# Patient Record
Sex: MALE | Race: WHITE | Employment: OTHER | URBAN - METROPOLITAN AREA
[De-identification: names, ages, dates, MRNs, and addresses within clinical notes are randomized per-mention and may not be internally consistent; named-entity substitution may affect disease eponyms.]

---

## 2017-01-24 ENCOUNTER — HOSPITAL ENCOUNTER (OUTPATIENT)
Dept: LAB | Age: 82
Discharge: HOME | End: 2017-01-24
Payer: MEDICARE

## 2017-01-24 ENCOUNTER — OFFICE VISIT (OUTPATIENT)
Dept: CARDIOLOGY CLINIC | Age: 82
End: 2017-01-24

## 2017-01-24 VITALS
WEIGHT: 246 LBS | HEIGHT: 69 IN | HEART RATE: 62 BPM | DIASTOLIC BLOOD PRESSURE: 90 MMHG | OXYGEN SATURATION: 97 % | SYSTOLIC BLOOD PRESSURE: 152 MMHG | BODY MASS INDEX: 36.43 KG/M2

## 2017-01-24 DIAGNOSIS — I48.92 PAROXYSMAL ATRIAL FLUTTER (HCC): Primary | ICD-10-CM

## 2017-01-24 DIAGNOSIS — R53.83 FATIGUE, UNSPECIFIED TYPE: ICD-10-CM

## 2017-01-24 DIAGNOSIS — Z98.890 S/P ABLATION OF ATRIAL FLUTTER: ICD-10-CM

## 2017-01-24 DIAGNOSIS — Z86.79 S/P ABLATION OF ATRIAL FLUTTER: ICD-10-CM

## 2017-01-24 DIAGNOSIS — I10 ESSENTIAL HYPERTENSION: ICD-10-CM

## 2017-01-24 LAB
ANION GAP BLD CALC-SCNC: 6 MMOL/L (ref 3–18)
BUN SERPL-MCNC: 12 MG/DL (ref 7–18)
BUN/CREAT SERPL: 16 (ref 12–20)
CALCIUM SERPL-MCNC: 8.9 MG/DL (ref 8.5–10.1)
CHLORIDE SERPL-SCNC: 103 MMOL/L (ref 100–108)
CO2 SERPL-SCNC: 29 MMOL/L (ref 21–32)
CREAT SERPL-MCNC: 0.73 MG/DL (ref 0.6–1.3)
GLUCOSE SERPL-MCNC: 93 MG/DL (ref 74–99)
POTASSIUM SERPL-SCNC: 4.3 MMOL/L (ref 3.5–5.5)
SODIUM SERPL-SCNC: 138 MMOL/L (ref 136–145)

## 2017-01-24 PROCEDURE — 80048 BASIC METABOLIC PNL TOTAL CA: CPT | Performed by: NURSE PRACTITIONER

## 2017-01-24 PROCEDURE — 36415 COLL VENOUS BLD VENIPUNCTURE: CPT | Performed by: NURSE PRACTITIONER

## 2017-01-24 RX ORDER — HYDROCHLOROTHIAZIDE 25 MG/1
25 TABLET ORAL DAILY
Qty: 30 TAB | Refills: 6 | Status: SHIPPED | OUTPATIENT
Start: 2017-01-24 | End: 2017-02-07 | Stop reason: SDUPTHER

## 2017-01-24 NOTE — PROGRESS NOTES
1. Have you been to the ER, urgent care clinic since your last visit? Hospitalized since your last visit? No    2. Have you seen or consulted any other health care providers outside of the [address] [place] [address] since your last visit? Include any pap smears or colon screening.  No

## 2017-01-24 NOTE — MR AVS SNAPSHOT
Visit Information Date & Time Provider Department Dept. Phone Encounter #  
 1/24/2017 10:30 AM Raffi Diez NP [facility] [address] [id number] Your Appointments 2/7/2017  9:30 AM  
Follow Up with Raffi Diez NP [facility] ([facility]) Appt Note: 2 week  f/u HTN  
 [address] [place] [address] [address] [address] [id number] [phone] 2300 [facility] [address] [facility]  
  
    
 3/6/2017  8:15 AM  
Office Visit with Ruy Shepadr MD  
Internist of [address] [place] [address] Appt Note: ov 6mos. rm; ov 6mos. rm  
 [address] [facility], [address] [address] [address] [place]  
  
   
 54[address] [facility] [place], [address]  
  
    
 12/14/2017  8:00 AM  
Follow Up with Priyanka Velazquez MD  
[facility] ([facility]) Appt Note: 1 yr f/up [place] [address] [address] [address] [phone] 2300 [facility] [address] Upcoming Health Maintenance Date Due DTaP/Tdap/Td series (1 - Tdap) 8/13/1953 MEDICARE YEARLY EXAM 8/13/1997 GLAUCOMA SCREENING Q2Y 6/25/2017 Pneumococcal 65+ Low/Medium Risk (2 of 2 - PPSV23) 10/28/2017 Allergies as of 1/24/2017  Review Complete On: 1/24/2017 By: Raffi Diez NP No Known Allergies Current Immunizations  Reviewed on 12/1/2016 Name Date Influenza High Dose Vaccine PF 10/16/2016 Influenza Vaccine 10/14/2014 Influenza Vaccine Whole 10/28/2012 Pneumococcal Polysaccharide (PPSV-23) 10/28/2012 Pneumococcal Vaccine (Unspecified Type) 10/28/2012 Not reviewed this visit You Were Diagnosed With   
  
 Codes Comments Paroxysmal atrial flutter (HCC)    -  Primary ICD-10-CM: I48.92 
ICD-9-CM: 427.32 Essential hypertension     ICD-10-CM: I10 
ICD-9-CM: 401.9 S/P ablation of atrial flutter     ICD-10-CM: Z98.890, Z86.79 
ICD-9-CM: V45.89 Fatigue, unspecified type     ICD-10-CM: R53.83 ICD-9-CM: 780.79 Vitals BP Pulse Height(growth percentile) Weight(growth percentile) SpO2 BMI  
 152/90 62 5' 9\" (1.753 m) 246 lb (111.6 kg) 97% 36.33 kg/m2 Smoking Status Former Smoker Vitals History BMI and BSA Data Body Mass Index Body Surface Area  
 36.33 kg/m 2 2.33 m 2 Preferred Pharmacy Pharmacy Name Phone [email or web address] [address] [place], [address] Your Updated Medication List  
  
   
This list is accurate as of: 1/24/17 11:44 AM.  Always use your most recent med list.  
  
  
  
  
 FISH OIL 1,000 mg Cap Generic drug:  omega-3 fatty acids-vitamin e Take 1 Cap by mouth two (2) times a day. metoprolol succinate 25 mg XL tablet Commonly known as:  TOPROL-XL Take 1 Tab by mouth daily. multivitamin tablet Commonly known as:  ONE A DAY Take 1 Tab by mouth daily. OSTEO BI-FLEX (5-LOXIN) 1,500-400-100 mg-unit-mg Tab Generic drug:  glucosamine-D3-Boswellia serr Take 1 Tab by mouth two (2) times a day. To-Do List   
 01/24/2017 Lab:  METABOLIC PANEL, BASIC Around 01/31/2017 ECHO:  2D ECHO COMPLETE ADULT (TTE) W OR WO CONTR Patient Instructions BMP Begin HCTZ 25mg - take one tablet daily All other medications to remain the same Echocardiogram; Dx;  Lower extremity edema, fatigue, evaluate LV function Follow-up with Gerry Gomes in 2 weeks Introducing [facility]! [facility] introduces Haloband patient portal. Now you can access parts of your medical record, email your doctor's office, and request medication refills online. 1. In your internet browser, go to [email or web address] 2. Click on the First Time User? Click Here link in the Sign In box.  You will see the New Member Sign Up page. 3. Enter your buildabrand Access Code exactly as it appears below. You will not need to use this code after youve completed the sign-up process. If you do not sign up before the expiration date, you must request a new code. · buildabrand Access Code: [email or web address] Expires: 3/15/2017  7:46 AM 
 
4. Enter the last four digits of your Social Security Number (xxxx) and Date of Birth (mm/dd/yyyy) as indicated and click Submit. You will be taken to the next sign-up page. 5. Create a buildabrand ID. This will be your buildabrand login ID and cannot be changed, so think of one that is secure and easy to remember. 6. Create a buildabrand password. You can change your password at any time. 7. Enter your Password Reset Question and Answer. This can be used at a later time if you forget your password. 8. Enter your e-mail address. You will receive e-mail notification when new information is available in [address] 9. Click Sign Up. You can now view and download portions of your medical record. 10. Click the Download Summary menu link to download a portable copy of your medical information. If you have questions, please visit the Frequently Asked Questions section of the buildabrand website. Remember, buildabrand is NOT to be used for urgent needs. For medical emergencies, dial 911. Now available from your iPhone and Android! Please provide this summary of care documentation to your next provider. Your primary care clinician is listed as Swapna Alcala. Cordell Chambers. If you have any questions after today's visit, please call [phone].

## 2017-01-24 NOTE — PATIENT INSTRUCTIONS
BMP  Begin HCTZ 25mg - take one tablet daily  All other medications to remain the same  Echocardiogram; Dx;  Lower extremity edema, fatigue, evaluate LV function  Follow-up with Duran Jones in 2 weeks

## 2017-01-24 NOTE — PROGRESS NOTES
Herminia Lai presents today for evaluation of complaints of left ankle swelling. He states that this has been occurring for some time as he had a previous fracture in this foot and the swelling intensifies the longer he stays on it. Mr. Kimmie Salinas is an 61-year-old  male with history of recurrent atrial flutter with ablation in January 2013. He is on a low-dose beta-blocker as he is unable to tolerate higher doses due to mild bradycardia. His last echocardiogram was done in 2011 which showed biatrial enlargement and transient cardiomyopathy. He also has history of hypertension and degenerative joint disease. He was last seen by Dr. Nick Zhang in mid December 2016 and during that visit an echocardiogram was recommended but he he and his wife did not want to proceed at that time. Aside from the swelling around his left ankle, he states that he has no other complaints. He denies chest pain, tightness, heaviness, and palpitations. He denies shortness of breath at rest, dyspnea on exertion, orthopnea and PND. He denies abdominal bloating. He denies lightheadedness, dizziness, and syncope. He admits to swelling around his left ankle. He denies claudication. Denies nausea, vomiting, diarrhea, fever, chills. PMH:  Past Medical History   Diagnosis Date    Arthritis      knees    Cardiac echocardiogram 06/16/2011     Normal LV systolic function. Mild conc LVH. Gr 1 DDfx. RVSP 35-40 mmHg. Marked LAE. Marked ILSA. Mild-mod MR.  Mild-mod AI. Mild AoRE. Mild aortic arch dil.  Cardiac Holter monitor study 06/22/2011     Baseline sinus rhythm to sinus bradycardia, avg HR 60 bpm (range  bpm). Questionable chronotropic intolerance. No sustained arrhythmias.  GERD (gastroesophageal reflux disease)     History of atrial fibrillation 2009    Hypertension     Unspecified adverse effect of anesthesia      H/o A-fib immediately post colonoscopy.        PSH:  Past Surgical History Procedure Laterality Date    Pr egd deliver thermal energy sphnctr/cardia gerd      Hx colonoscopy      Hx svt ablation  1/2/2013     by Dr. Harry Quinteros Hx knee replacement  2/19/2013     right    Hx orthopaedic Right 2/13     TKR, [place]    Hx tonsillectomy      Pr cardiac surg procedure unlist       hx atrial flutter ablation 2013    Hx gi         MEDS:  Current Outpatient Prescriptions   Medication Sig    metoprolol succinate (TOPROL-XL) 25 mg XL tablet Take 1 Tab by mouth daily.  multivitamin (ONE A DAY) tablet Take 1 Tab by mouth daily.  glucosamine-D3-boswellia serr (OSTEO BI-FLEX) 1,500-400-100 mg-unit-mg Tab Take 1 Tab by mouth two (2) times a day.  omega-3 fatty acids-vitamin e (FISH OIL) 1,000 mg cap Take 1 Cap by mouth two (2) times a day. No current facility-administered medications for this visit. Allergies and Sensitivities:  No Known Allergies    Family History:  Family History   Problem Relation Age of Onset    Pacemaker Father     Pacemaker Sister     Heart Failure Brother        Social History:  He  reports that he quit smoking about 52 years ago. He smoked 1.00 pack per day. He has never used smokeless tobacco.  He  reports that he drinks alcohol. Physical:  Visit Vitals    /90    Pulse 62    Ht 5' 9\" (1.753 m)    Wt 111.6 kg (246 lb)    SpO2 97%    BMI 36.33 kg/m2       His weight is down 4 pounds since his last visit. Exam:  Neck:  Supple, no JVD, no carotid bruits  CV:  Normal S1 and  S2, no murmurs, rubs, or gallops noted  Lungs:  Clear to ausculation throughout, no wheezes or rales  Abd:  Soft, non-tender, non-distended with good bowel sounds. No hepatosplenomegaly  Extremities:  Trace edema around the right ankle and 1+ edema around the left ankle.       Data:  EKG:        LABS:  Lab Results   Component Value Date/Time    Sodium 142 01/12/2016 08:02 AM    Potassium 4.2 01/12/2016 08:02 AM    Chloride 102 01/12/2016 08:02 AM    CO2 27 01/12/2016 08:02 AM    Glucose 89 01/12/2016 08:02 AM    BUN 17 01/12/2016 08:02 AM    Creatinine 0.78 01/12/2016 08:02 AM     Lab Results   Component Value Date/Time    Cholesterol, total 171 01/12/2016 08:02 AM    HDL Cholesterol 43 01/12/2016 08:02 AM    LDL, calculated 80.2 01/12/2016 08:02 AM    Triglyceride 239 01/12/2016 08:02 AM    CHOL/HDL Ratio 4.0 01/12/2016 08:02 AM     Lab Results   Component Value Date/Time    ALT 33 01/12/2016 08:02 AM       Impression / Plan:  1. Paroxysmal atrial flutter, status post atrial flutter ablation in January 2013, with no recurrence  2. Hypertension, blood pressure elevated  3. Lower extremity edema  4. Degenerative joint disease  5. History of transient cardiomyopathy    Mr. Salvatore Pimentel presents to the office today for evaluation of complaints of left ankle edema. He states that he recently noticed that it is a little more than usual and that it typically is worse when he is on his feet for long period of time. He also complains of some fatigue. His wife states that he went to [facility] and they did a x-ray. He was last seen by Dr. Mendez Smith in mid December 2016 and during that visit an echocardiogram was recommended. However, Mr. Salvatore Pimentel declined at that time. His last echocardiogram was done in June 2011 and at that time he had normal LV systolic function, grade 1 diastolic dysfunction, RVSP 35-40 mmHg. Will request that he have an echocardiogram done to reevaluate his LV function. He does have a history of transient cardiomyopathy. His blood pressure is elevated today. He is currently only taking a low-dose beta-blocker at 25 mg per day. I will add HCTZ 25 mg once a day to assist with blood pressure control and his complaints of lower extremity edema. He was given a lab requisition to have a BMP done today as his last BMP was done in January 2016. I asked that he return for follow-up in 2 weeks for reevaluation of his blood pressure and edema.     He will follow-up with Dr. Rosalee Medina as scheduled and as needed.       Tyree Hutchison MSN, FNP-BC

## 2017-01-25 ENCOUNTER — HOSPITAL ENCOUNTER (OUTPATIENT)
Dept: NON INVASIVE DIAGNOSTICS | Age: 82
Discharge: HOME | End: 2017-01-25
Attending: NURSE PRACTITIONER
Payer: MEDICARE

## 2017-01-25 DIAGNOSIS — I10 ESSENTIAL HYPERTENSION: ICD-10-CM

## 2017-01-25 PROCEDURE — 93306 TTE W/DOPPLER COMPLETE: CPT

## 2017-01-25 NOTE — PROGRESS NOTES
Echocardiogram was completed, report to follow. Patient armband removed and given to patient to take home. Patient was informed of the privacy risks if armband lost or stolen.

## 2017-02-07 ENCOUNTER — HOSPITAL ENCOUNTER (OUTPATIENT)
Dept: LAB | Age: 82
Discharge: HOME | End: 2017-02-07
Payer: MEDICARE

## 2017-02-07 ENCOUNTER — OFFICE VISIT (OUTPATIENT)
Dept: CARDIOLOGY CLINIC | Age: 82
End: 2017-02-07

## 2017-02-07 VITALS
WEIGHT: 243 LBS | OXYGEN SATURATION: 98 % | HEART RATE: 71 BPM | SYSTOLIC BLOOD PRESSURE: 130 MMHG | BODY MASS INDEX: 35.99 KG/M2 | HEIGHT: 69 IN | DIASTOLIC BLOOD PRESSURE: 72 MMHG

## 2017-02-07 DIAGNOSIS — I42.9 CARDIOMYOPATHY (HCC): ICD-10-CM

## 2017-02-07 DIAGNOSIS — Z98.890 S/P ABLATION OF ATRIAL FLUTTER: ICD-10-CM

## 2017-02-07 DIAGNOSIS — I10 ESSENTIAL HYPERTENSION: Primary | ICD-10-CM

## 2017-02-07 DIAGNOSIS — I48.3 TYPICAL ATRIAL FLUTTER (HCC): ICD-10-CM

## 2017-02-07 DIAGNOSIS — Z86.79 S/P ABLATION OF ATRIAL FLUTTER: ICD-10-CM

## 2017-02-07 LAB
ANION GAP BLD CALC-SCNC: 8 MMOL/L (ref 3–18)
BUN SERPL-MCNC: 14 MG/DL (ref 7–18)
BUN/CREAT SERPL: 16 (ref 12–20)
CALCIUM SERPL-MCNC: 9.3 MG/DL (ref 8.5–10.1)
CHLORIDE SERPL-SCNC: 99 MMOL/L (ref 100–108)
CO2 SERPL-SCNC: 30 MMOL/L (ref 21–32)
CREAT SERPL-MCNC: 0.86 MG/DL (ref 0.6–1.3)
GLUCOSE SERPL-MCNC: 91 MG/DL (ref 74–99)
POTASSIUM SERPL-SCNC: 4.3 MMOL/L (ref 3.5–5.5)
SODIUM SERPL-SCNC: 137 MMOL/L (ref 136–145)

## 2017-02-07 PROCEDURE — 80048 BASIC METABOLIC PNL TOTAL CA: CPT | Performed by: NURSE PRACTITIONER

## 2017-02-07 PROCEDURE — 36415 COLL VENOUS BLD VENIPUNCTURE: CPT | Performed by: NURSE PRACTITIONER

## 2017-02-07 RX ORDER — HYDROCHLOROTHIAZIDE 25 MG/1
25 TABLET ORAL DAILY
Qty: 90 TAB | Refills: 2 | Status: ON HOLD | OUTPATIENT
Start: 2017-02-07 | End: 2017-10-05

## 2017-02-07 NOTE — PROGRESS NOTES
1. Have you been to the ER, urgent care clinic since your last visit? Hospitalized since your last visit? No    2. Have you seen or consulted any other health care providers outside of the [address] [place] [address] since your last visit? Include any pap smears or colon screening.  No

## 2017-02-07 NOTE — MR AVS SNAPSHOT
Visit Information Date & Time Provider Department Dept. Phone Encounter #  
 2/7/2017  9:30 AM Priya Sarkar NP [facility] [address] [id number] Your Appointments 3/6/2017  8:15 AM  
Office Visit with Kings Jarrett MD  
Internist of [address] [place] [address] Appt Note: ov 6mos. rm; ov 6mos. rm  
 [address] [facility] [address] [address] [facility]  
  
   
 [address] [place] [address]  
  
    
 12/14/2017  8:00 AM  
Follow Up with Maynor Pickard MD  
[facility] [address] ([facility]) Appt Note: 1 yr f/up [place] [address] [address] [address] [phone] 2303 [facility] [address] Upcoming Health Maintenance Date Due DTaP/Tdap/Td series (1 - Tdap) 8/13/1953 MEDICARE YEARLY EXAM 8/13/1997 GLAUCOMA SCREENING Q2Y 6/25/2017 Pneumococcal 65+ Low/Medium Risk (2 of 2 - PPSV23) 10/28/2017 Allergies as of 2/7/2017  Review Complete On: 2/7/2017 By: Priya Sarkar NP No Known Allergies Current Immunizations  Reviewed on 12/1/2016 Name Date Influenza High Dose Vaccine PF 10/16/2016 Influenza Vaccine 10/14/2014 Influenza Vaccine Whole 10/28/2012 Pneumococcal Polysaccharide (PPSV-23) 10/28/2012 Pneumococcal Vaccine (Unspecified Type) 10/28/2012 Not reviewed this visit You Were Diagnosed With   
  
 Codes Comments Essential hypertension    -  Primary ICD-10-CM: I10 
ICD-9-CM: 401.9 Typical atrial flutter (HCC)     ICD-10-CM: I48.3 ICD-9-CM: 427.32 S/P ablation of atrial flutter     ICD-10-CM: Z98.890, Z86.79 
ICD-9-CM: V45.89 Cardiomyopathy ([address]     ICD-10-CM: I42.9 ICD-9-CM: 425.4 Vitals BP Pulse Height(growth percentile) Weight(growth percentile) SpO2 BMI  
 130/72 71 5' 9\" (1.753 m) 243 lb (110.2 kg) 98% 35.88 kg/m2 Smoking Status Former Smoker BMI and BSA Data Body Mass Index Body Surface Area  
 35.88 kg/m 2 2.32 m 2 Preferred Pharmacy Pharmacy Name Phone [facility] [address] [place], [address] Your Updated Medication List  
  
   
This list is accurate as of: 2/7/17 10:01 AM.  Always use your most recent med list.  
  
  
  
  
 FISH OIL 1,000 mg Cap Generic drug:  omega-3 fatty acids-vitamin e Take 1 Cap by mouth two (2) times a day. hydroCHLOROthiazide 25 mg tablet Commonly known as:  HYDRODIURIL Take 1 Tab by mouth daily. metoprolol succinate 25 mg XL tablet Commonly known as:  TOPROL-XL Take 1 Tab by mouth daily. multivitamin tablet Commonly known as:  ONE A DAY Take 1 Tab by mouth daily. OSTEO BI-FLEX (5-LOXIN) 1,500-400-100 mg-unit-mg Tab Generic drug:  glucosamine-D3-Boswellia serr Take 1 Tab by mouth two (2) times a day. To-Do List   
 Around 02/07/2017 Lab:  METABOLIC PANEL, BASIC Patient Instructions Continue present medication regimen BMP today Follow-up with Dr. Cheri Head as scheduled and as needed Primary care options:   
 Dr. Evelyne Laureano [facility] [address] [place] [address] # [phone] Dr. Arsen Lara 
 [address] [address] Ph:# [phone] Introducing [facility]! T[facility] introduces Flayr patient portal. Now you can access parts of your medical record, email your doctor's office, and request medication refills online. 1. In your internet browser, go to [email or web address] 2. Click on the First Time User? Click Here link in the Sign In box. You will see the New Member Sign Up page. 3. Enter your Flayr Access Code exactly as it appears below. You will not need to use this code after youve completed the sign-up process.  If you do not sign up before the expiration date, you must request a new code. · Manta Access Code: [email or web address] Expires: 3/15/2017  7:46 AM 
 
4. Enter the last four digits of your Social Security Number (xxxx) and Date of Birth (mm/dd/yyyy) as indicated and click Submit. You will be taken to the next sign-up page. 5. Create a Manta ID. This will be your Manta login ID and cannot be changed, so think of one that is secure and easy to remember. 6. Create a Manta password. You can change your password at any time. 7. Enter your Password Reset Question and Answer. This can be used at a later time if you forget your password. 8. Enter your e-mail address. You will receive e-mail notification when new information is available in [address] 9. Click Sign Up. You can now view and download portions of your medical record. 10. Click the Download Summary menu link to download a portable copy of your medical information. If you have questions, please visit the Frequently Asked Questions section of the Manta website. Remember, Manta is NOT to be used for urgent needs. For medical emergencies, dial 911. Now available from your iPhone and Android! Please provide this summary of care documentation to your next provider. Your primary care clinician is listed as [phone]. If you have any questions after today's visit, please call [phone].

## 2017-02-07 NOTE — PATIENT INSTRUCTIONS
Continue present medication regimen  BMP today  Follow-up with Dr. Mccann Samples as scheduled and as needed    Primary care options:     Dr. Jeff Huff., [address] [facility] [address]   [address] # [phone]     Dr. Rachelle LEMOS [address], [address] [facility] [address]   [place], [address] [place], [place] [address] [address]   Ph:# [phone]

## 2017-02-07 NOTE — PROGRESS NOTES
Fernando Soriano presents today for follow-up of his blood pressure and left ankle swelling. When seen 2 weeks ago, his blood pressure was elevated and he had left ankle edema. He was started on HCTZ 25mg daily and an echo was requested to follow-up on his LV function. Mr. Jeri Addison is an 80-year-old  male with history of recurrent atrial flutter with ablation in January 2013. He is on a low-dose beta-blocker as he is unable to tolerate higher doses due to mild bradycardia. His last echocardiogram was done in 2011 which showed biatrial enlargement and transient cardiomyopathy. He also has history of hypertension and degenerative joint disease. He states that he has been experiencing left ankle swelling for some time as he had a previous fracture in this foot and the swelling intensifies the longer he stays on it. He offers no cardiac complaints. He has noticed some improvement in the edema since starting the HCTZ. He denies chest pain, tightness, heaviness, and palpitations. He denies shortness of breath at rest, dyspnea on exertion, orthopnea and PND. He denies abdominal bloating. He denies lightheadedness, dizziness, and syncope. He admits to swelling around his left ankle but improved. He denies claudication. Denies nausea, vomiting, diarrhea, fever, chills. PMH:  Past Medical History   Diagnosis Date    Arthritis      knees    Cardiac echocardiogram 06/16/2011     Normal LV systolic function. Mild conc LVH. Gr 1 DDfx. RVSP 35-40 mmHg. Marked LAE. Marked LISA. Mild-mod MR.  Mild-mod AI. Mild AoRE. Mild aortic arch dil.  Cardiac Holter monitor study 06/22/2011     Baseline sinus rhythm to sinus bradycardia, avg HR 60 bpm (range  bpm). Questionable chronotropic intolerance. No sustained arrhythmias.     GERD (gastroesophageal reflux disease)     History of atrial fibrillation 2009    Hypertension     Unspecified adverse effect of anesthesia      H/o A-fib immediately post colonoscopy. PSH:  Past Surgical History   Procedure Laterality Date    Pr egd deliver thermal energy sphnctr/cardia gerd      Hx colonoscopy      Hx svt ablation  1/2/2013     by Dr. Milagros Antonio Hx knee replacement  2/19/2013     right    Hx orthopaedic Right 2/13     TKR, [place]    Hx tonsillectomy      Pr cardiac surg procedure unlist       hx atrial flutter ablation 2013    Hx gi         MEDS:  Current Outpatient Prescriptions   Medication Sig    hydroCHLOROthiazide (HYDRODIURIL) 25 mg tablet Take 1 Tab by mouth daily.  metoprolol succinate (TOPROL-XL) 25 mg XL tablet Take 1 Tab by mouth daily.  multivitamin (ONE A DAY) tablet Take 1 Tab by mouth daily.  glucosamine-D3-boswellia serr (OSTEO BI-FLEX) 1,500-400-100 mg-unit-mg Tab Take 1 Tab by mouth two (2) times a day.  omega-3 fatty acids-vitamin e (FISH OIL) 1,000 mg cap Take 1 Cap by mouth two (2) times a day. No current facility-administered medications for this visit. Allergies and Sensitivities:  No Known Allergies    Family History:  Family History   Problem Relation Age of Onset    Pacemaker Father     Pacemaker Sister     Heart Failure Brother        Social History:  He  reports that he quit smoking about 52 years ago. He smoked 1.00 pack per day. He has never used smokeless tobacco.  He  reports that he drinks alcohol. Physical:  Visit Vitals    /72    Pulse 71    Ht 5' 9\" (1.753 m)    Wt 110.2 kg (243 lb)    SpO2 98%    BMI 35.88 kg/m2       His weight is down 3 pounds since his last visit. Exam:  Neck:  Supple, no JVD, no carotid bruits  CV:  Normal S1 and  S2, no murmurs, rubs, or gallops noted  Lungs:  Clear to ausculation throughout, no wheezes or rales  Abd:  Soft, non-tender, non-distended with good bowel sounds.   No hepatosplenomegaly  Extremities:  Trace edema around both ankles      Data:  EKG:  Not done today      LABS:  Lab Results   Component Value Date/Time    Sodium 138 01/24/2017 12:40 PM    Potassium 4.3 01/24/2017 12:40 PM    Chloride 103 01/24/2017 12:40 PM    CO2 29 01/24/2017 12:40 PM    Glucose 93 01/24/2017 12:40 PM    BUN 12 01/24/2017 12:40 PM    Creatinine 0.73 01/24/2017 12:40 PM     Lab Results   Component Value Date/Time    Cholesterol, total 171 01/12/2016 08:02 AM    HDL Cholesterol 43 01/12/2016 08:02 AM    LDL, calculated 80.2 01/12/2016 08:02 AM    Triglyceride 239 01/12/2016 08:02 AM    CHOL/HDL Ratio 4.0 01/12/2016 08:02 AM     Lab Results   Component Value Date/Time    ALT (SGPT) 33 01/12/2016 08:02 AM       Impression / Plan:  1. Paroxysmal atrial flutter, status post atrial flutter ablation in January 2013, with no recurrence  2. Hypertension, blood pressure now well-controlled  3. Lower extremity edema, improved since starting HCTZ  4. Degenerative joint disease  5. History of transient cardiomyopathy    Mr. Isabel Vega presents to the office today for for follow-up after being started on HCTZ 25mg daily for suboptimally controlled blood pressure and left ankle edema. His pre-HCTZ labs are noted above and are stable. Results were discussed with him and his wife. He states that he is feeling well. He has noticed some improvement in his lower extremity edema and his weight is down 3 pounds. His blood pressure is now well-controlled and he denies side effects from the HCTZ. Will ask that he have another BMP done to follow-up on his renal function after being on the HCTZ. He had an echo done on Jan. 25, 2017 and it showed an EF of 55 to 60%, no regional wall motion abnormalities, improved aortic and mitral regurgitation, pulmonary artery pressure has decreased and left and right atrium size has decreased (when compared to previous echo done in June 2011. His last echocardiogram was done in June 2011 and at that time it showed normal LV systolic function, grade 1 diastolic dysfunction, RVSP 35-40 mmHg.   Results of his echocardiogram were also discussed with them. He will follow-up with Dr. Abdulaziz Crawford as scheduled and as needed. They requested recommendations for primary care physicians.       Crissy Taylor MSN, FNP-BC

## 2017-03-13 ENCOUNTER — OFFICE VISIT (OUTPATIENT)
Dept: FAMILY MEDICINE CLINIC | Age: 82
End: 2017-03-13

## 2017-03-13 VITALS
WEIGHT: 244 LBS | SYSTOLIC BLOOD PRESSURE: 128 MMHG | HEIGHT: 69 IN | BODY MASS INDEX: 36.14 KG/M2 | OXYGEN SATURATION: 95 % | TEMPERATURE: 98.8 F | HEART RATE: 69 BPM | DIASTOLIC BLOOD PRESSURE: 84 MMHG | RESPIRATION RATE: 16 BRPM

## 2017-03-13 DIAGNOSIS — I10 ESSENTIAL HYPERTENSION: ICD-10-CM

## 2017-03-13 DIAGNOSIS — M25.472 LEFT ANKLE SWELLING: Primary | ICD-10-CM

## 2017-03-13 DIAGNOSIS — M21.42 PES PLANUS OF LEFT FOOT: ICD-10-CM

## 2017-03-13 DIAGNOSIS — Z98.890 S/P ABLATION OF ATRIAL FLUTTER: ICD-10-CM

## 2017-03-13 DIAGNOSIS — Z86.79 S/P ABLATION OF ATRIAL FLUTTER: ICD-10-CM

## 2017-03-13 DIAGNOSIS — Z87.81 HISTORY OF ANKLE FRACTURE: ICD-10-CM

## 2017-03-13 DIAGNOSIS — R79.89 ELEVATED TSH: ICD-10-CM

## 2017-03-13 RX ORDER — IBUPROFEN 800 MG/1
800 TABLET ORAL
COMMUNITY
Start: 2017-01-17 | End: 2017-10-06

## 2017-03-13 NOTE — PATIENT INSTRUCTIONS
Ankle: Exercises  Your Care Instructions  Here are some examples of exercises for your ankle. Start each exercise slowly. Ease off the exercise if you start to have pain. Your doctor or physical therapist will tell you when you can start these exercises and which ones will work best for you. How to do the exercises  \"Alphabet\" exercise    1. Trace the alphabet with your toe. This helps your ankle move in all directions. Side-to-side knee swing exercise    1. Sit in a chair with your foot flat on the floor. 2. Slowly move your knee from side to side while keeping your foot pressed flat. 3. Continue this exercise for 2 to 3 minutes. Towel curl    1. While sitting, place your foot on a towel on the floor and scrunch the towel toward you with your toes. 2. Then use your toes to push the towel away from you. 3. Make this exercise more challenging by placing a weighted object, such as a soup can, on the other end of the towel. Towel stretch    1. Sit with your legs extended and knees straight. 2. Place a towel around your foot just under the toes. 3. Hold each end of the towel in each hand, with your hands above your knees. 4. Pull back with the towel so that your foot stretches toward you. 5. Hold the position for at least 15 to 30 seconds. 6. Repeat 2 to 4 times a session, up to 5 sessions a day. Ankle eversion exercise    1. Start by sitting with your foot flat on the floor and pushing it outward against an immovable object such as the wall or heavy furniture. Hold for about 6 seconds, then relax. Repeat 8 to 12 times. 2. After you feel comfortable with this, try using rubber tubing looped around the outside of your feet for resistance. Push your foot out to the side against the tubing, and then count to 10 as you slowly bring your foot back to the middle. Repeat 8 to 12 times. Isometric opposition exercises    1. While sitting, put your feet together flat on the floor.   2. Press your injured foot inward against your other foot. Hold for about 6 seconds, and relax. Repeat 8 to 12 times. 3. Then place the heel of your other foot on top of the injured one. Push down with the top heel while trying to push up with your injured foot. Hold for about 6 seconds, and relax. Repeat 8 to 12 times. Follow-up care is a key part of your treatment and safety. Be sure to make and go to all appointments, and call your doctor if you are having problems. It's also a good idea to know your test results and keep a list of the medicines you take. Where can you learn more? Go to http://fouzia-bere.info/. Enter I500 in the search box to learn more about \"Ankle: Exercises. \"  Current as of: May 23, 2016  Content Version: 11.1  © 1271-1432 [employer]. Care instructions adapted under license by [employer] (which disclaims liability or warranty for this information). If you have questions about a medical condition or this instruction, always ask your healthcare professional. [address] any warranty or liability for your use of this information.

## 2017-03-13 NOTE — PROGRESS NOTES
1. Have you been to the ER, urgent care clinic since your last visit? Hospitalized since your last visit? N/A    2. Have you seen or consulted any other health care providers outside of the [address] [place] [address] since your last visit? Include any pap smears or colon screening.  Jose A Angel is a new patient to [facility] (Dr. Hurley Lundborg)     Last Tdap: axp 1997  Last Colonoscopy: Dr. Yareli Hidalgo 04-

## 2017-03-13 NOTE — MR AVS SNAPSHOT
Visit Information Date & Time Provider Department Dept. Phone Encounter #  
 3/13/2017  9:00 AM Jerrica Cuenca, [address] [id number] Follow-up Instructions Return in about 1 month (around 4/13/2017) for routine care and Medicare Wellness exam.  Fasting labs due 3-7 days prior to appointment. Your Appointments 12/14/2017  8:00 AM  
Follow Up with Maynor Pickard MD  
[facility] ([facility]) Appt Note: 1 yr f/up [place] [address] [address] [address] [phone] [address] Upcoming Health Maintenance Date Due COLONOSCOPY 8/13/1950 MEDICARE YEARLY EXAM 8/13/1997 Pneumococcal 65+ Low/Medium Risk (2 of 2 - PPSV23) 10/28/2017 GLAUCOMA SCREENING Q2Y 1/27/2019 DTaP/Tdap/Td series (2 - Td) 3/13/2027 Allergies as of 3/13/2017  Review Complete On: 2/7/2017 By: Cheyenne Manzanares NP No Known Allergies Current Immunizations  Reviewed on 3/13/2017 Name Date Influenza High Dose Vaccine PF 10/16/2016 Influenza Vaccine 10/14/2014 Influenza Vaccine Whole 10/28/2012 Pneumococcal Polysaccharide (PPSV-23) 10/28/2012 Pneumococcal Vaccine (Unspecified Type) 10/28/2012 Reviewed by Jerrica Cuenca MD on 3/13/2017 at  9:35 AM  
You Were Diagnosed With   
  
 Codes Comments Left ankle swelling    -  Primary ICD-10-CM: M25.472 ICD-9-CM: 719.07 Essential hypertension     ICD-10-CM: I10 
ICD-9-CM: 401.9 Elevated TSH     ICD-10-CM: R94.6 ICD-9-CM: 794.5 S/P ablation of atrial flutter     ICD-10-CM: Z98.890, Z86.79 
ICD-9-CM: V45.89 History of ankle fracture     ICD-10-CM: Z87.81 ICD-9-CM: V15.51 Pes planovalgus, acquired, left     ICD-10-CM: M21.42 
ICD-9-CM: 736.79 Vitals BP Pulse Temp Resp Height(growth percentile) Weight(growth percentile) 128/84 69 98.8 °F (37.1 °C) (Oral) 16 5' 9\" (1.753 m) 244 lb (110.7 kg) SpO2 BMI Smoking Status 95% 36.03 kg/m2 Former Smoker Vitals History BMI and BSA Data Body Mass Index Body Surface Area 36.03 kg/m 2 2.32 m 2 Preferred Pharmacy Pharmacy Name Phone [facility] [address] [place], [address] Your Updated Medication List  
  
   
This list is accurate as of: 3/13/17  9:45 AM.  Always use your most recent med list.  
  
  
  
  
 FISH OIL 1,000 mg Cap Generic drug:  omega-3 fatty acids-vitamin e Take 1 Cap by mouth two (2) times a day. hydroCHLOROthiazide 25 mg tablet Commonly known as:  HYDRODIURIL Take 1 Tab by mouth daily. ibuprofen 800 mg tablet Commonly known as:  MOTRIN Take 800 mg by mouth every eight (8) hours as needed. metoprolol succinate 25 mg XL tablet Commonly known as:  TOPROL-XL Take 1 Tab by mouth daily. multivitamin tablet Commonly known as:  ONE A DAY Take 1 Tab by mouth daily. OSTEO BI-FLEX (5-LOXIN) 1,500-400-100 mg-unit-mg Tab Generic drug:  glucosamine-D3-Boswellia serr Take 1 Tab by mouth two (2) times a day. Follow-up Instructions Return in about 1 month (around 4/13/2017) for routine care and Medicare Wellness exam.  Fasting labs due 3-7 days prior to appointment. To-Do List   
 03/13/2017 Lab:  CBC W/O DIFF   
  
 03/13/2017 Lab:  LIPID PANEL   
  
 03/13/2017 Lab:  METABOLIC PANEL, COMPREHENSIVE   
  
 03/13/2017 Lab:  TSH 3RD GENERATION Patient Instructions Ankle: Exercises Your Care Instructions Here are some examples of exercises for your ankle. Start each exercise slowly. Ease off the exercise if you start to have pain. Your doctor or physical therapist will tell you when you can start these exercises and which ones will work best for you. How to do the exercises \"Alphabet\" exercise 1. Trace the alphabet with your toe. This helps your ankle move in all directions. Side-to-side knee swing exercise 1. Sit in a chair with your foot flat on the floor. 2. Slowly move your knee from side to side while keeping your foot pressed flat. 3. Continue this exercise for 2 to 3 minutes. Towel curl 1. While sitting, place your foot on a towel on the floor and scrunch the towel toward you with your toes. 2. Then use your toes to push the towel away from you. 3. Make this exercise more challenging by placing a weighted object, such as a soup can, on the other end of the towel. Towel stretch 1. Sit with your legs extended and knees straight. 2. Place a towel around your foot just under the toes. 3. Hold each end of the towel in each hand, with your hands above your knees. 4. Pull back with the towel so that your foot stretches toward you. 5. Hold the position for at least 15 to 30 seconds. 6. Repeat 2 to 4 times a session, up to 5 sessions a day. Ankle eversion exercise 1. Start by sitting with your foot flat on the floor and pushing it outward against an immovable object such as the wall or heavy furniture. Hold for about 6 seconds, then relax. Repeat 8 to 12 times. 2. After you feel comfortable with this, try using rubber tubing looped around the outside of your feet for resistance. Push your foot out to the side against the tubing, and then count to 10 as you slowly bring your foot back to the middle. Repeat 8 to 12 times. Isometric opposition exercises 1. While sitting, put your feet together flat on the floor. 2. Press your injured foot inward against your other foot. Hold for about 6 seconds, and relax. Repeat 8 to 12 times. 3. Then place the heel of your other foot on top of the injured one. Push down with the top heel while trying to push up with your injured foot. Hold for about 6 seconds, and relax. Repeat 8 to 12 times. Follow-up care is a key part of your treatment and safety. Be sure to make and go to all appointments, and call your doctor if you are having problems. It's also a good idea to know your test results and keep a list of the medicines you take. Where can you learn more? Go to http://fouzia-bere.info/. Enter Q477 in the search box to learn more about \"Ankle: Exercises. \" Current as of: May 23, 2016 Content Version: 11.1 © 7087-1973 [employer], [employer]orporated. Care instructions adapted under license by [employer] (which disclaims liability or warranty for this information). If you have questions about a medical condition or this instruction, always ask your healthcare professional. [address] any warranty or liability for your use of this information. Introducing [facility]! [employer] introduces [employer] patient portal. Now you can access parts of your medical record, email your doctor's office, and request medication refills online. 1. In your internet browser, go to [email or web address] 2. Click on the First Time User? Click Here link in the Sign In box. You will see the New Member Sign Up page. 3. Enter your [employer] Access Code exactly as it appears below. You will not need to use this code after youve completed the sign-up process. If you do not sign up before the expiration date, you must request a new code. · [employer] Access Code: [email or web address] Expires: 3/15/2017  8:46 AM 
 
4. Enter the last four digits of your Social Security Number (xxxx) and Date of Birth (mm/dd/yyyy) as indicated and click Submit. You will be taken to the next sign-up page. 5. Create a GroupSwimt ID. This will be your [employer] login ID and cannot be changed, so think of one that is secure and easy to remember. 6. Create a GroupSwimt password. You can change your password at any time. 7. Enter your Password Reset Question and Answer. This can be used at a later time if you forget your password. 8. Enter your e-mail address. You will receive e-mail notification when new information is available in [address] 9. Click Sign Up. You can now view and download portions of your medical record. 10. Click the Download Summary menu link to download a portable copy of your medical information. If you have questions, please visit the Frequently Asked Questions section of the [employer] website. Remember, [employer] is NOT to be used for urgent needs. For medical emergencies, dial 911. Now available from your iPhone and Android! Please provide this summary of care documentation to your next provider. Your primary care clinician is listed as Reji Valdes. Vandana Car. If you have any questions after today's visit, please call [phone].

## 2017-03-14 NOTE — PROGRESS NOTES
SUBJECTIVE  Chief Complaint   Patient presents with    New Patient    Ankle swelling     c/o left ankle swelling    Numbness     c/o plantar numbness     Patient presents to [facility] care. Has had ongoing complaints of left ankle swelling over the past 6 months. [place] He says that he had a medial fracture. He went to [facility] to get this checked out and has films with him showing two medial loose fracture fragments. He has had some reduction of swelling with recently started HCTZ by his cardiologist.  He has had a 2D ECHO as well recently. He denies pain but his wife says that he has complained of ankle pain if he walks far distances. We reviewed their cardiac risk factors. The patient has been taking medications as prescribed regularly and denies any side effects with the medication. The patient denies any chest pain or chest tightness. Denies any dyspnea upon exertion. ROS:  Complete 10 system ROS is obtained from the patient intake forms which are reviewed with the patient. The intake H&P is scanned in for the chart. OBJECTIVE    Blood pressure 128/84, pulse 69, temperature 98.8 °F (37.1 °C), temperature source Oral, resp. rate 16, height 5' 9\" (1.753 m), weight 244 lb (110.7 kg), SpO2 95 %. General:  Alert, cooperative, well appearing, in no apparent distress. Neck:  There are no carotid bruits. No JVD. CV:  The heart sounds are regular in rate and rhythm. There is a normal S1 and S2. There or no murmurs. Lungs: Inspiratory and expiratory efforts are full and unlabored. Lung sounds are clear and equal to auscultation throughout all lung fields without wheezing, rales, or rhonchi. Ext: mild swelling, trace pitting in the left ankle area. Left Ankle:  Reduced ROM. Nontender. Swelling worse medially. Pes planus of both feet. Psych: normal affect. Mood good. Oriented x 3. Judgement and insight intact. ASSESSMENT / PLAN    ICD-10-CM ICD-9-CM    1. Left ankle swelling M25.472 719.07    2. Essential hypertension I10 401.9 CBC W/O DIFF      METABOLIC PANEL, COMPREHENSIVE      LIPID PANEL   3. Elevated TSH R94.6 794.5 TSH 3RD GENERATION   4. S/P ablation of atrial flutter Z98.890 V45.89     Z86.79     5. History of ankle fracture Z87.81 V15.51    6. Pes planus of left foot M21.42 734      Left ankle swelling - suggested etiologies are multifactorial including his history of an ankle fracture with arthritis and venous insufficieny. Advised on orthotics to support his pes planus. Advised on possible compression stockings. Continue HCTZ as this has helped so far. HTN - well-controlled. Continue current care. Labs ordered. Elevated TSH - in 2016. Repeat TSH. S/P ablation for arrhythmia - cont per Dr. Gladis Carnes. H/O ankle fracture - films and [facility] record reviewed. HEP handout. Pes planus - referred to PT for orthotic fabrication. 30 minutes of face to face time spent with the patient with at least 50% on counseling on above medical issues, especially on the management of ankle swelling. All chart history elements were reviewed by me at the time of the visit even though marked at time of note closure. Patient understands our medical plan. Patient has provided input and agrees with goals. Alternatives have been explained and offered. All questions answered. The patient is to call if condition worsens or fails to improve. Follow-up Disposition:  Return in about 1 month (around 4/13/2017) for routine care and Medicare Wellness exam.  Fasting labs due 3-7 days prior to appointment.

## 2017-03-28 ENCOUNTER — HOSPITAL ENCOUNTER (OUTPATIENT)
Dept: LAB | Age: 82
Discharge: HOME | End: 2017-03-28
Payer: MEDICARE

## 2017-03-28 DIAGNOSIS — I10 ESSENTIAL HYPERTENSION: ICD-10-CM

## 2017-03-28 DIAGNOSIS — R79.89 ELEVATED TSH: ICD-10-CM

## 2017-03-28 LAB
ALBUMIN SERPL BCP-MCNC: 3.3 G/DL (ref 3.4–5)
ALBUMIN/GLOB SERPL: 0.8 {RATIO} (ref 0.8–1.7)
ALP SERPL-CCNC: 78 U/L (ref 45–117)
ALT SERPL-CCNC: 30 U/L (ref 16–61)
ANION GAP BLD CALC-SCNC: 10 MMOL/L (ref 3–18)
AST SERPL W P-5'-P-CCNC: 28 U/L (ref 15–37)
BILIRUB SERPL-MCNC: 0.4 MG/DL (ref 0.2–1)
BUN SERPL-MCNC: 14 MG/DL (ref 7–18)
BUN/CREAT SERPL: 18 (ref 12–20)
CALCIUM SERPL-MCNC: 8.7 MG/DL (ref 8.5–10.1)
CHLORIDE SERPL-SCNC: 103 MMOL/L (ref 100–108)
CHOLEST SERPL-MCNC: 155 MG/DL
CO2 SERPL-SCNC: 29 MMOL/L (ref 21–32)
CREAT SERPL-MCNC: 0.77 MG/DL (ref 0.6–1.3)
ERYTHROCYTE [DISTWIDTH] IN BLOOD BY AUTOMATED COUNT: 14.2 % (ref 11.6–14.5)
GLOBULIN SER CALC-MCNC: 4.2 G/DL (ref 2–4)
GLUCOSE SERPL-MCNC: 102 MG/DL (ref 74–99)
HCT VFR BLD AUTO: 43 % (ref 36–48)
HDLC SERPL-MCNC: 45 MG/DL (ref 40–60)
HDLC SERPL: 3.4 {RATIO} (ref 0–5)
HGB BLD-MCNC: 14.5 G/DL (ref 13–16)
LDLC SERPL CALC-MCNC: 74.2 MG/DL (ref 0–100)
LIPID PROFILE,FLP: ABNORMAL
MCH RBC QN AUTO: 31.9 PG (ref 24–34)
MCHC RBC AUTO-ENTMCNC: 33.7 G/DL (ref 31–37)
MCV RBC AUTO: 94.5 FL (ref 74–97)
PLATELET # BLD AUTO: 180 K/UL (ref 135–420)
PMV BLD AUTO: 11.7 FL (ref 9.2–11.8)
POTASSIUM SERPL-SCNC: 4 MMOL/L (ref 3.5–5.5)
PROT SERPL-MCNC: 7.5 G/DL (ref 6.4–8.2)
RBC # BLD AUTO: 4.55 M/UL (ref 4.7–5.5)
SODIUM SERPL-SCNC: 142 MMOL/L (ref 136–145)
TRIGL SERPL-MCNC: 179 MG/DL (ref ?–150)
TSH SERPL DL<=0.05 MIU/L-ACNC: 2.41 UIU/ML (ref 0.36–3.74)
VLDLC SERPL CALC-MCNC: 35.8 MG/DL
WBC # BLD AUTO: 8.8 K/UL (ref 4.6–13.2)

## 2017-03-28 PROCEDURE — 36415 COLL VENOUS BLD VENIPUNCTURE: CPT | Performed by: FAMILY MEDICINE

## 2017-03-28 PROCEDURE — 80061 LIPID PANEL: CPT | Performed by: FAMILY MEDICINE

## 2017-03-28 PROCEDURE — 80053 COMPREHEN METABOLIC PANEL: CPT | Performed by: FAMILY MEDICINE

## 2017-03-28 PROCEDURE — 84443 ASSAY THYROID STIM HORMONE: CPT | Performed by: FAMILY MEDICINE

## 2017-03-28 PROCEDURE — 85027 COMPLETE CBC AUTOMATED: CPT | Performed by: FAMILY MEDICINE

## 2017-04-03 RX ORDER — METOPROLOL SUCCINATE 25 MG/1
TABLET, EXTENDED RELEASE ORAL
Qty: 90 TAB | Refills: 0 | Status: SHIPPED | OUTPATIENT
Start: 2017-04-03 | End: 2017-04-17 | Stop reason: SDUPTHER

## 2017-04-05 NOTE — PROGRESS NOTES
Nurse to advise just minor abnormalities like elevated globulin levels. This is rarely anything of significance but we should discuss this at his follow-up. May need more labs after we discuss at his follow-up. All other labs look good.

## 2017-04-06 NOTE — PROGRESS NOTES
Patient identifiers verified. He was advised that per Dr. Facundo Manning, there were minor abnormalities like elevated globulin levels. This is rarely anything of significance but he will discuss this at his follow-up. There may be a need more labs after it is discussed at his follow-up. All other labs look good. Patient voices understanding and confirms he will be attending his 4/17/17 appointment.

## 2017-04-17 ENCOUNTER — OFFICE VISIT (OUTPATIENT)
Dept: FAMILY MEDICINE CLINIC | Age: 82
End: 2017-04-17

## 2017-04-17 ENCOUNTER — HOSPITAL ENCOUNTER (OUTPATIENT)
Dept: LAB | Age: 82
Discharge: HOME | End: 2017-04-17
Payer: MEDICARE

## 2017-04-17 VITALS
WEIGHT: 241 LBS | HEART RATE: 68 BPM | TEMPERATURE: 98 F | HEIGHT: 69 IN | RESPIRATION RATE: 16 BRPM | SYSTOLIC BLOOD PRESSURE: 130 MMHG | DIASTOLIC BLOOD PRESSURE: 74 MMHG | OXYGEN SATURATION: 96 % | BODY MASS INDEX: 35.7 KG/M2

## 2017-04-17 VITALS
WEIGHT: 241 LBS | RESPIRATION RATE: 16 BRPM | OXYGEN SATURATION: 96 % | SYSTOLIC BLOOD PRESSURE: 130 MMHG | DIASTOLIC BLOOD PRESSURE: 74 MMHG | TEMPERATURE: 98 F | BODY MASS INDEX: 35.7 KG/M2 | HEIGHT: 69 IN | HEART RATE: 68 BPM

## 2017-04-17 DIAGNOSIS — I10 ESSENTIAL HYPERTENSION: ICD-10-CM

## 2017-04-17 DIAGNOSIS — R79.89 ABNORMAL TSH: ICD-10-CM

## 2017-04-17 DIAGNOSIS — R77.1 ELEVATED SERUM GLOBULIN LEVEL: ICD-10-CM

## 2017-04-17 DIAGNOSIS — M25.472 LEFT ANKLE SWELLING: Primary | ICD-10-CM

## 2017-04-17 DIAGNOSIS — Z86.79 S/P ABLATION OF ATRIAL FLUTTER: ICD-10-CM

## 2017-04-17 DIAGNOSIS — Z13.31 SCREENING FOR DEPRESSION: ICD-10-CM

## 2017-04-17 DIAGNOSIS — M15.9 PRIMARY OSTEOARTHRITIS INVOLVING MULTIPLE JOINTS: ICD-10-CM

## 2017-04-17 DIAGNOSIS — Z00.00 ROUTINE GENERAL MEDICAL EXAMINATION AT A HEALTH CARE FACILITY: ICD-10-CM

## 2017-04-17 DIAGNOSIS — Z71.89 ADVANCED DIRECTIVES, COUNSELING/DISCUSSION: ICD-10-CM

## 2017-04-17 DIAGNOSIS — Z98.890 S/P ABLATION OF ATRIAL FLUTTER: ICD-10-CM

## 2017-04-17 PROCEDURE — 36415 COLL VENOUS BLD VENIPUNCTURE: CPT | Performed by: FAMILY MEDICINE

## 2017-04-17 PROCEDURE — 84156 ASSAY OF PROTEIN URINE: CPT | Performed by: FAMILY MEDICINE

## 2017-04-17 PROCEDURE — 84155 ASSAY OF PROTEIN SERUM: CPT | Performed by: FAMILY MEDICINE

## 2017-04-17 RX ORDER — METOPROLOL SUCCINATE 25 MG/1
TABLET, EXTENDED RELEASE ORAL
Qty: 90 TAB | Refills: 1 | Status: SHIPPED | OUTPATIENT
Start: 2017-04-17 | End: 2018-01-29 | Stop reason: SDUPTHER

## 2017-04-17 NOTE — PROGRESS NOTES
1. Have you been to the ER, urgent care clinic since your last visit? Hospitalized since your last visit? No    2. Have you seen or consulted any other health care providers outside of the [address] [place] [address] since your last visit? Include any pap smears or colon screening.  No

## 2017-04-17 NOTE — MR AVS SNAPSHOT
Visit Information Date & Time Provider Department Dept. Phone Encounter #  
 4/17/2017  8:45 AM Taj Henriquez, [address] [id number] Follow-up Instructions Return in about 6 months (around 10/17/2017) for HTN/Influenza Vaccine and have labs done today (04/17/2017). Your Appointments 4/17/2017  8:45 AM  
ROUTINE CARE with Taj Henriquez MD  
[address] [facility] ([address]) Appt Note: Followup [address] [facility] [address]  
[address] [facility] [address] [address] [address] [address]  
  
    
 12/14/2017  8:00 AM  
Follow Up with Teodora Benz MD  
[facility] ([address]) Appt Note: 1 yr f/up [place] [address] [address] [address]  
[phone] [address] Upcoming Health Maintenance Date Due  
 MEDICARE YEARLY EXAM 8/13/1997 Pneumococcal 65+ Low/Medium Risk (2 of 2 - PPSV23) 10/28/2017 GLAUCOMA SCREENING Q2Y 1/27/2019 COLONOSCOPY 4/13/2021 DTaP/Tdap/Td series (2 - Td) 3/13/2027 Allergies as of 4/17/2017  Review Complete On: 4/17/2017 By: Taj Henriquez MD  
 No Known Allergies Current Immunizations  Reviewed on 4/17/2017 Name Date Influenza High Dose Vaccine PF 10/16/2016 Influenza Vaccine 10/14/2014 Influenza Vaccine Whole 10/28/2012 Pneumococcal Polysaccharide (PPSV-23) 10/28/2012 Pneumococcal Vaccine (Unspecified Type) 10/28/2012 Reviewed by Mattie Enriquez on 4/17/2017 at  8:14 AM  
You Were Diagnosed With   
  
 Codes Comments Left ankle swelling    -  Primary ICD-10-CM: M25.472 ICD-9-CM: 719.07   
 Primary osteoarthritis involving multiple joints     ICD-10-CM: M15.0 ICD-9-CM: 715.09 Essential hypertension     ICD-10-CM: I10 
ICD-9-CM: 401.9 S/P ablation of atrial flutter     ICD-10-CM: Z98.890, Z86.79 
ICD-9-CM: V45.89 Abnormal TSH     ICD-10-CM: R79.89 ICD-9-CM: 790.6 Elevated serum globulin level     ICD-10-CM: R77.1 ICD-9-CM: 790.99 Vitals BP Pulse Temp Resp Height(growth percentile) Weight(growth percentile) 130/74 (BP 1 Location: Left arm, BP Patient Position: Sitting) 68 98 °F (36.7 °C) (Oral) 16 5' 9\" (1.753 m) 241 lb (109.3 kg) SpO2 BMI Smoking Status 96% 35.59 kg/m2 Former Smoker BMI and BSA Data Body Mass Index Body Surface Area 35.59 kg/m 2 2.31 m 2 Preferred Pharmacy Pharmacy Name Phone [facility] Your Updated Medication List  
  
   
This list is accurate as of: 4/17/17  8:42 AM.  Always use your most recent med list.  
  
  
  
  
 FISH OIL 1,000 mg Cap Generic drug:  omega-3 fatty acids-vitamin e Take 1 Cap by mouth two (2) times a day. hydroCHLOROthiazide 25 mg tablet Commonly known as:  HYDRODIURIL Take 1 Tab by mouth daily. ibuprofen 800 mg tablet Commonly known as:  MOTRIN Take 800 mg by mouth every eight (8) hours as needed. metoprolol succinate 25 mg XL tablet Commonly known as:  TOPROL-XL  
TAKE ONE TABLET BY MOUTH ONCE DAILY  
  
 multivitamin tablet Commonly known as:  ONE A DAY Take 1 Tab by mouth daily. OSTEO BI-FLEX (5-LOXIN) 1,500-400-100 mg-unit-mg Tab Generic drug:  glucosamine-D3-Boswellia serr Take 1 Tab by mouth two (2) times a day. Prescriptions Printed Refills  
 metoprolol succinate (TOPROL-XL) 25 mg XL tablet 1 Sig: TAKE ONE TABLET BY MOUTH ONCE DAILY Class: Print Follow-up Instructions Return in about 6 months (around 10/17/2017) for HTN/Influenza Vaccine and have labs done today (04/17/2017). To-Do List   
 04/17/2017 Lab:  PROTEIN ELECT & JAMEL, UR, RANDOM   
  
 04/17/2017 Lab: PROTEIN ELECTROPHORESIS Patient Instructions A Healthy Lifestyle: Care Instructions Your Care Instructions A healthy lifestyle can help you feel good, stay at a healthy weight, and have plenty of energy for both work and play. A healthy lifestyle is something you can share with your whole family. A healthy lifestyle also can lower your risk for serious health problems, such as high blood pressure, heart disease, and diabetes. You can follow a few steps listed below to improve your health and the health of your family. Follow-up care is a key part of your treatment and safety. Be sure to make and go to all appointments, and call your doctor if you are having problems. Its also a good idea to know your test results and keep a list of the medicines you take. How can you care for yourself at home? · Do not eat too much sugar, fat, or fast foods. You can still have dessert and treats now and then. The goal is moderation. · Start small to improve your eating habits. Pay attention to portion sizes, drink less juice and soda pop, and eat more fruits and vegetables. ¨ Eat a healthy amount of food. A 3-ounce serving of meat, for example, is about the size of a deck of cards. Fill the rest of your plate with vegetables and whole grains. ¨ Limit the amount of soda and sports drinks you have every day. Drink more water when you are thirsty. ¨ Eat at least 5 servings of fruits and vegetables every day. It may seem like a lot, but it is not hard to reach this goal. A serving or helping is 1 piece of fruit, 1 cup of vegetables, or 2 cups of leafy, raw vegetables. Have an apple or some carrot sticks as an afternoon snack instead of a candy bar. Try to have fruits and/or vegetables at every meal. 
· Make exercise part of your daily routine. You may want to start with simple activities, such as walking, bicycling, or slow swimming.  Try to be active 30 to 60 minutes every day. You do not need to do all 30 to 60 minutes all at once. For example, you can exercise 3 times a day for 10 or 20 minutes. Moderate exercise is safe for most people, but it is always a good idea to talk to your doctor before starting an exercise program. 
· Keep moving. Yaritzae Gold the lawn, work in the garden, or [employer]. Take the stairs instead of the elevator at work. · If you smoke, quit. People who smoke have an increased risk for heart attack, stroke, cancer, and other lung illnesses. Quitting is hard, but there are ways to boost your chance of quitting tobacco for good. ¨ Use nicotine gum, patches, or lozenges. ¨ Ask your doctor about stop-smoking programs and medicines. ¨ Keep trying. In addition to reducing your risk of diseases in the future, you will notice some benefits soon after you stop using tobacco. If you have shortness of breath or asthma symptoms, they will likely get better within a few weeks after you quit. · Limit how much alcohol you drink. Moderate amounts of alcohol (up to 2 drinks a day for men, 1 drink a day for women) are okay. But drinking too much can lead to liver problems, high blood pressure, and other health problems. Family health If you have a family, there are many things you can do together to improve your health. · Eat meals together as a family as often as possible. · Eat healthy foods. This includes fruits, vegetables, lean meats and dairy, and whole grains. · Include your family in your fitness plan. Most people think of activities such as jogging or tennis as the way to fitness, but there are many ways you and your family can be more active. Anything that makes you breathe hard and gets your heart pumping is exercise. Here are some tips: 
¨ Walk to do errands or to take your child to school or the bus. ¨ Go for a family bike ride after dinner instead of watching TV. Where can you learn more? Go to http://fouzia-bere.info/. Enter D916 in the search box to learn more about \"A Healthy Lifestyle: Care Instructions. \" Current as of: July 26, 2016 Content Version: 11.2 © 4357-2593 [employer]. Care instructions adapted under license by [employer] (which disclaims liability or warranty for this information). If you have questions about a medical condition or this instruction, always ask your healthcare professional. [facility] any warranty or liability for your use of this information. Follow up in 6 months for HTN/Influenza Vaccine and have labs done today (04/17/2017) Introducing [facility]! [place] [employer] introduces Zentyal patient portal. Now you can access parts of your medical record, email your doctor's office, and request medication refills online. 1. In your internet browser, go to [email or web address] 2. Click on the First Time User? Click Here link in the Sign In box. You will see the New Member Sign Up page. 3. Enter your Zentyal Access Code exactly as it appears below. You will not need to use this code after youve completed the sign-up process. If you do not sign up before the expiration date, you must request a new code. · Zentyal Access Code: [email or web address] Expires: 6/19/2017  1:49 PM 
 
4. Enter the last four digits of your Social Security Number (xxxx) and Date of Birth (mm/dd/yyyy) as indicated and click Submit. You will be taken to the next sign-up page. 5. Create a Yo que Vost ID. This will be your Zentyal login ID and cannot be changed, so think of one that is secure and easy to remember. 6. Create a Zentyal password. You can change your password at any time. 7. Enter your Password Reset Question and Answer. This can be used at a later time if you forget your password. 8. Enter your e-mail address.  You will receive e-mail notification when new information is available in [employer]. 9. Click Sign Up. You can now view and download portions of your medical record. 10. Click the Download Summary menu link to download a portable copy of your medical information. If you have questions, please visit the Frequently Asked Questions section of the [employer] website. Remember, [employer] is NOT to be used for urgent needs. For medical emergencies, dial 911. Now available from your iPhone and Android! Please provide this summary of care documentation to your next provider. Your primary care clinician is listed as Adriana Moffett. If you have any questions after today's visit, please call [phone].

## 2017-04-17 NOTE — PATIENT INSTRUCTIONS
Medicare Wellness Visit, Male    The best way to live healthy is to have a healthy lifestyle by eating a well-balanced diet, exercising regularly, limiting alcohol and stopping smoking. Regular physical exams and screening tests are another way to keep healthy. Preventive exams provided by your health care provider can find health problems before they become diseases or illnesses. Preventive services including immunizations, screening tests, monitoring and exams can help you take care of your own health. All people over age 72 should have a pneumovax  and and a prevnar shot to prevent pneumonia. These are once in a lifetime unless you and your provider decide differently. All people over 65 should have a yearly flu shot and a tetanus vaccine every 10 years. Screening for diabetes mellitus with a blood sugar test should be done every year. Glaucoma is a disease of the eye due to increased ocular pressure that can lead to blindness and it should be done every year by an eye professional.    Cardiovascular screening tests that check for elevated lipids (fatty part of blood) which can lead to heart disease and strokes should be done every 5 years. Colorectal screening that evaluates for blood or polyps in your colon should be done yearly as a stool test or every five years as a flexible sigmoidoscope or every 10 years as a colonoscopy up to age 76. Men up to age 76 may need a screening blood test for prostate cancer at certain intervals, depending on their personal and family history. This decision is between the patient and his provider. If you have been a smoker or had family history of abdominal aortic aneurysms, you and your provider may decide to schedule an ultrasound test of your aorta. Hepatitis C screening is also recommended for anyone born between [birth year] through [address]. A shingles vaccine is also recommended once in a lifetime after age 61.     Your Medicare Wellness Exam is recommended annually. Here is a list of your current Health Maintenance items with a due date:  Health Maintenance Due   Topic Date Due    Annual Well Visit  08/13/1997            Advance Directives: Care Instructions  Your Care Instructions  An advance directive is a legal way to state your wishes at the end of your life. It tells your family and your doctor what to do if you can no longer say what you want. There are two main types of advance directives. You can change them any time that your wishes change. · A living will tells your family and your doctor your wishes about life support and other treatment. · A durable power of  for health care lets you name a person to make treatment decisions for you when you can't speak for yourself. This person is called a health care agent. If you do not have an advance directive, decisions about your medical care may be made by a doctor or a  who doesn't know you. It may help to think of an advance directive as a gift to the people who care for you. If you have one, they won't have to make tough decisions by themselves. Follow-up care is a key part of your treatment and safety. Be sure to make and go to all appointments, and call your doctor if you are having problems. It's also a good idea to know your test results and keep a list of the medicines you take. How can you care for yourself at home? · Discuss your wishes with your loved ones and your doctor. This way, there are no surprises. · Many states have a unique form. Or you might use a universal form that has been approved by many states. This kind of form can sometimes be completed and stored online. Your electronic copy will then be available wherever you have a connection to the Internet. In most cases, doctors will respect your wishes even if you have a form from a different state. · You don't need a  to do an advance directive. But you may want to get legal advice.   · Think about these questions when you prepare an advance directive:  ¨ Who do you want to make decisions about your medical care if you are not able to? Many people choose a family member or close friend. ¨ Do you know enough about life support methods that might be used? If not, talk to your doctor so you understand. ¨ What are you most afraid of that might happen? You might be afraid of having pain, losing your independence, or being kept alive by machines. ¨ Where would you prefer to die? Choices include your home, a hospital, or a nursing home. ¨ Would you like to have information about hospice care to support you and your family? ¨ Do you want to donate organs when you die? ¨ Do you want certain Hoahaoism practices performed before you die? If so, put your wishes in the advance directive. · Read your advance directive every year, and make changes as needed. When should you call for help? Be sure to contact your doctor if you have any questions. Where can you learn more? Go to http://fouzia-bere.info/. Enter R264 in the search box to learn more about \"Advance Directives: Care Instructions. \"  Current as of: November 17, 2016  Content Version: 11.2  © 5292-0497 [employer]. Care instructions adapted under license by [employer] (which disclaims liability or warranty for this information). If you have questions about a medical condition or this instruction, always ask your healthcare professional. [address] any warranty or liability for your use of this information.     Follow up in 1 year for annual wellness

## 2017-04-17 NOTE — PROGRESS NOTES
SUBJECTIVE  Chief Complaint   Patient presents with    Hypertension    Ankle swelling     2 week follow up    Results     review of lab results      Patient presents for follow-up. Has had ongoing complaints of left ankle swelling. He is satisfied with the explanation of OA being a potential cause. He has had some relief with orthotics. He wishes not to wear compression stockings. He does request a handicap placard due to his inability to walk long distances due to arthritic pains. We reviewed their cardiac risk factors. The patient has been taking medications as prescribed regularly and denies any side effects with the medication. The patient denies any chest pain or chest tightness. Denies any dyspnea upon exertion. OBJECTIVE    Blood pressure 130/74, pulse 68, temperature 98 °F (36.7 °C), temperature source Oral, resp. rate 16, height 5' 9\" (1.753 m), weight 241 lb (109.3 kg), SpO2 96 %. General:  Alert, cooperative, well appearing, in no apparent distress. CV:  The heart sounds are regular in rate and rhythm. There is a normal S1 and S2. There or no murmurs. Lungs: Inspiratory and expiratory efforts are full and unlabored. Lung sounds are clear and equal to auscultation throughout all lung fields without wheezing, rales, or rhonchi. Ext: mild swelling, trace pitting in the left ankle area. Psych: normal affect. Mood good. Oriented x 3. Judgement and insight intact.      Results for orders placed or performed during the hospital encounter of 03/28/17   TSH 3RD GENERATION   Result Value Ref Range    TSH 2.41 0.36 - 3.74 uIU/mL   CBC W/O DIFF   Result Value Ref Range    WBC 8.8 4.6 - 13.2 K/uL    RBC 4.55 (L) 4.70 - 5.50 M/uL    HGB 14.5 13.0 - 16.0 g/dL    HCT 43.0 36.0 - 48.0 %    MCV 94.5 74.0 - 97.0 FL    MCH 31.9 24.0 - 34.0 PG    MCHC 33.7 31.0 - 37.0 g/dL    RDW 14.2 11.6 - 14.5 %    PLATELET 247 429 - 551 K/uL    MPV 11.7 9.2 - 62.7 FL   METABOLIC PANEL, COMPREHENSIVE Result Value Ref Range    Sodium 142 136 - 145 mmol/L    Potassium 4.0 3.5 - 5.5 mmol/L    Chloride 103 100 - 108 mmol/L    CO2 29 21 - 32 mmol/L    Anion gap 10 3.0 - 18 mmol/L    Glucose 102 (H) 74 - 99 mg/dL    BUN 14 7.0 - 18 MG/DL    Creatinine 0.77 0.6 - 1.3 MG/DL    BUN/Creatinine ratio 18 12 - 20      GFR est AA >60 >60 ml/min/1.73m2    GFR est non-AA >60 >60 ml/min/1.73m2    Calcium 8.7 8.5 - 10.1 MG/DL    Bilirubin, total 0.4 0.2 - 1.0 MG/DL    ALT (SGPT) 30 16 - 61 U/L    AST (SGOT) 28 15 - 37 U/L    Alk. phosphatase 78 45 - 117 U/L    Protein, total 7.5 6.4 - 8.2 g/dL    Albumin 3.3 (L) 3.4 - 5.0 g/dL    Globulin 4.2 (H) 2.0 - 4.0 g/dL    A-G Ratio 0.8 0.8 - 1.7     LIPID PANEL   Result Value Ref Range    LIPID PROFILE          Cholesterol, total 155 <200 MG/DL    Triglyceride 179 (H) <150 MG/DL    HDL Cholesterol 45 40 - 60 MG/DL    LDL, calculated 74.2 0 - 100 MG/DL    VLDL, calculated 35.8 MG/DL    CHOL/HDL Ratio 3.4 0 - 5.0       ASSESSMENT / PLAN    ICD-10-CM ICD-9-CM    1. Left ankle swelling M25.472 719.07    2. Primary osteoarthritis involving multiple joints M15.0 715.09    3. Essential hypertension I10 401.9    4. S/P ablation of atrial flutter Z98.890 V45.89     Z86.79     5. Abnormal TSH R79.89 790.6    6. Elevated serum globulin level R77.1 790.99 PROTEIN ELECT & JAMEL, UR, RANDOM      PROTEIN ELECTROPHORESIS     Reviewed labs. Left ankle swelling - suggested etiologies are multifactorial including his history of an ankle fracture with arthritis and venous insufficieny. Advised on orthotics to support his pes planus. Advised on possible compression stockings. Continue HCTZ as this has helped so far. OA - handicap placard application filled out. HTN - well-controlled. Continue current care. Labs ordered. S/P ablation for arrhythmia - cont per Dr. Jose Looney. Abn TSH -  Repeat TSH was normal.     Elevated serum globulin - SPEP, UPEP.     15 minutes of face to face time spent with the patient with at least 50% on counseling on above medical issues. All chart history elements were reviewed by me at the time of the visit even though marked at time of note closure. Patient understands our medical plan. Patient has provided input and agrees with goals. Alternatives have been explained and offered. All questions answered. The patient is to call if condition worsens or fails to improve. Follow-up Disposition:  Return in about 6 months (around 10/17/2017) for HTN/Influenza Vaccine and have labs done today (04/17/2017).

## 2017-04-17 NOTE — PROGRESS NOTES
This is an Initial Medicare Annual Wellness Exam (AWV) (Performed 12 months after IPPE or effective date of Medicare Part B enrollment, Once in a lifetime)    I have reviewed the patient's medical history in detail and updated the computerized patient record. History     Past Medical History:   Diagnosis Date    Ankle fracture, left approx 2011    medial, tibial    Arthritis     knees    Cardiac echocardiogram 06/16/2011    Normal LV systolic function. Mild conc LVH. Gr 1 DDfx. RVSP 35-40 mmHg. Marked LAE. Marked LISA. Mild-mod MR.  Mild-mod AI. Mild AoRE. Mild aortic arch dil.  Cardiac Holter monitor study 06/22/2011    Baseline sinus rhythm to sinus bradycardia, avg HR 60 bpm (range  bpm). Questionable chronotropic intolerance. No sustained arrhythmias.  GERD (gastroesophageal reflux disease)     H/O colonoscopy 04/13/2011    1 polyp thermally treated    History of atrial fibrillation 2009    Hypertension     Unspecified adverse effect of anesthesia     H/o A-fib immediately post colonoscopy. Past Surgical History:   Procedure Laterality Date    CARDIAC SURG PROCEDURE UNLIST      hx atrial flutter ablation 2013    HX COLONOSCOPY      HX GI      HX KNEE REPLACEMENT Right 02/19/2013    Dr. Khan Ped HX ORTHOPAEDIC Right 2/13    [facility], [place]    HX SVT ABLATION  1/2/2013    by Dr. Tramaine Gimenez EGD [facility] SPHNCTR/CARDIA GERD       Current Outpatient Prescriptions   Medication Sig Dispense Refill    metoprolol succinate (TOPROL-XL) 25 mg XL tablet TAKE ONE TABLET BY MOUTH ONCE DAILY 90 Tab 0    hydroCHLOROthiazide (HYDRODIURIL) 25 mg tablet Take 1 Tab by mouth daily. 90 Tab 2    multivitamin (ONE A DAY) tablet Take 1 Tab by mouth daily.  glucosamine-D3-boswellia serr (OSTEO BI-FLEX) 1,500-400-100 mg-unit-mg Tab Take 1 Tab by mouth two (2) times a day.         omega-3 fatty acids-vitamin e (FISH OIL) 1,000 mg cap Take 1 Cap by mouth two (2) times a day.  ibuprofen (MOTRIN) 800 mg tablet Take 800 mg by mouth every eight (8) hours as needed. No Known Allergies  Family History   Problem Relation Age of Onset    Pacemaker Father     Pacemaker Sister     Heart Failure Brother     Diabetes Brother     Cancer Brother      Lung Cancer     Social History   Substance Use Topics    Smoking status: Former Smoker     Packs/day: 1.00     Quit date: 1/1/1965    Smokeless tobacco: Never Used    Alcohol use Yes      Comment: occasionally. Patient Active Problem List   Diagnosis Code    Atrial flutter ([facility]) I48.92    Cardiomyopathy ([address] I42.9    DJD (degenerative joint disease) M19.90    Atrial flutter with rapid ventricular response ([facility]) I48.92    S/P ablation of atrial flutter Z98.890, Z86.79    Essential hypertension I10         Depression Risk Factor Screening:     PHQ 2 / 9, over the last two weeks 4/17/2017   Little interest or pleasure in doing things Not at all   Feeling down, depressed or hopeless Not at all   Total Score PHQ 2 0     Alcohol Risk Factor Screening: On any occasion during the past 3 months, have you had more than 4 drinks containing alcohol? Yes    Do you average more than 14 drinks per week? No    Functional Ability and Level of Safety:     Hearing Loss   Mild, worked as [employer]    Activities of Daily Living   Self-care. Requires assistance with: no ADLs    Fall Risk     Fall Risk Assessment, last 12 mths 4/17/2017   Able to walk? Yes   Fall in past 12 months? No     Abuse Screen   Patient is not abused    Review of Systems   A comprehensive review of systems was negative except for that written in the HPI. Physical Examination   Blood pressure 130/74, pulse 68, temperature 98 °F (36.7 °C), temperature source Oral, resp. rate 16, height 5' 9\" (1.753 m), weight 241 lb (109.3 kg), SpO2 96 %.      Evaluation of Cognitive Function:  Mood/affect:  happy  Appearance: age appropriate and casually dressed  Family member/caregiver input: wife present    Patient Care Team:  Claudean Nipper, MD as PCP - General (Family Practice)  Santa Rivera MD (Cardiology)  Zack Hua NP (Nurse Practitioner)    Advice/Referrals/Counseling   Education and counseling provided:  Are appropriate based on today's review and evaluation      Assessment/Plan       ICD-10-CM ICD-9-CM    1. Routine general medical examination at a health care facility Z00.00 V70.0    2. Screening for depression Z13.89 V79.0 DEPRESSION SCREEN ANNUAL   3. Advanced directives, counseling/discussion Z71.89 V65.49 ADVANCE CARE PLANNING FIRST 27 MINS     Age and sex specific counseling. Screening for depression and alcoholism. Advanced directives / end of life planning discussion - they have a packet at home. Care team updated. Vaccines are up to date. Medicare recommended screening and prevention test table is reviewed and provided to patient. Screening and prevention is up to date based on his age. Patient understands our medical plan. Patient has provided input and agrees with goals. All questions answered.

## 2017-04-17 NOTE — PROGRESS NOTES
1. Have you been to the ER, urgent care clinic since your last visit? Hospitalized since your last visit? No    2. Have you seen or consulted any other health care providers outside of the [address] [place] [address] since your last visit? Include any pap smears or colon screening. No     Abuse Screening Questionnaire 4/17/2017   Do you ever feel afraid of your partner? N   Are you in a relationship with someone who physically or mentally threatens you? N   Is it safe for you to go home? Y     Fall Risk Assessment, last 12 mths 4/17/2017   Able to walk? Yes   Fall in past 12 months?  No             PHQ 2 / 9, over the last two weeks 4/17/2017   Little interest or pleasure in doing things Not at all   Feeling down, depressed or hopeless Not at all   Total Score PHQ 2 0

## 2017-04-17 NOTE — ACP (ADVANCE CARE PLANNING)
Advance Care Planning    Advance Care Planning (ACP) Provider Note - Comprehensive     Date of ACP Conversation: 04/17/17  Persons included in Conversation:  and wife  Length of ACP Conversation in minutes:  16 minutes    Authorized Decision Maker (if patient is incapable of making informed decisions): This person is:  not determined yet          General ACP for ALL Patients with Decision Making Capacity:   Importance of advance care planning, including choosing a healthcare agent to communicate patient's healthcare decisions if patient lost the ability to make decisions, such as after a sudden illness or accident  Understanding of the healthcare agent role was assessed and information provided    Review of Existing Advance Directive:  none exists at this time    For Serious or Chronic Illness:  No known illness    Interventions Provided:  Referral made for ACP follow-up assistance to:  nurse navigator. Advised on importance of annual review of Advanced Care Planning and Living Will.

## 2017-04-17 NOTE — PATIENT INSTRUCTIONS

## 2017-04-18 LAB
ALBUMIN 24H MFR UR ELPH: 32.9 %
ALPHA1 GLOB 24H MFR UR ELPH: 2.5 %
ALPHA2 GLOB 24H MFR UR ELPH: 12.9 %
B-GLOBULIN MFR UR ELPH: 27.3 %
GAMMA GLOB 24H MFR UR ELPH: 24.5 %
INTERPRETATION UR IFE-IMP: NORMAL
M PROTEIN 24H MFR UR ELPH: NORMAL %
NOTE, 149533: NORMAL
PROT UR-MCNC: 14 MG/DL

## 2017-04-19 LAB
ALBUMIN SERPL ELPH-MCNC: 3.3 G/DL (ref 2.9–4.4)
ALBUMIN/GLOB SERPL: 0.8 {RATIO} (ref 0.7–1.7)
ALPHA1 GLOB SERPL ELPH-MCNC: 0.3 G/DL (ref 0–0.4)
ALPHA2 GLOB SERPL ELPH-MCNC: 0.9 G/DL (ref 0.4–1)
B-GLOBULIN SERPL ELPH-MCNC: 1.3 G/DL (ref 0.7–1.3)
GAMMA GLOB SERPL ELPH-MCNC: 1.5 G/DL (ref 0.4–1.8)
GLOBULIN SER CALC-MCNC: 4.1 G/DL (ref 2.2–3.9)
M PROTEIN SERPL ELPH-MCNC: ABNORMAL G/DL
PROT SERPL-MCNC: 7.4 G/DL (ref 6–8.5)

## 2017-04-20 ENCOUNTER — TELEPHONE (OUTPATIENT)
Dept: FAMILY MEDICINE CLINIC | Age: 82
End: 2017-04-20

## 2017-04-20 NOTE — TELEPHONE ENCOUNTER
Patient called this afternoon returning nurse Aura's phone call regarding results. Please call her back at your earliest convenience.

## 2017-10-02 ENCOUNTER — HOSPITAL ENCOUNTER (INPATIENT)
Age: 82
LOS: 4 days | Discharge: HOME | DRG: 378 | End: 2017-10-06
Attending: EMERGENCY MEDICINE | Admitting: HOSPITALIST
Payer: MEDICARE

## 2017-10-02 ENCOUNTER — APPOINTMENT (OUTPATIENT)
Dept: GENERAL RADIOLOGY | Age: 82
DRG: 378 | End: 2017-10-02
Attending: EMERGENCY MEDICINE
Payer: MEDICARE

## 2017-10-02 ENCOUNTER — APPOINTMENT (OUTPATIENT)
Dept: CT IMAGING | Age: 82
DRG: 378 | End: 2017-10-02
Attending: EMERGENCY MEDICINE
Payer: MEDICARE

## 2017-10-02 DIAGNOSIS — R55 SYNCOPE, UNSPECIFIED SYNCOPE TYPE: ICD-10-CM

## 2017-10-02 DIAGNOSIS — D64.9 SYMPTOMATIC ANEMIA: ICD-10-CM

## 2017-10-02 DIAGNOSIS — R06.09 DOE (DYSPNEA ON EXERTION): ICD-10-CM

## 2017-10-02 DIAGNOSIS — K92.2 ACUTE LOWER GI BLEEDING: Primary | ICD-10-CM

## 2017-10-02 LAB
ALBUMIN SERPL-MCNC: 2.8 G/DL (ref 3.4–5)
ALBUMIN/GLOB SERPL: 0.7 {RATIO} (ref 0.8–1.7)
ALP SERPL-CCNC: 65 U/L (ref 45–117)
ALT SERPL-CCNC: 21 U/L (ref 16–61)
ANION GAP SERPL CALC-SCNC: 7 MMOL/L (ref 3–18)
APPEARANCE UR: CLEAR
APTT PPP: 29.6 SEC (ref 23–36.4)
AST SERPL-CCNC: 20 U/L (ref 15–37)
ATRIAL RATE: 102 BPM
BACTERIA URNS QL MICRO: ABNORMAL /HPF
BASOPHILS # BLD: 0 K/UL (ref 0–0.06)
BASOPHILS NFR BLD: 0 % (ref 0–2)
BILIRUB SERPL-MCNC: 0.3 MG/DL (ref 0.2–1)
BILIRUB UR QL: NEGATIVE
BNP SERPL-MCNC: 195 PG/ML (ref 0–1800)
BUN SERPL-MCNC: 23 MG/DL (ref 7–18)
BUN/CREAT SERPL: 29 (ref 12–20)
CALCIUM SERPL-MCNC: 8.4 MG/DL (ref 8.5–10.1)
CALCULATED P AXIS, ECG09: 45 DEGREES
CALCULATED R AXIS, ECG10: -9 DEGREES
CALCULATED T AXIS, ECG11: 58 DEGREES
CHLORIDE SERPL-SCNC: 106 MMOL/L (ref 100–108)
CK MB CFR SERPL CALC: NORMAL % (ref 0–4)
CK MB SERPL-MCNC: <1 NG/ML (ref 5–25)
CK SERPL-CCNC: 45 U/L (ref 39–308)
CO2 SERPL-SCNC: 25 MMOL/L (ref 21–32)
COLOR UR: ABNORMAL
CREAT SERPL-MCNC: 0.8 MG/DL (ref 0.6–1.3)
D DIMER PPP FEU-MCNC: 1.28 UG/ML(FEU)
DIAGNOSIS, 93000: NORMAL
DIFFERENTIAL METHOD BLD: ABNORMAL
EOSINOPHIL # BLD: 0.1 K/UL (ref 0–0.4)
EOSINOPHIL NFR BLD: 1 % (ref 0–5)
EPITH CASTS URNS QL MICRO: ABNORMAL /LPF (ref 0–5)
ERYTHROCYTE [DISTWIDTH] IN BLOOD BY AUTOMATED COUNT: 13.7 % (ref 11.6–14.5)
GLOBULIN SER CALC-MCNC: 3.8 G/DL (ref 2–4)
GLUCOSE BLD STRIP.AUTO-MCNC: 196 MG/DL (ref 70–110)
GLUCOSE SERPL-MCNC: 125 MG/DL (ref 74–99)
GLUCOSE UR STRIP.AUTO-MCNC: NEGATIVE MG/DL
HCT VFR BLD AUTO: 27.6 % (ref 36–48)
HCT VFR BLD AUTO: 33.7 % (ref 36–48)
HGB BLD-MCNC: 11.3 G/DL (ref 13–16)
HGB BLD-MCNC: 9.3 G/DL (ref 13–16)
HGB UR QL STRIP: NEGATIVE
INR PPP: 1.1 (ref 0.8–1.2)
KETONES UR QL STRIP.AUTO: ABNORMAL MG/DL
LEUKOCYTE ESTERASE UR QL STRIP.AUTO: ABNORMAL
LYMPHOCYTES # BLD: 2.9 K/UL (ref 0.9–3.6)
LYMPHOCYTES NFR BLD: 27 % (ref 21–52)
MAGNESIUM SERPL-MCNC: 1.9 MG/DL (ref 1.6–2.6)
MCH RBC QN AUTO: 31.7 PG (ref 24–34)
MCHC RBC AUTO-ENTMCNC: 33.5 G/DL (ref 31–37)
MCV RBC AUTO: 94.4 FL (ref 74–97)
MONOCYTES # BLD: 0.7 K/UL (ref 0.05–1.2)
MONOCYTES NFR BLD: 7 % (ref 3–10)
MUCOUS THREADS URNS QL MICRO: ABNORMAL /LPF
NEUTS SEG # BLD: 7.1 K/UL (ref 1.8–8)
NEUTS SEG NFR BLD: 65 % (ref 40–73)
NITRITE UR QL STRIP.AUTO: NEGATIVE
P-R INTERVAL, ECG05: 196 MS
PH UR STRIP: 5 [PH] (ref 5–8)
PLATELET # BLD AUTO: 198 K/UL (ref 135–420)
PMV BLD AUTO: 11.3 FL (ref 9.2–11.8)
POTASSIUM SERPL-SCNC: 4.8 MMOL/L (ref 3.5–5.5)
PROT SERPL-MCNC: 6.6 G/DL (ref 6.4–8.2)
PROT UR STRIP-MCNC: NEGATIVE MG/DL
PROTHROMBIN TIME: 13.5 SEC (ref 11.5–15.2)
Q-T INTERVAL, ECG07: 334 MS
QRS DURATION, ECG06: 68 MS
QTC CALCULATION (BEZET), ECG08: 435 MS
RBC # BLD AUTO: 3.57 M/UL (ref 4.7–5.5)
RBC #/AREA URNS HPF: ABNORMAL /HPF (ref 0–5)
SODIUM SERPL-SCNC: 138 MMOL/L (ref 136–145)
SP GR UR REFRACTOMETRY: 1.03 (ref 1–1.03)
TROPONIN I SERPL-MCNC: 0.02 NG/ML (ref 0–0.04)
TROPONIN I SERPL-MCNC: 0.03 NG/ML (ref 0–0.04)
UROBILINOGEN UR QL STRIP.AUTO: 1 EU/DL (ref 0.2–1)
VENTRICULAR RATE, ECG03: 102 BPM
WBC # BLD AUTO: 10.8 K/UL (ref 4.6–13.2)
WBC URNS QL MICRO: ABNORMAL /HPF (ref 0–4)

## 2017-10-02 PROCEDURE — 74011250637 HC RX REV CODE- 250/637: Performed by: HOSPITALIST

## 2017-10-02 PROCEDURE — 85379 FIBRIN DEGRADATION QUANT: CPT | Performed by: EMERGENCY MEDICINE

## 2017-10-02 PROCEDURE — 86920 COMPATIBILITY TEST SPIN: CPT | Performed by: EMERGENCY MEDICINE

## 2017-10-02 PROCEDURE — 86900 BLOOD TYPING SEROLOGIC ABO: CPT | Performed by: EMERGENCY MEDICINE

## 2017-10-02 PROCEDURE — 96360 HYDRATION IV INFUSION INIT: CPT

## 2017-10-02 PROCEDURE — 94762 N-INVAS EAR/PLS OXIMTRY CONT: CPT

## 2017-10-02 PROCEDURE — 36430 TRANSFUSION BLD/BLD COMPNT: CPT

## 2017-10-02 PROCEDURE — 96361 HYDRATE IV INFUSION ADD-ON: CPT

## 2017-10-02 PROCEDURE — 93005 ELECTROCARDIOGRAM TRACING: CPT

## 2017-10-02 PROCEDURE — 82550 ASSAY OF CK (CPK): CPT | Performed by: EMERGENCY MEDICINE

## 2017-10-02 PROCEDURE — 85025 COMPLETE CBC W/AUTO DIFF WBC: CPT | Performed by: EMERGENCY MEDICINE

## 2017-10-02 PROCEDURE — 80053 COMPREHEN METABOLIC PANEL: CPT | Performed by: EMERGENCY MEDICINE

## 2017-10-02 PROCEDURE — 71010 XR CHEST PORT: CPT

## 2017-10-02 PROCEDURE — 30233N1 TRANSFUSION OF NONAUTOLOGOUS RED BLOOD CELLS INTO PERIPHERAL VEIN, PERCUTANEOUS APPROACH: ICD-10-PCS | Performed by: EMERGENCY MEDICINE

## 2017-10-02 PROCEDURE — 83880 ASSAY OF NATRIURETIC PEPTIDE: CPT | Performed by: EMERGENCY MEDICINE

## 2017-10-02 PROCEDURE — 65660000004 HC RM CVT STEPDOWN

## 2017-10-02 PROCEDURE — P9016 RBC LEUKOCYTES REDUCED: HCPCS | Performed by: EMERGENCY MEDICINE

## 2017-10-02 PROCEDURE — 74011250636 HC RX REV CODE- 250/636: Performed by: EMERGENCY MEDICINE

## 2017-10-02 PROCEDURE — 77030013169 SET IV BLD ICUM -A

## 2017-10-02 PROCEDURE — 81001 URINALYSIS AUTO W/SCOPE: CPT | Performed by: EMERGENCY MEDICINE

## 2017-10-02 PROCEDURE — 83735 ASSAY OF MAGNESIUM: CPT | Performed by: EMERGENCY MEDICINE

## 2017-10-02 PROCEDURE — 71275 CT ANGIOGRAPHY CHEST: CPT

## 2017-10-02 PROCEDURE — 85730 THROMBOPLASTIN TIME PARTIAL: CPT | Performed by: EMERGENCY MEDICINE

## 2017-10-02 PROCEDURE — 85610 PROTHROMBIN TIME: CPT | Performed by: EMERGENCY MEDICINE

## 2017-10-02 PROCEDURE — 74011636320 HC RX REV CODE- 636/320: Performed by: EMERGENCY MEDICINE

## 2017-10-02 PROCEDURE — 85018 HEMOGLOBIN: CPT | Performed by: EMERGENCY MEDICINE

## 2017-10-02 PROCEDURE — 82962 GLUCOSE BLOOD TEST: CPT

## 2017-10-02 PROCEDURE — 99285 EMERGENCY DEPT VISIT HI MDM: CPT

## 2017-10-02 RX ORDER — SODIUM CHLORIDE 9 MG/ML
250 INJECTION, SOLUTION INTRAVENOUS AS NEEDED
Status: DISCONTINUED | OUTPATIENT
Start: 2017-10-02 | End: 2017-10-06 | Stop reason: HOSPADM

## 2017-10-02 RX ORDER — FAMOTIDINE 20 MG/1
20 TABLET, FILM COATED ORAL 2 TIMES DAILY
Status: DISCONTINUED | OUTPATIENT
Start: 2017-10-02 | End: 2017-10-03

## 2017-10-02 RX ORDER — ONDANSETRON 2 MG/ML
INJECTION INTRAMUSCULAR; INTRAVENOUS
Status: DISPENSED
Start: 2017-10-02 | End: 2017-10-03

## 2017-10-02 RX ORDER — HYDROCODONE BITARTRATE AND ACETAMINOPHEN 10; 325 MG/1; MG/1
1 TABLET ORAL
Status: DISCONTINUED | OUTPATIENT
Start: 2017-10-02 | End: 2017-10-03

## 2017-10-02 RX ORDER — ACETAMINOPHEN 325 MG/1
650 TABLET ORAL ONCE
Status: ACTIVE | OUTPATIENT
Start: 2017-10-02 | End: 2017-10-03

## 2017-10-02 RX ORDER — DIPHENHYDRAMINE HYDROCHLORIDE 50 MG/ML
12.5 INJECTION, SOLUTION INTRAMUSCULAR; INTRAVENOUS
Status: DISCONTINUED | OUTPATIENT
Start: 2017-10-02 | End: 2017-10-03

## 2017-10-02 RX ORDER — ONDANSETRON 2 MG/ML
4 INJECTION INTRAMUSCULAR; INTRAVENOUS
Status: DISCONTINUED | OUTPATIENT
Start: 2017-10-02 | End: 2017-10-05

## 2017-10-02 RX ORDER — MORPHINE SULFATE 2 MG/ML
1 INJECTION, SOLUTION INTRAMUSCULAR; INTRAVENOUS
Status: DISCONTINUED | OUTPATIENT
Start: 2017-10-02 | End: 2017-10-03

## 2017-10-02 RX ORDER — HYDROCHLOROTHIAZIDE 25 MG/1
25 TABLET ORAL DAILY
Status: DISCONTINUED | OUTPATIENT
Start: 2017-10-03 | End: 2017-10-03

## 2017-10-02 RX ORDER — DOCUSATE SODIUM 100 MG/1
100 CAPSULE, LIQUID FILLED ORAL 2 TIMES DAILY
Status: DISCONTINUED | OUTPATIENT
Start: 2017-10-02 | End: 2017-10-06 | Stop reason: HOSPADM

## 2017-10-02 RX ORDER — DIPHENHYDRAMINE HYDROCHLORIDE 50 MG/ML
25 INJECTION, SOLUTION INTRAMUSCULAR; INTRAVENOUS ONCE
Status: ACTIVE | OUTPATIENT
Start: 2017-10-02 | End: 2017-10-03

## 2017-10-02 RX ORDER — METOPROLOL SUCCINATE 25 MG/1
25 TABLET, EXTENDED RELEASE ORAL DAILY
Status: DISCONTINUED | OUTPATIENT
Start: 2017-10-03 | End: 2017-10-06 | Stop reason: HOSPADM

## 2017-10-02 RX ORDER — NALOXONE HYDROCHLORIDE 0.4 MG/ML
0.4 INJECTION, SOLUTION INTRAMUSCULAR; INTRAVENOUS; SUBCUTANEOUS AS NEEDED
Status: DISCONTINUED | OUTPATIENT
Start: 2017-10-02 | End: 2017-10-06 | Stop reason: HOSPADM

## 2017-10-02 RX ORDER — ZOLPIDEM TARTRATE 5 MG/1
5 TABLET ORAL
Status: DISCONTINUED | OUTPATIENT
Start: 2017-10-02 | End: 2017-10-03

## 2017-10-02 RX ADMIN — SODIUM CHLORIDE 500 ML: 900 INJECTION, SOLUTION INTRAVENOUS at 16:15

## 2017-10-02 RX ADMIN — IOPAMIDOL 100 ML: 755 INJECTION, SOLUTION INTRAVENOUS at 14:52

## 2017-10-02 RX ADMIN — FAMOTIDINE 20 MG: 20 TABLET ORAL at 20:10

## 2017-10-02 RX ADMIN — SODIUM CHLORIDE 500 ML: 900 INJECTION, SOLUTION INTRAVENOUS at 13:36

## 2017-10-02 NOTE — IP AVS SNAPSHOT
[address] Patient: Paula Severe MRN: [id number] You are allergic to the following No active allergies Immunizations Administered for This Admission Name Date Influenza Vaccine (Quad) PF  Deferred () Recent Documentation Height Weight BMI Smoking Status 1.765 m 104.5 kg 33.53 kg/m2 Former Smoker Emergency Contacts Name Discharge Info Relation Home Work Mobile Lilliam Espinal DISCHARGE CAREGIVER [3] Spouse [3] [phone] About your hospitalization You were admitted on:  2017 You last received care in the:  [address] [place] [address] You were discharged on:  2017 Unit phone number:  [phone] Why you were hospitalized Your primary diagnosis was:  Not on File Your diagnoses also included:  Lower Gi Bleed, Symptomatic Anemia, Syncope And Collapse, Acute Gi Bleeding Providers Seen During Your Hospitalizations Provider Role Specialty Primary office phone Tracie Rodas MD Attending Provider Emergency Medicine [phone] Kashmir Espinoza MD Attending Provider Internal Medicine [phone] Your Primary Care Physician (PCP) Primary Care Physician Office Phone Office Fax Elder Vides [phone] Follow-up Information Follow up With Details Comments Contact Info Shelly Alan MD On 10/11/2017 @ 5:00 pm [id number] [facility] 
 [address] [facility] [address] 
[phone] Jay Pennington MD On 10/20/2017 @ 1:45 pm with NP [address] [facility] [facility] [address] 
[phone] Your Appointments 2017  5:00 PM EDT TRANSITIONAL CARE MANAGEMENT with Shelly Alan MD  
 [facility] ([facility]) [address] [facility] [address]  
[phone] Monday October 16, 2017  8:45 AM EDT ROUTINE CARE with Hilaria Crowell MD  
[facility] ([facility]) [address] [place] [address] [address] [place] [address]  
[phone] Current Discharge Medication List  
  
START taking these medications Dose & Instructions Dispensing Information Comments Morning Noon Evening Bedtime  
 ferrous sulfate 325 mg (65 mg iron) tablet Your last dose was: Your next dose is:    
   
   
 Dose:  325 mg Take 1 Tab by mouth daily (with breakfast). Quantity:  15 Tab Refills:  0  
     
   
   
   
  
 pantoprazole 40 mg tablet Commonly known as:  PROTONIX Your last dose was: Your next dose is:    
   
   
 Dose:  40 mg Take 1 Tab by mouth daily. Quantity:  10 Tab Refills:  0  
     
   
   
   
  
 polyethylene glycol 17 gram packet Commonly known as:  Jt Armor Your last dose was: Your next dose is:    
   
   
 Dose:  17 g Take 1 Packet by mouth daily. Quantity:  30 Packet Refills:  0  
     
   
   
   
  
 senna-docusate 8.6-50 mg per tablet Commonly known as:  Miachel Huge Your last dose was: Your next dose is:    
   
   
 Dose:  1 Tab Take 1 Tab by mouth daily. Quantity:  10 Tab Refills:  0 CONTINUE these medications which have CHANGED Dose & Instructions Dispensing Information Comments Morning Noon Evening Bedtime  
 hydroCHLOROthiazide 25 mg tablet Commonly known as:  HYDRODIURIL Start taking on:  10/9/2017 What changed:  how much to take Your last dose was: Your next dose is:    
   
   
 Dose:  12.5 mg Take 0.5 Tabs by mouth daily. Quantity:  90 Tab Refills:  2 CONTINUE these medications which have NOT CHANGED Dose & Instructions Dispensing Information Comments Morning Noon Evening Bedtime FISH OIL 1,000 mg Cap Generic drug:  omega-3 fatty acids-vitamin e Your last dose was: Your next dose is:    
   
   
 Dose:  1 Cap Take 1 Cap by mouth two (2) times a day. Refills:  0  
     
   
   
   
  
 metoprolol succinate 25 mg XL tablet Commonly known as:  TOPROL-XL Your last dose was: Your next dose is: TAKE ONE TABLET BY MOUTH ONCE DAILY Quantity:  90 Tab Refills:  1  
     
   
   
   
  
 multivitamin tablet Commonly known as:  ONE A DAY Your last dose was: Your next dose is:    
   
   
 Dose:  1 Tab Take 1 Tab by mouth daily. Refills:  0  
     
   
   
   
  
 OSTEO BI-FLEX (5-LOXIN) 1,500-400-100 mg-unit-mg Tab Generic drug:  glucosamine-D3-Boswellia serr Your last dose was: Your next dose is:    
   
   
 Dose:  1 Tab Take 1 Tab by mouth two (2) times a day. Refills:  0 STOP taking these medications   
 ibuprofen 800 mg tablet Commonly known as:  MOTRIN Where to Get Your Medications These medications were sent to [facility], [address] [place] [address], [address] [place] [id number] Phone:  [phone]  
  hydroCHLOROthiazide 25 mg tablet Information on where to get these meds will be given to you by the nurse or doctor. ! Ask your nurse or doctor about these medications  
  ferrous sulfate 325 mg (65 mg iron) tablet  
 pantoprazole 40 mg tablet  
 polyethylene glycol 17 gram packet  
 senna-docusate 8.6-50 mg per tablet Discharge Instructions Anemia: Care Instructions Your Care Instructions Anemia is a low level of red blood cells, which carry oxygen throughout your body. Many things can cause anemia. Lack of iron is one of the most common causes. Your body needs iron to make hemoglobin, a substance in red blood cells that carries oxygen from the lungs to your body's cells. Without enough iron, the body produces fewer and smaller red blood cells. As a result, your body's cells do not get enough oxygen, and you feel tired and weak. And you may have trouble concentrating. Bleeding is the most common cause of a lack of iron. You may have heavy menstrual bleeding or bleeding caused by conditions such as ulcers, hemorrhoids, or cancer. Regular use of aspirin or other anti-inflammatory medicines (such as ibuprofen) also can cause bleeding in some people. A lack of iron in your diet also can cause anemia, especially at times when the body needs more iron, such as during pregnancy, infancy, and the teen years. Your doctor may have prescribed iron pills. It may take several months of treatment for your iron levels to return to normal. Your doctor also may suggest that you eat foods that are rich in iron, such as meat and beans. There are many other causes of anemia. It is not always due to a lack of iron. Finding the specific cause of your anemia will help your doctor find the right treatment for you. Follow-up care is a key part of your treatment and safety. Be sure to make and go to all appointments, and call your doctor if you are having problems. It's also a good idea to know your test results and keep a list of the medicines you take. How can you care for yourself at home? · Take your medicines exactly as prescribed. Call your doctor if you think you are having a problem with your medicine. · If your doctor recommends iron pills, take them as directed: ¨ Try to take the pills on an empty stomach about 1 hour before or 2 hours after meals. But you may need to take iron with food to avoid an upset stomach. ¨ Do not take antacids or drink milk or caffeine drinks (such as coffee, tea, or cola) at the same time or within 2 hours of the time that you take your iron. They can make it hard for your body to absorb the iron. ¨ Vitamin C (from food or supplements) helps your body absorb iron. Try taking iron pills with a glass of orange juice or some other food that is high in vitamin C, such as citrus fruits. ¨ Iron pills may cause stomach problems, such as heartburn, nausea, diarrhea, constipation, and cramps. Be sure to drink plenty of fluids, and include fruits, vegetables, and fiber in your diet each day. Iron pills often make your bowel movements dark or green. ¨ If you forget to take an iron pill, do not take a double dose of iron the next time you take a pill. ¨ Keep iron pills out of the reach of small children. An overdose of iron can be very dangerous. · Follow your doctor's advice about eating iron-rich foods. These include red meat, shellfish, poultry, eggs, beans, raisins, whole-grain bread, and leafy green vegetables. · Steam vegetables to help them keep their iron content. When should you call for help? Call 911 anytime you think you may need emergency care. For example, call if: 
· You have symptoms of a heart attack. These may include: ¨ Chest pain or pressure, or a strange feeling in the chest. 
¨ Sweating. ¨ Shortness of breath. ¨ Nausea or vomiting. ¨ Pain, pressure, or a strange feeling in the back, neck, jaw, or upper belly or in one or both shoulders or arms. ¨ Lightheadedness or sudden weakness. ¨ A fast or irregular heartbeat. After you call 911, the  may tell you to chew 1 adult-strength or 2 to 4 low-dose aspirin. Wait for an ambulance. Do not try to drive yourself. · You passed out (lost consciousness). Call your doctor now or seek immediate medical care if: 
· You have new or increased shortness of breath. · You are dizzy or lightheaded, or you feel like you may faint. · Your fatigue and weakness continue or get worse. · You have any abnormal bleeding, such as: 
¨ Nosebleeds.  
¨ Vaginal bleeding that is different (heavier, more frequent, at a different time of the month) than what you are used to. ¨ Bloody or black stools, or rectal bleeding. ¨ Bloody or pink urine. Watch closely for changes in your health, and be sure to contact your doctor if: 
· You do not get better as expected. Where can you learn more? Go to http://fouzia-bere.info/. Enter R301 in the search box to learn more about \"Anemia: Care Instructions. \" Current as of: October 13, 2016 Content Version: 11.3 © 0268-9176 [employer]. Care instructions adapted under license by [employer] (which disclaims liability or warranty for this information). If you have questions about a medical condition or this instruction, always ask your healthcare professional. [address] any warranty or liability for your use of this information. Learning About Blood Transfusions What is a blood transfusion? Blood transfusion is a medical treatment to replace the blood or parts of blood that your body has lost. The blood goes through a tube from a bag to an intravenous (IV) catheter and into your vein. You may need a blood transfusion after losing blood from an injury, a major surgery, an illness that causes bleeding, or an illness that destroys blood cells. Transfusions are also used to give you the parts of bloodsuch as platelets, plasma, or substances that cause clottingthat your body needs to fight an illness or stop bleeding. How is a blood transfusion done? Before you receive a blood transfusion, your blood is tested to find out what your blood type is. Blood or blood parts that are a match with your blood type are ordered by your doctor. Blood is typed as A, B, AB, or O. It is also typed as Rh-positive or Rh-negative. The blood you are getting is checked and rechecked to make sure that it's the right type for you.  
A sample of your blood is mixed with a sample of the blood you will receive to check for problems. Before actually giving you the transfusion, a doctor and nurses will look at the label on the package of blood and compare it to your hospital ID bracelet and medical records. The transfusion begins only when all agree that this is the correct blood and that you are the correct person to receive it. To receive the transfusion, you will have an intravenous (IV) catheter inserted into a vein. A tube connects the catheter to the bag containing the blood, which is placed higher than your body. The blood then flows slowly into your vein. A doctor or nurse will check you several times during the transfusion to watch for a reaction or other problems. What are the possible risks? Blood transfusions have many benefits and are often life-saving. But they also have a few risks. Possible risks include: 
· Your body's reaction to receiving new blood. This may include: ¨ Fever. ¨ Allergic reactions. ¨ Breathing problems. · An infection from the blood. This risk is small because of the strict rules placed on handling and storing blood. Getting a viral infection, such as HIV or hepatitis B or C, through blood transfusions has become very rare. The U.S. Food and Drug Administration (FDA) enforces strict guidelines on the collection, testing, storage, and use of blood. · Getting the wrong blood type by accident. Severe reactions, which can be life-threatening, are very rare. What can you expect after a blood transfusion? Here are some things you can do at home to help prevent infection at the transfusion site: 
· Wash the area daily with warm, soapy water, and pat it dry. Don't use hydrogen peroxide or alcohol, which can slow healing. You may cover the area with a gauze bandage if it weeps or rubs against clothing. Change the bandage every day. · Keep the area clean and dry. Follow-up care is a key part of your treatment and safety.  Be sure to make and go to all appointments, and call your doctor if you are having problems. It's also a good idea to know your test results and keep a list of the medicines you take. When should you call for help? Call 911 anytime you think you may need emergency care. For example, call if: 
· You have severe trouble breathing. Call your doctor now or seek immediate medical care if: 
· You have a fever. · You feel weaker or more tired than usual. 
· You have a yellow tint to your skin or the whites of your eyes. Watch closely for changes in your health, and be sure to contact your doctor if you have any problems. Where can you learn more? Go to http://fouziaSonicPollenbere.info/. Enter V588 in the search box to learn more about \"Learning About Blood Transfusions. \" Current as of: October 13, 2016 Content Version: 11.3 © 7230-2080 [employer]. Care instructions adapted under license by "[employer] (which disclaims liability or warranty for this information). If you have questions about a medical condition or this instruction, always ask your healthcare professional. [place] [address] any warranty or liability for your use of this information. Discharge Orders Procedure Order Date Status Priority Quantity Spec Type Associated Dx DIET CARDIAC No options chosen 10/05/17 0933 Normal Routine 1 Questions: Additional options:  No options chosen CBC WITH AUTOMATED DIFF 10/05/17 0933 Future Routine 1 Blood Comments:  On 10/10/17. Call report to PCP Diagnosis: anemia ACO Transitions of Care Introducing [employer] [address] Bunn offers a voluntary care coordination program to provide high quality service and care to [facility] fee-for-service beneficiaries. [employer] was designed to help you enhance your health and well-being through the following services: ? Transitions of Care  support for individuals who are transitioning from one care setting to another (example: Hospital to home). ? Chronic and Complex Care Coordination  support for individuals and caregivers of those with serious or chronic illnesses or with more than one chronic (ongoing) condition and those who take a number of different medications. If you meet specific medical criteria, a [facility] [address] may call you directly to coordinate your care with your primary care physician and your other care providers. For questions about the [facility] programs, please, contact your physicians office. For general questions or additional information about Accountable Care Organizations: 
Please visit www.medicare.gov/acos. html or call 1-800-MEDICARE (9-650.939.5857) TTY users should call 3-742.577.8754. Introducing [facility]! [facility] introduces AppLift patient portal. Now you can access parts of your medical record, email your doctor's office, and request medication refills online. 1. In your internet browser, go to [email or web address] 2. Click on the First Time User? Click Here link in the Sign In box. You will see the New Member Sign Up page. 3. Enter your AppLift Access Code exactly as it appears below. You will not need to use this code after youve completed the sign-up process. If you do not sign up before the expiration date, you must request a new code. · AppLift Access Code: [email or web address] Expires: 1/3/2018 11:00 AM 
 
4. Enter the last four digits of your Social Security Number (xxxx) and Date of Birth (mm/dd/yyyy) as indicated and click Submit. You will be taken to the next sign-up page. 5. Create a AppLift ID. This will be your AppLift login ID and cannot be changed, so think of one that is secure and easy to remember. 6. Create a CallRestot password. You can change your password at any time. 7. Enter your Password Reset Question and Answer. This can be used at a later time if you forget your password. 8. Enter your e-mail address. You will receive e-mail notification when new information is available in [address] 9. Click Sign Up. You can now view and download portions of your medical record. 10. Click the Download Summary menu link to download a portable copy of your medical information. If you have questions, please visit the Frequently Asked Questions section of the [employer] website. Remember, [employer] is NOT to be used for urgent needs. For medical emergencies, dial 911. Now available from your iPhone and Android! General Information Please provide this summary of care documentation to your next provider. Patient Signature:  ____________________________________________________________ Date:  ____________________________________________________________  
  
Renzo Griffin Provider Signature:  ____________________________________________________________ Date:  ____________________________________________________________

## 2017-10-02 NOTE — ED NOTES
Patient requested to get out of bed to have bowel movement, educated patient that he would have to use a bedpan due to his shortness of breath upon exertion and lower GI bleed, patient then stated he would wait for his wife, patient informed to not get out of bed and to call staff when ready to use bedpan, safety intact, will continue to monitor

## 2017-10-02 NOTE — H&P
Hospitalist Admission History and Physical    NAME:  Sonia Muller   :   1932   MRN:   [id number]     PCP:  Mich Velazquez MD  Date/Time:  10/2/2017 5:01 PM  Subjective:   CHIEF COMPLAINT:  Shortness of breath    HISTORY OF PRESENT ILLNESS:     Tia Ayala is a 80 y.o. male who presents with dyspnea, tachycardia, and dizziness which occurs on exertion which may be possibly attributed to painless rectal bleeding that first started yesterday morning. Overnight he woke up and upon getting out of bed and immediately felt dizzy, dyspneic and fell on floor w/o LOC. He continued to have WOODY and was trasnferred to ER where he was given a fluid bolus and admitted for further workup. He was found to have a hemoglobin of 11.3 which was not as that low but he does appear to be symptomatic given that his previous Hgb was > 14. He denies chest pain. Past Medical History:   Diagnosis Date    Ankle fracture, left approx     medial, tibial    Arthritis     knees    Cardiac echocardiogram 2011    Normal LV systolic function. Mild conc LVH. Gr 1 DDfx. RVSP 35-40 mmHg. Marked LAE. Marked LISA. Mild-mod MR.  Mild-mod AI. Mild AoRE. Mild aortic arch dil.  Cardiac Holter monitor study 2011    Baseline sinus rhythm to sinus bradycardia, avg HR 60 bpm (range  bpm). Questionable chronotropic intolerance. No sustained arrhythmias.  GERD (gastroesophageal reflux disease)     Glaucoma suspect     posterior vitreous detachment b/l, pterygium left eye, aseroid hyalosis / synchisis, b/l pseudophakia    H/O colonoscopy 2011    1 polyp thermally treated    History of atrial fibrillation     Hypertension     Unspecified adverse effect of anesthesia     H/o A-fib immediately post colonoscopy.         Past Surgical History:   Procedure Laterality Date    CARDIAC SURG PROCEDURE UNLIST      hx atrial flutter ablation     HX COLONOSCOPY      HX GI      HX KNEE REPLACEMENT Right 02/19/2013    Dr. Madan Restrepo Right 2/13    TKR, Fabio    HX SVT ABLATION  1/2/2013    by Dr. Javier Starr EGD [address] [facility] SPHNCTR/CARDIA GERD         Social History   Substance Use Topics    Smoking status: Former Smoker     Packs/day: 1.00     Quit date: 1/1/1965    Smokeless tobacco: Never Used    Alcohol use Yes      Comment: occasionally. Family History   Problem Relation Age of Onset   [facility] Pacemaker Father    [facility] Pac[facility] Sister     Heart Failure Brother     Diabetes Brother     Cancer Brother      Lung Cancer        No Known Allergies     Prior to Admission Medications   Prescriptions Last Dose Informant Patient Reported? Taking?   glucosamine-D3-boswellia serr (OSTEO BI-FLEX) 1,500-400-100 mg-unit-mg Tab   Yes No   Sig: Take 1 Tab by mouth two (2) times a day. hydroCHLOROthiazide (HYDRODIURIL) 25 mg tablet   No No   Sig: Take 1 Tab by mouth daily. ibuprofen (MOTRIN) 800 mg tablet   Yes No   Sig: Take 800 mg by mouth every eight (8) hours as needed. metoprolol succinate (TOPROL-XL) 25 mg XL tablet   No No   Sig: TAKE ONE TABLET BY MOUTH ONCE DAILY   multivitamin (ONE A DAY) tablet   Yes No   Sig: Take 1 Tab by mouth daily. omega-3 fatty acids-vitamin e (FISH OIL) 1,000 mg cap   Yes No   Sig: Take 1 Cap by mouth two (2) times a day.       Facility-Administered Medications: None       REVIEW OF SYSTEMS:     [] Unable to obtain  ROS due to  []mental status change  []sedated   []intubated   [x]Total of 12 systems reviewed as follows:  Constitutional: Positive for weakness, but negative fever, negative chills, negative weight loss  Eyes:   negative visual changes  ENT:   negative sore throat, tongue or lip swelling  Respiratory:  Positive for dyspnea  Cards:  negative for chest pain, palpitations, lower extremity edema  GI:   negative for nausea, vomiting, diarrhea, and abdominal pain  Genitourinary: negative for frequency, dysuria  Integument:  negative for rash and pruritus  Hematologic:  negative for easy bruising and gum/nose bleeding  Musculoskel: negative for myalgias,  back pain and muscle weakness  Neurological:  dizziness, but no vertigo  Behavl/Psych: negative for feelings of anxiety, depression       Objective:   VITALS:    Visit Vitals    /75    Pulse 99    Temp 97.4 °F (36.3 °C)    Resp 22    SpO2 100%     Temp (24hrs), Av.4 °F (36.3 °C), Min:97.4 °F (36.3 °C), Max:97.4 °F (36.3 °C)      PHYSICAL EXAM:   General:    Alert, cooperative, no distress, appears stated age. Head:   Normocephalic, without obvious abnormality, atraumatic. Eyes:   Conjunctivae clear, anicteric sclerae. Pupils are equal  Nose:  Nares normal. No drainage or sinus tenderness. Throat:    Lips, mucosa, and tongue normal.  No Thrush  Neck:  Supple, symmetrical,  no adenopathy, thyroid: non tender    no carotid bruit and no JVD. Back:    Symmetric,  No CVA tenderness. Lungs:   Clear to auscultation bilaterally. No Wheezing or Rhonchi. No rales. Chest wall:  No tenderness or deformity. No Accessory muscle use. Heart:   Regular rate and rhythm,  no murmur, rub or gallop. Abdomen:   Soft, non-tender. Not distended. Bowel sounds normal. No masses  Extremities: Extremities normal, atraumatic, No cyanosis. No edema. No clubbing  Skin:     Texture, turgor normal. No rashes or lesions. Not Jaundiced  Lymph nodes: Cervical, supraclavicular normal.  Psych:  Good insight. Not depressed. Not anxious or agitated. Neurologic: EOMs intact. No facial asymmetry. No aphasia or slurred speech. Normal  strength, Alert and oriented X 3.        LAB DATA REVIEWED:    No components found for: [place]  Recent Labs      10/02/17   1349  10/02/17   1211   NA  138   --    K  4.8   --    CL  106   --    CO2  25   --    BUN  23*   --    CREA  0.80   --    GLU  125*   --    CA  8.4*   --    ALB  2.8*   --    WBC   --   10.8   HGB   --   11.3*   HCT   -- 33.7*   PLT   --   198         IMAGING RESULTS:   []  I have personally reviewed the actual   []CXR  []CT scan    CXR:  Impression: Subtle retrocardiac opacity likely represents atelectasis, when the patient's clinical condition allows a PA and lateral chest radiograph could be obtained. CTA Chest:  IMPRESSION:  1. No pulmonary embolus. 2. Emphysema with minimal fibrosis in the lung bases. 3. Left upper renal pole simple cystic lesion. Limited evaluation. Further follow-up with retroperitoneal ultrasound is recommended. 4. Extensive coronary vascular calcifications. Coronary calcium scoring evaluation may be evaluated.             Assessment/Plan:      1. Rectal bleeding likely due to lower gastrointestinal bleed  2. Symptomatic anemia due to acute blood loss anemia from rectal bleed with dyspnea, tachycardia, and dizziness. 3. Dyspnea likely due to anemia but need to assess for other causes such as cardiac. 4. History of HTN: stable  5. Remote history of Atrial Fib but has been in NSR for years and is on aspirin. 6. Left upper renal cystic lesion  ___________________________________________________  PLAN:    Risk of deterioration:  []Low    [x]Moderate  []High  -  Admit for further workup and treatment. -  Will monitor labs as outlined below. -  IV Fluid hydration.  -  Clears  -  Supportive care and await consultant input. Meds:   -  Dina Case if prepping for procedure if GI wants to proce  -  GI & Mechanical DVT prophylaxis  Labs:  Monitor H&H, Chemistry, troponin, BNP, Renal ultrasound recommended to assess cystic. Extras: Consider echocard. If symptoms do not improve. Drug levels: n/a  Cultures:  n/a  Consult(s):  -  Gastroenterology pending  -  Consider surgical consult    **Chart reviewed including medications, vitals, notes, labs and pertinent studies. **Assessment, condition, and plan reviewed and all questions and concerns addressed.     Prophylaxis:  []Lovenox  []Coumadin  []Hep SQ  [x]SCDs [x]H2B/PPI    Disposition:  [x]Home w/ Family   []HH PT,OT,RN   []SNF/LTC   []SAH/Rehab    Discussed Code Status:    []Full Code      [x]DNR     ___________________________________________________    Care Plan discussed with:    [x]Patient   []Family    []ED Care Manager  []ED Doc   []Specialist :    Total Time Coordinating Admission:      minutes    []Total Critical Care Time:     ___________________________________________________  Admitting Physician: Devang Hassan MD     Addendum:    After admitting patient to observation but still in emergency room the patient's wife helped him get up to have a bowel movement. He quickly became diaphoretic and had a syncopal episode. He then had a large bloody BM. His hemoglobin is now 9.3 from 11.3. A/P  Worsening symptomatic anemia and in the face of a drop in hemoglobin of 3 gms since admission. At this point the patient will be transfused, made inpatient instead of obs and changed from tele to stepdown. H&H will be monitored and will transfuse additional units of PRBC if needed. Plan d/w nurse in ER, Dr Agustin Khna, and wife.

## 2017-10-02 NOTE — Clinical Note
Patient Class[de-identified] Observation [770] Type of Bed: Telemetry [19] Reason for Observation: Symptomatic anemia Admitting Diagnosis: Lower GI bleed [997029] Admitting Diagnosis: Symptomatic anemia [[id number]] Admitting Physician: Lily Lopez [[id number]] Attending Physician: Lily Lopez [[id number]]

## 2017-10-02 NOTE — IP AVS SNAPSHOT
[facility] 
 
 
 [address] Patient: Trae Jeter MRN: [id number] ARK:8/83/1912 Current Discharge Medication List  
  
START taking these medications Dose & Instructions Dispensing Information Comments Morning Noon Evening Bedtime  
 ferrous sulfate 325 mg (65 mg iron) tablet Your last dose was: Your next dose is:    
   
   
 Dose:  325 mg Take 1 Tab by mouth daily (with breakfast). Quantity:  15 Tab Refills:  0  
     
   
   
   
  
 pantoprazole 40 mg tablet Commonly known as:  PROTONIX Your last dose was: Your next dose is:    
   
   
 Dose:  40 mg Take 1 Tab by mouth daily. Quantity:  10 Tab Refills:  0  
     
   
   
   
  
 polyethylene glycol 17 gram packet Commonly known as:  Kim Lamprey Your last dose was: Your next dose is:    
   
   
 Dose:  17 g Take 1 Packet by mouth daily. Quantity:  30 Packet Refills:  0  
     
   
   
   
  
 senna-docusate 8.6-50 mg per tablet Commonly known as:  Marrie Portia Your last dose was: Your next dose is:    
   
   
 Dose:  1 Tab Take 1 Tab by mouth daily. Quantity:  10 Tab Refills:  0 CONTINUE these medications which have CHANGED Dose & Instructions Dispensing Information Comments Morning Noon Evening Bedtime  
 hydroCHLOROthiazide 25 mg tablet Commonly known as:  HYDRODIURIL Start taking on:  10/9/2017 What changed:  how much to take Your last dose was: Your next dose is:    
   
   
 Dose:  12.5 mg Take 0.5 Tabs by mouth daily. Quantity:  90 Tab Refills:  2 CONTINUE these medications which have NOT CHANGED Dose & Instructions Dispensing Information Comments Morning Noon Evening Bedtime FISH OIL 1,000 mg Cap Generic drug:  omega-3 fatty acids-vitamin e Your last dose was: Your next dose is:    
   
   
 Dose:  1 Cap Take 1 Cap by mouth two (2) times a day. Refills:  0  
     
   
   
   
  
 metoprolol succinate 25 mg XL tablet Commonly known as:  TOPROL-XL Your last dose was: Your next dose is: TAKE ONE TABLET BY MOUTH ONCE DAILY Quantity:  90 Tab Refills:  1  
     
   
   
   
  
 multivitamin tablet Commonly known as:  ONE A DAY Your last dose was: Your next dose is:    
   
   
 Dose:  1 Tab Take 1 Tab by mouth daily. Refills:  0  
     
   
   
   
  
 OSTEO BI-FLEX (5-LOXIN) 1,500-400-100 mg-unit-mg Tab Generic drug:  glucosamine-D3-Boswellia serr Your last dose was: Your next dose is:    
   
   
 Dose:  1 Tab Take 1 Tab by mouth two (2) times a day. Refills:  0 STOP taking these medications   
 ibuprofen 800 mg tablet Commonly known as:  MOTRIN Where to Get Your Medications These medications were sent to [facility], [address] [place] [address]  [address] [address] Phone:  [phone]  
  hydroCHLOROthiazide 25 mg tablet Information on where to get these meds will be given to you by the nurse or doctor. ! Ask your nurse or doctor about these medications  
  ferrous sulfate 325 mg (65 mg iron) tablet  
 pantoprazole 40 mg tablet  
 polyethylene glycol 17 gram packet  
 senna-docusate 8.6-50 mg per tablet

## 2017-10-02 NOTE — ED PROVIDER NOTES
HPI Comments: Lázaro Mathis is a 80 y.o. Male with PMHx of HTN and A-Fib who presents to the ED with c/o SOB and WOODY since this morning. Associated symptoms include lightheadedness. Admits he had a fall 2/2 dizziness \"spell\" this morning, denies hitting head or pain elsewhere. Patient also notes 1x episode of diarrhea this morning with dark blood progressing to light red blood in stool. Patient reports he was at baseline yesterday and had no SOB or other sx. Denies cough or cold sx. Notes hx of A-Fib in past for which he had an ablation years ago by Dr. Augustine Razo, cardiologist. Denies CP, N/V, palpitations or abdominal pain. Admits he had LLE swelling after a broken bone 5 years ago, denies leg swelling currently. Claims he was on Warfarin after his surgery, not currently. Takes ASA daily. The history is provided by the patient. Past Medical History:   Diagnosis Date    Ankle fracture, left approx 2011    medial, tibial    Arthritis     knees    Cardiac echocardiogram 06/16/2011    Normal LV systolic function. Mild conc LVH. Gr 1 DDfx. RVSP 35-40 mmHg. Marked LAE. Marked LISA. Mild-mod MR.  Mild-mod AI. Mild AoRE. Mild aortic arch dil.  Cardiac Holter monitor study 06/22/2011    Baseline sinus rhythm to sinus bradycardia, avg HR 60 bpm (range  bpm). Questionable chronotropic intolerance. No sustained arrhythmias.  GERD (gastroesophageal reflux disease)     Glaucoma suspect     posterior vitreous detachment b/l, pterygium left eye, aseroid hyalosis / synchisis, b/l pseudophakia    H/O colonoscopy 04/13/2011    1 polyp thermally treated    History of atrial fibrillation 2009    Hypertension     Unspecified adverse effect of anesthesia     H/o A-fib immediately post colonoscopy.        Past Surgical History:   Procedure Laterality Date    CARDIAC SURG PROCEDURE UNLIST      hx atrial flutter ablation 2013    HX COLONOSCOPY      HX GI      HX KNEE REPLACEMENT Right 02/19/2013 Dr. Una Salcido    HX ORTHOPAEDIC Right 2/13    TKR, Fabio    HX SVT ABLATION  1/2/2013    by Dr. Cherise Ha EGD [address] [facility] SPHNCTR/CARDIA GERD           Family History:   Problem Relation Age of Onset   Harshad Pérez Pacemaker Father     Pacemaker Sister     Heart Failure Brother     Diabetes Brother     Cancer Brother      Lung Cancer       Social History     Social History    Marital status:      Spouse name: N/A    Number of children: N/A    Years of education: N/A     Occupational History    Not on file. Social History Main Topics    Smoking status: Former Smoker     Packs/day: 1.00     Quit date: 1/1/1965    Smokeless tobacco: Never Used    Alcohol use Yes      Comment: occasionally.  Drug use: No    Sexual activity: Not Currently     Partners: Female     Other Topics Concern    Not on file     Social History Narrative         ALLERGIES: Review of patient's allergies indicates no known allergies. Review of Systems   Constitutional: Negative. Negative for chills and fever. HENT: Negative. Negative for congestion. Eyes: Negative. Negative for visual disturbance. Respiratory: Positive for shortness of breath. Negative for cough. Cardiovascular: Negative. Negative for chest pain, palpitations and leg swelling. Gastrointestinal: Positive for blood in stool and diarrhea. Negative for abdominal pain, nausea and vomiting. Genitourinary: Negative. Negative for dysuria. Musculoskeletal: Negative. Negative for back pain and myalgias. Skin: Negative. Negative for rash and wound. Neurological: Positive for dizziness and light-headedness. Negative for weakness and numbness. Psychiatric/Behavioral: Negative. Negative for self-injury. All other systems reviewed and are negative.       Vitals:    10/02/17 1645 10/02/17 1700 10/02/17 1810 10/02/17 1815   BP: 122/77 115/79 94/42 90/54   Pulse: (!) 102 (!) 105 (!) 105 (!) 102   Resp: 21 22 19 17   Temp:       SpO2: 96% 100% 98% 99%            Physical Exam   Constitutional: He is oriented to person, place, and time. He appears well-developed and well-nourished. No distress. HENT:   Head: Normocephalic and atraumatic. Mouth/Throat: Mucous membranes are pale. Eyes: Conjunctivae and EOM are normal. Pupils are equal, round, and reactive to light. No scleral icterus. Neck: Normal range of motion. Neck supple. No JVD present. No thyromegaly present. Cardiovascular: Regular rhythm, S1 normal and S2 normal.  Tachycardia present. Exam reveals no gallop and no friction rub. No murmur heard. Pulmonary/Chest: Effort normal and breath sounds normal. No accessory muscle usage. No respiratory distress. Abdominal: Soft. Normal appearance. He exhibits no distension. There is no tenderness. There is no rigidity, no rebound and no guarding. Genitourinary: Rectal exam shows guaiac positive stool. Genitourinary Comments: Rectal exam:  Dark red blood, grossly heme positive stool    Musculoskeletal: Normal range of motion. He exhibits edema (+1 pitting edema in BLE). He exhibits no tenderness. Neurological: He is alert and oriented to person, place, and time. He has normal strength. No cranial nerve deficit or sensory deficit. Coordination normal.   Skin: Skin is warm and intact. No rash noted. Psychiatric: He has a normal mood and affect. His speech is normal and behavior is normal.   Vitals reviewed. MDM  Number of Diagnoses or Management Options  Acute lower GI bleeding:   WOODY (dyspnea on exertion): Symptomatic anemia:   Syncope, unspecified syncope type:   Diagnosis management comments: Mara Koroma is a 80 y.o. Male coming in with WOODY and lightheadedness. Evidence of lower GI bleed on exam and history. Hbg and hemodynamically stable. No evidence of PE or CHF. Will type and screen and plan for admission.     ED Course       Procedures    Vitals:  Patient Vitals for the past 12 hrs: Temp Pulse Resp BP SpO2   10/02/17 1815 - (!) 102 17 90/54 99 %   10/02/17 1810 - (!) 105 19 94/42 98 %   10/02/17 1700 - (!) 105 22 115/79 100 %   10/02/17 1645 - (!) 102 21 122/77 96 %   10/02/17 1630 - (!) 102 19 123/81 96 %   10/02/17 1615 - 99 22 113/75 100 %   10/02/17 1600 - 99 22 110/72 98 %   10/02/17 1545 - 99 23 131/80 94 %   10/02/17 1530 - (!) 105 20 120/72 -   10/02/17 1515 - (!) 103 23 110/71 99 %   10/02/17 1430 - (!) 106 23 110/68 -   10/02/17 1415 - (!) 105 24 110/65 99 %   10/02/17 1400 - (!) 104 19 123/61 98 %   10/02/17 1345 - (!) 108 27 108/53 95 %   10/02/17 1315 - (!) 101 20 106/76 95 %   10/02/17 1300 - (!) 102 16 110/68 -   10/02/17 1245 - (!) 103 21 104/57 97 %   10/02/17 1230 - (!) 104 21 103/64 99 %   10/02/17 1215 - (!) 106 20 95/75 97 %   10/02/17 1201 97.4 °F (36.3 °C) 85 20 122/80 98 %   10/02/17 1200 - (!) 103 25 115/74 -       Medications ordered:   Medications   hydroCHLOROthiazide (HYDRODIURIL) tablet 25 mg (not administered)   metoprolol succinate (TOPROL-XL) XL tablet 25 mg (not administered)   acetaminophen (TYLENOL) solution 650 mg (not administered)   HYDROcodone-acetaminophen (NORCO)  mg tablet 1 Tab (not administered)   morphine injection 1 mg (not administered)   naloxone (NARCAN) injection 0.4 mg (not administered)   diphenhydrAMINE (BENADRYL) injection 12.5 mg (not administered)   ondansetron (ZOFRAN) injection 4 mg (not administered)   docusate sodium (COLACE) capsule 100 mg (not administered)   zolpidem (AMBIEN) tablet 5 mg (not administered)   ondansetron (ZOFRAN) 4 mg/2 mL injection (not administered)   famotidine (PEPCID) tablet 20 mg (not administered)   . PLEASE ENTER THE PATIENT'S HEIGHT AND WEIGHT IN CONNECT CARE (not administered)   sodium chloride 0.9 % bolus infusion 500 mL (0 mL IntraVENous IV Completed 10/2/17 6242)   iopamidol (ISOVUE-370) 76 % injection 100 mL (100 mL IntraVENous Given 10/2/17 5502)   sodium chloride 0.9 % bolus infusion 500 mL (0 mL IntraVENous IV Completed 10/2/17 8914)         Lab findings:  Recent Results (from the past 12 hour(s))   CBC WITH AUTOMATED DIFF    Collection Time: 10/02/17 12:11 PM   Result Value Ref Range    WBC 10.8 4.6 - 13.2 K/uL    RBC 3.57 (L) 4.70 - 5.50 M/uL    HGB 11.3 (L) 13.0 - 16.0 g/dL    HCT 33.7 (L) 36.0 - 48.0 %    MCV 94.4 74.0 - 97.0 FL    MCH 31.7 24.0 - 34.0 PG    MCHC 33.5 31.0 - 37.0 g/dL    RDW 13.7 11.6 - 14.5 %    PLATELET 653 004 - 818 K/uL    MPV 11.3 9.2 - 11.8 FL    NEUTROPHILS 65 40 - 73 %    LYMPHOCYTES 27 21 - 52 %    MONOCYTES 7 3 - 10 %    EOSINOPHILS 1 0 - 5 %    BASOPHILS 0 0 - 2 %    ABS. NEUTROPHILS 7.1 1.8 - 8.0 K/UL    ABS. LYMPHOCYTES 2.9 0.9 - 3.6 K/UL    ABS. MONOCYTES 0.7 0.05 - 1.2 K/UL    ABS. EOSINOPHILS 0.1 0.0 - 0.4 K/UL    ABS.  BASOPHILS 0.0 0.0 - 0.06 K/UL    DF AUTOMATED     PROTHROMBIN TIME + INR    Collection Time: 10/02/17 12:11 PM   Result Value Ref Range    Prothrombin time 13.5 11.5 - 15.2 sec    INR 1.1 0.8 - 1.2     PTT    Collection Time: 10/02/17 12:11 PM   Result Value Ref Range    aPTT 29.6 23.0 - 36.4 SEC   D DIMER    Collection Time: 10/02/17 12:11 PM   Result Value Ref Range    D DIMER 1.28 (H) <0.46 ug/ml(FEU)   EKG, 12 LEAD, INITIAL    Collection Time: 10/02/17 12:32 PM   Result Value Ref Range    Ventricular Rate 102 BPM    Atrial Rate 102 BPM    P-R Interval 196 ms    QRS Duration 68 ms    Q-T Interval 334 ms    QTC Calculation (Bezet) 435 ms    Calculated P Axis 45 degrees    Calculated R Axis -9 degrees    Calculated T Axis 58 degrees    Diagnosis       Sinus tachycardia  Otherwise normal ECG  When compared with ECG of 10-DEC-2012 09:35,  QRS duration has decreased  Confirmed by Janice Miller MD, --- ([id number]) on 10/2/2017 5:24:56 PM     CARDIAC PANEL,(CK, CKMB & TROPONIN)    Collection Time: 10/02/17  1:49 PM   Result Value Ref Range    CK 45 39 - 308 U/L    CK - MB <1.0 <3.6 ng/ml    CK-MB Index  0.0 - 4.0 %     CALCULATION NOT PERFORMED WHEN RESULT IS BELOW LINEAR LIMIT    Troponin-I, Qt. 0.02 0.0 - 0.045 NG/ML   MAGNESIUM    Collection Time: 10/02/17  1:49 PM   Result Value Ref Range    Magnesium 1.9 1.6 - 2.6 mg/dL   METABOLIC PANEL, COMPREHENSIVE    Collection Time: 10/02/17  1:49 PM   Result Value Ref Range    Sodium 138 136 - 145 mmol/L    Potassium 4.8 3.5 - 5.5 mmol/L    Chloride 106 100 - 108 mmol/L    CO2 25 21 - 32 mmol/L    Anion gap 7 3.0 - 18 mmol/L    Glucose 125 (H) 74 - 99 mg/dL    BUN 23 (H) 7.0 - 18 MG/DL    Creatinine 0.80 0.6 - 1.3 MG/DL    BUN/Creatinine ratio 29 (H) 12 - 20      GFR est AA >60 >60 ml/min/1.73m2    GFR est non-AA >60 >60 ml/min/1.73m2    Calcium 8.4 (L) 8.5 - 10.1 MG/DL    Bilirubin, total 0.3 0.2 - 1.0 MG/DL    ALT (SGPT) 21 16 - 61 U/L    AST (SGOT) 20 15 - 37 U/L    Alk.  phosphatase 65 45 - 117 U/L    Protein, total 6.6 6.4 - 8.2 g/dL    Albumin 2.8 (L) 3.4 - 5.0 g/dL    Globulin 3.8 2.0 - 4.0 g/dL    A-G Ratio 0.7 (L) 0.8 - 1.7     NT-PRO BNP    Collection Time: 10/02/17  1:49 PM   Result Value Ref Range    NT pro- 0 - 1800 PG/ML   URINALYSIS W/ RFLX MICROSCOPIC    Collection Time: 10/02/17  2:23 PM   Result Value Ref Range    Color DARK YELLOW      Appearance CLEAR      Specific gravity 1.029 1.005 - 1.030      pH (UA) 5.0 5.0 - 8.0      Protein NEGATIVE  NEG mg/dL    Glucose NEGATIVE  NEG mg/dL    Ketone TRACE (A) NEG mg/dL    Bilirubin NEGATIVE  NEG      Blood NEGATIVE  NEG      Urobilinogen 1.0 0.2 - 1.0 EU/dL    Nitrites NEGATIVE  NEG      Leukocyte Esterase TRACE (A) NEG     URINE MICROSCOPIC ONLY    Collection Time: 10/02/17  2:23 PM   Result Value Ref Range    WBC 1 to 4 0 - 4 /hpf    RBC 0 to 2 0 - 5 /hpf    Epithelial cells 1+ 0 - 5 /lpf    Bacteria 1+ (A) NEG /hpf    Mucus 1+ (A) NEG /lpf   TYPE & SCREEN    Collection Time: 10/02/17  4:51 PM   Result Value Ref Range    Crossmatch Expiration 10/05/2017     ABO/Rh(D) Boby Mylar POSITIVE     Antibody screen NEG    TROPONIN I    Collection Time: 10/02/17  6:03 PM   Result Value Ref Range    Troponin-I, Qt. 0.03 0.0 - 0.045 NG/ML   HGB & HCT    Collection Time: 10/02/17  6:03 PM   Result Value Ref Range    HGB 9.3 (L) 13.0 - 16.0 g/dL    HCT 27.6 (L) 36.0 - 48.0 %   GLUCOSE, POC    Collection Time: 10/02/17  6:20 PM   Result Value Ref Range    Glucose (POC) 196 (H) 70 - 110 mg/dL       EKG interpretation by ED Physician:  Sinus tachycardia @ 102 bpm. No acute ischemic changes. X-Ray, CT or other radiology findings or impressions:  CTA CHEST W OR W WO CONT   Final Result      XR CHEST PORT   Final Result      US KIDNEY LT    (Results Pending)   CXR:  Subtle retrocardiac opacity likely represents atelectasis, when the  patient's clinical condition allows a PA and lateral chest radiograph could be  obtained. CTA Chest:  1. No pulmonary embolus. 2. Emphysema with minimal fibrosis in the lung bases. 3. Left upper renal pole simple cystic lesion. Limited evaluation. Further  follow-up with retroperitoneal ultrasound is recommended. 4. Extensive coronary vascular calcifications. Coronary calcium scoring  evaluation may be evaluated. Progress notes, Consult notes or additional Procedure notes:   Consult:  Discussed care with Dr. Carolyn Richardson, hospitalist. Standard discussion; including history of patients chief complaint, available diagnostic results, and treatment course. Agrees to admit for Telemetry. 4:06 PM, 10/2/2017     Consult:  Discussed care with Dr. Diogenes Harmon, Gastroenterologist. Standard discussion; including history of patients chief complaint, available diagnostic results, and treatment course. Agrees to consult.  5:26 PM, 10/2/2017     6:30 PM: Called back into the room by nurse. Patient was trying to walk to the bedside commode and synopized and had a large dark red bloody bowel movement. Was able to get patient back in bed and he quickly came around. BP low initial, but improved with IVF. Repeat H+H showed hbg of 9.  Called and spoke to Dr. Ochoa Aponte to make him aware.    Disposition:  Diagnosis:   1. Acute lower GI bleeding    2. Symptomatic anemia    3. WOODY (dyspnea on exertion)    4. Syncope, unspecified syncope type        Disposition: Admit. Follow-up Information     None           Patient's Medications   Start Taking    No medications on file   Continue Taking    GLUCOSAMINE-D3-BOSWELLIA SERR (OSTEO BI-FLEX) 1,500-400-100 MG-UNIT-MG TAB    Take 1 Tab by mouth two (2) times a day. HYDROCHLOROTHIAZIDE (HYDRODIURIL) 25 MG TABLET    Take 1 Tab by mouth daily. IBUPROFEN (MOTRIN) 800 MG TABLET    Take 800 mg by mouth every eight (8) hours as needed. METOPROLOL SUCCINATE (TOPROL-XL) 25 MG XL TABLET    TAKE ONE TABLET BY MOUTH ONCE DAILY    MULTIVITAMIN (ONE A DAY) TABLET    Take 1 Tab by mouth daily. OMEGA-3 FATTY ACIDS-VITAMIN E (FISH OIL) 1,000 MG CAP    Take 1 Cap by mouth two (2) times a day. These Medications have changed    No medications on file   Stop Taking    No medications on file         Scribe Attestation      Ramírez Mccall acting as a scribe for and in the presence of Balaji Cerrato MD      October 02, 2017 at 1:02 PM       Provider Attestation:      I personally performed the services described in the documentation, reviewed the documentation, as recorded by the scribe in my presence, and it accurately and completely records my words and actions.  October 02, 2017 at 1:02 PM - Balaji Cerrato MD

## 2017-10-02 NOTE — ED NOTES
Walked into pts room to find pt standing out of bed with wifes help. Wife unhooked pt. Wife was assisting pt to the bedside commode. Pt was diaphoretic. Pt sat on the bedside commode and appeared to have a syncopal episode. I called for help and other nurses and emergency room doctors came to bedside. Returned pt to bed. Pts doctor notified. Pt currently resting in bed.

## 2017-10-03 ENCOUNTER — APPOINTMENT (OUTPATIENT)
Dept: ULTRASOUND IMAGING | Age: 82
DRG: 378 | End: 2017-10-03
Attending: HOSPITALIST
Payer: MEDICARE

## 2017-10-03 ENCOUNTER — APPOINTMENT (OUTPATIENT)
Dept: NUCLEAR MEDICINE | Age: 82
DRG: 378 | End: 2017-10-03
Attending: INTERNAL MEDICINE
Payer: MEDICARE

## 2017-10-03 LAB
ALBUMIN SERPL-MCNC: 2.5 G/DL (ref 3.4–5)
ALBUMIN/GLOB SERPL: 0.8 {RATIO} (ref 0.8–1.7)
ALP SERPL-CCNC: 49 U/L (ref 45–117)
ALT SERPL-CCNC: 19 U/L (ref 16–61)
ANION GAP SERPL CALC-SCNC: 10 MMOL/L (ref 3–18)
APTT PPP: 29.3 SEC (ref 23–36.4)
AST SERPL-CCNC: 24 U/L (ref 15–37)
BASOPHILS # BLD: 0 K/UL (ref 0–0.1)
BASOPHILS NFR BLD: 0 % (ref 0–2)
BILIRUB SERPL-MCNC: 0.4 MG/DL (ref 0.2–1)
BUN SERPL-MCNC: 23 MG/DL (ref 7–18)
BUN/CREAT SERPL: 33 (ref 12–20)
CALCIUM SERPL-MCNC: 7.7 MG/DL (ref 8.5–10.1)
CHLORIDE SERPL-SCNC: 108 MMOL/L (ref 100–108)
CO2 SERPL-SCNC: 21 MMOL/L (ref 21–32)
CREAT SERPL-MCNC: 0.69 MG/DL (ref 0.6–1.3)
EOSINOPHIL # BLD: 0 K/UL (ref 0–0.4)
EOSINOPHIL NFR BLD: 1 % (ref 0–5)
ERYTHROCYTE [DISTWIDTH] IN BLOOD BY AUTOMATED COUNT: 14.2 % (ref 11.6–14.5)
GLOBULIN SER CALC-MCNC: 3.2 G/DL (ref 2–4)
GLUCOSE SERPL-MCNC: 129 MG/DL (ref 74–99)
HCT VFR BLD AUTO: 24 % (ref 36–48)
HCT VFR BLD AUTO: 24.2 % (ref 36–48)
HCT VFR BLD AUTO: 27.3 % (ref 36–48)
HGB BLD-MCNC: 8.3 G/DL (ref 13–16)
HGB BLD-MCNC: 8.3 G/DL (ref 13–16)
HGB BLD-MCNC: 9.2 G/DL (ref 13–16)
INR PPP: 1.2 (ref 0.8–1.2)
LYMPHOCYTES # BLD: 3.6 K/UL (ref 0.9–3.6)
LYMPHOCYTES NFR BLD: 23 % (ref 21–52)
MCH RBC QN AUTO: 31.8 PG (ref 24–34)
MCHC RBC AUTO-ENTMCNC: 33.7 G/DL (ref 31–37)
MCV RBC AUTO: 94.5 FL (ref 74–97)
MONOCYTES # BLD: 1.4 K/UL (ref 0.05–1.2)
MONOCYTES NFR BLD: 9 % (ref 3–10)
NEUTS SEG # BLD: 10.2 K/UL (ref 1.8–8)
NEUTS SEG NFR BLD: 67 % (ref 40–73)
PLATELET # BLD AUTO: 154 K/UL (ref 135–420)
PMV BLD AUTO: 11.6 FL (ref 9.2–11.8)
POTASSIUM SERPL-SCNC: 4.6 MMOL/L (ref 3.5–5.5)
PROT SERPL-MCNC: 5.7 G/DL (ref 6.4–8.2)
PROTHROMBIN TIME: 14.5 SEC (ref 11.5–15.2)
RBC # BLD AUTO: 2.89 M/UL (ref 4.7–5.5)
SODIUM SERPL-SCNC: 139 MMOL/L (ref 136–145)
TROPONIN I SERPL-MCNC: 0.06 NG/ML (ref 0–0.04)
WBC # BLD AUTO: 15.2 K/UL (ref 4.6–13.2)

## 2017-10-03 PROCEDURE — 76770 US EXAM ABDO BACK WALL COMP: CPT

## 2017-10-03 PROCEDURE — 36415 COLL VENOUS BLD VENIPUNCTURE: CPT | Performed by: HOSPITALIST

## 2017-10-03 PROCEDURE — 74011250636 HC RX REV CODE- 250/636: Performed by: NURSE PRACTITIONER

## 2017-10-03 PROCEDURE — 85025 COMPLETE CBC W/AUTO DIFF WBC: CPT | Performed by: HOSPITALIST

## 2017-10-03 PROCEDURE — 74011000250 HC RX REV CODE- 250: Performed by: NURSE PRACTITIONER

## 2017-10-03 PROCEDURE — 80053 COMPREHEN METABOLIC PANEL: CPT | Performed by: HOSPITALIST

## 2017-10-03 PROCEDURE — 74011250637 HC RX REV CODE- 250/637: Performed by: HOSPITALIST

## 2017-10-03 PROCEDURE — 85018 HEMOGLOBIN: CPT | Performed by: HOSPITALIST

## 2017-10-03 PROCEDURE — 84484 ASSAY OF TROPONIN QUANT: CPT | Performed by: HOSPITALIST

## 2017-10-03 PROCEDURE — 85610 PROTHROMBIN TIME: CPT | Performed by: HOSPITALIST

## 2017-10-03 PROCEDURE — P9016 RBC LEUKOCYTES REDUCED: HCPCS | Performed by: EMERGENCY MEDICINE

## 2017-10-03 PROCEDURE — A9560 TC99M LABELED RBC: HCPCS

## 2017-10-03 PROCEDURE — 65660000000 HC RM CCU STEPDOWN

## 2017-10-03 PROCEDURE — 74011250636 HC RX REV CODE- 250/636: Performed by: HOSPITALIST

## 2017-10-03 PROCEDURE — 36430 TRANSFUSION BLD/BLD COMPNT: CPT

## 2017-10-03 PROCEDURE — 85730 THROMBOPLASTIN TIME PARTIAL: CPT | Performed by: HOSPITALIST

## 2017-10-03 PROCEDURE — C9113 INJ PANTOPRAZOLE SODIUM, VIA: HCPCS | Performed by: NURSE PRACTITIONER

## 2017-10-03 RX ORDER — SODIUM CHLORIDE 9 MG/ML
250 INJECTION, SOLUTION INTRAVENOUS AS NEEDED
Status: DISCONTINUED | OUTPATIENT
Start: 2017-10-03 | End: 2017-10-06 | Stop reason: HOSPADM

## 2017-10-03 RX ORDER — HEPARIN SODIUM 1000 [USP'U]/ML
1000 INJECTION, SOLUTION INTRAVENOUS; SUBCUTANEOUS
Status: COMPLETED | OUTPATIENT
Start: 2017-10-03 | End: 2017-10-03

## 2017-10-03 RX ORDER — HYDROCODONE BITARTRATE AND ACETAMINOPHEN 5; 325 MG/1; MG/1
1-2 TABLET ORAL
Status: DISCONTINUED | OUTPATIENT
Start: 2017-10-03 | End: 2017-10-06 | Stop reason: HOSPADM

## 2017-10-03 RX ORDER — SODIUM CHLORIDE 0.9 % (FLUSH) 0.9 %
5-10 SYRINGE (ML) INJECTION AS NEEDED
Status: DISCONTINUED | OUTPATIENT
Start: 2017-10-03 | End: 2017-10-06 | Stop reason: HOSPADM

## 2017-10-03 RX ORDER — SODIUM CHLORIDE 0.9 % (FLUSH) 0.9 %
5-10 SYRINGE (ML) INJECTION EVERY 8 HOURS
Status: DISCONTINUED | OUTPATIENT
Start: 2017-10-03 | End: 2017-10-06 | Stop reason: HOSPADM

## 2017-10-03 RX ADMIN — DOCUSATE SODIUM 100 MG: 100 CAPSULE, LIQUID FILLED ORAL at 09:08

## 2017-10-03 RX ADMIN — DOCUSATE SODIUM 100 MG: 100 CAPSULE, LIQUID FILLED ORAL at 17:29

## 2017-10-03 RX ADMIN — METOPROLOL SUCCINATE 25 MG: 25 TABLET, EXTENDED RELEASE ORAL at 09:08

## 2017-10-03 RX ADMIN — FAMOTIDINE 20 MG: 20 TABLET ORAL at 09:08

## 2017-10-03 RX ADMIN — Medication 10 ML: at 23:00

## 2017-10-03 RX ADMIN — HYDROCHLOROTHIAZIDE 25 MG: 25 TABLET ORAL at 09:08

## 2017-10-03 RX ADMIN — SODIUM CHLORIDE 40 MG: 9 INJECTION INTRAMUSCULAR; INTRAVENOUS; SUBCUTANEOUS at 12:40

## 2017-10-03 RX ADMIN — Medication 10 ML: at 21:55

## 2017-10-03 RX ADMIN — HEPARIN SODIUM 1000 UNITS: 1000 INJECTION, SOLUTION INTRAVENOUS; SUBCUTANEOUS at 14:41

## 2017-10-03 RX ADMIN — SODIUM CHLORIDE 40 MG: 9 INJECTION INTRAMUSCULAR; INTRAVENOUS; SUBCUTANEOUS at 21:52

## 2017-10-03 NOTE — PROGRESS NOTES
Care Management Interventions  PCP Verified by CM: Yes  Current Support Network: Lives with Spouse  Confirm Follow Up Transport: Family  Plan discussed with Pt/Family/Caregiver: Yes     Pt is a 80year old male admitted for lower GI bleed, symptomatic anemia. Pt is alert and oriented in room with spouse Sebas at his bedside. Pt indicates being independent with ADLs and ambulation prior to admission. Pt states that he resides with spouse and his plan is to go home at discharge. Pt had concerns and questions and CM addressed these to the best of her ability. Physician informed of pt's concerns.

## 2017-10-03 NOTE — CONSULTS
[email or web address]  [phone]    Impression:   1. GI bleed- heme positive stool. Last colonoscopy 4/13/11 showed moderate diverticulosis of colon. 1 polyp distal transverse colon  2. Symptomatic anemia due to acute blood loss  3. Dyspnea- resolved  4. HTN  5. Remote h/o afib- s/p ablation  6. H/o GERD-           Plan:     1. Cont monitor H/H transfuse as indicated   2. Continue monitor for overt GI hemorrhage  3 Start PPI  4 Cont clears  Await further recommendation from attending        Chief Complaint: painless bright red blood per rectum      HPI:  Brian Parikh is a 80 y.o. male who is being seen on consult for GI bleed. Pt presented to ER for dyspnea, dizzyness and tacycardia. Pt had 1 episode at home of large amount  Of painless bright red blood per rectum after BM yesterday. Pt had another episode upon arriving to hospital room. No further episodes since then. On rectal exam small amount of bright red blood noted. No clots noted. Pt had colonoscopy about 5 years ago and had polyps removed. Pt denies NSAIDs use. Pt denies alcohol intake    PMH:   Past Medical History:   Diagnosis Date    Ankle fracture, left approx 2011    medial, tibial    Arthritis     knees    Cardiac echocardiogram 06/16/2011    Normal LV systolic function. Mild conc LVH. Gr 1 DDfx. RVSP 35-40 mmHg. Marked LAE. Marked LISA. Mild-mod MR.  Mild-mod AI. Mild AoRE. Mild aortic arch dil.  Cardiac Holter monitor study 06/22/2011    Baseline sinus rhythm to sinus bradycardia, avg HR 60 bpm (range  bpm). Questionable chronotropic intolerance. No sustained arrhythmias.     GERD (gastroesophageal reflux disease)     Glaucoma suspect     posterior vitreous detachment b/l, pterygium left eye, aseroid hyalosis / synchisis, b/l pseudophakia    H/O colonoscopy 04/13/2011    1 polyp thermally treated    History of atrial fibrillation 2009    Hypertension     Unspecified adverse effect of anesthesia     H/o A-fib immediately post colonoscopy. PSH:   Past Surgical History:   Procedure Laterality Date    CARDIAC SURG PROCEDURE UNLIST      hx atrial flutter ablation 2013    HX COLONOSCOPY      HX GI      HX KNEE REPLACEMENT Right 02/19/2013    Dr. Sanjana Irby HX ORTHOPAEDIC Right 2/13    TKR, Fabio    HX SVT ABLATION  1/2/2013    by Dr. Julian Escobar EGD [facility] [employer] SPHNCTR/CARDIA GERD         Social HX:   Social History     Social History    Marital status:      Spouse name: N/A    Number of children: N/A    Years of education: N/A     Occupational History    Not on file. Social History Main Topics    Smoking status: Former Smoker     Packs/day: 1.00     Quit date: 1/1/1965    Smokeless tobacco: Never Used    Alcohol use Yes      Comment: occasionally.  Drug use: No    Sexual activity: Not Currently     Partners: Female     Other Topics Concern    Not on file     Social History Narrative       FHX:   Family History   Problem Relation Age of Onset    Pacemaker Father     Pacemaker Sister     Heart Failure Brother     Diabetes Brother     Cancer Brother      Lung Cancer       Allergy:   No Known Allergies    Home Medications:     Prescriptions Prior to Admission   Medication Sig    metoprolol succinate (TOPROL-XL) 25 mg XL tablet TAKE ONE TABLET BY MOUTH ONCE DAILY    ibuprofen (MOTRIN) 800 mg tablet Take 800 mg by mouth every eight (8) hours as needed.  hydroCHLOROthiazide (HYDRODIURIL) 25 mg tablet Take 1 Tab by mouth daily.  multivitamin (ONE A DAY) tablet Take 1 Tab by mouth daily.  glucosamine-D3-boswellia serr (OSTEO BI-FLEX) 1,500-400-100 mg-unit-mg Tab Take 1 Tab by mouth two (2) times a day.  omega-3 fatty acids-vitamin e (FISH OIL) 1,000 mg cap Take 1 Cap by mouth two (2) times a day. Review of Systems:     A fourteen point review of systems was obtained, and was negative except per HPI.     Visit Vitals    /76 (BP 1 Location: Left arm, BP Patient Position: At rest)    Pulse 68    Temp 97.8 °F (36.6 °C)    Resp 18    Ht 5' 9.5\" (1.765 m)    Wt 109 kg (240 lb 4.8 oz)    SpO2 95%    BMI 34.98 kg/m2       Physical Assessment:     constitutional: appearance: well developed, well nourished, in no acute distress. skin: no rashes, jaundice  eyes: inspection: normal conjunctivae and lids; no scleral icterus pupils: equal, round, reactive to light; extraocular movements intact  ENMT: mouth: mucous membranes moist, no thrush  neck:  no masses or tenderness; normal range of motion  respiratory: breath sounds clear, no wheeze/rales/rhonchi  cardiovascular: normal rate, regular rhythm without murmur  abdominal: soft, bowel sounds present, non-tender to palpation without rebound or guarding; no appreciable mass; no appreciable hepatosplenomegaly; no appreciable fluid wave  extremities: no clubbing, cyanosis, edema  neurologic:  Cranial nerves II-XII grossly intact; no asterixis   psychiatric:  oriented to time, space and person. Basic Metabolic Profile   Recent Labs      10/03/17   0200  10/02/17   1349   NA  139  138   K  4.6  4.8   CL  108  106   CO2  21  25   BUN  23*  23*   GLU  129*  125*   CA  7.7*  8.4*   MG   --   1.9         CBC w/Diff    Recent Labs      10/03/17   0200   WBC  15.2*   RBC  2.89*   HGB  9.2*   HCT  27.3*   MCV  94.5   MCH  31.8   MCHC  33.7   RDW  14.2   PLT  154    Recent Labs      10/03/17   0200   GRANS  67   LYMPH  23   EOS  1        Hepatic Function   Recent Labs      10/03/17   0200   ALB  2.5*   TP  5.7*   TBILI  0.4   SGOT  24   AP  [address], NP  [facility]  [email or web address]

## 2017-10-03 NOTE — ED NOTES
TRANSFER - OUT REPORT:    Verbal report given to Mary Marrero RN (name) on Rajni Evans  being transferred to [address] [place] [address],[address] (unit) for routine progression of care       Report consisted of patients Situation, Background, Assessment and   Recommendations(SBAR). Information from the following report(s) SBAR, ED Summary and Intake/Output was reviewed with the receiving nurse. Lines:   Peripheral IV 10/02/17 Left Hand (Active)   Site Assessment Clean, dry, & intact 10/2/2017  2:25 PM   Phlebitis Assessment 0 10/2/2017  2:25 PM   Infiltration Assessment 0 10/2/2017  2:25 PM   Dressing Status Clean, dry, & intact 10/2/2017  2:25 PM   Dressing Type Transparent 10/2/2017  2:25 PM       Peripheral IV 10/02/17 Right Antecubital (Active)   Site Assessment Clean, dry, & intact 10/2/2017  2:36 PM   Phlebitis Assessment 0 10/2/2017  2:36 PM   Infiltration Assessment 0 10/2/2017  2:36 PM   Dressing Status Clean, dry, & intact 10/2/2017  2:36 PM   Dressing Type Transparent 10/2/2017  2:36 PM        Opportunity for questions and clarification was provided.       Patient transported with:   Monitor  Registered Nurse

## 2017-10-03 NOTE — PROGRESS NOTES
SUBJECTIVE:    Feeling better. Saw him in nuclear medicine department. No chest pain or SOB or cough. No N/V/D. Had a bloody BM earlier but slowing down per patient. Can not sat if he is dizzy as he did not stand up yet. No tingling or numbness. Lives with wife. OBJECTIVE:    /76 (BP 1 Location: Left arm, BP Patient Position: At rest)  Pulse 66  Temp 97.4 °F (36.3 °C)  Resp 18  Ht 5' 9.5\" (1.765 m)  Wt 109 kg (240 lb 4.8 oz)  SpO2 96%  BMI 34.98 kg/m2    HEENT: oral mucosa is moist, no pallor  Neck: trachea is midline  CVS: RRR  RS: CTA bilaterally  GI: ND, Soft, ND, BS +  Extremities: no pedal edema  CNS: non focal.  Motor strength 5/5 in all 4 extremities  General: NAD, Awake    ASSESSMENT:    1. Rectal bleeding likely due to lower gastrointestinal bleed  2. Acute GI blood loss anemia S/P ONE UNIT  3. Dyspnea and dizziness likely due to anemia, IMPROVING   4. History of HTN. 5. Remote history of Atrial Fib but has been in NSR for years. 6. Left upper renal cyst.  7. Emphysematous disease  8.  Stress induced leukocytosis     PLAN:    CTA report reviewed - will get ECHO done as well as bedside PFT  Monitor H/H and transfuse as needed  Renal US report reviewed  GI input noted - will follow bleeding scan report  PT/OT from tomorrow    Total time greater than 35 minutes    CMP:   Lab Results   Component Value Date/Time     10/03/2017 02:00 AM    K 4.6 10/03/2017 02:00 AM     10/03/2017 02:00 AM    CO2 21 10/03/2017 02:00 AM    AGAP 10 10/03/2017 02:00 AM     (H) 10/03/2017 02:00 AM    BUN 23 (H) 10/03/2017 02:00 AM    CREA 0.69 10/03/2017 02:00 AM    GFRAA >60 10/03/2017 02:00 AM    GFRNA >60 10/03/2017 02:00 AM    CA 7.7 (L) 10/03/2017 02:00 AM    ALB 2.5 (L) 10/03/2017 02:00 AM    TP 5.7 (L) 10/03/2017 02:00 AM    GLOB 3.2 10/03/2017 02:00 AM    AGRAT 0.8 10/03/2017 02:00 AM    SGOT 24 10/03/2017 02:00 AM    ALT 19 10/03/2017 02:00 AM     CBC:   Lab Results   Component Value Date/Time    WBC 15.2 (H) 10/03/2017 02:00 AM    HGB 8.3 (L) 10/03/2017 12:21 PM    HCT 24.0 (L) 10/03/2017 12:21 PM     10/03/2017 02:00 AM

## 2017-10-03 NOTE — PROGRESS NOTES
PT alert, able to make needs/wants known. VSS. SR on the monitor. Received 1 unit of PRBC's this shift. Pt tolerated it well. Voices no concerns,  Needs met by staff. Call light within reach, will monitor.

## 2017-10-03 NOTE — PROGRESS NOTES
Report received from [facility] in ED. Pt arrived to unit via stretcher accompanied by staff with unit of blood already transfusing. VSS. Pt tolerating it well. Oriented to room and plan for the night. Call light within reach, bed in low position. Will monitor.

## 2017-10-03 NOTE — ED NOTES
Received bedside report from Richardson Closs, [facility]. Pt alert and oriented getting ready to receive blood at this time. Pt awaiting bed assignment in stepdown. Pt sitting in blood on bed from rectum at this time.

## 2017-10-04 LAB
ABO + RH BLD: NORMAL
ANION GAP SERPL CALC-SCNC: 8 MMOL/L (ref 3–18)
BASOPHILS # BLD: 0 K/UL (ref 0–0.1)
BASOPHILS NFR BLD: 0 % (ref 0–2)
BLD PROD TYP BPU: NORMAL
BLD PROD TYP BPU: NORMAL
BLOOD GROUP ANTIBODIES SERPL: NORMAL
BPU ID: NORMAL
BPU ID: NORMAL
BUN SERPL-MCNC: 13 MG/DL (ref 7–18)
BUN/CREAT SERPL: 21 (ref 12–20)
CALCIUM SERPL-MCNC: 7.6 MG/DL (ref 8.5–10.1)
CALLED TO:,BCALL1: NORMAL
CALLED TO:,BCALL2: NORMAL
CHLORIDE SERPL-SCNC: 105 MMOL/L (ref 100–108)
CO2 SERPL-SCNC: 27 MMOL/L (ref 21–32)
CREAT SERPL-MCNC: 0.63 MG/DL (ref 0.6–1.3)
CROSSMATCH RESULT,%XM: NORMAL
CROSSMATCH RESULT,%XM: NORMAL
DIFFERENTIAL METHOD BLD: ABNORMAL
EOSINOPHIL # BLD: 0.2 K/UL (ref 0–0.4)
EOSINOPHIL NFR BLD: 3 % (ref 0–5)
ERYTHROCYTE [DISTWIDTH] IN BLOOD BY AUTOMATED COUNT: 14.7 % (ref 11.6–14.5)
GLUCOSE SERPL-MCNC: 103 MG/DL (ref 74–99)
HCT VFR BLD AUTO: 25.6 % (ref 36–48)
HCT VFR BLD AUTO: 28.1 % (ref 36–48)
HGB BLD-MCNC: 8.7 G/DL (ref 13–16)
HGB BLD-MCNC: 9.4 G/DL (ref 13–16)
LYMPHOCYTES # BLD: 2.4 K/UL (ref 0.9–3.6)
LYMPHOCYTES NFR BLD: 30 % (ref 21–52)
MCH RBC QN AUTO: 31.2 PG (ref 24–34)
MCHC RBC AUTO-ENTMCNC: 34 G/DL (ref 31–37)
MCV RBC AUTO: 91.8 FL (ref 74–97)
MONOCYTES # BLD: 1 K/UL (ref 0.05–1.2)
MONOCYTES NFR BLD: 12 % (ref 3–10)
NEUTS SEG # BLD: 4.3 K/UL (ref 1.8–8)
NEUTS SEG NFR BLD: 55 % (ref 40–73)
PLATELET # BLD AUTO: 138 K/UL (ref 135–420)
PMV BLD AUTO: 11.2 FL (ref 9.2–11.8)
POTASSIUM SERPL-SCNC: 3.7 MMOL/L (ref 3.5–5.5)
RBC # BLD AUTO: 2.79 M/UL (ref 4.7–5.5)
SODIUM SERPL-SCNC: 140 MMOL/L (ref 136–145)
SPECIMEN EXP DATE BLD: NORMAL
STATUS OF UNIT,%ST: NORMAL
STATUS OF UNIT,%ST: NORMAL
UNIT DIVISION, %UDIV: 0
UNIT DIVISION, %UDIV: 0
WBC # BLD AUTO: 7.9 K/UL (ref 4.6–13.2)

## 2017-10-04 PROCEDURE — 77010033678 HC OXYGEN DAILY

## 2017-10-04 PROCEDURE — 97161 PT EVAL LOW COMPLEX 20 MIN: CPT

## 2017-10-04 PROCEDURE — C8929 TTE W OR WO FOL WCON,DOPPLER: HCPCS

## 2017-10-04 PROCEDURE — 97116 GAIT TRAINING THERAPY: CPT

## 2017-10-04 PROCEDURE — 36415 COLL VENOUS BLD VENIPUNCTURE: CPT | Performed by: INTERNAL MEDICINE

## 2017-10-04 PROCEDURE — 80048 BASIC METABOLIC PNL TOTAL CA: CPT | Performed by: INTERNAL MEDICINE

## 2017-10-04 PROCEDURE — C9113 INJ PANTOPRAZOLE SODIUM, VIA: HCPCS | Performed by: NURSE PRACTITIONER

## 2017-10-04 PROCEDURE — 65660000000 HC RM CCU STEPDOWN

## 2017-10-04 PROCEDURE — 85025 COMPLETE CBC W/AUTO DIFF WBC: CPT | Performed by: INTERNAL MEDICINE

## 2017-10-04 PROCEDURE — 74011250637 HC RX REV CODE- 250/637: Performed by: HOSPITALIST

## 2017-10-04 PROCEDURE — 74011250637 HC RX REV CODE- 250/637: Performed by: INTERNAL MEDICINE

## 2017-10-04 PROCEDURE — 74011250636 HC RX REV CODE- 250/636: Performed by: NURSE PRACTITIONER

## 2017-10-04 PROCEDURE — 74011000250 HC RX REV CODE- 250: Performed by: NURSE PRACTITIONER

## 2017-10-04 PROCEDURE — 85018 HEMOGLOBIN: CPT | Performed by: INTERNAL MEDICINE

## 2017-10-04 PROCEDURE — 74011250636 HC RX REV CODE- 250/636: Performed by: HOSPITALIST

## 2017-10-04 RX ORDER — POLYETHYLENE GLYCOL 3350 17 G/17G
17 POWDER, FOR SOLUTION ORAL DAILY
Status: DISCONTINUED | OUTPATIENT
Start: 2017-10-04 | End: 2017-10-06 | Stop reason: HOSPADM

## 2017-10-04 RX ORDER — PANTOPRAZOLE SODIUM 40 MG/1
40 TABLET, DELAYED RELEASE ORAL
Status: DISCONTINUED | OUTPATIENT
Start: 2017-10-04 | End: 2017-10-06 | Stop reason: HOSPADM

## 2017-10-04 RX ADMIN — SODIUM CHLORIDE 40 MG: 9 INJECTION INTRAMUSCULAR; INTRAVENOUS; SUBCUTANEOUS at 08:48

## 2017-10-04 RX ADMIN — PANTOPRAZOLE SODIUM 40 MG: 40 TABLET, DELAYED RELEASE ORAL at 16:14

## 2017-10-04 RX ADMIN — POLYETHYLENE GLYCOL 3350 17 G: 17 POWDER, FOR SOLUTION ORAL at 16:14

## 2017-10-04 RX ADMIN — Medication 10 ML: at 16:18

## 2017-10-04 RX ADMIN — METOPROLOL SUCCINATE 25 MG: 25 TABLET, EXTENDED RELEASE ORAL at 08:48

## 2017-10-04 RX ADMIN — Medication 10 ML: at 21:22

## 2017-10-04 RX ADMIN — DOCUSATE SODIUM 100 MG: 100 CAPSULE, LIQUID FILLED ORAL at 08:48

## 2017-10-04 RX ADMIN — PERFLUTREN 2 ML: 6.52 INJECTION, SUSPENSION INTRAVENOUS at 11:10

## 2017-10-04 RX ADMIN — Medication 10 ML: at 08:56

## 2017-10-04 RX ADMIN — DOCUSATE SODIUM 100 MG: 100 CAPSULE, LIQUID FILLED ORAL at 18:00

## 2017-10-04 NOTE — PROGRESS NOTES
NUTRITION    Nutrition Consult: General Nutrition Management & Supplements     RECOMMENDATIONS / PLAN:     - Advance diet as tolerated. Monitor tolerance and meal intake  - Add Ensure Enlive, once daily  - Continue RD inpatient monitoring and evaluation. NUTRITION INTERVENTIONS & DIAGNOSIS:     [x] Meals/Snacks: modified diet  [x] Medical food supplementation: Ensure Enlive, once daily  [x] Coordination of care: Spoke with Dr. Gerry Torres about adding Ensure Enlive. MD agreed. Nutrition Diagnosis: Inadequate energy intake related to intake of low caloric foods related to liquid diet and GI bleed. ASSESSMENT:     Pt reports a good, normal appetite with good consumption and tolerance of clear liquids. Spoke with MD, orders are in place to advance diet to full liquids starting 10/5. Discussed nutritional supplements with patient, pt agrees to try Ensure Enlive once daily, once full liquid diet is initiated. No recent episodes of N/V.     Average po intake adequate to meet patients estimated nutritional needs:   [] Yes     [x] No   [] Unable to determine at this time    Diet: DIET FULL LIQUID      Food Allergies: NKFA  Current Appetite:   [] Good     [x] Fair     [] Poor     [] Other:  Appetite/meal intake prior to admission:   [x] Good     [] Fair     [] Poor     [] Other:  Feeding Limitations:  [] Swallowing difficulty    [] Chewing difficulty    [] Other:  Current Meal Intake: Patient Vitals for the past 100 hrs:   % Diet Eaten   10/03/17 1844 0 %   10/03/17 1337 0 %       BM: 10/2 - bloody  Skin Integrity: No pressure ulcers or wounds noted  Edema: 1+ LLE, RLE  Pertinent Medications: Reviewed, Toprol, Colace    Recent Labs      10/04/17   0335  10/03/17   0200  10/02/17   1349   NA  140  139  138   K  3.7  4.6  4.8   CL  105  108  106   CO2  27  21  25   GLU  103*  129*  125*   BUN  13  23*  23*   CREA  0.63  0.69  0.80   CA  7.6*  7.7*  8.4*   MG   --    --   1.9   ALB   --   2.5*  2.8*   SGOT   --   24  20 ALT   --   19  21       Intake/Output Summary (Last 24 hours) at 10/04/17 1301  Last data filed at 10/04/17 0627   Gross per 24 hour   Intake              410 ml   Output             1400 ml   Net             -990 ml       Anthropometrics:  Ht Readings from Last 1 Encounters:   10/02/17 5' 9.5\" (1.765 m)     Last 3 Recorded Weights in this Encounter    10/02/17 1859 10/02/17 2200 10/04/17 0433   Weight: 109 kg (240 lb 4.8 oz) 109 kg (240 lb 4.8 oz) 105.2 kg (231 lb 14.4 oz)     Body mass index is 33.75 kg/(m^2). Weight History: Pt reports a weight of 247 lbs. X 3 months PTA. Pt states that he has been trying to lose weight and has lost around 10 lbs over the past couple of months. Weight Metrics 10/4/2017 4/17/2017 4/17/2017 3/13/2017 2/7/2017 1/24/2017 12/15/2016   Weight 231 lb 14.4 oz 241 lb 241 lb 244 lb 243 lb 246 lb 250 lb   BMI 33.75 kg/m2 35.59 kg/m2 35.59 kg/m2 36.03 kg/m2 35.88 kg/m2 36.33 kg/m2 36.92 kg/m2        Admitting Diagnosis: Lower GI bleed  Symptomatic anemia  Acute GI bleeding  Symptomatic anemia  Syncope and collapse  Pertinent PMHx: HTN    Education Needs:        [x] None identified  [] Identified - Not appropriate at this time  []  Identified and addressed - refer to education log  Learning Limitations:   [x] None identified  [] Identified    Cultural, Episcopal & ethnic food preferences:  [x] None identified    [] Identified and addressed     ESTIMATED NUTRITION NEEDS:     Calories: 7663-0371 kcal (MSJx1.2-1.3) based on  [] Actual BW      [] IBW   Protein:  gm (0.8-1 gm/kg) based on  [] Actual BW      [] IBW   Fluid: 1 mL/kcal     MONITORING & EVALUATION:     Nutrition Goal(s):   1. Po intake of meals will meet >75% of patient estimated nutritional needs within the next 7 days.   Outcome:  [] Met/Ongoing    []  Not Met    [x] New/Initial Goal     Monitoring:   [x] Diet tolerance   [x] Meal intake   [] Supplement intake   [x] GI symptoms/ability to tolerate po diet   [] Respiratory status   [] Plan of care      Previous Recommendations (for follow-up assessments only):     []   Implemented       []   Not Implemented (RD to address)     [] No Recommendation Made     Discharge Planning: Low-Sodium diet pending patients tolerance   [x] Participated in care planning, discharge planning, & interdisciplinary rounds as appropriate      Jessica Henderson RD   Pager: [phone]

## 2017-10-04 NOTE — PROGRESS NOTES
SUBJECTIVE:    Feeling better. Walking with a walker in room in presence of his wife. Wants to have regular food. Denies for CP/SOB/N/V/D/Abdominal pain. OBJECTIVE:    /72 (BP 1 Location: Left arm, BP Patient Position: At rest)  Pulse (!) 57  Temp 97.5 °F (36.4 °C)  Resp 20  Ht 5' 9.5\" (1.765 m)  Wt 105.2 kg (231 lb 14.4 oz)  SpO2 98%  BMI 33.75 kg/m2    CVS: RRR  RS: CTA bilaterally  GI: ND, Soft, ND, BS +  Extremities: no pedal edema  CNS: non focal.  Motor strength 5/5 in all 4 extremities  General: NAD, Awake    ASSESSMENT:    1. Rectal bleeding likely due to lower gastrointestinal bleed  2. Acute GI blood loss anemia S/P 2 units  3. Dyspnea and dizziness likely due to anemia, IMPROVING   4. History of HTN. 5. Remote history of Atrial Fib but has been in NSR for years. 6. Left upper renal cyst.  7. Emphysematous disease  8.  Stress induced leukocytosis, resolved     PLAN:    State H/H now  Advance diet  GI input noted  Negative bleed scan  Possible d/c home tomorrow if remains stable    Total time greater than 30 minutes    CMP:   Lab Results   Component Value Date/Time     10/04/2017 03:35 AM    K 3.7 10/04/2017 03:35 AM     10/04/2017 03:35 AM    CO2 27 10/04/2017 03:35 AM    AGAP 8 10/04/2017 03:35 AM     (H) 10/04/2017 03:35 AM    BUN 13 10/04/2017 03:35 AM    CREA 0.63 10/04/2017 03:35 AM    GFRAA >60 10/04/2017 03:35 AM    GFRNA >60 10/04/2017 03:35 AM    CA 7.6 (L) 10/04/2017 03:35 AM     CBC:   Lab Results   Component Value Date/Time    WBC 7.9 10/04/2017 03:35 AM    HGB 8.7 (L) 10/04/2017 03:35 AM    HCT 25.6 (L) 10/04/2017 03:35 AM     10/04/2017 03:35 AM

## 2017-10-04 NOTE — ROUTINE PROCESS
TRANSFER - OUT REPORT:    Verbal report given to Dejuan Cormier on Catalina Olivo  being transferred to [address] [place] [address](unit) for routine progression of care       Report consisted of patients Situation, Background, Assessment and   Recommendations(SBAR). Information from the following report(s) SBAR, Kardex and Cardiac Rhythm normal sinus rythm was reviewed with the receiving nurse. Lines:   Peripheral IV 10/02/17 Right Antecubital (Active)   Site Assessment Clean, dry, & intact 10/3/2017  8:00 PM   Phlebitis Assessment 0 10/3/2017  8:00 PM   Infiltration Assessment 0 10/3/2017  8:00 PM   Dressing Status Clean, dry, & intact 10/3/2017  8:00 PM   Dressing Type Transparent 10/3/2017  8:00 PM   Hub Color/Line Status Pink;Flushed 10/3/2017  8:00 PM       Peripheral IV 10/02/17 Left Antecubital (Active)   Site Assessment Clean, dry, & intact 10/3/2017  8:00 PM   Phlebitis Assessment 0 10/3/2017  8:00 PM   Infiltration Assessment 0 10/3/2017  8:00 PM   Dressing Status Clean, dry, & intact 10/3/2017  8:00 PM   Dressing Type Transparent 10/3/2017  8:00 PM   Hub Color/Line Status Pink;Flushed 10/3/2017  8:00 PM        Opportunity for questions and clarification was provided.       Patient transported with:   O2 @ 2 liters  Tech

## 2017-10-04 NOTE — PROGRESS NOTES
Problem: Mobility Impaired (Adult and Pediatric)  Goal: *Acute Goals and Plan of Care (Insert Text)  STGs to be addressed within 3 days:  1. Bed mobility: Supine to sit to supine modified independent with HR for meals. 2. Activity tolerance: Tolerate up in chair 1-2 hrs for ADLs. 3. Transfers: Sit to stand to chair S with LRAD for ADLs. LTGs to be addressed within 7 days:  1. Standing/Ambulation Balance: Increase to Good with LRAD for safe transfers and gait. 2. Ambulation: Ambulate > 200 ft. S with LRAD for home mobility. 3. Patient Education: Independent with HEP for home safety. Outcome: Progressing Towards Goal  PHYSICAL THERAPY EVALUATION     Patient: Mara Koroma (70 y.o. male)  Date: 10/4/2017  Primary Diagnosis: Lower GI bleed  Symptomatic anemia  Acute GI bleeding  Symptomatic anemia  Syncope and collapse   Precautions:   Fall      ASSESSMENT :  Based on the objective data described below, the patient presents with decreased functional mobility with regard to bed mobility, transfers, gait and overall tolerance for activity. Patient admitted with dyspnea, tachycardia and rectal bleeding. Patient reports that PTA, he was independent with ADL's and ambulated without assistive device, no recent h/o falls noted. Today, patient demonstrated supervision with bed mobility, transitioned into standing CGA/SBA with RW, vc's for safety as patient demonstrates mild impulsivity with activity. Patient able to ambulate ~ 150 ft CGA/SBA with RW, vc's for maintaining upright posture as patient demonstrates crouched gt with ambulation, manual cues for obstacle negotiations. Patient with no c/o dizziness or lightheadedness. O2 saturations 98% on RA with activity. Relayed O2 saturations to Logan Sands RN, who cleared patient to remain off supplemental O2 while seated in chair. Patient able to return back to room, set up in chair at bedside. Family present at bedside.  Patient and family educated with PT's recommended use of RW for home/community mobility due to ongoing weakness during ambulation. Patient instructed to call for assistance when ready to return back to bed, patient verbalized comprehension. Patient would benefit from PT to address above impairments and assist with discharge planning. Patient will benefit from skilled intervention to address the above impairments. Patients rehabilitation potential is considered to be Good  Factors which may influence rehabilitation potential include:   [X]         None noted  [ ]         Mental ability/status  [ ]         Medical condition  [ ]         Home/family situation and support systems  [ ]         Safety awareness  [ ]         Pain tolerance/management  [ ]         Other:        PLAN :  Recommendations and Planned Interventions:  [X]           Bed Mobility Training             [X]    Neuromuscular Re-Education  [X]           Transfer Training                   [ ]    Orthotic/Prosthetic Training  [X]           Gait Training                          [ ]    Modalities  [X]           Therapeutic Exercises          [ ]    Edema Management/Control  [X]           Therapeutic Activities            [X]    Patient and Family Training/Education  [ ]           Other (comment):     Frequency/Duration: Patient will be followed by physical therapy daily x 4-7 x week to address goals. Discharge Recommendations: Home Health  Further Equipment Recommendations for Discharge: rolling walker       SUBJECTIVE:   Patient stated I am ready to get moving.       OBJECTIVE DATA SUMMARY:       Past Medical History:   Diagnosis Date    Ankle fracture, left approx 2011     medial, tibial    Arthritis       knees    Cardiac echocardiogram 06/16/2011     Normal LV systolic function. Mild conc LVH. Gr 1 DDfx. RVSP 35-40 mmHg. Marked LAE. Marked LISA. Mild-mod MR.  Mild-mod AI. Mild AoRE. Mild aortic arch dil.       Cardiac Holter monitor study 06/22/2011     Baseline sinus rhythm to sinus bradycardia, avg HR 60 bpm (range  bpm). Questionable chronotropic intolerance. No sustained arrhythmias.  GERD (gastroesophageal reflux disease)      Glaucoma suspect       posterior vitreous detachment b/l, pterygium left eye, aseroid hyalosis / synchisis, b/l pseudophakia    H/O colonoscopy 04/13/2011     1 polyp thermally treated    History of atrial fibrillation 2009    Hypertension      Unspecified adverse effect of anesthesia       H/o A-fib immediately post colonoscopy. Past Surgical History:   Procedure Laterality Date    CARDIAC SURG PROCEDURE UNLIST         hx atrial flutter ablation 2013    HX COLONOSCOPY        HX GI        HX KNEE REPLACEMENT Right 02/19/2013     Dr. Cary Mott HX ORTHOPAEDIC Right 2/13     TKR, Fabio    HX SVT ABLATION   1/2/2013     by Dr. Mckayla Medel EGD [address]  SPHNCTR/CARDIA GERD         Barriers to Learning/Limitations: None  Compensate with: N/A  Prior Level of Function/Home Situation:   Home Situation  Home Environment: Private residence  # Steps to Enter: 3  Rails to Enter: Yes  Hand Rails : Bilateral  One/Two Story Residence: One story  Living Alone: No  Support Systems: Spouse/Significant Other/Partner, Family member(s)  Patient Expects to be Discharged to[de-identified] Private residence  Current DME Used/Available at Home: Walker, rolling  Tub or Shower Type: Tub/Shower combination  Critical Behavior:  Neurologic State: Alert; Appropriate for age  Psychosocial  Patient Behaviors: Calm; Cooperative  Family  Behaviors: Appropriate for situation;Calm; Cooperative;Supportive  Needs Expressed: Educational  Purposeful Interaction: Yes  Pt Identified Daily Priority: Clinical issues (comment)  Caritas Process: Nurture loving kindness  Caring Interventions: Therapeutic modalities  Reassure: Informing; Therapeutic listening  Therapeutic Modalities: Intentional therapeutic touch  Strength:    Strength:  Within functional limits (B LE)  Tone & Sensation:   Tone: Normal (B LE)  Sensation: Intact (B LE)   Range Of Motion:  AROM: Within functional limits (B LE)  Functional Mobility:  Bed Mobility:  Rolling: Modified independent  Supine to Sit: Supervision  Scooting: Supervision  Transfers:  Sit to Stand: Contact guard assistance;Stand-by asssistance (with RW)  Stand to Sit: Contact guard assistance;Stand-by asssistance (with RW)  Balance:   Sitting: Intact  Standing: Impaired;Pull to stand; With support (with RW)  Standing - Static: Good;Constant support (with RW)  Standing - Dynamic : Fair (with RW)  Ambulation/Gait Training:  Distance (ft): 150 Feet (ft)  Assistive Device: Walker, rolling  Ambulation - Level of Assistance: Contact guard assistance  Base of Support: Widened  Speed/Adali: Pace decreased (<100 feet/min); Slow  Interventions: Verbal cues; Safety awareness training  Pain:  Pain Scale 1: Numeric (0 - 10)  Pain Intensity 1: 0  Activity Tolerance:   Good  Please refer to the flowsheet for vital signs taken during this treatment. After treatment:   [X] Patient left in no apparent distress sitting up in chair  [ ] Patient left sitting on EOB  [ ] Patient left in no apparent distress in bed  [ ] Patient declined to be OOB at this time due to   [X] Call bell left within reach  [X] Nursing notified(LOIDA Mark)  [X] Caregiver present  [ ] Bed alarm activated      COMMUNICATION/EDUCATION:   [X]         Fall prevention education was provided and the patient/caregiver indicated understanding. [X]         Patient/family have participated as able in goal setting and plan of care. [X]         Patient/family agree to work toward stated goals and plan of care. [ ]         Patient understands intent and goals of therapy, but is neutral about his/her participation. [ ]         Patient is unable to participate in goal setting and plan of care.      Thank you for this referral.  Christopher Chavarria, PT   Time Calculation: 25 mins G-codes:  Mobility [id number] Current  CI= 1-19%   Goal  CI= 1-19%. The severity rating is based on the Level of Assistance required for Functional Mobility and ADLs.      Eval Complexity: History: LOW Complexity : Zero comorbidities / personal factors that will impact the outcome / POCExam:LOW Complexity : 1-2 Standardized tests and measures addressing body structure, function, activity limitation and / or participation in recreation  Presentation: LOW Complexity : Stable, uncomplicated   Overall Complexity:LOW

## 2017-10-04 NOTE — PROGRESS NOTES
conducted an initial consultation and Spiritual Assessment for Apurva Collier, who is a 80 y. o.,male. Patients Primary Language is: [place]. According to the patients EMR Quaker Affiliation is: No Christian. The reason the Patient came to the hospital is:   Patient Active Problem List    Diagnosis Date Noted    Lower GI bleed 10/02/2017    Symptomatic anemia 10/02/2017    Syncope and collapse 10/02/2017    Acute GI bleeding 10/02/2017    Essential hypertension 01/24/2017    S/P ablation of atrial flutter 01/02/2013    Atrial flutter with rapid ventricular response ([facility] 12/10/2012    DJD (degenerative joint disease) 12/09/2012    Cardiomyopathy ([facility] 08/09/2012    [facility]2011        The  provided the following Interventions:  Initiated a relationship of care and support. Listened empathically. Provided information about Spiritual Care Services. Offered prayer and assurance of continued prayers on patient's behalf. Chart reviewed. The following outcomes where achieved:  Patient expressed gratitude for 's visit. Assessment:  Patient does not have any Evangelical/cultural needs that will affect patients preferences in health care. There are no spiritual or Evangelical issues which require intervention at this time. Plan:  Chaplains will continue to follow and will provide pastoral care on an as needed/requested basis.  recommends bedside caregivers page  on duty if patient shows signs of acute spiritual or emotional distress.     [address]  [phone])

## 2017-10-04 NOTE — ROUTINE PROCESS
Received patient in bed, awake, alert and oriented. No complaints made, will continue to monitor. Report  Given by LEAH Villeda RN.

## 2017-10-04 NOTE — ROUTINE PROCESS
Bedside and Verbal shift change report given to Donnell Anglin (oncoming nurse) by Paulo Rouse   (offgoing nurse). Report included the following information SBAR, Intake/Output, MAR and Cardiac Rhythm Sinus Rhythm.

## 2017-10-04 NOTE — PROGRESS NOTES
[email or web address]  [phone]    GI Attending note: The pros and cons of repeat colo discussed with pt and he indicates he prefers a conservative approach unless he has more bleeidng . Wife and brother present . All questions answered. Ok to advance diet and DC tomorrow gi wise if no issues.      Impression  1. GI bleed- heme positive stool.  - Last colonoscopy 4/13/11 showed moderate diverticulosis of colon. 1 polyp distal transverse colon.   -Bleeding scan showed no evidence of active gastrointestinal bleed. - No overt GI hemorrhage. 2. Symptomatic anemia due to acute blood loss  3. Dyspnea- resolved  4. HTN  5. Remote h/o afib- s/p ablation  6. H/o GERD-  On PPI      Plan:  1. Advance diet as tolerated  2. Cont monitor H/H transfuse hgb<7  3. Cont to monitor for overt GI hemorrhage  No GI intervention at this time in the absence of overt GI hemorrhage, advanced age and stable H/H      Chief Complaint: GI bleed    Subjective:  Pt uneventful overnight.  Pt denies melena, hematochezia, hematemesis        Eyes: conjunctiva normal, EOM normal   ENT: Mucous membranes moist, no lesion or thrush   Cardiovascular:  normal rate and regular rhythm, no murmur   Pulmonary/Chest Wall: breath sounds normal and effort normal   Abdominal:  soft, non-acute, non-tender, no appreciable mass or hepatosplenomegaly, no appreciable ascites       Visit Vitals    /72 (BP 1 Location: Left arm, BP Patient Position: At rest)    Pulse (!) 59    Temp 97.6 °F (36.4 °C)    Resp 20    Ht 5' 9.5\" (1.765 m)    Wt 105.2 kg (231 lb 14.4 oz)    SpO2 98%    BMI 33.75 kg/m2           Intake/Output Summary (Last 24 hours) at 10/04/17 1129  Last data filed at 10/04/17 0148   Gross per 24 hour   Intake              410 ml   Output             1600 ml   Net            -1190 ml       CBC w/Diff    Lab Results   Component Value Date/Time    WBC 7.9 10/04/2017 03:35 AM    RBC 2.79 (L) 10/04/2017 03:35 AM    HGB 8.7 (L) 10/04/2017 03:35 AM HCT 25.6 (L) 10/04/2017 03:35 AM    MCV 91.8 10/04/2017 03:35 AM    MCH 31.2 10/04/2017 03:35 AM    MCHC 34.0 10/04/2017 03:35 AM    RDW 14.7 (H) 10/04/2017 03:35 AM     10/04/2017 03:35 AM    Lab Results   Component Value Date/Time    GRANS 55 10/04/2017 03:35 AM    LYMPH 30 10/04/2017 03:35 AM    EOS 3 10/04/2017 03:35 AM    BASOS 0 10/04/2017 03:35 AM      Basic Metabolic Profile   Recent Labs      10/04/17   0335   10/02/17   1349   NA  140   < >  138   K  3.7   < >  4.8   CL  105   < >  106   CO2  27   < >  25   BUN  13   < >  23*   CA  7.6*   < >  8.4*   MG   --    --   1.9    < > = values in this interval not displayed. Hepatic Function    Lab Results   Component Value Date/Time    ALB 2.5 (L) 10/03/2017 02:00 AM    TP 5.7 (L) 10/03/2017 02:00 AM    AP 49 10/03/2017 02:00 AM    Lab Results   Component Value Date/Time    SGOT 24 10/03/2017 02:00 AM          Ivanna Navarro NP  [facility], [facility]  [email or web address]

## 2017-10-05 ENCOUNTER — ANESTHESIA EVENT (OUTPATIENT)
Dept: ENDOSCOPY | Age: 82
DRG: 378 | End: 2017-10-05
Payer: MEDICARE

## 2017-10-05 LAB
HCT VFR BLD AUTO: 23.9 % (ref 36–48)
HCT VFR BLD AUTO: 28.6 % (ref 36–48)
HGB BLD-MCNC: 8.1 G/DL (ref 13–16)
HGB BLD-MCNC: 9.6 G/DL (ref 13–16)

## 2017-10-05 PROCEDURE — 36415 COLL VENOUS BLD VENIPUNCTURE: CPT | Performed by: INTERNAL MEDICINE

## 2017-10-05 PROCEDURE — 74011250637 HC RX REV CODE- 250/637: Performed by: HOSPITALIST

## 2017-10-05 PROCEDURE — 65660000000 HC RM CCU STEPDOWN

## 2017-10-05 PROCEDURE — 97116 GAIT TRAINING THERAPY: CPT

## 2017-10-05 PROCEDURE — 74011250637 HC RX REV CODE- 250/637: Performed by: INTERNAL MEDICINE

## 2017-10-05 PROCEDURE — 74011000250 HC RX REV CODE- 250: Performed by: NURSE PRACTITIONER

## 2017-10-05 PROCEDURE — 97165 OT EVAL LOW COMPLEX 30 MIN: CPT

## 2017-10-05 PROCEDURE — 85018 HEMOGLOBIN: CPT | Performed by: INTERNAL MEDICINE

## 2017-10-05 RX ORDER — AMOXICILLIN 250 MG
1 CAPSULE ORAL DAILY
Qty: 10 TAB | Refills: 0 | Status: SHIPPED | OUTPATIENT
Start: 2017-10-05 | End: 2018-04-23

## 2017-10-05 RX ORDER — HYDROCHLOROTHIAZIDE 25 MG/1
12.5 TABLET ORAL DAILY
Qty: 90 TAB | Refills: 2 | Status: ON HOLD | OUTPATIENT
Start: 2017-10-09 | End: 2018-02-15

## 2017-10-05 RX ORDER — LIDOCAINE HYDROCHLORIDE 10 MG/ML
0.1 INJECTION, SOLUTION EPIDURAL; INFILTRATION; INTRACAUDAL; PERINEURAL AS NEEDED
Status: CANCELLED | OUTPATIENT
Start: 2017-10-05

## 2017-10-05 RX ORDER — LANOLIN ALCOHOL/MO/W.PET/CERES
325 CREAM (GRAM) TOPICAL
Qty: 15 TAB | Refills: 0 | Status: SHIPPED | OUTPATIENT
Start: 2017-10-05 | End: 2017-10-16 | Stop reason: SDUPTHER

## 2017-10-05 RX ORDER — POLYETHYLENE GLYCOL 3350 17 G/17G
17 POWDER, FOR SOLUTION ORAL DAILY
Qty: 30 PACKET | Refills: 0 | Status: SHIPPED | OUTPATIENT
Start: 2017-10-05 | End: 2017-10-16 | Stop reason: CLARIF

## 2017-10-05 RX ORDER — PANTOPRAZOLE SODIUM 40 MG/1
40 TABLET, DELAYED RELEASE ORAL DAILY
Qty: 10 TAB | Refills: 0 | Status: SHIPPED | OUTPATIENT
Start: 2017-10-05 | End: 2017-10-26

## 2017-10-05 RX ORDER — ONDANSETRON 4 MG/1
4 TABLET, ORALLY DISINTEGRATING ORAL
Status: DISCONTINUED | OUTPATIENT
Start: 2017-10-05 | End: 2017-10-06 | Stop reason: HOSPADM

## 2017-10-05 RX ORDER — LANOLIN ALCOHOL/MO/W.PET/CERES
1 CREAM (GRAM) TOPICAL
Status: DISCONTINUED | OUTPATIENT
Start: 2017-10-05 | End: 2017-10-06 | Stop reason: HOSPADM

## 2017-10-05 RX ADMIN — POLYETHYLENE GLYCOL 3350, SODIUM SULFATE ANHYDROUS, SODIUM BICARBONATE, SODIUM CHLORIDE, POTASSIUM CHLORIDE 4000 ML: 236; 22.74; 6.74; 5.86; 2.97 POWDER, FOR SOLUTION ORAL at 14:00

## 2017-10-05 RX ADMIN — METOPROLOL SUCCINATE 25 MG: 25 TABLET, EXTENDED RELEASE ORAL at 09:23

## 2017-10-05 RX ADMIN — Medication 10 ML: at 22:16

## 2017-10-05 RX ADMIN — Medication 10 ML: at 05:10

## 2017-10-05 RX ADMIN — DOCUSATE SODIUM 100 MG: 100 CAPSULE, LIQUID FILLED ORAL at 18:45

## 2017-10-05 RX ADMIN — POLYETHYLENE GLYCOL 3350 17 G: 17 POWDER, FOR SOLUTION ORAL at 09:23

## 2017-10-05 RX ADMIN — Medication 10 ML: at 14:00

## 2017-10-05 RX ADMIN — DOCUSATE SODIUM 100 MG: 100 CAPSULE, LIQUID FILLED ORAL at 09:23

## 2017-10-05 RX ADMIN — PANTOPRAZOLE SODIUM 40 MG: 40 TABLET, DELAYED RELEASE ORAL at 09:23

## 2017-10-05 RX ADMIN — PANTOPRAZOLE SODIUM 40 MG: 40 TABLET, DELAYED RELEASE ORAL at 18:45

## 2017-10-05 NOTE — PROGRESS NOTES
Pt's nurse called about pt having1 episode of BRBPR after BM. Pt seen and examined. Pt denies SOB, CP, dizzyness. Pt and wife spoken to a length regarding plan. Pt agreed to colonoscopy tomorrow. Clears today NPO MN.  Prep ordered

## 2017-10-05 NOTE — PROGRESS NOTES
SUBJECTIVE:    Feeling better. Wants to go home if possible. No BM yet. Denies for chest and abdominal pain. No N/V. WIFE IS AT BEDSIDE. OBJECTIVE:    /74 (BP 1 Location: Left arm, BP Patient Position: At rest)  Pulse 65  Temp 97.8 °F (36.6 °C)  Resp 18  Ht 5' 9.5\" (1.765 m)  Wt 105.3 kg (232 lb 1.6 oz)  SpO2 96%  BMI 33.78 kg/m2    CVS: RRR  RS: CTA bilaterally  GI: ND, Soft, ND, BS +  Extremities: no pedal edema  CNS: non focal.  Motor strength 5/5 in all 4 extremities  General: NAD, Awake    ASSESSMENT:    1. Rectal bleeding likely due to lower gastrointestinal bleed  2. Acute GI blood loss anemia S/P 2 units  3. Dyspnea and dizziness likely due to anemia, IMPROVING   4. History of HTN. 5. Remote history of Atrial Fib but has been in NSR for years. 6. Left upper renal cyst.  7. Emphysematous disease  8. Stress induced leukocytosis, resolved     PLAN:    Repeat H/H now - asked nursing to call lab  Treat constipation  If he has a BM without red blood, he can be discharged home later today otherwise whenever he is stable  Add Ferrous sulfate  Cont current management    Total time greater than 30 minutes    CMP:   No results found for: NA, K, CL, CO2, AGAP, GLU, BUN, CREA, GFRAA, GFRNA, CA, MG, PHOS, ALB, TBIL, TP, ALB, GLOB, AGRAT, SGOT, ALT, GPT  CBC:   Lab Results   Component Value Date/Time    HGB 8.1 (L) 10/05/2017 02:34 AM    HCT 23.9 (L) 10/05/2017 02:34 AM       Current Discharge Medication List      START taking these medications    Details   pantoprazole (PROTONIX) 40 mg tablet Take 1 Tab by mouth daily. Qty: 10 Tab, Refills: 0      polyethylene glycol (MIRALAX) 17 gram packet Take 1 Packet by mouth daily. Qty: 30 Packet, Refills: 0      senna-docusate (PERICOLACE) 8.6-50 mg per tablet Take 1 Tab by mouth daily. Qty: 10 Tab, Refills: 0      ferrous sulfate 325 mg (65 mg iron) tablet Take 1 Tab by mouth daily (with breakfast).   Qty: 15 Tab, Refills: 0         CONTINUE these medications which have CHANGED    Details   hydroCHLOROthiazide (HYDRODIURIL) 25 mg tablet Take 0.5 Tabs by mouth daily. Qty: 90 Tab, Refills: 2         CONTINUE these medications which have NOT CHANGED    Details   metoprolol succinate (TOPROL-XL) 25 mg XL tablet TAKE ONE TABLET BY MOUTH ONCE DAILY  Qty: 90 Tab, Refills: 1      multivitamin (ONE A DAY) tablet Take 1 Tab by mouth daily. glucosamine-D3-boswellia serr (OSTEO BI-FLEX) 1,500-400-100 mg-unit-mg Tab Take 1 Tab by mouth two (2) times a day. omega-3 fatty acids-vitamin e (FISH OIL) 1,000 mg cap Take 1 Cap by mouth two (2) times a day.          STOP taking these medications       ibuprofen (MOTRIN) 800 mg tablet Comments:   Reason for Stopping:

## 2017-10-05 NOTE — ROUTINE PROCESS
Received patient in bed, awake, alert and oriented. No complaints made, will continue to monitor. Report  Given by Sina Kitchen RN.

## 2017-10-05 NOTE — PROGRESS NOTES
[email or web address]  [phone]  GI Attending note:  -Pt seen, examined and evaluated independently. Pt is having recurrent bleeding . Will set up for colo tomorrow to evalauate . Monitor blood count and transfuse as needed .      Impression  1.. GI bleed- heme positive stool.  - Last colonoscopy 4/13/11 showed moderate diverticulosis of colon. 1 polyp distal transverse colon.   -Bleeding scan showed no evidence of active gastrointestinal bleed. - No overt GI hemorrhage. 2. Symptomatic anemia due to acute blood loss  3. Dyspnea- resolved  4. HTN  5. Remote h/o afib- s/p ablation  6. H/o GERD-  On PPI        Plan:  1. Ok d/c GI wise if H/H stable. Pt to f/u with PCP  Start metamucil at home 1 teaspoon at night. Pt and wife verbalized understanding    Chief Complaint: GI bleed    Subjective:  Pt uneventful overnight. Pt denies episodes of melena, hematochezia, hematemesis. Pt tolerated reg diet.  Pt wishes to go home        Eyes: conjunctiva normal, EOM normal   ENT: Mucous membranes moist, no lesion or thrush   Cardiovascular:  normal rate and regular rhythm, no murmur   Pulmonary/Chest Wall: breath sounds normal and effort normal   Abdominal:  soft, non-acute, non-tender, no appreciable mass or hepatosplenomegaly, no appreciable ascites       Visit Vitals    /68 (BP 1 Location: Left arm, BP Patient Position: At rest)    Pulse 77    Temp 97.5 °F (36.4 °C)    Resp 20    Ht 5' 9.5\" (1.765 m)    Wt 105.3 kg (232 lb 1.6 oz)    SpO2 96%    BMI 33.78 kg/m2           Intake/Output Summary (Last 24 hours) at 10/05/17 1120  Last data filed at 10/05/17 0913   Gross per 24 hour   Intake             1120 ml   Output              580 ml   Net              540 ml       CBC w/Diff    Lab Results   Component Value Date/Time    WBC 7.9 10/04/2017 03:35 AM    RBC 2.79 (L) 10/04/2017 03:35 AM    HGB 8.1 (L) 10/05/2017 02:34 AM    HCT 23.9 (L) 10/05/2017 02:34 AM    MCV 91.8 10/04/2017 03:35 AM    MCH 31.2 10/04/2017 03:35 AM    MCHC 34.0 10/04/2017 03:35 AM    RDW 14.7 (H) 10/04/2017 03:35 AM     10/04/2017 03:35 AM    Lab Results   Component Value Date/Time    GRANS 55 10/04/2017 03:35 AM    LYMPH 30 10/04/2017 03:35 AM    EOS 3 10/04/2017 03:35 AM    BASOS 0 10/04/2017 03:35 AM      Basic Metabolic Profile   Recent Labs      10/04/17   0335   10/02/17   1349   NA  140   < >  138   K  3.7   < >  4.8   CL  105   < >  106   CO2  27   < >  25   BUN  13   < >  23*   CA  7.6*   < >  8.4*   MG   --    --   1.9    < > = values in this interval not displayed. Hepatic Function    Lab Results   Component Value Date/Time    ALB 2.5 (L) 10/03/2017 02:00 AM    TP 5.7 (L) 10/03/2017 02:00 AM    AP 49 10/03/2017 02:00 AM    Lab Results   Component Value Date/Time    SGOT 24 10/03/2017 02:00 AM          Serjio Seaman NP  [facility], [facility]  [email or web address]

## 2017-10-05 NOTE — PROGRESS NOTES
Problem: Self Care Deficits Care Plan (Adult)  Goal: *Acute Goals and Plan of Care (Insert Text)  Outcome: Resolved/Met Date Met:  10/05/17  OCCUPATIONAL THERAPY EVALUATION/DISCHARGE     Patient: Zaid Monae (10 y.o. male)  Date: 10/5/2017  Primary Diagnosis: Lower GI bleed  Symptomatic anemia  Acute GI bleeding  Symptomatic anemia  Syncope and collapse  DX  Procedure(s) (LRB):  COLONOSCOPY (N/A)     Precautions:   Fall      ASSESSMENT AND RECOMMENDATIONS:  Based on the objective data described below, the patient is able to perform basic self care tasks and functional transfers without assistance. He has a supportive wife at home to assist him prn and all needed DME for bathroom safety. Skilled occupational therapy is not indicated at this time. Discharge Recommendations: None  Further Equipment Recommendations for Discharge: N/A       Barriers to Learning/Limitations: None  Compensate with: visual, verbal, tactile, kinesthetic cues/model       COMPLEXITY      Eval Complexity: History: LOW Complexity : Brief history review ; Examination: LOW Complexity : 1-3 performance deficits relating to physical, cognitive , or psychosocial skils that result in activity limitations and / or participation restrictions ; Decision Making:LOW Complexity : No comorbidities that affect functional and no verbal or physical assistance needed to complete eval tasks  Assessment: Low Complexity          G-CODES:      Self Care  Current  CI= 1-19%   Goal  CI= 1-19%   D/C  CI= 1-19%. The severity rating is based on the Level of Assistance required for Functional Mobility and ADLs. SUBJECTIVE:   Patient stated I have to get all these tests now.       OBJECTIVE DATA SUMMARY:       Past Medical History:   Diagnosis Date    Ankle fracture, left approx 2011     medial, tibial    Arthritis       knees    Cardiac echocardiogram 06/16/2011     Normal LV systolic function. Mild conc LVH. Gr 1 DDfx.   RVSP 35-40 mmHg.  Marked LAE. Marked LISA. Mild-mod MR.  Mild-mod AI. Mild AoRE. Mild aortic arch dil.  Cardiac Holter monitor study 06/22/2011     Baseline sinus rhythm to sinus bradycardia, avg HR 60 bpm (range  bpm). Questionable chronotropic intolerance. No sustained arrhythmias.  GERD (gastroesophageal reflux disease)      Glaucoma suspect       posterior vitreous detachment b/l, pterygium left eye, aseroid hyalosis / synchisis, b/l pseudophakia    H/O colonoscopy 04/13/2011     1 polyp thermally treated    History of atrial fibrillation 2009    Hypertension      Unspecified adverse effect of anesthesia       H/o A-fib immediately post colonoscopy. Past Surgical History:   Procedure Laterality Date    CARDIAC SURG PROCEDURE UNLIST         hx atrial flutter ablation 2013    HX COLONOSCOPY        HX GI        HX KNEE REPLACEMENT Right 02/19/2013     Dr. Luis Angel Gomes HX ORTHOPAEDIC Right 2/13     [facility]    HX SVT ABLATION   1/2/2013     by Dr. Veronica Duckworth EGD [address] SPHNCTR/CARDIA GERD         Prior Level of Function/Home Situation: Pt was independent with basic self care tasks and used a RW for functional mobility PTA. Home Situation  Home Environment: Private residence  # Steps to Enter: 3  Rails to Enter: Yes  Hand Rails : Bilateral  One/Two Story Residence: One story  Living Alone: No  Support Systems: Spouse/Significant Other/Partner, Family member(s)  Patient Expects to be Discharged to[de-identified] Private residence  Current DME Used/Available at Home: Walker, rolling  Tub or Shower Type: Tub/Shower combination (with seat)  [X]     Right hand dominant       [ ]     Left hand dominant  Cognitive/Behavioral Status:  Neurologic State: Alert  Orientation Level: Oriented X4  Cognition: Appropriate decision making; Follows commands  Safety/Judgement: Awareness of environment; Fall prevention     Skin: Intact on UEs     Edema: None noted in UEs Vision/Perceptual:    Acuity: Within Defined Limits    Corrective Lenses: Glasses     Coordination:  Fine Motor Skills-Upper: Left Intact; Right Intact    Gross Motor Skills-Upper: Left Intact; Right Intact     Balance:  Sitting: Intact  Standing: Intact  Standing - Static: Good  Standing - Dynamic : Good     Strength:  Strength: Within functional limits (UEs)     Tone & Sensation:  Tone: Normal (UEs)  Sensation: Intact (UEs)     Range of Motion:  AROM: Within functional limits (UEs)  PROM: Within functional limits (UEs)     Functional Mobility and Transfers for ADLs:  Bed Mobility:  Supine to Sit: Modified independent  Sit to Supine: Modified independent  Transfers:  Sit to Stand: Modified independent              Toilet Transfer : Modified independent     ADL Assessment:  Feeding: Independent     Oral Facial Hygiene/Grooming: Independent     Bathing: Modified independent     Upper Body Dressing: Independent     Lower Body Dressing: Modified independent     Toileting: Independent  Pain:  Pt reports 0/10 pain or discomfort prior to treatment. Pt reports 0/10 pain or discomfort post treatment. Activity Tolerance:   Good     Please refer to the flowsheet for vital signs taken during this treatment. After treatment:   [ ]  Patient left in no apparent distress sitting up in chair  [X]  Patient left in no apparent distress in bed  [X]  Call bell left within reach  [ ]  Nursing notified  [ ]  Caregiver present  [ ]  Bed alarm activated      COMMUNICATION/EDUCATION:   Communication/Collaboration:  [X]      Home safety education was provided and the patient/caregiver indicated understanding. [X]      Patient/family have participated as able and agree with findings and recommendations. [ ]      Patient is unable to participate in plan of care at this time.      Salvador Lemus MS OTR/L  Time Calculation: 15 mins

## 2017-10-05 NOTE — ROUTINE PROCESS
Bedside and Verbal shift change report given to [facility] (oncoming nurse) by Amira Loco (offgoing nurse). Report included the following information SBAR, Kardex, MAR and Recent Results. SITUATION:    Code Status: DNR   Reason for Admission: Lower GI bleed   Symptomatic anemia   Acute GI bleeding   Symptomatic anemia   Syncope and collapse    [facility] day: 3   Problem List:       Hospital Problems  Date Reviewed: 4/17/2017          Codes Class Noted POA    Lower GI bleed ICD-10-CM: K92.2  ICD-9-CM: 578.9  10/2/2017 Unknown        Symptomatic anemia ICD-10-CM: D64.9  ICD-9-CM: 285.9  10/2/2017 Unknown        Syncope and collapse ICD-10-CM: R55  ICD-9-CM: 780.2  10/2/2017 Unknown        Acute GI bleeding ICD-10-CM: K92.2  ICD-9-CM: 578.9  10/2/2017 Unknown              BACKGROUND:    Past Medical History:   Past Medical History:   Diagnosis Date    Ankle fracture, left approx 2011    medial, tibial    Arthritis     knees    Cardiac echocardiogram 06/16/2011    Normal LV systolic function. Mild conc LVH. Gr 1 DDfx. RVSP 35-40 mmHg. Marked LAE. Marked LISA. Mild-mod MR.  Mild-mod AI. Mild AoRE. Mild aortic arch dil.  Cardiac Holter monitor study 06/22/2011    Baseline sinus rhythm to sinus bradycardia, avg HR 60 bpm (range  bpm). Questionable chronotropic intolerance. No sustained arrhythmias.  GERD (gastroesophageal reflux disease)     Glaucoma suspect     posterior vitreous detachment b/l, pterygium left eye, aseroid hyalosis / synchisis, b/l pseudophakia    H/O colonoscopy 04/13/2011    1 polyp thermally treated    History of atrial fibrillation 2009    Hypertension     Unspecified adverse effect of anesthesia     H/o A-fib immediately post colonoscopy.          Patient taking anticoagulants no     ASSESSMENT:    Changes in Assessment Throughout Shift: none     Patient has Central Line: no Reasons if yes: na   Patient has Arreola Cath: no Reasons if yes: na  Last Vitals:     Vitals:    10/04/17 1158 10/04/17 1545 10/04/17 2023 10/05/17 0005   BP: 116/72 110/68 120/68 121/71   Pulse: (!) 57 72 76 68   Resp: 20 20 20 20   Temp: 97.5 °F (36.4 °C) 97.6 °F (36.4 °C) 97.2 °F (36.2 °C) 97.8 °F (36.6 °C)   SpO2: 98% 95% 97% 92%   Weight:       Height:            IV and DRAINS (will only show if present)   [REMOVED] Peripheral IV 10/02/17 Left Hand-Site Assessment: Clean, dry, & intact  [REMOVED] Peripheral IV 10/02/17 Right Antecubital-Site Assessment: Clean, dry, & intact  Peripheral IV 10/02/17 Right Antecubital-Site Assessment: Clean, dry, & intact  Peripheral IV 10/02/17 Left Antecubital-Site Assessment: Clean, dry, & intact     WOUND (if present)   Wound Type:  none   Dressing present Dressing Present : No   Wound Concerns/Notes:  none     PAIN    Pain Assessment    Pain Intensity 1: 0 (10/05/17 0005)              Patient Stated Pain Goal: 0  o Interventions for Pain:  none  o Intervention effective: na  o Time of last intervention: na   o Reassessment Completed: yes      Last 3 Weights:  Last 3 Recorded Weights in this Encounter    10/02/17 1859 10/02/17 2200 10/04/17 0433   Weight: 109 kg (240 lb 4.8 oz) 109 kg (240 lb 4.8 oz) 105.2 kg (231 lb 14.4 oz)     Weight change:      INTAKE/OUPUT    Current Shift: 10/04 1901 - 10/05 0700  In: -   Out: 380 [Urine:380]    Last three shifts: 10/03 0701 - 10/04 1900  In: 830 [P.O.:520]  Out: 1600 [Urine:1600]     LAB RESULTS     Recent Labs      10/04/17   1443  10/04/17   0335  10/03/17   1815   10/03/17   0200   10/02/17   1211   WBC   --   7.9   --    --   15.2*   --   10.8   HGB  9.4*  8.7*  8.3*   < >  9.2*   < >  11.3*   HCT  28.1*  25.6*  24.2*   < >  27.3*   < >  33.7*   PLT   --   138   --    --   154   --   198    < > = values in this interval not displayed.         Recent Labs      10/04/17   0335  10/03/17   0200  10/02/17   1349  10/02/17   1211   NA  140  139  138   --    K  3.7  4.6  4.8   --    GLU  103* 129*  125*   --    BUN  13  23*  23*   --    CREA  0.63  0.69  0.80   --    CA  7.6*  7.7*  8.4*   --    MG   --    --   1.9   --    INR   --   1.2   --   1.1       RECOMMENDATIONS AND DISCHARGE PLANNING     1. Pending tests/procedures/ Plan of Care or Other Needs: for possible discharge     2. Discharge plan for patient and Needs/Barriers: home    3. Estimated Discharge Date: 10/5/17 Posted on Whiteboard in [address] [place] [address] Room: yes      4. The patient's care plan was reviewed with the oncoming nurse. \"HEALS\" SAFETY CHECK      Fall Risk    Total Score: 2    Safety Measures: Safety Measures: Bed/Chair-Wheels locked, Bed in low position, Call light within reach, Fall prevention (comment), Gripper socks, Side rails X 3    A safety check occurred in the patient's room between off going nurse and oncoming nurse listed above. The safety check included the below items  Area Items   H  High Alert Medications - Verify all high alert medication drips (heparin, PCA, etc.)   E  Equipment - Suction is set up for ALL patients (with carissa)  - Red plugs utilized for all equipment (IV pumps, etc.)  - WOWs wiped down at end of shift.  - Room stocked with oxygen, suction, and other unit-specific supplies   A  Alarms - Bed alarm is set for fall risk patients  - Ensure chair alarm is in place and activated if patient is up in a chair   L  Lines - Check IV for any infiltration  - Arreola bag is empty if patient has a Arreola   - Tubing and IV bags are labeled   S  Safety   - Room is clean, patient is clean, and equipment is clean. - Hallways are clear from equipment besides carts. - Fall bracelet on for fall risk patients  - Ensure room is clear and free of clutter  - Suction is set up for ALL patients (with carissa)  - Hallways are clear from equipment besides carts.    - Isolation precautions followed, supplies available outside room, sign posted     Pablo Garrido

## 2017-10-05 NOTE — PROGRESS NOTES
Problem: Mobility Impaired (Adult and Pediatric)  Goal: *Acute Goals and Plan of Care (Insert Text)  STGs to be addressed within 3 days:  1. Bed mobility: Supine to sit to supine modified independent with HR for meals. 2. Activity tolerance: Tolerate up in chair 1-2 hrs for ADLs. 3. Transfers: Sit to stand to chair S with LRAD for ADLs. LTGs to be addressed within 7 days:  1. Standing/Ambulation Balance: Increase to Good with LRAD for safe transfers and gait. 2. Ambulation: Ambulate > 200 ft. S with LRAD for home mobility. 3. Patient Education: Independent with HEP for home safety. PHYSICAL THERAPY TREATMENT     Patient: Tarun Ahn (68 y.o. male)  Date: 10/5/2017  Diagnosis: Lower GI bleed  Symptomatic anemia  Acute GI bleeding  Symptomatic anemia  Syncope and collapse <principal problem not specified>       Precautions: Fall   Chart, physical therapy assessment, plan of care and goals were reviewed. ASSESSMENT:  Pt found sitting upright in chair with wife present. Pt very pleasant and eager to return home. Pt happily performed sit to stand to Standard Walker and preceded to demonstrate their ability to amb while holding walker off of floor with B UE. Pt amb with therapist in [place] ~300 ft with mod-I with singular VC to increase step clearance to discourage shuffling gait, denying any dyspnea or lightheadedness. Pt reported having 4 stairs to enter household and agreed to demo stairs. Pt able to perform 8 stairs utilizing reciprocal gair pattern when ascending and single UE support and step to gait with descending, all with SBA for safety. Pt reports wanting to operate their riding lawnmower but, therapist requested pt hold that activity for a few days to determine pt was at 100% and ready for that activity. Pt reported the lack of a bowel movement in sometime and requested to know if pt should consume more medication. Nursing alerted of question and confirmed they would follow up with pt.  Pt appears mod-I will general mobility and pt reports the use of a RW and cane at home. Progression toward goals:  [X]      Improving appropriately and progressing toward goals  [ ]      Improving slowly and progressing toward goals  [ ]      Not making progress toward goals and plan of care will be adjusted       PLAN:  Patient continues to benefit from skilled intervention to address the above impairments. Continue treatment per established plan of care. Discharge Recommendations:  Home Health  Further Equipment Recommendations for Discharge:  N/A       SUBJECTIVE:   Patient stated Theresa Brad, you bringing the paper work so I can get out of here?       OBJECTIVE DATA SUMMARY:   Critical Behavior:  Neurologic State: Alert, Eyes open spontaneously  Orientation Level: Oriented X4, Appropriate for age  Cognition: Appropriate decision making, Appropriate for age attention/concentration, Appropriate safety awareness, Follows commands  Safety/Judgement: Awareness of environment, Fall prevention  Functional Mobility Training:  Transfers:  Sit to Stand: Modified independent  Stand to Sit: Modified independent  Balance:  Sitting: Intact  Standing: Intact  Standing - Static: Good  Standing - Dynamic : Good  Ambulation/Gait Training:  Distance (ft): 300 Feet (ft) (approximately)  Assistive Device: Walker, rolling  Ambulation - Level of Assistance: Modified independent  Gait Description (WDL): Exceptions to WDL  Gait Abnormalities: Shuffling gait  Base of Support: Narrowed  Speed/Adali: Accelerated; Shuffled  Interventions: Safety awareness training  Stairs:  Number of Stairs Trained: 8  Stairs - Level of Assistance: Stand-by asssistance  Pain:  Pain Scale 1: Numeric (0 - 10)  Pain Intensity 1: 0   Activity Tolerance:   good  Please refer to the flowsheet for vital signs taken during this treatment.   After treatment:   [X] Patient left in no apparent distress sitting up in chair  [ ] Patient left in no apparent distress in bed  [X] Call bell left within reach  [ ] Nursing notified  [X] Caregiver present  [ ] Bed alarm activated      Dori Glassing   Time Calculation: 24 mins

## 2017-10-05 NOTE — DISCHARGE INSTRUCTIONS
Anemia: Care Instructions  Your Care Instructions    Anemia is a low level of red blood cells, which carry oxygen throughout your body. Many things can cause anemia. Lack of iron is one of the most common causes. Your body needs iron to make hemoglobin, a substance in red blood cells that carries oxygen from the lungs to your body's cells. Without enough iron, the body produces fewer and smaller red blood cells. As a result, your body's cells do not get enough oxygen, and you feel tired and weak. And you may have trouble concentrating. Bleeding is the most common cause of a lack of iron. You may have heavy menstrual bleeding or bleeding caused by conditions such as ulcers, hemorrhoids, or cancer. Regular use of aspirin or other anti-inflammatory medicines (such as ibuprofen) also can cause bleeding in some people. A lack of iron in your diet also can cause anemia, especially at times when the body needs more iron, such as during pregnancy, infancy, and the teen years. Your doctor may have prescribed iron pills. It may take several months of treatment for your iron levels to return to normal. Your doctor also may suggest that you eat foods that are rich in iron, such as meat and beans. There are many other causes of anemia. It is not always due to a lack of iron. Finding the specific cause of your anemia will help your doctor find the right treatment for you. Follow-up care is a key part of your treatment and safety. Be sure to make and go to all appointments, and call your doctor if you are having problems. It's also a good idea to know your test results and keep a list of the medicines you take. How can you care for yourself at home? · Take your medicines exactly as prescribed. Call your doctor if you think you are having a problem with your medicine. · If your doctor recommends iron pills, take them as directed:  ¨ Try to take the pills on an empty stomach about 1 hour before or 2 hours after meals.  But you may need to take iron with food to avoid an upset stomach. ¨ Do not take antacids or drink milk or caffeine drinks (such as coffee, tea, or cola) at the same time or within 2 hours of the time that you take your iron. They can make it hard for your body to absorb the iron. ¨ Vitamin C (from food or supplements) helps your body absorb iron. Try taking iron pills with a glass of orange juice or some other food that is high in vitamin C, such as citrus fruits. ¨ Iron pills may cause stomach problems, such as heartburn, nausea, diarrhea, constipation, and cramps. Be sure to drink plenty of fluids, and include fruits, vegetables, and fiber in your diet each day. Iron pills often make your bowel movements dark or green. ¨ If you forget to take an iron pill, do not take a double dose of iron the next time you take a pill. ¨ Keep iron pills out of the reach of small children. An overdose of iron can be very dangerous. · Follow your doctor's advice about eating iron-rich foods. These include red meat, shellfish, poultry, eggs, beans, raisins, whole-grain bread, and leafy green vegetables. · Steam vegetables to help them keep their iron content. When should you call for help? Call 911 anytime you think you may need emergency care. For example, call if:  · You have symptoms of a heart attack. These may include:  ¨ Chest pain or pressure, or a strange feeling in the chest.  ¨ Sweating. ¨ Shortness of breath. ¨ Nausea or vomiting. ¨ Pain, pressure, or a strange feeling in the back, neck, jaw, or upper belly or in one or both shoulders or arms. ¨ Lightheadedness or sudden weakness. ¨ A fast or irregular heartbeat. After you call 911, the  may tell you to chew 1 adult-strength or 2 to 4 low-dose aspirin. Wait for an ambulance. Do not try to drive yourself. · You passed out (lost consciousness). Call your doctor now or seek immediate medical care if:  · You have new or increased shortness of breath.   · You are dizzy or lightheaded, or you feel like you may faint. · Your fatigue and weakness continue or get worse. · You have any abnormal bleeding, such as:  ¨ Nosebleeds. ¨ Vaginal bleeding that is different (heavier, more frequent, at a different time of the month) than what you are used to. ¨ Bloody or black stools, or rectal bleeding. ¨ Bloody or pink urine. Watch closely for changes in your health, and be sure to contact your doctor if:  · You do not get better as expected. Where can you learn more? Go to http://fouziaLimeadebere.info/. Enter R301 in the search box to learn more about \"Anemia: Care Instructions. \"  Current as of: October 13, 2016  Content Version: 11.3  © 2818-4935 [employer]. Care instructions adapted under license by [employer] (which disclaims liability or warranty for this information). If you have questions about a medical condition or this instruction, always ask your healthcare professional. [place] [address] any warranty or liability for your use of this information. Learning About Blood Transfusions  What is a blood transfusion? Blood transfusion is a medical treatment to replace the blood or parts of blood that your body has lost. The blood goes through a tube from a bag to an intravenous (IV) catheter and into your vein. You may need a blood transfusion after losing blood from an injury, a major surgery, an illness that causes bleeding, or an illness that destroys blood cells. Transfusions are also used to give you the parts of blood--such as platelets, plasma, or substances that cause clotting--that your body needs to fight an illness or stop bleeding. How is a blood transfusion done? Before you receive a blood transfusion, your blood is tested to find out what your blood type is. Blood or blood parts that are a match with your blood type are ordered by your doctor. Blood is typed as A, B, AB, or O.  It is also typed as Rh-positive or Rh-negative. The blood you are getting is checked and rechecked to make sure that it's the right type for you. A sample of your blood is mixed with a sample of the blood you will receive to check for problems. Before actually giving you the transfusion, a doctor and nurses will look at the label on the package of blood and compare it to your hospital ID bracelet and medical records. The transfusion begins only when all agree that this is the correct blood and that you are the correct person to receive it. To receive the transfusion, you will have an intravenous (IV) catheter inserted into a vein. A tube connects the catheter to the bag containing the blood, which is placed higher than your body. The blood then flows slowly into your vein. A doctor or nurse will check you several times during the transfusion to watch for a reaction or other problems. What are the possible risks? Blood transfusions have many benefits and are often life-saving. But they also have a few risks. Possible risks include:  · Your body's reaction to receiving new blood. This may include:  ¨ Fever. ¨ Allergic reactions. ¨ Breathing problems. · An infection from the blood. This risk is small because of the strict rules placed on handling and storing blood. Getting a viral infection, such as HIV or hepatitis B or C, through blood transfusions has become very rare. The U.S. Food and Drug Administration (FDA) enforces strict guidelines on the collection, testing, storage, and use of blood. · Getting the wrong blood type by accident. Severe reactions, which can be life-threatening, are very rare. What can you expect after a blood transfusion? Here are some things you can do at home to help prevent infection at the transfusion site:  · Wash the area daily with warm, soapy water, and pat it dry. Don't use hydrogen peroxide or alcohol, which can slow healing.  You may cover the area with a gauze bandage if it weeps or rubs against clothing. Change the bandage every day. · Keep the area clean and dry. Follow-up care is a key part of your treatment and safety. Be sure to make and go to all appointments, and call your doctor if you are having problems. It's also a good idea to know your test results and keep a list of the medicines you take. When should you call for help? Call 911 anytime you think you may need emergency care. For example, call if:  · You have severe trouble breathing. Call your doctor now or seek immediate medical care if:  · You have a fever. · You feel weaker or more tired than usual.  · You have a yellow tint to your skin or the whites of your eyes. Watch closely for changes in your health, and be sure to contact your doctor if you have any problems. Where can you learn more? Go to http://fouzia-bere.info/. Enter V588 in the search box to learn more about \"Learning About Blood Transfusions. \"  Current as of: October 13, 2016  Content Version: 11.3  © 3408-2032 [employer]. Care instructions adapted under license by [employer] (which disclaims liability or warranty for this information). If you have questions about a medical condition or this instruction, always ask your healthcare professional. [address] any warranty or liability for your use of this information.

## 2017-10-05 NOTE — INTERDISCIPLINARY ROUNDS
IDR/SLIDR Summary          Patient: Coty Petersen MRN: [id number]    Age: 80 y.o. YOB: 1932 Room/Bed: 211/01   Admit Diagnosis: Lower GI bleed  Symptomatic anemia  Acute GI bleeding  Symptomatic anemia  Syncope and collapse  Principal Diagnosis: <principal problem not specified>   Goals: safety, no bleeding  Readmission: NO  Quality Measure: Not applicable  VTE Prophylaxis: Not needed  Influenza Vaccine screening completed? YES  Pneumococcal Vaccine screening completed? NO  Mobility needs: Yes   Nutrition plan:No  Consults:P.T, O.T. and Case Management    Financial concerns:No  Escalated to CM? NO  RRAT Score: 15   Interventions:H2H  Testing due for pt today?  NO  LOS: 2 days Expected length of stay 5 days  Discharge plan: home   PCP: Nic Pond MD  Transportation needs: No    Days before discharge:ready for discharge  Discharge disposition: Home    Signed:     Philip Guthrie  10/4/2017  11:50 PM

## 2017-10-05 NOTE — PROGRESS NOTES
Bedside and Verbal shift change report given to Gretta Neff RN (oncoming nurse) by Arline Haider RN (offgoing nurse). Report included the following information SBAR, Kardex, ED Summary and MAR.

## 2017-10-05 NOTE — PROGRESS NOTES
Patient called due to having a bowel movement with bleeding. Blood is bright red in color, Dr. Khoi Cartwright and Dr. Mikey Baker  notified. Hemoglobin/Hematocrit 9.6/28.6,Will continue to monitor.

## 2017-10-06 ENCOUNTER — ANESTHESIA (OUTPATIENT)
Dept: ENDOSCOPY | Age: 82
DRG: 378 | End: 2017-10-06
Payer: MEDICARE

## 2017-10-06 VITALS
RESPIRATION RATE: 18 BRPM | WEIGHT: 230.38 LBS | HEIGHT: 70 IN | BODY MASS INDEX: 32.98 KG/M2 | TEMPERATURE: 99.4 F | SYSTOLIC BLOOD PRESSURE: 114 MMHG | DIASTOLIC BLOOD PRESSURE: 69 MMHG | HEART RATE: 71 BPM | OXYGEN SATURATION: 97 %

## 2017-10-06 LAB
ANION GAP SERPL CALC-SCNC: 7 MMOL/L (ref 3–18)
BASOPHILS # BLD: 0 K/UL (ref 0–0.1)
BASOPHILS NFR BLD: 0 % (ref 0–2)
BUN SERPL-MCNC: 9 MG/DL (ref 7–18)
BUN/CREAT SERPL: 16 (ref 12–20)
CALCIUM SERPL-MCNC: 8.4 MG/DL (ref 8.5–10.1)
CHLORIDE SERPL-SCNC: 104 MMOL/L (ref 100–108)
CO2 SERPL-SCNC: 30 MMOL/L (ref 21–32)
CREAT SERPL-MCNC: 0.55 MG/DL (ref 0.6–1.3)
DIFFERENTIAL METHOD BLD: ABNORMAL
EOSINOPHIL # BLD: 0.2 K/UL (ref 0–0.4)
EOSINOPHIL NFR BLD: 3 % (ref 0–5)
ERYTHROCYTE [DISTWIDTH] IN BLOOD BY AUTOMATED COUNT: 15 % (ref 11.6–14.5)
GLUCOSE SERPL-MCNC: 108 MG/DL (ref 74–99)
HCT VFR BLD AUTO: 25.8 % (ref 36–48)
HGB BLD-MCNC: 8.7 G/DL (ref 13–16)
LYMPHOCYTES # BLD: 2 K/UL (ref 0.9–3.6)
LYMPHOCYTES NFR BLD: 26 % (ref 21–52)
MCH RBC QN AUTO: 31.6 PG (ref 24–34)
MCHC RBC AUTO-ENTMCNC: 33.7 G/DL (ref 31–37)
MCV RBC AUTO: 93.8 FL (ref 74–97)
MONOCYTES # BLD: 1 K/UL (ref 0.05–1.2)
MONOCYTES NFR BLD: 12 % (ref 3–10)
NEUTS SEG # BLD: 4.5 K/UL (ref 1.8–8)
NEUTS SEG NFR BLD: 59 % (ref 40–73)
PLATELET # BLD AUTO: 153 K/UL (ref 135–420)
PMV BLD AUTO: 11.2 FL (ref 9.2–11.8)
POTASSIUM SERPL-SCNC: 3.7 MMOL/L (ref 3.5–5.5)
RBC # BLD AUTO: 2.75 M/UL (ref 4.7–5.5)
SODIUM SERPL-SCNC: 141 MMOL/L (ref 136–145)
WBC # BLD AUTO: 7.7 K/UL (ref 4.6–13.2)

## 2017-10-06 PROCEDURE — 74011000250 HC RX REV CODE- 250: Performed by: NURSE ANESTHETIST, CERTIFIED REGISTERED

## 2017-10-06 PROCEDURE — 80048 BASIC METABOLIC PNL TOTAL CA: CPT | Performed by: INTERNAL MEDICINE

## 2017-10-06 PROCEDURE — 77030008565 HC TBNG SUC IRR ERBE -B: Performed by: INTERNAL MEDICINE

## 2017-10-06 PROCEDURE — 74011250636 HC RX REV CODE- 250/636

## 2017-10-06 PROCEDURE — 74011250637 HC RX REV CODE- 250/637: Performed by: INTERNAL MEDICINE

## 2017-10-06 PROCEDURE — 0DJD8ZZ INSPECTION OF LOWER INTESTINAL TRACT, VIA NATURAL OR ARTIFICIAL OPENING ENDOSCOPIC: ICD-10-PCS | Performed by: INTERNAL MEDICINE

## 2017-10-06 PROCEDURE — 74011000250 HC RX REV CODE- 250

## 2017-10-06 PROCEDURE — 77030018846 HC SOL IRR STRL H20 ICUM -A: Performed by: INTERNAL MEDICINE

## 2017-10-06 PROCEDURE — 76060000032 HC ANESTHESIA 0.5 TO 1 HR: Performed by: INTERNAL MEDICINE

## 2017-10-06 PROCEDURE — 74011250637 HC RX REV CODE- 250/637: Performed by: HOSPITALIST

## 2017-10-06 PROCEDURE — 36415 COLL VENOUS BLD VENIPUNCTURE: CPT | Performed by: INTERNAL MEDICINE

## 2017-10-06 PROCEDURE — 85025 COMPLETE CBC W/AUTO DIFF WBC: CPT | Performed by: INTERNAL MEDICINE

## 2017-10-06 PROCEDURE — 76040000007: Performed by: INTERNAL MEDICINE

## 2017-10-06 PROCEDURE — 74011250636 HC RX REV CODE- 250/636: Performed by: NURSE ANESTHETIST, CERTIFIED REGISTERED

## 2017-10-06 RX ORDER — PROPOFOL 10 MG/ML
INJECTION, EMULSION INTRAVENOUS AS NEEDED
Status: DISCONTINUED | OUTPATIENT
Start: 2017-10-06 | End: 2017-10-06 | Stop reason: HOSPADM

## 2017-10-06 RX ORDER — LIDOCAINE HYDROCHLORIDE 20 MG/ML
INJECTION, SOLUTION EPIDURAL; INFILTRATION; INTRACAUDAL; PERINEURAL AS NEEDED
Status: DISCONTINUED | OUTPATIENT
Start: 2017-10-06 | End: 2017-10-06 | Stop reason: HOSPADM

## 2017-10-06 RX ORDER — NALOXONE HYDROCHLORIDE 0.4 MG/ML
0.1 INJECTION, SOLUTION INTRAMUSCULAR; INTRAVENOUS; SUBCUTANEOUS ONCE
Status: DISCONTINUED | OUTPATIENT
Start: 2017-10-06 | End: 2017-10-06 | Stop reason: HOSPADM

## 2017-10-06 RX ORDER — SODIUM CHLORIDE, SODIUM LACTATE, POTASSIUM CHLORIDE, CALCIUM CHLORIDE 600; 310; 30; 20 MG/100ML; MG/100ML; MG/100ML; MG/100ML
75 INJECTION, SOLUTION INTRAVENOUS CONTINUOUS
Status: DISCONTINUED | OUTPATIENT
Start: 2017-10-06 | End: 2017-10-06 | Stop reason: HOSPADM

## 2017-10-06 RX ORDER — HYDROMORPHONE HYDROCHLORIDE 2 MG/ML
0.5 INJECTION, SOLUTION INTRAMUSCULAR; INTRAVENOUS; SUBCUTANEOUS AS NEEDED
Status: DISCONTINUED | OUTPATIENT
Start: 2017-10-06 | End: 2017-10-06 | Stop reason: HOSPADM

## 2017-10-06 RX ORDER — SODIUM CHLORIDE, SODIUM LACTATE, POTASSIUM CHLORIDE, CALCIUM CHLORIDE 600; 310; 30; 20 MG/100ML; MG/100ML; MG/100ML; MG/100ML
50 INJECTION, SOLUTION INTRAVENOUS CONTINUOUS
Status: DISCONTINUED | OUTPATIENT
Start: 2017-10-06 | End: 2017-10-06 | Stop reason: HOSPADM

## 2017-10-06 RX ADMIN — Medication 10 ML: at 07:22

## 2017-10-06 RX ADMIN — METOPROLOL SUCCINATE 25 MG: 25 TABLET, EXTENDED RELEASE ORAL at 11:33

## 2017-10-06 RX ADMIN — PROPOFOL 50 MG: 10 INJECTION, EMULSION INTRAVENOUS at 09:36

## 2017-10-06 RX ADMIN — SODIUM CHLORIDE, SODIUM LACTATE, POTASSIUM CHLORIDE, AND CALCIUM CHLORIDE 50 ML/HR: 600; 310; 30; 20 INJECTION, SOLUTION INTRAVENOUS at 07:20

## 2017-10-06 RX ADMIN — LIDOCAINE HYDROCHLORIDE 40 MG: 20 INJECTION, SOLUTION EPIDURAL; INFILTRATION; INTRACAUDAL; PERINEURAL at 09:36

## 2017-10-06 RX ADMIN — FAMOTIDINE 20 MG: 10 INJECTION, SOLUTION INTRAVENOUS at 08:25

## 2017-10-06 RX ADMIN — PROPOFOL 50 MG: 10 INJECTION, EMULSION INTRAVENOUS at 09:38

## 2017-10-06 RX ADMIN — PANTOPRAZOLE SODIUM 40 MG: 40 TABLET, DELAYED RELEASE ORAL at 11:33

## 2017-10-06 RX ADMIN — FERROUS SULFATE TAB 325 MG (65 MG ELEMENTAL FE) 325 MG: 325 (65 FE) TAB at 11:33

## 2017-10-06 NOTE — ROUTINE PROCESS
Bedside and Verbal shift change report given to Mleissa Fisher RN (oncoming nurse) by Dixie Leblanc (offgoing nurse). Report included the following information SBAR, Kardex, MAR and Recent Results. SITUATION:    Code Status: DNR   Reason for Admission: Lower GI bleed   Symptomatic anemia   Acute GI bleeding   Symptomatic anemia   Syncope and collapse   [address] day: 4   Problem List:       Hospital Problems  Date Reviewed: 4/17/2017          Codes Class Noted POA    Lower GI bleed ICD-10-CM: K92.2  ICD-9-CM: 578.9  10/2/2017 Unknown        Symptomatic anemia ICD-10-CM: D64.9  ICD-9-CM: 285.9  10/2/2017 Unknown        Syncope and collapse ICD-10-CM: R55  ICD-9-CM: 780.2  10/2/2017 Unknown        Acute GI bleeding ICD-10-CM: K92.2  ICD-9-CM: 578.9  10/2/2017 Unknown              BACKGROUND:    Past Medical History:   Past Medical History:   Diagnosis Date    Ankle fracture, left approx 2011    medial, tibial    Arthritis     knees    Cardiac echocardiogram 06/16/2011    Normal LV systolic function. Mild conc LVH. Gr 1 DDfx. RVSP 35-40 mmHg. Marked LAE. Marked LISA. Mild-mod MR.  Mild-mod AI. Mild AoRE. Mild aortic arch dil.  Cardiac Holter monitor study 06/22/2011    Baseline sinus rhythm to sinus bradycardia, avg HR 60 bpm (range  bpm). Questionable chronotropic intolerance. No sustained arrhythmias.  GERD (gastroesophageal reflux disease)     Glaucoma suspect     posterior vitreous detachment b/l, pterygium left eye, aseroid hyalosis / synchisis, b/l pseudophakia    H/O colonoscopy 04/13/2011    1 polyp thermally treated    History of atrial fibrillation 2009    Hypertension     Unspecified adverse effect of anesthesia     H/o A-fib immediately post colonoscopy.          Patient taking anticoagulants no     ASSESSMENT:    Changes in Assessment Throughout Shift: none     Patient has Central Line: no Reasons if yes: na   Patient has Arreola Cath: no Reasons if yes: na      Last Vitals:     Vitals:    10/05/17 1513 10/05/17 2000 10/06/17 0000 10/06/17 0400   BP: 119/75 116/70 135/72 127/77   Pulse: 67 94 67 66   Resp: 20 20 20 20   Temp: 98 °F (36.7 °C) 97.7 °F (36.5 °C) 98 °F (36.7 °C) 98.3 °F (36.8 °C)   SpO2: 95% 99% 98% 97%   Weight:    104.5 kg (230 lb 6.1 oz)   Height:            IV and DRAINS (will only show if present)   [REMOVED] Peripheral IV 10/02/17 Left Hand-Site Assessment: Clean, dry, & intact  [REMOVED] Peripheral IV 10/02/17 Right Antecubital-Site Assessment: Clean, dry, & intact  Peripheral IV 10/02/17 Right Antecubital-Site Assessment: Clean, dry, & intact  Peripheral IV 10/02/17 Left Antecubital-Site Assessment: Clean, dry, & intact     WOUND (if present)   Wound Type:  none   Dressing present Dressing Present : No   Wound Concerns/Notes:  none     PAIN    Pain Assessment    Pain Intensity 1: 0 (10/06/17 0437)              Patient Stated Pain Goal: 0  o Interventions for Pain:  none  o Intervention effective: na  o Time of last intervention: na   o Reassessment Completed: yes      Last 3 Weights:  Last 3 Recorded Weights in this Encounter    10/04/17 0433 10/05/17 0503 10/06/17 0400   Weight: 105.2 kg (231 lb 14.4 oz) 105.3 kg (232 lb 1.6 oz) 104.5 kg (230 lb 6.1 oz)     Weight change: -0.78 kg (-1 lb 11.5 oz)     INTAKE/OUPUT    Current Shift:      Last three shifts: 10/04 1901 - 10/06 0700  In: 1920 [P.O.:1920]  Out: 580 [Urine:580]     LAB RESULTS     Recent Labs      10/06/17   0158  10/05/17   1120  10/05/17   0234   10/04/17   0335   WBC  7.7   --    --    --   7.9   HGB  8.7*  9.6*  8.1*   < >  8.7*   HCT  25.8*  28.6*  23.9*   < >  25.6*   PLT  153   --    --    --   138    < > = values in this interval not displayed. Recent Labs      10/06/17   0158  10/04/17   0335   NA  141  140   K  3.7  3.7   GLU  108*  103*   BUN  9  13   CREA  0.55*  0.63   CA  8.4*  7.6*       RECOMMENDATIONS AND DISCHARGE PLANNING     1.  Pending tests/procedures/ Plan of Care or Other Needs: Colonoscopy, continue to monitor     2. Discharge plan for patient and Needs/Barriers: home    3. Estimated Discharge Date: pending Posted on Whiteboard in Patients Room: yes      4. The patient's care plan was reviewed with the oncoming nurse. \"HEALS\" SAFETY CHECK      Fall Risk    Total Score: 2    Safety Measures: Safety Measures: Bed/Chair-Wheels locked, Bed in low position, Call light within reach, Fall prevention (comment), Family at bedside, Gripper socks, Side rails X2    A safety check occurred in the patient's room between off going nurse and oncoming nurse listed above. The safety check included the below items  Area Items   H  High Alert Medications - Verify all high alert medication drips (heparin, PCA, etc.)   E  Equipment - Suction is set up for ALL patients (with carissa)  - Red plugs utilized for all equipment (IV pumps, etc.)  - WOWs wiped down at end of shift.  - Room stocked with oxygen, suction, and other unit-specific supplies   A  Alarms - Bed alarm is set for fall risk patients  - Ensure chair alarm is in place and activated if patient is up in a chair   L  Lines - Check IV for any infiltration  - Arreola bag is empty if patient has a Arreola   - Tubing and IV bags are labeled   S  Safety   - Room is clean, patient is clean, and equipment is clean. - Hallways are clear from equipment besides carts. - Fall bracelet on for fall risk patients  - Ensure room is clear and free of clutter  - Suction is set up for ALL patients (with carissa)  - Hallways are clear from equipment besides carts.    - Isolation precautions followed, supplies available outside room, sign posted     Renato Roberto

## 2017-10-06 NOTE — ANESTHESIA POSTPROCEDURE EVALUATION
Post-Anesthesia Evaluation and Assessment    Patient: Anh Godoy MRN: [id number]  SSN: [id number]    YOB: 1932  Age: 80 y.o. Sex: male       Cardiovascular Function/Vital Signs  Visit Vitals    /62    Pulse 60    Temp 36.9 °C (98.5 °F)    Resp 17    Ht 5' 9.5\" (1.765 m)    Wt 104.5 kg (230 lb 6.1 oz)    SpO2 100%    BMI 33.53 kg/m2       Patient is status post MAC anesthesia for Procedure(s):  COLONOSCOPY. Nausea/Vomiting: None    Postoperative hydration reviewed and adequate. Pain:  Pain Scale 1: Numeric (0 - 10) (10/06/17 0953)  Pain Intensity 1: 0 (10/06/17 0953)   Managed    Neurological Status:   Neuro  Neurologic State: Alert;Eyes open spontaneously (10/05/17 1914)  Orientation Level: Appropriate for age;Oriented X4 (10/05/17 1914)  Cognition: Appropriate decision making; Appropriate for age attention/concentration; Appropriate safety awareness; Follows commands;Recognition of people/places (10/05/17 1914)   At baseline    Mental Status and Level of Consciousness: Alert and oriented     Pulmonary Status:   O2 Device: Room air (10/06/17 0953)   Adequate oxygenation and airway patent    Complications related to anesthesia: None    Post-anesthesia assessment completed.  No concerns    Signed By: James Mayo MD     October 6, 2017

## 2017-10-06 NOTE — ANESTHESIA PREPROCEDURE EVALUATION
Anesthetic History   No history of anesthetic complications            Review of Systems / Medical History  Patient summary reviewed and pertinent labs reviewed    Pulmonary  Within defined limits                 Neuro/Psych   Within defined limits           Cardiovascular    Hypertension: well controlled                   GI/Hepatic/Renal     GERD: well controlled           Endo/Other        Arthritis and anemia     Other Findings   Comments:   Risk Factors for Postoperative nausea/vomiting:       History of postoperative nausea/vomiting? NO       Female? NO       Motion sickness? NO       Intended opioid administration for postoperative analgesia? NO      Smoking Abstinence  Current Smoker? NO  Elective Surgery? YES  Seen preoperatively by anesthesiologist or proxy prior to day of surgery? YES  Pt abstained from smoking 24 hours prior to anesthesia?  N/A           Physical Exam    Airway  Mallampati: II  TM Distance: 4 - 6 cm  Neck ROM: normal range of motion   Mouth opening: Normal     Cardiovascular  Regular rate and rhythm,  S1 and S2 normal,  no murmur, click, rub, or gallop             Dental    Dentition: Full lower dentures and Full upper dentures     Pulmonary  Breath sounds clear to auscultation               Abdominal  GI exam deferred       Other Findings            Anesthetic Plan    ASA: 2  Anesthesia type: MAC          Induction: Intravenous  Anesthetic plan and risks discussed with: Patient

## 2017-10-06 NOTE — PROCEDURES
[facility]  [facility], [address]    Procedure Note    Patient: Lalitha Wen MRN: [id number]  SSN: [id number]    YOB: 1932  Age: 80 y.o. Sex: male        Date/Time:  10/6/2017 9:50 AM    Colonoscopy Operative Report    Procedure Type:   Colonoscopy --diagnostic     Indications:     Lower rectal bleeding  Pre-operative Diagnosis: see indication above  Post-operative Diagnosis:  See findings below  :    Referring Provider: Laura Barney MD    Exam:  Airway: clear, no airway problems anticipated  Heart: RRR, without gallops or rubs  Lungs: clear bilaterally without wheezes, crackles, or rhonchi  Abdomen: soft, nontender, nondistended, bowel sounds present  Mental Status: awake, alert and oriented to person, place and time    Sedation:  MAC anesthesia Propofol  Procedure Details:  After informed consent was obtained with all risks and benefits of procedure explained and preoperative exam completed, the patient was taken to the endoscopy suite and placed in the left lateral decubitus position. Upon sequential sedation as per above, a digital rectal exam was performed demonstrating internal hemorrhoids. The Olympus videocolonoscope  was inserted in the rectum and carefully advanced to the cecum, which was identified by the ileocecal valve and appendiceal orifice. The quality of preparation was good. The colonoscope was slowly withdrawn with careful evaluation between folds. Retroflexion in the rectum was completed demonstrating internal hemorrhoids. Findings: diverticula of colon , no bleeding, 2+ hemorrhoids     Specimen Removed:  none    Complications: None. EBL:  None.     Impression:  diverticulosis,  Moderate in degree, involving the entire colon  hemorrhoids internal, Moderate in size, no bleeding anywhere     Recommendations: --high fiber diet , metamucil with dinner         Discharge Disposition: Return to [place] , ok to [place] home Later today , f/u with us for further problems       Stacy Alves, MD

## 2017-10-06 NOTE — PERIOP NOTES
TRANSFER - OUT REPORT:    Verbal report given to [facility] on Ree Muhammad  being transferred to [address] [place] [address]) for routine progression of care       Report consisted of patients Situation, Background, Assessment and   Recommendations(SBAR). Information from the following report(s) SBAR, Kardex, OR Summary, Procedure Summary, Intake/Output, MAR, Recent Results and Med Rec Status was reviewed with the receiving nurse. Lines:   Peripheral IV 10/06/17 Right Hand (Active)   Site Assessment Clean, dry, & intact 10/6/2017 10:00 AM   Phlebitis Assessment 0 10/6/2017 10:00 AM   Infiltration Assessment 0 10/6/2017 10:00 AM   Dressing Status Clean, dry, & intact 10/6/2017 10:00 AM   Dressing Type Tape;Transparent 10/6/2017 10:00 AM   Hub Color/Line Status Pink; Infusing 10/6/2017 10:00 AM        Opportunity for questions and clarification was provided.       Patient transported with:   Registered Nurse

## 2017-10-06 NOTE — PROGRESS NOTES
Problem: Mobility Impaired (Adult and Pediatric)  Goal: *Acute Goals and Plan of Care (Insert Text)  STGs to be addressed within 3 days:  1. Bed mobility: Supine to sit to supine modified independent with HR for meals. 2. Activity tolerance: Tolerate up in chair 1-2 hrs for ADLs. 3. Transfers: Sit to stand to chair S with LRAD for ADLs. LTGs to be addressed within 7 days:  1. Standing/Ambulation Balance: Increase to Good with LRAD for safe transfers and gait. 2. Ambulation: Ambulate > 200 ft. S with LRAD for home mobility. 3. Patient Education: Independent with HEP for home safety. 1     Pt found seated up right in chair dressed in regular clothes awaiting D/C. Pt reports D/C transport on the way. PTA discussed and reviewed safety education with amb and transfer at home before pt left.      Jaime TINSLEY

## 2017-10-07 NOTE — DISCHARGE SUMMARY
[address] Jordan Swanson SUMMARY    Name:  Maricarmen Mak  MR#:  [id number]  :  1932  Account #:  [de-identified]  Date of Adm:  10/02/2017  Date of Discharge:  10/06/2017      CONSULTATIONS: Dr. Samia Georges from Gastroenterology. PROCEDURES: Colonoscopy diagnostic on 10/06/2017. This showed  diverticulosis, moderate in degree, involving the entire colon,  hemorrhoids internal, moderate in size, no bleeding anywhere. Recommendations are for high-fiber diet and Metamucil with dinner. SIGNIFICANT STUDIES: He had CTA of the chest with and without  contrast that was negative for pulmonary embolism. He was noted to  have emphysema with minimal fibrosis in the lung bases, left upper  renal pole simple cystic lesion, limited evaluation with further followup  with retroperitoneal ultrasound recommended. Extensive coronary  vascular calcifications, coronary calcium score evaluation may be  evaluated. Chest x-ray 10/02/2017, showed subtle retrocardiac opacity, likely  represents atelectasis with recommendation made for a PA and lateral  chest radiograph when the patient's condition allows. REASON FOR HOSPITALIZATION: Please see the history and  physical done by Dr. Marques Osuna. Briefly, the patient is an 80-year-old  male who presented with complaints of shortness of breath and  painless rectal bleeding. Closer to the time he was admitted, he woke  up one night and felt dizzy and short of breath and fell on the floor  without loss of consciousness. Initial evaluation showed hemoglobin of  11.3, with a previous hemoglobin of 14. The patient denied chest pain. He was admitted with the initial impression of rectal bleeding thought to  be secondary to lower gastrointestinal bleeding, symptomatic anemia,  dyspnea likely secondary to anemia.     HOSPITALIZATION COURSE: The patient did have CTA as part of the  workup for his shortness of breath, which showed that he did not have  evidence of pulmonary embolism. He was noted to have  emphysematous changes. He was seen by the Gastroenterology team  with recommendations for continue to monitor his hemoglobin and  hematocrit and transfuse as needed, and to start PPIs, continue clears. Initially, it appears that the plan was for conservative management to  do invasive studies should he have continued bleeding. The patient did  have bowel movement with blood the day prior to his discharge. He did  have a colonoscopy done on date of discharge that did not show any  evidence of significant bleeding. Recommendations made for  Metamucil and high-fiber diet. The patient continued to have stable  vital signs and no further complaints of rectal bleeding on date of  discharge. PHYSICAL EXAMINATION  VITAL SIGNS: On date of discharge, his vitals are stable. GENERAL: He is alert, awake, oriented. HEART: Regular rate and rhythm. LUNGS: Clear. ABDOMEN: Soft. EXTREMITIES: No peripheral edema on extremity exam.    LABORATORY DATA: His last hemoglobin is 8.7 today, was 9.6  yesterday, and he has remained within this range since admission. DISCHARGE INSTRUCTIONS: Follow up with his primary care  physician within 1 week. He should have his hemoglobin checked in  the near future. I have instructed that he hold his aspirin for the next  few days until he is seen by his primary care physician. He has an  appointment to see his primary care physician in less than a week and  the risks and benefits of aspirin therapy should then be reconsidered at  that time. MEDICATIONS ON DISCHARGE: The patient has been instructed to  continue his outpatient regimen with no new medications prescribed. Hold aspirin as documented above. Followup with primary care physician in less than  1 week. Time taken for discharge greater than 30 minutes.         Shelly Martines MD    KB / [facility]  D:  10/06/2017   17:45  T:  10/06/2017   22:02  Job #:  [id number]

## 2017-10-09 ENCOUNTER — HOSPITAL ENCOUNTER (INPATIENT)
Age: 82
LOS: 3 days | Discharge: HOME | DRG: 378 | End: 2017-10-12
Attending: EMERGENCY MEDICINE | Admitting: INTERNAL MEDICINE
Payer: MEDICARE

## 2017-10-09 DIAGNOSIS — K92.2 ACUTE LOWER GI BLEEDING: Primary | ICD-10-CM

## 2017-10-09 DIAGNOSIS — D64.9 SYMPTOMATIC ANEMIA: ICD-10-CM

## 2017-10-09 DIAGNOSIS — R55 NEAR SYNCOPE: ICD-10-CM

## 2017-10-09 LAB
ALBUMIN SERPL-MCNC: 2.7 G/DL (ref 3.4–5)
ALBUMIN/GLOB SERPL: 0.8 {RATIO} (ref 0.8–1.7)
ALP SERPL-CCNC: 79 U/L (ref 45–117)
ALT SERPL-CCNC: 21 U/L (ref 16–61)
ANION GAP SERPL CALC-SCNC: 13 MMOL/L (ref 3–18)
APTT PPP: 34.3 SEC (ref 23–36.4)
AST SERPL-CCNC: 20 U/L (ref 15–37)
ATRIAL RATE: 94 BPM
BASOPHILS # BLD: 0 K/UL (ref 0–0.1)
BASOPHILS NFR BLD: 0 % (ref 0–2)
BILIRUB SERPL-MCNC: 0.4 MG/DL (ref 0.2–1)
BUN SERPL-MCNC: 15 MG/DL (ref 7–18)
BUN/CREAT SERPL: 17 (ref 12–20)
CALCIUM SERPL-MCNC: 7.7 MG/DL (ref 8.5–10.1)
CALCULATED P AXIS, ECG09: 44 DEGREES
CALCULATED R AXIS, ECG10: -20 DEGREES
CALCULATED T AXIS, ECG11: 58 DEGREES
CHLORIDE SERPL-SCNC: 104 MMOL/L (ref 100–108)
CK MB CFR SERPL CALC: NORMAL % (ref 0–4)
CK MB SERPL-MCNC: <1 NG/ML (ref 5–25)
CK SERPL-CCNC: 49 U/L (ref 39–308)
CO2 SERPL-SCNC: 21 MMOL/L (ref 21–32)
CREAT SERPL-MCNC: 0.89 MG/DL (ref 0.6–1.3)
DIAGNOSIS, 93000: NORMAL
DIFFERENTIAL METHOD BLD: ABNORMAL
EOSINOPHIL # BLD: 0.2 K/UL (ref 0–0.4)
EOSINOPHIL NFR BLD: 2 % (ref 0–5)
ERYTHROCYTE [DISTWIDTH] IN BLOOD BY AUTOMATED COUNT: 14.8 % (ref 11.6–14.5)
GLOBULIN SER CALC-MCNC: 3.6 G/DL (ref 2–4)
GLUCOSE SERPL-MCNC: 184 MG/DL (ref 74–99)
HCT VFR BLD AUTO: 19.4 % (ref 36–48)
HCT VFR BLD AUTO: 22.5 % (ref 36–48)
HCT VFR BLD AUTO: 22.9 % (ref 36–48)
HGB BLD-MCNC: 6.7 G/DL (ref 13–16)
HGB BLD-MCNC: 7.8 G/DL (ref 13–16)
HGB BLD-MCNC: 7.9 G/DL (ref 13–16)
INR PPP: 1.1 (ref 0.8–1.2)
LYMPHOCYTES # BLD: 1.9 K/UL (ref 0.9–3.6)
LYMPHOCYTES NFR BLD: 21 % (ref 21–52)
MAGNESIUM SERPL-MCNC: 1.8 MG/DL (ref 1.6–2.6)
MCH RBC QN AUTO: 31.7 PG (ref 24–34)
MCHC RBC AUTO-ENTMCNC: 34.7 G/DL (ref 31–37)
MCV RBC AUTO: 91.5 FL (ref 74–97)
MONOCYTES # BLD: 1 K/UL (ref 0.05–1.2)
MONOCYTES NFR BLD: 11 % (ref 3–10)
NEUTS SEG # BLD: 5.8 K/UL (ref 1.8–8)
NEUTS SEG NFR BLD: 66 % (ref 40–73)
P-R INTERVAL, ECG05: 186 MS
PLATELET # BLD AUTO: 206 K/UL (ref 135–420)
PMV BLD AUTO: 10.6 FL (ref 9.2–11.8)
POTASSIUM SERPL-SCNC: 3.3 MMOL/L (ref 3.5–5.5)
PROT SERPL-MCNC: 6.3 G/DL (ref 6.4–8.2)
PROTHROMBIN TIME: 14.1 SEC (ref 11.5–15.2)
Q-T INTERVAL, ECG07: 370 MS
QRS DURATION, ECG06: 80 MS
QTC CALCULATION (BEZET), ECG08: 462 MS
RBC # BLD AUTO: 2.46 M/UL (ref 4.7–5.5)
SODIUM SERPL-SCNC: 138 MMOL/L (ref 136–145)
TROPONIN I SERPL-MCNC: <0.02 NG/ML (ref 0–0.04)
VENTRICULAR RATE, ECG03: 94 BPM
WBC # BLD AUTO: 8.8 K/UL (ref 4.6–13.2)

## 2017-10-09 PROCEDURE — 30233N1 TRANSFUSION OF NONAUTOLOGOUS RED BLOOD CELLS INTO PERIPHERAL VEIN, PERCUTANEOUS APPROACH: ICD-10-PCS | Performed by: INTERNAL MEDICINE

## 2017-10-09 PROCEDURE — 85610 PROTHROMBIN TIME: CPT | Performed by: EMERGENCY MEDICINE

## 2017-10-09 PROCEDURE — 36415 COLL VENOUS BLD VENIPUNCTURE: CPT | Performed by: INTERNAL MEDICINE

## 2017-10-09 PROCEDURE — 99218 HC RM OBSERVATION: CPT

## 2017-10-09 PROCEDURE — 85025 COMPLETE CBC W/AUTO DIFF WBC: CPT | Performed by: EMERGENCY MEDICINE

## 2017-10-09 PROCEDURE — 83735 ASSAY OF MAGNESIUM: CPT | Performed by: EMERGENCY MEDICINE

## 2017-10-09 PROCEDURE — 93005 ELECTROCARDIOGRAM TRACING: CPT

## 2017-10-09 PROCEDURE — 74011250636 HC RX REV CODE- 250/636: Performed by: INTERNAL MEDICINE

## 2017-10-09 PROCEDURE — 82550 ASSAY OF CK (CPK): CPT | Performed by: EMERGENCY MEDICINE

## 2017-10-09 PROCEDURE — 99284 EMERGENCY DEPT VISIT MOD MDM: CPT

## 2017-10-09 PROCEDURE — 85018 HEMOGLOBIN: CPT | Performed by: EMERGENCY MEDICINE

## 2017-10-09 PROCEDURE — 86920 COMPATIBILITY TEST SPIN: CPT | Performed by: EMERGENCY MEDICINE

## 2017-10-09 PROCEDURE — 85730 THROMBOPLASTIN TIME PARTIAL: CPT | Performed by: EMERGENCY MEDICINE

## 2017-10-09 PROCEDURE — 77030013169 SET IV BLD ICUM -A

## 2017-10-09 PROCEDURE — 80053 COMPREHEN METABOLIC PANEL: CPT | Performed by: EMERGENCY MEDICINE

## 2017-10-09 PROCEDURE — P9016 RBC LEUKOCYTES REDUCED: HCPCS | Performed by: EMERGENCY MEDICINE

## 2017-10-09 PROCEDURE — 86850 RBC ANTIBODY SCREEN: CPT | Performed by: EMERGENCY MEDICINE

## 2017-10-09 PROCEDURE — 74011250636 HC RX REV CODE- 250/636: Performed by: EMERGENCY MEDICINE

## 2017-10-09 PROCEDURE — 36430 TRANSFUSION BLD/BLD COMPNT: CPT

## 2017-10-09 PROCEDURE — 65660000000 HC RM CCU STEPDOWN

## 2017-10-09 PROCEDURE — 96365 THER/PROPH/DIAG IV INF INIT: CPT

## 2017-10-09 PROCEDURE — 74011250637 HC RX REV CODE- 250/637: Performed by: INTERNAL MEDICINE

## 2017-10-09 RX ORDER — ONDANSETRON 2 MG/ML
4 INJECTION INTRAMUSCULAR; INTRAVENOUS
Status: DISCONTINUED | OUTPATIENT
Start: 2017-10-09 | End: 2017-10-12 | Stop reason: HOSPADM

## 2017-10-09 RX ORDER — HYDROCHLOROTHIAZIDE 25 MG/1
12.5 TABLET ORAL DAILY
Status: DISCONTINUED | OUTPATIENT
Start: 2017-10-09 | End: 2017-10-12 | Stop reason: HOSPADM

## 2017-10-09 RX ORDER — SODIUM CHLORIDE 9 MG/ML
250 INJECTION, SOLUTION INTRAVENOUS AS NEEDED
Status: DISCONTINUED | OUTPATIENT
Start: 2017-10-09 | End: 2017-10-12 | Stop reason: HOSPADM

## 2017-10-09 RX ORDER — SODIUM CHLORIDE 9 MG/ML
125 INJECTION, SOLUTION INTRAVENOUS CONTINUOUS
Status: DISCONTINUED | OUTPATIENT
Start: 2017-10-09 | End: 2017-10-12

## 2017-10-09 RX ORDER — HYDROCODONE BITARTRATE AND ACETAMINOPHEN 5; 325 MG/1; MG/1
1 TABLET ORAL
Status: DISCONTINUED | OUTPATIENT
Start: 2017-10-09 | End: 2017-10-12 | Stop reason: HOSPADM

## 2017-10-09 RX ORDER — METOPROLOL SUCCINATE 25 MG/1
25 TABLET, EXTENDED RELEASE ORAL DAILY
Status: DISCONTINUED | OUTPATIENT
Start: 2017-10-09 | End: 2017-10-12 | Stop reason: HOSPADM

## 2017-10-09 RX ORDER — PANTOPRAZOLE SODIUM 40 MG/1
40 TABLET, DELAYED RELEASE ORAL DAILY
Status: DISCONTINUED | OUTPATIENT
Start: 2017-10-09 | End: 2017-10-12 | Stop reason: HOSPADM

## 2017-10-09 RX ORDER — ACETAMINOPHEN 325 MG/1
650 TABLET ORAL
Status: DISCONTINUED | OUTPATIENT
Start: 2017-10-09 | End: 2017-10-12 | Stop reason: HOSPADM

## 2017-10-09 RX ORDER — POTASSIUM CHLORIDE 7.45 MG/ML
10 INJECTION INTRAVENOUS
Status: COMPLETED | OUTPATIENT
Start: 2017-10-09 | End: 2017-10-09

## 2017-10-09 RX ADMIN — PANTOPRAZOLE SODIUM 40 MG: 40 TABLET, DELAYED RELEASE ORAL at 09:40

## 2017-10-09 RX ADMIN — POTASSIUM BICARBONATE 25 MEQ: 25 TABLET, EFFERVESCENT ORAL at 09:39

## 2017-10-09 RX ADMIN — SODIUM CHLORIDE 1000 ML: 900 INJECTION, SOLUTION INTRAVENOUS at 04:43

## 2017-10-09 RX ADMIN — POTASSIUM CHLORIDE 10 MEQ: 10 INJECTION, SOLUTION INTRAVENOUS at 04:34

## 2017-10-09 RX ADMIN — POTASSIUM BICARBONATE 25 MEQ: 25 TABLET, EFFERVESCENT ORAL at 17:38

## 2017-10-09 RX ADMIN — SODIUM CHLORIDE 125 ML/HR: 900 INJECTION, SOLUTION INTRAVENOUS at 21:46

## 2017-10-09 NOTE — ROUTINE PROCESS
TRANSFER - IN REPORT:    Verbal report received from [facility], RN(name) on Zaid Monae  being received from ED(unit) for routine progression of care      Report consisted of patients Situation, Background, Assessment and   Recommendations(SBAR). Information from the following report(s) SBAR and Kardex was reviewed with the receiving nurse. Opportunity for questions and clarification was provided. Assessment completed upon patients arrival to unit and care assumed.

## 2017-10-09 NOTE — IP AVS SNAPSHOT
Charley Reynoso 
 
 
 [address] [address] David Jaimeke Patient: Herminia Lai MRN: [id number] BBS:0/36/8175 You are allergic to the following No active allergies Immunizations Administered for This Admission Name Date Influenza Vaccine (Quad) PF 10/11/2017 Recent Documentation Height Weight BMI Smoking Status 1.778 m 104.6 kg 33.09 kg/m2 Former Smoker Emergency Contacts Name Discharge Info Relation Home Work Mobile Teddy,[place] DISCHARGE CAREGIVER [3] Spouse [3] [phone] About your hospitalization You were admitted on:  October 9, 2017 You last received care in the:  [address] [place] [address] You were discharged on:  October 12, 2017 Unit phone number:  [phone] Why you were hospitalized Your primary diagnosis was:  Not on File Your diagnoses also included:  Symptomatic Anemia, Acute Lower Gi Bleeding, Near Syncope, Gi Bleeding Providers Seen During Your Hospitalizations Provider Role Specialty Primary office phone Ghazala Patrick MD Attending Provider Emergency Medicine [phone] Iveth Christopher MD Attending Provider Internal Medicine [phone] Alberto Live MD Attending Provider Internal Medicine [phone] Your Primary Care Physician (PCP) Primary Care Physician Office Phone Office Fax Arleen Jones [phone] Follow-up Information Follow up With Details Comments Contact Info Lucia Duong MD In 5 days  [address] 
 [address] [facility] [address] 
[phone] Your Appointments Monday October 16, 2017  8:45 AM EDT ROUTINE CARE with Lucia Duong MD  
[address] ([facility]) [address] [place] [address] [address] [facility] [address]  
[phone] Current Discharge Medication List  
  
START taking these medications Dose & Instructions Dispensing Information Comments Morning Noon Evening Bedtime  
 potassium bicarbonate 25 mEq tablet Commonly known as:  Levora Beat Your last dose was: Your next dose is:    
   
   
 Dose:  25 mEq Take 1 Tab by mouth two (2) times a day. Quantity:  60 Tab Refills:  0 CONTINUE these medications which have NOT CHANGED Dose & Instructions Dispensing Information Comments Morning Noon Evening Bedtime  
 ferrous sulfate 325 mg (65 mg iron) tablet Your last dose was: Your next dose is:    
   
   
 Dose:  325 mg Take 1 Tab by mouth daily (with breakfast). Quantity:  15 Tab Refills:  0  
     
   
   
   
  
 FISH OIL 1,000 mg Cap Generic drug:  omega-3 fatty acids-vitamin e Your last dose was: Your next dose is:    
   
   
 Dose:  1 Cap Take 1 Cap by mouth two (2) times a day. Refills:  0  
     
   
   
   
  
 hydroCHLOROthiazide 25 mg tablet Commonly known as:  HYDRODIURIL Your last dose was: Your next dose is:    
   
   
 Dose:  12.5 mg Take 0.5 Tabs by mouth daily. Quantity:  90 Tab Refills:  2  
     
   
   
   
  
 metoprolol succinate 25 mg XL tablet Commonly known as:  TOPROL-XL Your last dose was: Your next dose is: TAKE ONE TABLET BY MOUTH ONCE DAILY Quantity:  90 Tab Refills:  1  
     
   
   
   
  
 multivitamin tablet Commonly known as:  ONE A DAY Your last dose was: Your next dose is:    
   
   
 Dose:  1 Tab Take 1 Tab by mouth daily. Refills:  0  
     
   
   
   
  
 OSTEO BI-FLEX (5-LOXIN) 1,500-400-100 mg-unit-mg Tab Generic drug:  glucosamine-D3-Boswellia serr Your last dose was: Your next dose is:    
   
   
 Dose:  1 Tab Take 1 Tab by mouth two (2) times a day. Refills:  0  
     
   
   
   
  
 pantoprazole 40 mg tablet Commonly known as:  PROTONIX Your last dose was: Your next dose is:    
   
   
 Dose:  40 mg Take 1 Tab by mouth daily. Quantity:  10 Tab Refills:  0  
     
   
   
   
  
 polyethylene glycol 17 gram packet Commonly known as:  Shawnee Brendan Your last dose was: Your next dose is:    
   
   
 Dose:  17 g Take 1 Packet by mouth daily. Quantity:  30 Packet Refills:  0  
     
   
   
   
  
 senna-docusate 8.6-50 mg per tablet Commonly known as:  Nolan Grimes Your last dose was: Your next dose is:    
   
   
 Dose:  1 Tab Take 1 Tab by mouth daily. Quantity:  10 Tab Refills:  0 Where to Get Your Medications Information on where to get these meds will be given to you by the nurse or doctor. ! Ask your nurse or doctor about these medications  
  potassium bicarbonate 25 mEq tablet Discharge Instructions Please make sure you make an appointment to see your primary care physician for blood check to include hemoglobin check, last hemoglobin today 10/12 is 7.5 and potassium check. Return to ED or call your PCP if you start feeling lightheaded, have increasing shortness of breath or fatigue. Anemia: Care Instructions Your Care Instructions Anemia is a low level of red blood cells, which carry oxygen throughout your body. Many things can cause anemia. Lack of iron is one of the most common causes. Your body needs iron to make hemoglobin, a substance in red blood cells that carries oxygen from the lungs to your body's cells. Without enough iron, the body produces fewer and smaller red blood cells. As a result, your body's cells do not get enough oxygen, and you feel tired and weak. And you may have trouble concentrating. Bleeding is the most common cause of a lack of iron.  You may have heavy menstrual bleeding or bleeding caused by conditions such as ulcers, hemorrhoids, or cancer. Regular use of aspirin or other anti-inflammatory medicines (such as ibuprofen) also can cause bleeding in some people. A lack of iron in your diet also can cause anemia, especially at times when the body needs more iron, such as during pregnancy, infancy, and the teen years. Your doctor may have prescribed iron pills. It may take several months of treatment for your iron levels to return to normal. Your doctor also may suggest that you eat foods that are rich in iron, such as meat and beans. There are many other causes of anemia. It is not always due to a lack of iron. Finding the specific cause of your anemia will help your doctor find the right treatment for you. Follow-up care is a key part of your treatment and safety. Be sure to make and go to all appointments, and call your doctor if you are having problems. It's also a good idea to know your test results and keep a list of the medicines you take. How can you care for yourself at home? · Take your medicines exactly as prescribed. Call your doctor if you think you are having a problem with your medicine. · If your doctor recommends iron pills, take them as directed: ¨ Try to take the pills on an empty stomach about 1 hour before or 2 hours after meals. But you may need to take iron with food to avoid an upset stomach. ¨ Do not take antacids or drink milk or caffeine drinks (such as coffee, tea, or cola) at the same time or within 2 hours of the time that you take your iron. They can make it hard for your body to absorb the iron. ¨ Vitamin C (from food or supplements) helps your body absorb iron. Try taking iron pills with a glass of orange juice or some other food that is high in vitamin C, such as citrus fruits. ¨ Iron pills may cause stomach problems, such as heartburn, nausea, diarrhea, constipation, and cramps. Be sure to drink plenty of fluids, and include fruits, vegetables, and fiber in your diet each day.  Iron pills often make your bowel movements dark or green. ¨ If you forget to take an iron pill, do not take a double dose of iron the next time you take a pill. ¨ Keep iron pills out of the reach of small children. An overdose of iron can be very dangerous. · Follow your doctor's advice about eating iron-rich foods. These include red meat, shellfish, poultry, eggs, beans, raisins, whole-grain bread, and leafy green vegetables. · Steam vegetables to help them keep their iron content. When should you call for help? Call 911 anytime you think you may need emergency care. For example, call if: 
· You have symptoms of a heart attack. These may include: ¨ Chest pain or pressure, or a strange feeling in the chest. 
¨ Sweating. ¨ Shortness of breath. ¨ Nausea or vomiting. ¨ Pain, pressure, or a strange feeling in the back, neck, jaw, or upper belly or in one or both shoulders or arms. ¨ Lightheadedness or sudden weakness. ¨ A fast or irregular heartbeat. After you call 911, the  may tell you to chew 1 adult-strength or 2 to 4 low-dose aspirin. Wait for an ambulance. Do not try to drive yourself. · You passed out (lost consciousness). Call your doctor now or seek immediate medical care if: 
· You have new or increased shortness of breath. · You are dizzy or lightheaded, or you feel like you may faint. · Your fatigue and weakness continue or get worse. · You have any abnormal bleeding, such as: 
¨ Nosebleeds. ¨ Vaginal bleeding that is different (heavier, more frequent, at a different time of the month) than what you are used to. ¨ Bloody or black stools, or rectal bleeding. ¨ Bloody or pink urine. Watch closely for changes in your health, and be sure to contact your doctor if: 
· You do not get better as expected. Where can you learn more? Go to http://fouzia-bere.info/. Enter R301 in the search box to learn more about \"Anemia: Care Instructions. \" Current as of: October 13, 2016 Content Version: 11.3 © 2710-3858 [employer]. Care instructions adapted under license by [employer] (which disclaims liability or warranty for this information). If you have questions about a medical condition or this instruction, always ask your healthcare professional. [address] any warranty or liability for your use of this information. Gastrointestinal Bleeding: Care Instructions Your Care Instructions The digestive or gastrointestinal tract goes from the mouth to the anus. It is often called the GI tract. Bleeding can happen anywhere in the GI tract. It may be caused by an ulcer, an infection, or cancer. It may also be caused by medicines such as aspirin or ibuprofen. Light bleeding may not cause any symptoms at first. But if you continue to bleed for a while, you may feel very weak or tired. Sudden, heavy bleeding means you need to see a doctor right away. This kind of bleeding can be very dangerous. But it can usually be cured or controlled. The doctor may do some tests to find the cause of your bleeding. Follow-up care is a key part of your treatment and safety. Be sure to make and go to all appointments, and call your doctor if you are having problems. It's also a good idea to know your test results and keep a list of the medicines you take. How can you care for yourself at home? · Be safe with medicines. Take your medicines exactly as prescribed. Call your doctor if you think you are having a problem with your medicine. You will get more details on the specific medicines your doctor prescribes. · Do not take aspirin or other anti-inflammatory medicines, such as naproxen (Aleve) or ibuprofen (Advil, Motrin), without talking to your doctor first. Ask your doctor if it is okay to use acetaminophen (Tylenol). · Do not drink alcohol. · The bleeding may make you lose iron.  So it's important to eat foods that have a lot of iron. These include red meat, shellfish, poultry, and eggs. They also include beans, raisins, whole-grain breads, and leafy green vegetables. If you want help planning meals, you can make an appointment with a dietitian. When should you call for help? Call 911 anytime you think you may need emergency care. For example, call if: 
· You have sudden, severe belly pain. · You vomit blood or what looks like coffee grounds. · You passed out (lost consciousness). · Your stools are maroon or very bloody. Call your doctor now or seek immediate medical care if: 
· You are dizzy or lightheaded, or you feel like you may faint. · Your stools are black and look like tar, or they have streaks of blood. · You have belly pain. · You vomit or have nausea. · You have trouble swallowing, or it hurts when you swallow. Watch closely for changes in your health, and be sure to contact your doctor if: 
· You do not get better as expected. Where can you learn more? Go to http://fouzia"[employer]bere.info/. Enter V508 in the search box to learn more about \"Gastrointestinal Bleeding: Care Instructions. \" Current as of: March 20, 2017 Content Version: 11.3 © 9624-4610 [employer]. Care instructions adapted under license by [employer] (which disclaims liability or warranty for this information). If you have questions about a medical condition or this instruction, always ask your healthcare professional. [address] [address] any warranty or liability for your use of this information. Discharge Orders None ACO Transitions of Care Introducing [employer] [address] Patricia Oni offers a voluntary care coordination program to provide high quality service and care to [facility] fee-for-service beneficiaries. Ba Nuñez was designed to help you enhance your health and well-being through the following services: ? Transitions of Care  support for individuals who are transitioning from one care setting to another (example: Hospital to home). ? Chronic and Complex Care Coordination  support for individuals and caregivers of those with serious or chronic illnesses or with more than one chronic (ongoing) condition and those who take a number of different medications. If you meet specific medical criteria, a [facility] [address] may call you directly to coordinate your care with your primary care physician and your other care providers. For questions about the [facility] programs, please, contact your physicians office. For general questions or additional information about Accountable Care Organizations: 
Please visit www.medicare.gov/acos. html or call 1-800-MEDICARE (9-562.924.1486) TTY users should call 6-202.368.9739. Introducing [facility]! Dalia Schwarz introduces [facility] patient portal. Now you can access parts of your medical record, email your doctor's office, and request medication refills online. 1. In your internet browser, go to [email or web address] 2. Click on the First Time User? Click Here link in the Sign In box. You will see the New Member Sign Up page. 3. Enter your [facility] Access Code exactly as it appears below. You will not need to use this code after youve completed the sign-up process. If you do not sign up before the expiration date, you must request a new code. · [facility] Access Code: [email or web address] Expires: 1/3/2018 11:00 AM 
 
4. Enter the last four digits of your Social Security Number (xxxx) and Date of Birth (mm/dd/yyyy) as indicated and click Submit. You will be taken to the next sign-up page. 5. Create a [facility] ID. This will be your [facility] login ID and cannot be changed, so think of one that is secure and easy to remember. 6. Create a [employer] password. You can change your password at any time. 7. Enter your Password Reset Question and Answer. This can be used at a later time if you forget your password. 8. Enter your e-mail address. You will receive e-mail notification when new information is available in [address] 9. Click Sign Up. You can now view and download portions of your medical record. 10. Click the Download Summary menu link to download a portable copy of your medical information. If you have questions, please visit the Frequently Asked Questions section of the Vapps website. Remember, Vapps is NOT to be used for urgent needs. For medical emergencies, dial 911. Now available from your iPhone and Android! General Information Please provide this summary of care documentation to your next provider. Patient Signature:  ____________________________________________________________ Date:  ____________________________________________________________  
  
Derrick Campa Provider Signature:  ____________________________________________________________ Date:  ____________________________________________________________

## 2017-10-09 NOTE — ED NOTES
Bedside and Verbal shift change report given to Moni Peralta RN and Merlyn Parmar RN (oncoming nurse) by Zaire Palumbo RN (offgoing nurse). Report included the following information SBAR, ED Summary and MAR.

## 2017-10-09 NOTE — Clinical Note
Status[de-identified] Inpatient [101] Type of Bed: Telemetry [19] Inpatient Hospitalization Certified Necessary for the Following Reasons: 3. Patient receiving treatment that can only be provided in an inpatient setting (further clarification in H&P documentation) Admitting Diagnosis: Acute lower GI bleeding [747789] Admitting Diagnosis: Symptomatic anemia [8363081] Admitting Diagnosis: Near syncope [5464204] Admitting Physician: Duarte Olvera Attending Physician: Duarte Olvera Estimated Length of Stay: 2 Midnights Discharge Plan[de-identified] Home with Office Follow-up

## 2017-10-09 NOTE — H&P
[address] [facility]  ROUTINE H AND PS    Name:  Arnol Man  MR#:  [id number]  :  1932  Account #:  [de-identified]  Date of Adm:  10/09/2017      CHIEF COMPLAINT:  Bright red rectal bleeding. HISTORY OF PRESENT ILLNESS:  The patient is an 80-year-old  white male who was just hospitalized here approximately a week ago  for rectal bleeding. He was discovered at that point to have  diverticulosis and hemorrhoids. He was transfused, and his bleeding  abated spontaneously. He was discharged to home. He apparently  developed recurrent bloody diarrhea yesterday with the passage of  clots. He became short of breath and somewhat dizzy. He presented  back to the emergency room, where he was noted to have a  hemoglobin of approximately 6.5. Because of this, he was admitted. The patient also has a history of atrial fibrillation, hypertension, and  COPD. He is not taking any blood thinners as a result of his atrial  fibrillation because he apparently had an ablative procedure and does  not require them. REVIEW OF SYSTEMS  CONSTITUTIONAL:  His weight has been stable over the past several  months. He has had no fevers. HEENT:  No problems with vision, visual field cuts, tinnitus, difficulty  with pain in his face, or postnasal drip. RESPIRATORY:  No cough, wheezing, or sputum production. CARDIOVASCULAR:  Atrial fibrillation, as mentioned. GASTROINTESTINAL:  As mentioned above. GENITOURINARY:  No urologic abnormalities. ENDOCRINOLOGIC:  No diabetes or hypo- or hyperthyroidism. HEMATOLOGIC:  As mentioned above. ORTHOPEDIC:  No bone pain, pain or swelling involving any of his  joints, or myopathies. SKIN:  No history of easy bruisability or pruritus. NEUROLOGIC:  No seizures, tremors, changes in mental status, et  Jennifer Ac. PAST MEDICAL HISTORY:  As mentioned above. SOCIAL HISTORY:  He is a nonsmoker and does not use alcohol.     MEDICATIONS:  His medications are mentioned in his admission  documentation. ALLERGIES:  HE HAS NO ALLERGIES, EITHER SEASONAL OR TO  DRUGS. FAMILY HISTORY:  Positive for congestive failure, hypertension, and  diabetes. PHYSICAL EXAMINATION  GENERAL:  When I saw him, he is a well-developed, well-nourished,  and very pleasant white male. At the time I saw him, he is in no  distress. VITAL SIGNS:  Blood pressure was 113/66. Pulse was 86 and  regular. Respiratory rate is 26, and he was afebrile. HEENT:  Unremarkable. Pharynx is clear. NECK:  Supple. He has no carotid bruits, no enlargement of the  thyroid gland, and no enlargement of the cervical nodes. Trachea is  midline. CHEST:  Reveals good airway movement bilaterally. He has no  wheezes, rales, or rhonchi. CARDIAC:  Examination reveals PMI to be in the fifth intercostal space  at the midclavicular line. S1 and S2 are normal.  He has no S3, S4, or  murmur. ABDOMEN:  Soft. He has no palpable organs or masses. He has  somewhat hyperactive bowel sounds. No peritoneal signs. GENITALIA:  Normal.  RECTAL:  Not done. EXTREMITIES:  Reveal trace to +1 pedal edema bilaterally. He has  good pulses all around. NEUROLOGIC:  Examination at the time I see him is completely intact. IMPRESSION  1. Anemia of blood loss. 2. Bright red rectal bleeding. 3. Diverticulosis. 4. Hypokalemia. 5. Chronic obstructive pulmonary disease. 6. Atherosclerotic cardiovascular disease, characterized by a history of  atrial fibrillation. Presently, he is in sinus mechanism. 7. Hypertension. PLAN  1. Admit to the hospital.  2. He will be transfused to a hemoglobin above 7.  3. Surgical evaluation may be in order as the patient apparently has  been bleeding off and on for about two weeks. 4. Hypokalemia: This will addressed with oral potassium replacement. 5. Chronic obstructive pulmonary disease:  Long and short-acting beta  agonists.   6. Arteriosclerotic cardiovascular disease:  His atrial fibrillation is rodolfo  now since he has had an ablative procedure. He is not requiring  anticoagulation and is in a sinus mechanism. 7. Hypertension:  Good blood pressure control with the above-  mentioned medications. 8. The usual housekeeping orders are to include a PPI for stress ulcer  prophylaxis.   Heparin obviously would be contraindicated in this  particular individual.        Zoila Del Toro MD    TS / [address]  D:  10/09/2017   06:44  T:  10/09/2017   07:24  Job #:  [id number]

## 2017-10-09 NOTE — ED PROVIDER NOTES
HPI Comments: Catalina Olivo is a 80 y.o. male with hx of HTN, A-fib s/p ablation, GERD, arthritis and diverticulosis presenting to the ED via EMS with c/o blood in stool. Pt was seen in the ED 3 days ago for same sx. Colonoscopy was performed and pt was diagnosed with diverticulosis and internal hemorrhoids. Pt states he woke up in the middle of the night and had a BM. Describes stool was dark red, felt diaphoretic, dizzy and lightheaded. Reports no syncopal episode since discharged. Notes blood in stool 1 day ago, but did not have any bloody stool yesterday. Pt has stopped taking Aspirin since discharge from the hospital and is now taking Iron. Pt is followed by Dr. Alvarado Krishna, PCP. The history is provided by the patient. Past Medical History:   Diagnosis Date    Ankle fracture, left approx 2011    medial, tibial    Arthritis     knees    Cardiac echocardiogram 06/16/2011    Normal LV systolic function. Mild conc LVH. Gr 1 DDfx. RVSP 35-40 mmHg. Marked LAE. Marked LISA. Mild-mod MR.  Mild-mod AI. Mild AoRE. Mild aortic arch dil.  Cardiac Holter monitor study 06/22/2011    Baseline sinus rhythm to sinus bradycardia, avg HR 60 bpm (range  bpm). Questionable chronotropic intolerance. No sustained arrhythmias.  GERD (gastroesophageal reflux disease)     Glaucoma suspect     posterior vitreous detachment b/l, pterygium left eye, aseroid hyalosis / synchisis, b/l pseudophakia    H/O colonoscopy 04/13/2011    1 polyp thermally treated    History of atrial fibrillation 2009    Hypertension     Unspecified adverse effect of anesthesia     H/o A-fib immediately post colonoscopy.        Past Surgical History:   Procedure Laterality Date    CARDIAC SURG PROCEDURE UNLIST      hx atrial flutter ablation 2013    HX COLONOSCOPY      HX GI      HX KNEE REPLACEMENT Right 02/19/2013    Dr. Osvaldo Dasilva HX ORTHOPAEDIC Right 2/13    [facility], [place]    HX SVT ABLATION  1/2/2013    by  Suetter    HX TONSILLECTOMY      PA EGD DELIVER THERMAL ENERGY SPHNCTR/CARDIA GERD           Family History:   Problem Relation Age of Onset    Pacemaker Father     Pacemaker Sister     Heart Failure Brother     Diabetes Brother     Cancer Brother      Lung Cancer       Social History     Social History    Marital status:      Spouse name: N/A    Number of children: N/A    Years of education: N/A     Occupational History    Not on file. Social History Main Topics    Smoking status: Former Smoker     Packs/day: 1.00     Quit date: 1/1/1965    Smokeless tobacco: Never Used    Alcohol use Yes      Comment: occasionally.  Drug use: No    Sexual activity: Not Currently     Partners: Female     Other Topics Concern    Not on file     Social History Narrative         ALLERGIES: Review of patient's allergies indicates no known allergies. Review of Systems   Constitutional: Negative. Negative for chills and fever. HENT: Negative. Negative for congestion. Eyes: Negative. Negative for visual disturbance. Respiratory: Negative. Negative for shortness of breath. Cardiovascular: Negative. Negative for chest pain and leg swelling. Gastrointestinal: Positive for blood in stool. Negative for abdominal pain, diarrhea and vomiting. Genitourinary: Negative. Negative for dysuria. Musculoskeletal: Negative. Negative for back pain and myalgias. Skin: Negative. Negative for rash and wound. Neurological: Positive for dizziness and light-headedness. Negative for syncope and weakness. Psychiatric/Behavioral: Negative. Negative for self-injury. All other systems reviewed and are negative. Vitals:    10/09/17 0230 10/09/17 0315   BP: 127/65 113/66   Pulse: 94 89   Resp: 17 29   Temp: 98.3 °F (36.8 °C)    SpO2: 98% 98%            Physical Exam   Constitutional: He is oriented to person, place, and time. He appears well-developed and well-nourished. No distress.    HENT: Head: Normocephalic and atraumatic. Mouth/Throat: Mucous membranes are pale. Eyes: Conjunctivae and EOM are normal. Pupils are equal, round, and reactive to light. No scleral icterus. Neck: Normal range of motion. Neck supple. No JVD present. No thyromegaly present. Cardiovascular: Normal rate, regular rhythm, S1 normal and S2 normal.  Exam reveals no gallop and no friction rub. No murmur heard. Pulmonary/Chest: Effort normal and breath sounds normal. No accessory muscle usage. No respiratory distress. Abdominal: Soft. Normal appearance. He exhibits no distension. There is no tenderness. There is no rigidity, no rebound and no guarding. Genitourinary:   Genitourinary Comments: Grossly bloody hemoccult positive stool    Musculoskeletal: Normal range of motion. He exhibits no edema or tenderness. Neurological: He is alert and oriented to person, place, and time. He has normal strength. No cranial nerve deficit or sensory deficit. Coordination normal.   Skin: Skin is warm and intact. No rash noted. Psychiatric: He has a normal mood and affect. His speech is normal and behavior is normal.   Vitals reviewed. MDM  Number of Diagnoses or Management Options  Acute lower GI bleeding:   Near syncope:   Symptomatic anemia:   Diagnosis management comments: Perla Fan is a 80 y.o. Male who was just recently seen and discharged with a diverticular bleed coming in with recurrent GI bleed and a near syncopal event. Stable vitals. Hbg 7.6 but dropped to 6.7 after observation and gentle rehydration. Will transfuse and admit for obs.     ED Course       Procedures    Vitals:  Patient Vitals for the past 12 hrs:   Temp Pulse Resp BP SpO2   10/09/17 0315 - 89 29 113/66 98 %   10/09/17 0230 98.3 °F (36.8 °C) 94 17 127/65 98 %       Medications Ordered:  Medications   0.9% sodium chloride infusion 250 mL (not administered)   potassium chloride 10 mEq in 100 ml IVPB (0 mEq IntraVENous IV Completed 10/9/17 0534)   sodium chloride 0.9 % bolus infusion 1,000 mL (1,000 mL IntraVENous New Bag 10/9/17 0443)       Lab Findings:  Recent Results (from the past 12 hour(s))   CBC WITH AUTOMATED DIFF    Collection Time: 10/09/17  2:36 AM   Result Value Ref Range    WBC 8.8 4.6 - 13.2 K/uL    RBC 2.46 (L) 4.70 - 5.50 M/uL    HGB 7.8 (L) 13.0 - 16.0 g/dL    HCT 22.5 (L) 36.0 - 48.0 %    MCV 91.5 74.0 - 97.0 FL    MCH 31.7 24.0 - 34.0 PG    MCHC 34.7 31.0 - 37.0 g/dL    RDW 14.8 (H) 11.6 - 14.5 %    PLATELET 929 402 - 087 K/uL    MPV 10.6 9.2 - 11.8 FL    NEUTROPHILS 66 40 - 73 %    LYMPHOCYTES 21 21 - 52 %    MONOCYTES 11 (H) 3 - 10 %    EOSINOPHILS 2 0 - 5 %    BASOPHILS 0 0 - 2 %    ABS. NEUTROPHILS 5.8 1.8 - 8.0 K/UL    ABS. LYMPHOCYTES 1.9 0.9 - 3.6 K/UL    ABS. MONOCYTES 1.0 0.05 - 1.2 K/UL    ABS. EOSINOPHILS 0.2 0.0 - 0.4 K/UL    ABS. BASOPHILS 0.0 0.0 - 0.1 K/UL    DF AUTOMATED     METABOLIC PANEL, COMPREHENSIVE    Collection Time: 10/09/17  2:36 AM   Result Value Ref Range    Sodium 138 136 - 145 mmol/L    Potassium 3.3 (L) 3.5 - 5.5 mmol/L    Chloride 104 100 - 108 mmol/L    CO2 21 21 - 32 mmol/L    Anion gap 13 3.0 - 18 mmol/L    Glucose 184 (H) 74 - 99 mg/dL    BUN 15 7.0 - 18 MG/DL    Creatinine 0.89 0.6 - 1.3 MG/DL    BUN/Creatinine ratio 17 12 - 20      GFR est AA >60 >60 ml/min/1.73m2    GFR est non-AA >60 >60 ml/min/1.73m2    Calcium 7.7 (L) 8.5 - 10.1 MG/DL    Bilirubin, total 0.4 0.2 - 1.0 MG/DL    ALT (SGPT) 21 16 - 61 U/L    AST (SGOT) 20 15 - 37 U/L    Alk.  phosphatase 79 45 - 117 U/L    Protein, total 6.3 (L) 6.4 - 8.2 g/dL    Albumin 2.7 (L) 3.4 - 5.0 g/dL    Globulin 3.6 2.0 - 4.0 g/dL    A-G Ratio 0.8 0.8 - 1.7     MAGNESIUM    Collection Time: 10/09/17  2:36 AM   Result Value Ref Range    Magnesium 1.8 1.6 - 2.6 mg/dL   CARDIAC PANEL,(CK, CKMB & TROPONIN)    Collection Time: 10/09/17  2:36 AM   Result Value Ref Range    CK 49 39 - 308 U/L    CK - MB <1.0 <3.6 ng/ml    CK-MB Index  0.0 - 4.0 % CALCULATION NOT PERFORMED WHEN RESULT IS BELOW LINEAR LIMIT    Troponin-I, Qt. <0.02 0.0 - 0.045 NG/ML   PROTHROMBIN TIME + INR    Collection Time: 10/09/17  2:36 AM   Result Value Ref Range    Prothrombin time 14.1 11.5 - 15.2 sec    INR 1.1 0.8 - 1.2     PTT    Collection Time: 10/09/17  2:36 AM   Result Value Ref Range    aPTT 34.3 23.0 - 36.4 SEC   TYPE & SCREEN    Collection Time: 10/09/17  2:36 AM   Result Value Ref Range    Crossmatch Expiration 10/12/2017     ABO/Rh(D) Kimberleyeleen Prost POSITIVE     Antibody screen NEG    EKG, 12 LEAD, INITIAL    Collection Time: 10/09/17  2:38 AM   Result Value Ref Range    Ventricular Rate 94 BPM    Atrial Rate 94 BPM    P-R Interval 186 ms    QRS Duration 80 ms    Q-T Interval 370 ms    QTC Calculation (Bezet) 462 ms    Calculated P Axis 44 degrees    Calculated R Axis -20 degrees    Calculated T Axis 58 degrees    Diagnosis       Normal sinus rhythm  Nonspecific ST abnormality  Abnormal ECG  When compared with ECG of 02-OCT-2017 12:32,  No significant change was found     HGB & HCT    Collection Time: 10/09/17  5:59 AM   Result Value Ref Range    HGB 6.7 (L) 13.0 - 16.0 g/dL    HCT 19.4 (L) 36.0 - 48.0 %       EKG Interpretation by ED physician:  NSR. Rate of 94 bpm. No acute ischemic changes. Progress notes, consult notes, or additional procedure notes:    6:26 AM Consult: I discussed care with Dr. Bernadette Jimenez ([facility]). It was a standard discussion including patient history, chief complaint, available diagnostic results, and predicted treatment course. Agrees to admit to telemetry. All results and plan for admission discussed with patient and wife. Diagnosis:   1. Acute lower GI bleeding    2. Near syncope    3. Symptomatic anemia        Disposition: Admit     Follow-up Information     None           Patient's Medications   Start Taking    No medications on file   Continue Taking    FERROUS SULFATE 325 MG (65 MG IRON) TABLET    Take 1 Tab by mouth daily (with breakfast). GLUCOSAMINE-D3-BOSWELLIA SERR (OSTEO BI-FLEX) 1,500-400-100 MG-UNIT-MG TAB    Take 1 Tab by mouth two (2) times a day. HYDROCHLOROTHIAZIDE (HYDRODIURIL) 25 MG TABLET    Take 0.5 Tabs by mouth daily. METOPROLOL SUCCINATE (TOPROL-XL) 25 MG XL TABLET    TAKE ONE TABLET BY MOUTH ONCE DAILY    MULTIVITAMIN (ONE A DAY) TABLET    Take 1 Tab by mouth daily. OMEGA-3 FATTY ACIDS-VITAMIN E (FISH OIL) 1,000 MG CAP    Take 1 Cap by mouth two (2) times a day. PANTOPRAZOLE (PROTONIX) 40 MG TABLET    Take 1 Tab by mouth daily. POLYETHYLENE GLYCOL (MIRALAX) 17 GRAM PACKET    Take 1 Packet by mouth daily. SENNA-DOCUSATE (PERICOLACE) 8.6-50 MG PER TABLET    Take 1 Tab by mouth daily. These Medications have changed    No medications on file   Stop Taking    No medications on file       Scribe Attestation      Magda Diana acting as a scribe for and in the presence of Nirav Martinez MD      October 09, 2017 at 2:42 AM       Provider Attestation:      I personally performed the services described in the documentation, reviewed the documentation, as recorded by the scribe in my presence, and it accurately and completely records my words and actions.  October 09, 2017 at 2:42 AM - Nirav Martinez MD

## 2017-10-09 NOTE — ED NOTES
TRANSFER - OUT REPORT:    Verbal report given to Bogdan Will RN (name) on Jaron Julian  being transferred to [facility] room [phone] (unit) for routine progression of care       Report consisted of patients Situation, Background, Assessment and   Recommendations(SBAR). Information from the following report(s) SBAR, ED Summary, [facility] and Recent Results was reviewed with the receiving nurse. Lines:   Peripheral IV 10/09/17 Right Forearm (Active)   Site Assessment Clean, dry, & intact 10/9/2017  2:34 AM   Phlebitis Assessment 0 10/9/2017  2:34 AM   Infiltration Assessment 0 10/9/2017  2:34 AM   Dressing Status Clean, dry, & intact 10/9/2017  2:34 AM   Dressing Type Transparent 10/9/2017  2:34 AM        Opportunity for questions and clarification was provided.       Patient transported with:   Registered Nurse

## 2017-10-09 NOTE — ED TRIAGE NOTES
Patient arrived via EMS complaining of dizziness and bloody stool at 0000. Patient was discharged from this facility on Friday for a lower GI bleed.

## 2017-10-09 NOTE — CONSULTS
[email or web address]  [phone]    Impression:   1. GI bleed- heme positive stool. Last colonoscopy 4/13/11 showed moderate diverticulosis of colon. 1 polyp distal transverse colon. 10/6/17 colonoscopy- diverticulosis,  Moderate in degree, involving the entire colon hemorrhoids internal, Moderate in size, no bleeding anywhere   2. Symptomatic anemia due to acute blood loss- Presently blood transfusion in progress  3. Dyspnea- resolved  4. HTN  5. Remote h/o afib- s/p ablation  6. H/o GERD      Plan:     1. Cont monitor H/H transfuse as indicated   2. Continue monitor for overt GI hemorrhage  3 cont PPI  4 Cont clears  5. If overt GI hemorrhage occurs order bleeding scan      Chief Complaint: GI bleed      HPI:  Ree Muhammad is a 80 y.o. male who is being seen on consult for GI bleed. Pt presents to ER feeling weak, dizzy and h/o recurrent GI bleed. Hgb 6.5. Pt was recently d/c from [facility] on 10/7/17 for GI bleed. Colonoscopy  was done results noted above. Pt states was doing fine till Sunday night where he had 3 BMs with painless bright red blood, some dark blood with clots. Pt denies NSAIDS use, anticoagulants. Pt presently with blood transfusion in progress. Pt denies melena, hematochezia or hematemesis at this time    PMH:   Past Medical History:   Diagnosis Date    Ankle fracture, left approx 2011    medial, tibial    Arthritis     knees    Cardiac echocardiogram 06/16/2011    Normal LV systolic function. Mild conc LVH. Gr 1 DDfx. RVSP 35-40 mmHg. Marked LAE. Marked LISA. Mild-mod MR.  Mild-mod AI. Mild AoRE. Mild aortic arch dil.  Cardiac Holter monitor study 06/22/2011    Baseline sinus rhythm to sinus bradycardia, avg HR 60 bpm (range  bpm). Questionable chronotropic intolerance. No sustained arrhythmias.     GERD (gastroesophageal reflux disease)     Glaucoma suspect     posterior vitreous detachment b/l, pterygium left eye, aseroid hyalosis / synchisis, b/l pseudophakia    H/O colonoscopy 04/13/2011    1 polyp thermally treated    History of atrial fibrillation 2009    Hypertension     Unspecified adverse effect of anesthesia     H/o A-fib immediately post colonoscopy. PSH:   Past Surgical History:   Procedure Laterality Date    CARDIAC SURG PROCEDURE UNLIST      hx atrial flutter ablation 2013    COLONOSCOPY N/A 10/6/2017    COLONOSCOPY performed by Johanna Torres MD at [address] HX COLONOSCOPY      HX GI      HX KNEE REPLACEMENT Right 02/19/2013    Dr. Karsten Carter HX ORTHOPAEDIC Right 2/13    [facility], [place]    HX SVT ABLATION  1/2/2013    by Dr. Lord Patrick EGD DELIVER THERMAL ENERGY SPHNCTR/CARDIA GERD         Social HX:   Social History     Social History    Marital status:      Spouse name: N/A    Number of children: N/A    Years of education: N/A     Occupational History    Not on file. Social History Main Topics    Smoking status: Former Smoker     Packs/day: 1.00     Quit date: 1/1/1965    Smokeless tobacco: Never Used    Alcohol use Yes      Comment: occasionally.  Drug use: No    Sexual activity: Not Currently     Partners: Female     Other Topics Concern    Not on file     Social History Narrative       FHX:   Family History   Problem Relation Age of Onset    Pacemaker Father     Pacemaker Sister     Heart Failure Brother     Diabetes Brother     Cancer Brother      Lung Cancer       Allergy:   No Known Allergies    Home Medications:     Prescriptions Prior to Admission   Medication Sig    hydroCHLOROthiazide (HYDRODIURIL) 25 mg tablet Take 0.5 Tabs by mouth daily.  pantoprazole (PROTONIX) 40 mg tablet Take 1 Tab by mouth daily.  polyethylene glycol (MIRALAX) 17 gram packet Take 1 Packet by mouth daily.  senna-docusate (PERICOLACE) 8.6-50 mg per tablet Take 1 Tab by mouth daily.  ferrous sulfate 325 mg (65 mg iron) tablet Take 1 Tab by mouth daily (with breakfast).     metoprolol succinate (TOPROL-XL) 25 mg XL tablet TAKE ONE TABLET BY MOUTH ONCE DAILY    multivitamin (ONE A DAY) tablet Take 1 Tab by mouth daily.  glucosamine-D3-boswellia serr (OSTEO BI-FLEX) 1,500-400-100 mg-unit-mg Tab Take 1 Tab by mouth two (2) times a day.  omega-3 fatty acids-vitamin e (FISH OIL) 1,000 mg cap Take 1 Cap by mouth two (2) times a day. Review of Systems:     A fourteen point review of systems was obtained, and was negative except per HPI. Visit Vitals    /70 (BP Patient Position: At rest;Supine)    Pulse 70    Temp 97.4 °F (36.3 °C)    Resp 18    SpO2 98%       Physical Assessment:     constitutional: appearance: well developed, well nourished, in no acute distress. skin: no rashes, jaundice  eyes: inspection: normal conjunctivae and lids; no scleral icterus pupils: equal, round, reactive to light; extraocular movements intact  ENMT: mouth: mucous membranes moist, no thrush  neck:  no masses or tenderness; normal range of motion  respiratory: breath sounds clear, no wheeze/rales/rhonchi  cardiovascular: normal rate, regular rhythm without murmur  abdominal: soft, bowel sounds present, non-tender to palpation without rebound or guarding; no appreciable mass; no appreciable hepatosplenomegaly; no appreciable fluid wave  extremities: no clubbing, cyanosis, edema  neurologic:  Cranial nerves II-XII grossly intact; no asterixis   psychiatric:  oriented to time, space and person.         Basic Metabolic Profile   Recent Labs      10/09/17   0236   NA  138   K  3.3*   CL  104   CO2  21   BUN  15   GLU  184*   CA  7.7*   MG  1.8         CBC w/Diff    Recent Labs      10/09/17   0559  10/09/17   0236   WBC   --   8.8   RBC   --   2.46*   HGB  6.7*  7.8*   HCT  19.4*  22.5*   MCV   --   91.5   MCH   --   31.7   MCHC   --   34.7   RDW   --   14.8*   PLT   --   206    Recent Labs      10/09/17   0236   GRANS  66   LYMPH  21   EOS  2        Hepatic Function   Recent Labs      10/09/17   0236 ALB  2.7*   TP  6.3*   TBILI  0.4   SGOT  20   AP  79          Josef Perez NP  [facility] [place], [place]  [email or web address]

## 2017-10-09 NOTE — MED STUDENT NOTES
HPI: Mr. Mateo Dooley is an 81yo male with hx of AFib, HTN, diverticulosis, and internal hemorrhoids who presents with two episodes of hematochezia, with dizziness, SOB, and diaphoresis. He was admitted for evaluation of the same problem last Monday, and discharged this Friday after colonoscopy, with a diagnosis of diverticulosis and internal hemorrhoids. He was also found to have symptomatic anemia during his admission, and was prescribed iron to take at home, with close follow-up. He states that he did not have any blood in his stool from Friday until Sunday, when he had two episodes of bright red blood in his stool, followed by dizziness. He denies chest pain, fevers, abdominal pain, emesis, cough, or recent illness. He is not currently diaphoretic or short of breath. He states that his dizziness is worst when he stands up after a prolonged period of sitting. He is not currently on any anticoagulant medication. During his recent admission, he had a normal CXR and CTA. PMH: AFib, HTN, Diverticulosis, Internal Hemorrhoids,   PSH: Atrial ablation, 2013, TKR, Tonsillectomy  Allergies: NKDA    O  Vitals:  P: 88  BP:113/66  RR: 26  SpO2- 98%  General: Alert, pleasant, and in no acute distress  Cardio: RRR with no murmurs, gallops, or rubs  Lungs: CTA in all fields  Abdomen: +BS, soft, non-tender, no tenderness to palpation in any quadrant. Diastasis Recti  Extremities: +DP pulses bilaterally. No calf pain or edema  Neuro: PERRLA, EOMI, CN II-XII intact bilaterally. 5/5 Motor strength UE/LE bilaterally. Rectal Exam: Grossly bloody, hemoccult positive stool. A/P:   Hematochezia is likely due to known diverticulosis and internal hemorrhoids, however his H/H was low during his admission here, and continued GI bleeding may exacerbate this.    EKG- r/o any cardiac cause for dizziness  CBC- Evaluate for anemia  CMP- Evaluation for electrolyte abnormalities  Coags, Type & Screen     *ATTENTION:  This note has been created by a medical student for educational purposes only. Please do not refer to the content of this note for clinical decision-making, billing, or other purposes. Please see attending physicians note to obtain clinical information on this patient. *

## 2017-10-09 NOTE — IP AVS SNAPSHOT
[address] [address] David Holden Patient: Justo Barnes MRN: [id number] KFN:[id number] Current Discharge Medication List  
  
START taking these medications Dose & Instructions Dispensing Information Comments Morning Noon Evening Bedtime  
 potassium bicarbonate 25 mEq tablet Commonly known as:  Cony Tim Your last dose was: Your next dose is:    
   
   
 Dose:  25 mEq Take 1 Tab by mouth two (2) times a day. Quantity:  60 Tab Refills:  0 CONTINUE these medications which have NOT CHANGED Dose & Instructions Dispensing Information Comments Morning Noon Evening Bedtime  
 ferrous sulfate 325 mg (65 mg iron) tablet Your last dose was: Your next dose is:    
   
   
 Dose:  325 mg Take 1 Tab by mouth daily (with breakfast). Quantity:  15 Tab Refills:  0  
     
   
   
   
  
 FISH OIL 1,000 mg Cap Generic drug:  omega-3 fatty acids-vitamin e Your last dose was: Your next dose is:    
   
   
 Dose:  1 Cap Take 1 Cap by mouth two (2) times a day. Refills:  0  
     
   
   
   
  
 hydroCHLOROthiazide 25 mg tablet Commonly known as:  HYDRODIURIL Your last dose was: Your next dose is:    
   
   
 Dose:  12.5 mg Take 0.5 Tabs by mouth daily. Quantity:  90 Tab Refills:  2  
     
   
   
   
  
 metoprolol succinate 25 mg XL tablet Commonly known as:  TOPROL-XL Your last dose was: Your next dose is: TAKE ONE TABLET BY MOUTH ONCE DAILY Quantity:  90 Tab Refills:  1  
     
   
   
   
  
 multivitamin tablet Commonly known as:  ONE A DAY Your last dose was: Your next dose is:    
   
   
 Dose:  1 Tab Take 1 Tab by mouth daily. Refills:  0  
     
   
   
   
  
 OSTEO BI-FLEX (5-LOXIN) 1,500-400-100 mg-unit-mg Tab Generic drug:  glucosamine-D3-Boswellia serr Your last dose was: Your next dose is:    
   
   
 Dose:  1 Tab Take 1 Tab by mouth two (2) times a day. Refills:  0  
     
   
   
   
  
 pantoprazole 40 mg tablet Commonly known as:  PROTONIX Your last dose was: Your next dose is:    
   
   
 Dose:  40 mg Take 1 Tab by mouth daily. Quantity:  10 Tab Refills:  0  
     
   
   
   
  
 polyethylene glycol 17 gram packet Commonly known as:  Josette Meyers Your last dose was: Your next dose is:    
   
   
 Dose:  17 g Take 1 Packet by mouth daily. Quantity:  30 Packet Refills:  0  
     
   
   
   
  
 senna-docusate 8.6-50 mg per tablet Commonly known as:  Martinez Punt Your last dose was: Your next dose is:    
   
   
 Dose:  1 Tab Take 1 Tab by mouth daily. Quantity:  10 Tab Refills:  0 Where to Get Your Medications Information on where to get these meds will be given to you by the nurse or doctor. ! Ask your nurse or doctor about these medications  
  potassium bicarbonate 25 mEq tablet

## 2017-10-09 NOTE — ED NOTES
PT transported to 5th floor with first unit of blood still infusing, patient transferred to hospital bed from ED stretcher, no complications noted, patient stable at time of transport

## 2017-10-10 LAB
ABO + RH BLD: NORMAL
BLD PROD TYP BPU: NORMAL
BLD PROD TYP BPU: NORMAL
BLOOD GROUP ANTIBODIES SERPL: NORMAL
BPU ID: NORMAL
BPU ID: NORMAL
CALLED TO:,BCALL1: NORMAL
CROSSMATCH RESULT,%XM: NORMAL
CROSSMATCH RESULT,%XM: NORMAL
HCT VFR BLD AUTO: 22.3 % (ref 36–48)
HCT VFR BLD AUTO: 23.4 % (ref 36–48)
HGB BLD-MCNC: 7.5 G/DL (ref 13–16)
HGB BLD-MCNC: 7.9 G/DL (ref 13–16)
SPECIMEN EXP DATE BLD: NORMAL
STATUS OF UNIT,%ST: NORMAL
STATUS OF UNIT,%ST: NORMAL
UNIT DIVISION, %UDIV: 0
UNIT DIVISION, %UDIV: 0

## 2017-10-10 PROCEDURE — 85018 HEMOGLOBIN: CPT | Performed by: INTERNAL MEDICINE

## 2017-10-10 PROCEDURE — 74011250637 HC RX REV CODE- 250/637: Performed by: INTERNAL MEDICINE

## 2017-10-10 PROCEDURE — 74011250636 HC RX REV CODE- 250/636: Performed by: INTERNAL MEDICINE

## 2017-10-10 PROCEDURE — 36415 COLL VENOUS BLD VENIPUNCTURE: CPT | Performed by: INTERNAL MEDICINE

## 2017-10-10 PROCEDURE — 65660000000 HC RM CCU STEPDOWN

## 2017-10-10 RX ADMIN — HYDROCHLOROTHIAZIDE 12.5 MG: 25 TABLET ORAL at 09:21

## 2017-10-10 RX ADMIN — SODIUM CHLORIDE 125 ML/HR: 900 INJECTION, SOLUTION INTRAVENOUS at 12:40

## 2017-10-10 RX ADMIN — METOPROLOL SUCCINATE 25 MG: 25 TABLET, EXTENDED RELEASE ORAL at 09:21

## 2017-10-10 RX ADMIN — POTASSIUM BICARBONATE 25 MEQ: 25 TABLET, EFFERVESCENT ORAL at 09:21

## 2017-10-10 RX ADMIN — POTASSIUM BICARBONATE 25 MEQ: 25 TABLET, EFFERVESCENT ORAL at 17:00

## 2017-10-10 RX ADMIN — PANTOPRAZOLE SODIUM 40 MG: 40 TABLET, DELAYED RELEASE ORAL at 09:21

## 2017-10-10 NOTE — PROGRESS NOTES
[employer]  [phone]       Impression    1. GI bleed- heme positive stool. Last colonoscopy 4/13/11 showed moderate diverticulosis of colon. 1 polyp distal transverse colon. 10/6/17 colonoscopy- diverticulosis,  Moderate in degree, involving the entire colon hemorrhoids internal, Moderate in size, no bleeding anywhere . No overt GI hemorrhage at this time  2. Symptomatic anemia due to acute blood loss  3. Dyspnea- resolved  4. HTN  5. Remote h/o afib- s/p ablation  6. H/o GERD    Plan: 1. Cont monitor H/H transfuse as indicated   2. Continue monitor for overt GI hemorrhage  3 cont PPI  4 Advance diet- cardiac diet ordered  5.  bleeding scan if bleeding recurs  6. Continue supportive care       Chief Complaint: GI bleed    Subjective: Pt uneventful overnight.  Pt denies melena, hematochezia, hematemesis         Eyes: conjunctiva normal, EOM normal   ENT: Mucous membranes moist, no lesion or thrush   Cardiovascular:  normal rate and regular rhythm, no murmur   Pulmonary/Chest Wall: breath sounds normal and effort normal   Abdominal:  soft, non-acute, non-tender, no appreciable mass or hepatosplenomegaly, no appreciable ascites       Visit Vitals    /77 (BP 1 Location: Left arm, BP Patient Position: At rest)    Pulse 66    Temp 97.7 °F (36.5 °C)    Resp 16    Ht 5' 10\" (1.778 m)    Wt 105.9 kg (233 lb 6.4 oz)    SpO2 97%    BMI 33.49 kg/m2           Intake/Output Summary (Last 24 hours) at 10/10/17 0932  Last data filed at 10/10/17 0640   Gross per 24 hour   Intake             1245 ml   Output              350 ml   Net              895 ml       CBC w/Diff    Lab Results   Component Value Date/Time    WBC 8.8 10/09/2017 02:36 AM    RBC 2.46 (L) 10/09/2017 02:36 AM    HGB 7.5 (L) 10/10/2017 03:06 AM    HCT 22.3 (L) 10/10/2017 03:06 AM    MCV 91.5 10/09/2017 02:36 AM    MCH 31.7 10/09/2017 02:36 AM    MCHC 34.7 10/09/2017 02:36 AM    RDW 14.8 (H) 10/09/2017 02:36 AM     10/09/2017 02:36 AM Lab Results   Component Value Date/Time    GRANS 66 10/09/2017 02:36 AM    LYMPH 21 10/09/2017 02:36 AM    EOS 2 10/09/2017 02:36 AM    BASOS 0 10/09/2017 02:36 AM      Basic Metabolic Profile   Recent Labs      10/09/17   0236   NA  138   K  3.3*   CL  104   CO2  21   BUN  15   CA  7.7*   MG  1.8        Hepatic Function    Lab Results   Component Value Date/Time    ALB 2.7 (L) 10/09/2017 02:36 AM    TP 6.3 (L) 10/09/2017 02:36 AM    AP 79 10/09/2017 02:36 AM    Lab Results   Component Value Date/Time    SGOT 20 10/09/2017 02:36 AM          Shawn Yepez NP  [facility]  [email or web address]

## 2017-10-10 NOTE — PROGRESS NOTES
conducted an initial consultation and Spiritual Assessment for Zhen Quintanilla, who is a 80 y. o.,male. Patients Primary Language is: [place]. According to the patients EMR Orthodoxy Affiliation is: No Restorationist. The reason the Patient came to the hospital is:   Patient Active Problem List    Diagnosis Date Noted    Acute lower GI bleeding 10/09/2017    Near syncope 10/09/2017    GI bleeding 10/09/2017    Lower GI bleed 10/02/2017    Symptomatic anemia 10/02/2017    Syncope and collapse 10/02/2017    Acute GI bleeding 10/02/2017    Essential hypertension 01/24/2017    S/P ablation of atrial flutter 01/02/2013    Atrial flutter with rapid ventricular response ([facility] [address] 12/10/2012    DJD (degenerative joint disease) 12/09/2012    Cardiomyopathy ([facility] [address] 08/09/2012    Atrial flutter ([facility] [address] 06/02/2011        The  provided the following Interventions:  Initiated a relationship of care and support. Provided information about Spiritual Care Services. Chart reviewed. The following outcomes where achieved:  Patient expressed gratitude for 's visit. Assessment:  Patient does not have any Temple/cultural needs that will affect patients preferences in health care. Plan:  Chaplains will continue to follow and will provide pastoral care on an as needed/requested basis.  recommends bedside caregivers page  on duty if patient shows signs of acute spiritual or emotional distress.     [address]  [phone])

## 2017-10-10 NOTE — PROGRESS NOTES
Pt and wife very frustrated in room with several issues, mainly that they have not seen a doctor yet except for in the ED. Pt feels like he is \"not cared about from the doctor\" Wife is concerned \"pt didn't have a bed pan after requesting although pt never had BM, he was not offered wash clothes or deodorant\". Marly Chapman, patient advocate, phone number given to pt wife and Carli Araiza called by this RN.

## 2017-10-10 NOTE — PROGRESS NOTES
[facility] Hospitalist Group  Progress Note    Patient: Dandre Medrano Age: 80 y.o. : 1932 MR#: [id number] SSN: [id number]  Date: 10/9/2017     Subjective:   Per wife, pt has not had bm today. Assessment/Plan:   - GI bleed, recurrent. S/p recent admission for same, just dc'd a few days ago. Colonscopy recent showed diverticulosis and internal hemorrhoids but was unrevealing in terms of source of GI bleed. S/p GI eval. Recommend bleeding scan if with overt GI bleed.  -Acute blood loss anemia secondary to above. S/p PRBC transfusion.  -History of aflutters/p ablation per wife- no active issues. -HTN-on bp meds-stable. on bpmeds. PLAN: will cont to closely monitor h+h and transfuse PRN, cont current meds.   Additional Notes:      Case discussed with:  [x]Patient  [x]Family  []Nursing  []Case Management  DVT Prophylaxis:  []Lovenox  []Hep SQ  [x]SCDs  []Coumadin   []On Heparin gtt    Objective:   VS:   Visit Vitals    /80    Pulse 79    Temp 97.5 °F (36.4 °C)    Resp 18    SpO2 96%      Tmax/24hrs: Temp (24hrs), Av.7 °F (36.5 °C), Min:97.3 °F (36.3 °C), Max:98.3 °F (36.8 °C)    Intake/Output Summary (Last 24 hours) at 10/09/17 2148  Last data filed at 10/09/17 1300   Gross per 24 hour   Intake                0 ml   Output              200 ml   Net             -200 ml       General:  Alert, awake in nad  Cardiovascular:  Rrr, no murmurs  Pulmonary:  ctab  GI:  Soft, nt, nd  Extremities:  No edema  Additional:      Labs:    Recent Results (from the past 24 hour(s))   CBC WITH AUTOMATED DIFF    Collection Time: 10/09/17  2:36 AM   Result Value Ref Range    WBC 8.8 4.6 - 13.2 K/uL    RBC 2.46 (L) 4.70 - 5.50 M/uL    HGB 7.8 (L) 13.0 - 16.0 g/dL    HCT 22.5 (L) 36.0 - 48.0 %    MCV 91.5 74.0 - 97.0 FL    MCH 31.7 24.0 - 34.0 PG    MCHC 34.7 31.0 - 37.0 g/dL    RDW 14.8 (H) 11.6 - 14.5 %    PLATELET 794 579 - 556 K/uL    MPV 10.6 9.2 - 11.8 FL    NEUTROPHILS 66 40 - 73 %    LYMPHOCYTES 21 21 - 52 %    MONOCYTES 11 (H) 3 - 10 %    EOSINOPHILS 2 0 - 5 %    BASOPHILS 0 0 - 2 %    ABS. NEUTROPHILS 5.8 1.8 - 8.0 K/UL    ABS. LYMPHOCYTES 1.9 0.9 - 3.6 K/UL    ABS. MONOCYTES 1.0 0.05 - 1.2 K/UL    ABS. EOSINOPHILS 0.2 0.0 - 0.4 K/UL    ABS. BASOPHILS 0.0 0.0 - 0.1 K/UL    DF AUTOMATED     METABOLIC PANEL, COMPREHENSIVE    Collection Time: 10/09/17  2:36 AM   Result Value Ref Range    Sodium 138 136 - 145 mmol/L    Potassium 3.3 (L) 3.5 - 5.5 mmol/L    Chloride 104 100 - 108 mmol/L    CO2 21 21 - 32 mmol/L    Anion gap 13 3.0 - 18 mmol/L    Glucose 184 (H) 74 - 99 mg/dL    BUN 15 7.0 - 18 MG/DL    Creatinine 0.89 0.6 - 1.3 MG/DL    BUN/Creatinine ratio 17 12 - 20      GFR est AA >60 >60 ml/min/1.73m2    GFR est non-AA >60 >60 ml/min/1.73m2    Calcium 7.7 (L) 8.5 - 10.1 MG/DL    Bilirubin, total 0.4 0.2 - 1.0 MG/DL    ALT (SGPT) 21 16 - 61 U/L    AST (SGOT) 20 15 - 37 U/L    Alk.  phosphatase 79 45 - 117 U/L    Protein, total 6.3 (L) 6.4 - 8.2 g/dL    Albumin 2.7 (L) 3.4 - 5.0 g/dL    Globulin 3.6 2.0 - 4.0 g/dL    A-G Ratio 0.8 0.8 - 1.7     MAGNESIUM    Collection Time: 10/09/17  2:36 AM   Result Value Ref Range    Magnesium 1.8 1.6 - 2.6 mg/dL   CARDIAC PANEL,(CK, CKMB & TROPONIN)    Collection Time: 10/09/17  2:36 AM   Result Value Ref Range    CK 49 39 - 308 U/L    CK - MB <1.0 <3.6 ng/ml    CK-MB Index  0.0 - 4.0 %     CALCULATION NOT PERFORMED WHEN RESULT IS BELOW LINEAR LIMIT    Troponin-I, Qt. <0.02 0.0 - 0.045 NG/ML   PROTHROMBIN TIME + INR    Collection Time: 10/09/17  2:36 AM   Result Value Ref Range    Prothrombin time 14.1 11.5 - 15.2 sec    INR 1.1 0.8 - 1.2     PTT    Collection Time: 10/09/17  2:36 AM   Result Value Ref Range    aPTT 34.3 23.0 - 36.4 SEC   TYPE & SCREEN    Collection Time: 10/09/17  2:36 AM   Result Value Ref Range    Crossmatch Expiration 10/12/2017     ABO/Rh(D) Britney Aleman POSITIVE     Antibody screen NEG     CALLED TO: SANTOSH HEARN ON 10/09/2017 AT 0641 BY [id number].       Unit number [id number]     Blood component type RC LR AS1     Unit division 00     Status of unit ISSUED     Crossmatch result Compatible     Unit number [id number]     Blood component type RC LR AS1     Unit division 00     Status of unit ISSUED     Crossmatch result Compatible    EKG, 12 LEAD, INITIAL    Collection Time: 10/09/17  2:38 AM   Result Value Ref Range    Ventricular Rate 94 BPM    Atrial Rate 94 BPM    P-R Interval 186 ms    QRS Duration 80 ms    Q-T Interval 370 ms    QTC Calculation (Bezet) 462 ms    Calculated P Axis 44 degrees    Calculated R Axis -20 degrees    Calculated T Axis 58 degrees    Diagnosis       Normal sinus rhythm  Nonspecific ST abnormality  Abnormal ECG  When compared with ECG of 02-OCT-2017 12:32,  No significant change was found  Confirmed by Mychal Obregon MD, --- ([id number]) on 10/9/2017 9:11:11 AM     HGB & HCT    Collection Time: 10/09/17  5:59 AM   Result Value Ref Range    HGB 6.7 (L) 13.0 - 16.0 g/dL    HCT 19.4 (L) 36.0 - 48.0 %       Signed By: Kenrick John MD     October 9, 2017 9:48 PM

## 2017-10-10 NOTE — PROGRESS NOTES
Informed by Dr. Eliane Falcon if pt has any bleeding to put in order for bleed scan. Pt and pt wife aware to let RN know if there is any bleeding nursing communication order put in as well.

## 2017-10-11 LAB
HCT VFR BLD AUTO: 23 % (ref 36–48)
HCT VFR BLD AUTO: 24.2 % (ref 36–48)
HGB BLD-MCNC: 7.8 G/DL (ref 13–16)
HGB BLD-MCNC: 8 G/DL (ref 13–16)

## 2017-10-11 PROCEDURE — 97161 PT EVAL LOW COMPLEX 20 MIN: CPT

## 2017-10-11 PROCEDURE — 74011250637 HC RX REV CODE- 250/637: Performed by: INTERNAL MEDICINE

## 2017-10-11 PROCEDURE — 90471 IMMUNIZATION ADMIN: CPT

## 2017-10-11 PROCEDURE — 65660000000 HC RM CCU STEPDOWN

## 2017-10-11 PROCEDURE — 90686 IIV4 VACC NO PRSV 0.5 ML IM: CPT | Performed by: INTERNAL MEDICINE

## 2017-10-11 PROCEDURE — 74011250636 HC RX REV CODE- 250/636: Performed by: INTERNAL MEDICINE

## 2017-10-11 PROCEDURE — 85018 HEMOGLOBIN: CPT | Performed by: INTERNAL MEDICINE

## 2017-10-11 PROCEDURE — 97116 GAIT TRAINING THERAPY: CPT

## 2017-10-11 PROCEDURE — 36415 COLL VENOUS BLD VENIPUNCTURE: CPT | Performed by: INTERNAL MEDICINE

## 2017-10-11 RX ORDER — FACIAL-BODY WIPES
10 EACH TOPICAL DAILY
Status: DISCONTINUED | OUTPATIENT
Start: 2017-10-11 | End: 2017-10-12 | Stop reason: HOSPADM

## 2017-10-11 RX ORDER — POLYETHYLENE GLYCOL 3350 17 G/17G
17 POWDER, FOR SOLUTION ORAL 2 TIMES DAILY
Status: DISCONTINUED | OUTPATIENT
Start: 2017-10-11 | End: 2017-10-12 | Stop reason: HOSPADM

## 2017-10-11 RX ADMIN — SODIUM CHLORIDE 125 ML/HR: 900 INJECTION, SOLUTION INTRAVENOUS at 04:01

## 2017-10-11 RX ADMIN — HYDROCHLOROTHIAZIDE 12.5 MG: 25 TABLET ORAL at 10:16

## 2017-10-11 RX ADMIN — PANTOPRAZOLE SODIUM 40 MG: 40 TABLET, DELAYED RELEASE ORAL at 10:16

## 2017-10-11 RX ADMIN — METOPROLOL SUCCINATE 25 MG: 25 TABLET, EXTENDED RELEASE ORAL at 10:17

## 2017-10-11 RX ADMIN — INFLUENZA VIRUS VACCINE 0.5 ML: 15; 15; 15; 15 SUSPENSION INTRAMUSCULAR at 17:09

## 2017-10-11 RX ADMIN — POTASSIUM BICARBONATE 25 MEQ: 25 TABLET, EFFERVESCENT ORAL at 17:08

## 2017-10-11 RX ADMIN — POTASSIUM BICARBONATE 25 MEQ: 25 TABLET, EFFERVESCENT ORAL at 10:15

## 2017-10-11 RX ADMIN — POLYETHYLENE GLYCOL 3350 17 G: 17 POWDER, FOR SOLUTION ORAL at 13:02

## 2017-10-11 RX ADMIN — BISACODYL 10 MG: 10 SUPPOSITORY RECTAL at 13:01

## 2017-10-11 NOTE — PROGRESS NOTES
[email or web address]  [phone]       Impression    1. GI bleed- heme positive stool. Last colonoscopy 4/13/11 showed moderate diverticulosis of colon. 1 polyp distal transverse colon. 10/6/17 colonoscopy- diverticulosis,  Moderate in degree, involving the entire colon hemorrhoids internal, Moderate in size, no bleeding anywhere . No overt GI hemorrhage at this time  2. Symptomatic anemia due to acute blood loss  3. Dyspnea- resolved  4. HTN  5. Remote h/o afib- s/p ablation  6. H/o GERD    Plan: 1. Cont monitor H/H transfuse as indicated   2. Continue monitor for overt GI hemorrhage  3 cont PPI  4.  bleeding scan if bleeding recurs  5. Continue supportive care       Chief Complaint: GI bleed    Subjective: Pt uneventful overnight.  Pt denies melena, hematochezia, hematemesis         Eyes: conjunctiva normal, EOM normal   ENT: Mucous membranes moist, no lesion or thrush   Cardiovascular:  normal rate and regular rhythm, no murmur   Pulmonary/Chest Wall: breath sounds normal and effort normal   Abdominal:  soft, non-acute, non-tender, no appreciable mass or hepatosplenomegaly, no appreciable ascites       Visit Vitals    /68 (BP 1 Location: Left arm, BP Patient Position: At rest)    Pulse 74    Temp 98 °F (36.7 °C)    Resp 16    Ht 5' 10\" (1.778 m)    Wt 107 kg (235 lb 12.8 oz)    SpO2 100%    BMI 33.83 kg/m2           Intake/Output Summary (Last 24 hours) at 10/11/17 1039  Last data filed at 10/11/17 0659   Gross per 24 hour   Intake          2457.92 ml   Output             1100 ml   Net          1357.92 ml       CBC w/Diff    Lab Results   Component Value Date/Time    WBC 8.8 10/09/2017 02:36 AM    RBC 2.46 (L) 10/09/2017 02:36 AM    HGB 7.8 (L) 10/11/2017 03:04 AM    HCT 23.0 (L) 10/11/2017 03:04 AM    MCV 91.5 10/09/2017 02:36 AM    MCH 31.7 10/09/2017 02:36 AM    MCHC 34.7 10/09/2017 02:36 AM    RDW 14.8 (H) 10/09/2017 02:36 AM     10/09/2017 02:36 AM    Lab Results   Component Value Date/Time    GRANS 66 10/09/2017 02:36 AM    LYMPH 21 10/09/2017 02:36 AM    EOS 2 10/09/2017 02:36 AM    BASOS 0 10/09/2017 02:36 AM      Basic Metabolic Profile   Recent Labs      10/09/17   0236   NA  138   K  3.3*   CL  104   CO2  21   BUN  15   CA  7.7*   MG  1.8        Hepatic Function    Lab Results   Component Value Date/Time    ALB 2.7 (L) 10/09/2017 02:36 AM    TP 6.3 (L) 10/09/2017 02:36 AM    AP 79 10/09/2017 02:36 AM    Lab Results   Component Value Date/Time    SGOT 20 10/09/2017 02:36 AM          Aaliyah Aguilera NP  [facility]  [email or web address]

## 2017-10-11 NOTE — PROGRESS NOTES
Problem: Mobility Impaired (Adult and Pediatric)  Goal: *Acute Goals and Plan of Care (Insert Text)  PHYSICAL THERAPY EVALUATION & DISCHARGE     Patient: Trae Jeter (18 y.o. male)  Date: 10/11/2017  Primary Diagnosis: Acute lower GI bleeding  Symptomatic anemia  Near syncope  GI bleeding  Acute lower GI bleeding  Symptomatic anemia  Near syncope        Precautions: None      ASSESSMENT AND RECOMMENDATIONS:  Patient is 81yo M admitted to hospital for LGIB and was alert and agreeable to therapy and was sitting at EOB and performed objective assessment. Patient then transferred to standing and used RW to ambulate 250ft and navigated 4 steps with R HR before returning back to bed. Patient is functioning at modified independent and skilled physical therapy is not indicated at this time. Patient was instructed to continue taking 2-3 walks per day with wife or nursing. LOIDA Graves notified that patient okay to walk with wife and rolling walker. Patient agreeable to D/C from PT at this time. Discharge Recommendations: Home Health  Further Equipment Recommendations for Discharge: rolling walker        G-:CODES      Mobility  Current  CI= 1-19%   Goal  CI= 1-19%  D/C  CI= 1-19%. The severity rating is based on the Level of Assistance required for Functional Mobility and ADLs. Evaluation Complexity      Eval Complexity: History: MEDIUM  Complexity : 1-2 comorbidities / personal factors will impact the outcome/ POC Exam:LOW Complexity : 1-2 Standardized tests and measures addressing body structure, function, activity limitation and / or participation in recreation  Presentation: LOW Complexity : Stable, uncomplicated  Clinical Decision Making:Low Complexity   Overall Complexity:LOW       SUBJECTIVE:   Patient stated I just need to go to the bathroom so I can go home.       OBJECTIVE DATA SUMMARY:       Past Medical History:   Diagnosis Date    Ankle fracture, left approx 2011     medial, tibial  Arthritis       knees    Cardiac echocardiogram 06/16/2011     Normal LV systolic function. Mild conc LVH. Gr 1 DDfx. RVSP 35-40 mmHg. Marked LAE. Marked LISA. Mild-mod MR.  Mild-mod AI. Mild AoRE. Mild aortic arch dil.  Cardiac Holter monitor study 06/22/2011     Baseline sinus rhythm to sinus bradycardia, avg HR 60 bpm (range  bpm). Questionable chronotropic intolerance. No sustained arrhythmias.  GERD (gastroesophageal reflux disease)      Glaucoma suspect       posterior vitreous detachment b/l, pterygium left eye, aseroid hyalosis / synchisis, b/l pseudophakia    H/O colonoscopy 04/13/2011     1 polyp thermally treated    History of atrial fibrillation 2009    Hypertension      Unspecified adverse effect of anesthesia       H/o A-fib immediately post colonoscopy. Past Surgical History:   Procedure Laterality Date    CARDIAC SURG PROCEDURE UNLIST         hx atrial flutter ablation 2013    COLONOSCOPY N/A 10/6/2017     COLONOSCOPY performed by Juwan Arvizu MD at [address] HX COLONOSCOPY        HX GI        HX KNEE REPLACEMENT Right 02/19/2013     Dr. Pradeep Booker HX ORTHOPAEDIC Right 2/13     TKR, [place]    HX SVT ABLATION   1/2/2013     by Dr. Billy Sweet EGD [address] SPHNCTR/CARDIA GERD         Barriers to Learning/Limitations: None  Compensate with: visual, verbal, tactile, kinesthetic cues/model  Prior Level of Function/Home Situation: Patient lives in 1 story home with 4STE and was independent with mobility and I/ADL's PTA. Patiet has RW, SPC, and SC in bathroom. Home Situation  Home Environment: Private residence  # Steps to Enter: 4  One/Two Story Residence: One story  Living Alone: No  Support Systems: Family member(s)  Patient Expects to be Discharged to[de-identified] Private residence  Current DME Used/Available at Home: Walker, rolling  Critical Behavior:    A&Ox4  Strength:    Strength:  Within functional limits (BLE) Tone & Sensation:   Tone: Normal (BLE)   Sensation: Intact (BLE to LT)   Range Of Motion:  AROM: Within functional limits (BLE)   Functional Mobility:  Bed Mobility:   Supine to Sit: Modified independent  Sit to Supine: Modified independent  Scooting: Modified independent  Transfers:  Sit to Stand: Modified independent  Stand to Sit: Modified independent      Balance:   Sitting: Intact  Standing: Intact; With support  Ambulation/Gait Training:  Distance (ft): 250 Feet (ft)  Assistive Device: Walker, rolling  Ambulation - Level of Assistance: Modified independent   Gait Abnormalities: Decreased step clearance   Base of Support: Narrowed     Pain:  Pt reports 0/10 pain or discomfort prior to treatment. Pt reports 0/10 pain or discomfort post treatment. Activity Tolerance:   Patient tolerated activity well and was left sitting EOB with wife in room. Please refer to the flowsheet for vital signs taken during this treatment. After treatment:   [ ]         Patient left in no apparent distress sitting up in chair  [X]         Patient left in no apparent distress in bed  [X]         Call bell left within reach  [X]         Nursing notified  [X]         Caregiver present  [ ]         Bed alarm activated      COMMUNICATION/EDUCATION:   [X]         Fall prevention education was provided and the patient/caregiver indicated understanding. [X]         Patient/family have participated as able in goal setting and plan of care. [X]         Patient/family agree to work toward stated goals and plan of care. [ ]         Patient understands intent and goals of therapy, but is neutral about his/her participation. [ ]         Patient is unable to participate in goal setting and plan of care.      Thank you for this referral.  Joseph Livingston, PT   Time Calculation: 23 mins

## 2017-10-11 NOTE — PROGRESS NOTES
[facility] Hospitalist Group  Progress Note    Patient: Fernando Soriano Age: 80 y.o. : 1932 MR#: [id number]: [id number]  Date: 10/10/2017     Subjective: Both patient and wife state that pt has not had bm today, +flatus. Assessment/Plan:   - GI bleed, recurrent. S/p recent admission for same, just dc'd a few days ago. Colonscopy recent showed diverticulosis and internal hemorrhoids but was unrevealing in terms of source of GI bleed. S/p GI eval. Recommend bleeding scan if with overt GI bleed.  -Acute blood loss anemia secondary to above. S/p PRBC transfusion.  -History of aflutters/p ablation per wife- no active issues. -HTN-on bp meds-stable. on bpmeds. PLAN: will cont to closely monitor h+h and transfuse PRN, cont current meds.   Additional Notes:      Case discussed with:  [x]Patient  [x]Family  []Nursing  []Case Management  DVT Prophylaxis:  []Lovenox  []Hep SQ  [x]SCDs  []Coumadin   []On Heparin gtt    Objective:   VS:   Visit Vitals    /65 (BP 1 Location: Left arm, BP Patient Position: At rest)    Pulse 73    Temp 97.2 °F (36.2 °C)    Resp 17    Ht 5' 10\" (1.778 m)    Wt 105.9 kg (233 lb 6.4 oz)    SpO2 97%    BMI 33.49 kg/m2      Tmax/24hrs: Temp (24hrs), Av.2 °F (36.2 °C), Min:96.9 °F (36.1 °C), Max:97.7 °F (36.5 °C)      Intake/Output Summary (Last 24 hours) at 10/10/17 2125  Last data filed at 10/10/17 1921   Gross per 24 hour   Intake          3412.92 ml   Output             1550 ml   Net          1862.92 ml       General:  Alert, awake in nad  Cardiovascular:  Rrr, no murmurs  Pulmonary:  ctab  GI:  Soft, nt, nd  Extremities:  No edema  Additional:      Labs:    Recent Results (from the past 24 hour(s))   HGB & HCT    Collection Time: 10/09/17 10:00 PM   Result Value Ref Range    HGB 7.9 (L) 13.0 - 16.0 g/dL    HCT 22.9 (L) 36.0 - 48.0 %   HGB & HCT    Collection Time: 10/10/17  3:06 AM   Result Value Ref Range    HGB 7.5 (L) 13.0 - 16.0 g/dL    HCT 22.3 (L) 36.0 - 48.0 %   HGB & HCT    Collection Time: 10/10/17  1:02 PM   Result Value Ref Range    HGB 7.9 (L) 13.0 - 16.0 g/dL    HCT 23.4 (L) 36.0 - 48.0 %       Signed By: Tamy Taylor MD     October 10, 2017 9:48 PM

## 2017-10-11 NOTE — ROUTINE PROCESS
1931 Assumed care of patient from off going nurse. Patient resting in bed. No distress noted. Call bell within reach, siderails up x 3, bed in lowest position, and patient instructed to use call bell for assistance. Will continue to monitor. Wife at bedside. 4080 Bedside and Verbal shift change report given to [address] [place] [address] (oncoming nurse) by Arline Langston RN (offgoing nurse). Report included the following information Kardex, Intake/Output, MAR and Recent Results.

## 2017-10-12 VITALS
HEIGHT: 70 IN | TEMPERATURE: 97.2 F | BODY MASS INDEX: 33.01 KG/M2 | OXYGEN SATURATION: 95 % | DIASTOLIC BLOOD PRESSURE: 69 MMHG | SYSTOLIC BLOOD PRESSURE: 145 MMHG | WEIGHT: 230.6 LBS | RESPIRATION RATE: 16 BRPM | HEART RATE: 79 BPM

## 2017-10-12 LAB
HCT VFR BLD AUTO: 22.6 % (ref 36–48)
HGB BLD-MCNC: 7.5 G/DL (ref 13–16)

## 2017-10-12 PROCEDURE — 85018 HEMOGLOBIN: CPT | Performed by: INTERNAL MEDICINE

## 2017-10-12 PROCEDURE — 36415 COLL VENOUS BLD VENIPUNCTURE: CPT | Performed by: INTERNAL MEDICINE

## 2017-10-12 PROCEDURE — 74011250637 HC RX REV CODE- 250/637: Performed by: INTERNAL MEDICINE

## 2017-10-12 RX ADMIN — POLYETHYLENE GLYCOL 3350 17 G: 17 POWDER, FOR SOLUTION ORAL at 09:21

## 2017-10-12 RX ADMIN — POTASSIUM BICARBONATE 25 MEQ: 25 TABLET, EFFERVESCENT ORAL at 09:22

## 2017-10-12 RX ADMIN — METOPROLOL SUCCINATE 25 MG: 25 TABLET, EXTENDED RELEASE ORAL at 09:24

## 2017-10-12 RX ADMIN — HYDROCHLOROTHIAZIDE 12.5 MG: 25 TABLET ORAL at 09:24

## 2017-10-12 RX ADMIN — PANTOPRAZOLE SODIUM 40 MG: 40 TABLET, DELAYED RELEASE ORAL at 09:24

## 2017-10-12 NOTE — DISCHARGE INSTRUCTIONS
Please make sure you make an appointment to see your primary care physician for blood check to include hemoglobin check, last hemoglobin today 10/12 is 7.5 and potassium check. Return to ED or call your PCP if you start feeling lightheaded, have increasing shortness of breath or fatigue. Anemia: Care Instructions  Your Care Instructions    Anemia is a low level of red blood cells, which carry oxygen throughout your body. Many things can cause anemia. Lack of iron is one of the most common causes. Your body needs iron to make hemoglobin, a substance in red blood cells that carries oxygen from the lungs to your body's cells. Without enough iron, the body produces fewer and smaller red blood cells. As a result, your body's cells do not get enough oxygen, and you feel tired and weak. And you may have trouble concentrating. Bleeding is the most common cause of a lack of iron. You may have heavy menstrual bleeding or bleeding caused by conditions such as ulcers, hemorrhoids, or cancer. Regular use of aspirin or other anti-inflammatory medicines (such as ibuprofen) also can cause bleeding in some people. A lack of iron in your diet also can cause anemia, especially at times when the body needs more iron, such as during pregnancy, infancy, and the teen years. Your doctor may have prescribed iron pills. It may take several months of treatment for your iron levels to return to normal. Your doctor also may suggest that you eat foods that are rich in iron, such as meat and beans. There are many other causes of anemia. It is not always due to a lack of iron. Finding the specific cause of your anemia will help your doctor find the right treatment for you. Follow-up care is a key part of your treatment and safety. Be sure to make and go to all appointments, and call your doctor if you are having problems. It's also a good idea to know your test results and keep a list of the medicines you take.   How can you care for yourself at home? · Take your medicines exactly as prescribed. Call your doctor if you think you are having a problem with your medicine. · If your doctor recommends iron pills, take them as directed:  ¨ Try to take the pills on an empty stomach about 1 hour before or 2 hours after meals. But you may need to take iron with food to avoid an upset stomach. ¨ Do not take antacids or drink milk or caffeine drinks (such as coffee, tea, or cola) at the same time or within 2 hours of the time that you take your iron. They can make it hard for your body to absorb the iron. ¨ Vitamin C (from food or supplements) helps your body absorb iron. Try taking iron pills with a glass of orange juice or some other food that is high in vitamin C, such as citrus fruits. ¨ Iron pills may cause stomach problems, such as heartburn, nausea, diarrhea, constipation, and cramps. Be sure to drink plenty of fluids, and include fruits, vegetables, and fiber in your diet each day. Iron pills often make your bowel movements dark or green. ¨ If you forget to take an iron pill, do not take a double dose of iron the next time you take a pill. ¨ Keep iron pills out of the reach of small children. An overdose of iron can be very dangerous. · Follow your doctor's advice about eating iron-rich foods. These include red meat, shellfish, poultry, eggs, beans, raisins, whole-grain bread, and leafy green vegetables. · Steam vegetables to help them keep their iron content. When should you call for help? Call 911 anytime you think you may need emergency care. For example, call if:  · You have symptoms of a heart attack. These may include:  ¨ Chest pain or pressure, or a strange feeling in the chest.  ¨ Sweating. ¨ Shortness of breath. ¨ Nausea or vomiting. ¨ Pain, pressure, or a strange feeling in the back, neck, jaw, or upper belly or in one or both shoulders or arms. ¨ Lightheadedness or sudden weakness. ¨ A fast or irregular heartbeat.   After you call 911, the  may tell you to chew 1 adult-strength or 2 to 4 low-dose aspirin. Wait for an ambulance. Do not try to drive yourself. · You passed out (lost consciousness). Call your doctor now or seek immediate medical care if:  · You have new or increased shortness of breath. · You are dizzy or lightheaded, or you feel like you may faint. · Your fatigue and weakness continue or get worse. · You have any abnormal bleeding, such as:  ¨ Nosebleeds. ¨ Vaginal bleeding that is different (heavier, more frequent, at a different time of the month) than what you are used to. ¨ Bloody or black stools, or rectal bleeding. ¨ Bloody or pink urine. Watch closely for changes in your health, and be sure to contact your doctor if:  · You do not get better as expected. Where can you learn more? Go to http://fouziaSiemensebre.info/. Enter R301 in the search box to learn more about \"Anemia: Care Instructions. \"  Current as of: October 13, 2016  Content Version: 11.3  © 7485-1640 [employer]. Care instructions adapted under license by [employer] (which disclaims liability or warranty for this information). If you have questions about a medical condition or this instruction, always ask your healthcare professional. [place] [address] any warranty or liability for your use of this information. Gastrointestinal Bleeding: Care Instructions  Your Care Instructions    The digestive or gastrointestinal tract goes from the mouth to the anus. It is often called the GI tract. Bleeding can happen anywhere in the GI tract. It may be caused by an ulcer, an infection, or cancer. It may also be caused by medicines such as aspirin or ibuprofen. Light bleeding may not cause any symptoms at first. But if you continue to bleed for a while, you may feel very weak or tired. Sudden, heavy bleeding means you need to see a doctor right away.  This kind of bleeding can be very dangerous. But it can usually be cured or controlled. The doctor may do some tests to find the cause of your bleeding. Follow-up care is a key part of your treatment and safety. Be sure to make and go to all appointments, and call your doctor if you are having problems. It's also a good idea to know your test results and keep a list of the medicines you take. How can you care for yourself at home? · Be safe with medicines. Take your medicines exactly as prescribed. Call your doctor if you think you are having a problem with your medicine. You will get more details on the specific medicines your doctor prescribes. · Do not take aspirin or other anti-inflammatory medicines, such as naproxen (Aleve) or ibuprofen (Advil, Motrin), without talking to your doctor first. Ask your doctor if it is okay to use acetaminophen (Tylenol). · Do not drink alcohol. · The bleeding may make you lose iron. So it's important to eat foods that have a lot of iron. These include red meat, shellfish, poultry, and eggs. They also include beans, raisins, whole-grain breads, and leafy green vegetables. If you want help planning meals, you can make an appointment with a dietitian. When should you call for help? Call 911 anytime you think you may need emergency care. For example, call if:  · You have sudden, severe belly pain. · You vomit blood or what looks like coffee grounds. · You passed out (lost consciousness). · Your stools are maroon or very bloody. Call your doctor now or seek immediate medical care if:  · You are dizzy or lightheaded, or you feel like you may faint. · Your stools are black and look like tar, or they have streaks of blood. · You have belly pain. · You vomit or have nausea. · You have trouble swallowing, or it hurts when you swallow. Watch closely for changes in your health, and be sure to contact your doctor if:  · You do not get better as expected. Where can you learn more?   Go to http://fouzia-bere.info/. Enter W579 in the search box to learn more about \"Gastrointestinal Bleeding: Care Instructions. \"  Current as of: March 20, 2017  Content Version: 11.3  © 4379-2302 [employer]. Care instructions adapted under license by [employer] (which disclaims liability or warranty for this information). If you have questions about a medical condition or this instruction, always ask your healthcare professional. [place] [address] any warranty or liability for your use of this information.

## 2017-10-12 NOTE — ROUTINE PROCESS
Bedside and Verbal shift change report given to Jannie Carballo RN (oncoming nurse) by Rajni Castillo RN (offgoing nurse). Report given with Josefina GTZ and MAR.

## 2017-10-12 NOTE — PROGRESS NOTES
[employer]  [phone]       Impression  1. GI bleed- heme positive stool. Last colonoscopy 4/13/11 showed moderate diverticulosis of colon. 1 polyp distal transverse colon. 10/6/17 colonoscopy- diverticulosis,  Moderate in degree, involving the entire colon hemorrhoids internal, Moderate in size, no bleeding anywhere . No overt GI hemorrhage at this time  2. Symptomatic anemia due to acute blood loss- resolved  3. Dyspnea- resolved  4. HTN  5. Remote h/o afib- s/p ablation  6. H/o GERD    Plan:  1. Pt ok to d/c GI wise. Cont metamucil  2. Pt to f/u with PCP to monitor H/H    Chief Complaint: GI bleed    Subjective: Pt uneventful overnight. Pt had 2 large BM yesterday with no bleed. Pt ambulating around without dizzyness, SOB or any complaints. Pt tolerated regular diet.  Pt wishes to go home         Eyes: conjunctiva normal, EOM normal   ENT: Mucous membranes moist, no lesion or thrush   Cardiovascular:  normal rate and regular rhythm, no murmur   Pulmonary/Chest Wall: breath sounds normal and effort normal   Abdominal:  soft, non-acute, non-tender, no appreciable mass or hepatosplenomegaly, no appreciable ascites       Visit Vitals    /69 (BP 1 Location: Left arm, BP Patient Position: At rest)    Pulse 79    Temp 97.2 °F (36.2 °C)    Resp 16    Ht 5' 10\" (1.778 m)    Wt 104.6 kg (230 lb 9.6 oz)    SpO2 95%    BMI 33.09 kg/m2           Intake/Output Summary (Last 24 hours) at 10/12/17 1107  Last data filed at 10/12/17 0659   Gross per 24 hour   Intake          3917.92 ml   Output              900 ml   Net          3017.92 ml       CBC w/Diff    Lab Results   Component Value Date/Time    WBC 8.8 10/09/2017 02:36 AM    RBC 2.46 (L) 10/09/2017 02:36 AM    HGB 7.5 (L) 10/12/2017 04:05 AM    HCT 22.6 (L) 10/12/2017 04:05 AM    MCV 91.5 10/09/2017 02:36 AM    MCH 31.7 10/09/2017 02:36 AM    MCHC 34.7 10/09/2017 02:36 AM    RDW 14.8 (H) 10/09/2017 02:36 AM     10/09/2017 02:36 AM    Lab Results   Component Value Date/Time    GRANS 66 10/09/2017 02:36 AM    LYMPH 21 10/09/2017 02:36 AM    EOS 2 10/09/2017 02:36 AM    BASOS 0 10/09/2017 02:36 AM      Basic Metabolic Profile   No results for input(s): NA, K, CL, CO2, BUN, CA, MG, PHOS in the last 72 hours. No lab exists for component: CREAT, GLUCOSE     Hepatic Function    Lab Results   Component Value Date/Time    ALB 2.7 (L) 10/09/2017 02:36 AM    TP 6.3 (L) 10/09/2017 02:36 AM    AP 79 10/09/2017 02:36 AM    Lab Results   Component Value Date/Time    SGOT 20 10/09/2017 02:36 AM          Candelaria Saini NP  [facility] [address], [address]  [email or web address]

## 2017-10-12 NOTE — ROUTINE PROCESS
1918 Assumed care of patient from off going nurse. Patient resting in bed. No distress noted. Call bell within reach, siderails up x 3, bed in lowest position, and patient instructed to use call bell for assistance. Will continue to monitor. 1887 Bedside and Verbal shift change report given to [facility] (oncoming nurse) by Shamar Cervantes RN (offgoing nurse). Report included the following information Kardex, Intake/Output, MAR and Recent Results.

## 2017-10-12 NOTE — DISCHARGE SUMMARY
[facility] Hospitalist Group  Discharge Summary       Patient: Milana Brunson Age: 80 y.o. : 1932 MR#: [id number]: [id number]  PCP on record: Leatha Hector MD  Admit date: 10/9/2017  Discharge date: 10/12/2017    Consults: Dr. Derian Ny, [facility]    -   Procedures: none  -     Significant Diagnostic Studies: none  -    Discharge Diagnoses:        GI bleed, likely lower. Resolved. Acute blood loss anemia. Patient Active Problem List   Diagnosis Code    Atrial flutter (HCC) I48.92    Cardiomyopathy ([facility] [address] I42.9    DJD (degenerative joint disease) M19.90    Atrial flutter with rapid ventricular response (HCC) I48.92    S/P ablation of atrial flutter Z98.890, Z86.79    Essential hypertension I10    Lower GI bleed K92.2    Symptomatic anemia D64.9    Syncope and collapse R55    Acute GI bleeding K92.2    Acute lower GI bleeding K92.2    Near syncope R55    GI bleeding K92.2       Hospital Course by Problem     - GI bleed, recurrent. S/p recent admission for same, just dc'd a few days ago. Colonscopy recent (performed during his recent admission here) showed diverticulosis and internal hemorrhoids but was unrevealing in terms of source of GI bleed. S/p GI eval. Recommend bleeding scan if with overt GI bleed. Pt did not have further significant rectal bleeding and in fact did not have BM until a few days into his hospitalization. BM was described by both pt and nursing staff as dark, no melena, no hematochezia. His hemoglobin stayed 7-8 range. Last hgb today is 7.5. Options of transfusion before discharge vs dc w/o transfusion offered to pt and he opts for no transfusion. He states that he has been walking the hallways w/o SOB or difficulty, he denies fatigue.  I have instructed him to follow up with his PCP in the next few days for hgb check and to continue taking iron supplement. I have also instructed pt to not take aspirin for a few weeks until seen by his PCP and stability of his GI bleed issues have been established.    -Acute blood loss anemia secondary to above. S/p PRBC transfusion. Hgb stable and above 7, please see above.     -History of aflutters/p ablation per wife- no active issues.     -HTN-on bp meds-stable. on bpmeds.    -Hypokalemia likely secondary to HCTZ use. He has been started on K supplement.               Today's examination of the patient revealed:     Subjective:   No complaints. Objective:   VS:   Visit Vitals    /69 (BP 1 Location: Left arm, BP Patient Position: At rest)    Pulse 79    Temp 97.2 °F (36.2 °C)    Resp 16    Ht 5' 10\" (1.778 m)    Wt 104.6 kg (230 lb 9.6 oz)    SpO2 95%    BMI 33.09 kg/m2      Tmax/24hrs: Temp (24hrs), Av.2 °F (36.8 °C), Min:97 °F (36.1 °C), Max:99.2 °F (37.3 °C)     Input/Output:   Intake/Output Summary (Last 24 hours) at 10/12/17 1224  Last data filed at 10/12/17 3103   Gross per 24 hour   Intake          3917.92 ml   Output              900 ml   Net          3017.92 ml       General:  Alert, awake, in nad  Cardiovascular:  Rrr, no murmurs  Pulmonary:  ctab  GI: soft, nt, nd   Extremities:no edema    Additional:      Labs:    Recent Results (from the past 24 hour(s))   HGB & HCT    Collection Time: 10/11/17  5:21 PM   Result Value Ref Range    HGB 8.0 (L) 13.0 - 16.0 g/dL    HCT 24.2 (L) 36.0 - 48.0 %   HGB & HCT    Collection Time: 10/12/17  4:05 AM   Result Value Ref Range    HGB 7.5 (L) 13.0 - 16.0 g/dL    HCT 22.6 (L) 36.0 - 48.0 %     Additional Data Reviewed:     Condition:   Disposition:    []Home   []Home with Home Health   []SNF/NH   []Rehab   []Home with family   []Alternate Facility:____________________      Discharge Medications:     Current Discharge Medication List      START taking these medications    Details   potassium bicarbonate (KLYTE) 25 mEq tablet Take 1 Tab by mouth two (2) times a day.   Qty: 60 Tab, Refills: 0         CONTINUE these medications which have NOT CHANGED    Details   hydroCHLOROthiazide (HYDRODIURIL) 25 mg tablet Take 0.5 Tabs by mouth daily. Qty: 90 Tab, Refills: 2      pantoprazole (PROTONIX) 40 mg tablet Take 1 Tab by mouth daily. Qty: 10 Tab, Refills: 0      polyethylene glycol (MIRALAX) 17 gram packet Take 1 Packet by mouth daily. Qty: 30 Packet, Refills: 0      senna-docusate (PERICOLACE) 8.6-50 mg per tablet Take 1 Tab by mouth daily. Qty: 10 Tab, Refills: 0      ferrous sulfate 325 mg (65 mg iron) tablet Take 1 Tab by mouth daily (with breakfast). Qty: 15 Tab, Refills: 0      metoprolol succinate (TOPROL-XL) 25 mg XL tablet TAKE ONE TABLET BY MOUTH ONCE DAILY  Qty: 90 Tab, Refills: 1      multivitamin (ONE A DAY) tablet Take 1 Tab by mouth daily. omega-3 fatty acids-vitamin e (FISH OIL) 1,000 mg cap Take 1 Cap by mouth two (2) times a day. glucosamine-D3-boswellia serr (OSTEO BI-FLEX) 1,500-400-100 mg-unit-mg Tab Take 1 Tab by mouth two (2) times a day. Follow-up Appointments:   1. Your PCP: Ramona Jin MD, within 7-10days  2.          Please follow-up on tests/labs that are still pendin.     >30 minutes spent coordinating this discharge (review instructions/follow-up, prescriptions, preparing report for sign off)    Signed:  Maria Teresa Vázquez MD  10/12/2017  12:24 PM

## 2017-10-12 NOTE — PROGRESS NOTES
Discharge instructions given to patient and patient wife. Prescriptions given. PIV removed. Pt and wife denied any questions at this time. Pt wheeled down to main lobby via wheelchair.

## 2017-10-12 NOTE — PROGRESS NOTES
[facility] Hospitalist Group  Progress Note    Patient: Asha Dailey Age: 80 y.o. : 1932 MR#: [id number]: [id number]  Date: 10/11/2017     Subjective:   Pt still has not had a bm, c/o bloating in his abdomen. Has good appetite and is eating well. Drank prune juice to help with bm. Assessment/Plan:   - GI bleed, recurrent. S/p recent admission for same, just dc'd a few days ago. Colonscopy recent showed diverticulosis and internal hemorrhoids but was unrevealing in terms of source of GI bleed. S/p GI eval. Recommend bleeding scan if with overt GI bleed. Spoke with GI attending today. Pt can go home after bowel movement. Will start bowel regimen.    -Acute blood loss anemia secondary to above. S/p PRBC transfusion. Hgb stable and is trending up.    -History of aflutters/p ablation per wife- no active issues. -HTN-on bp meds-stable. on bpmeds. PLAN: will cont to closely monitor h+h and transfuse PRN, cont current meds.   Additional Notes:      Case discussed with:  [x]Patient  [x]Family  []Nursing  []Case Management  DVT Prophylaxis:  []Lovenox  []Hep SQ  [x]SCDs  []Coumadin   []On Heparin gtt    Objective:   VS:   Visit Vitals    /72 (BP 1 Location: Left arm, BP Patient Position: At rest)    Pulse 68    Temp 99.2 °F (37.3 °C)    Resp 17    Ht 5' 10\" (1.778 m)    Wt 107 kg (235 lb 12.8 oz)    SpO2 98%    BMI 33.83 kg/m2      Tmax/24hrs: Temp (24hrs), Av.4 °F (36.3 °C), Min:96 °F (35.6 °C), Max:99.2 °F (37.3 °C)      Intake/Output Summary (Last 24 hours) at 10/11/17 2153  Last data filed at 10/11/17 1709   Gross per 24 hour   Intake          3977.92 ml   Output             1600 ml   Net          2377.92 ml       General:  Alert, awake in nad  Cardiovascular:  Rrr, no murmurs  Pulmonary:  ctab  GI:  Soft, nt, nd  Extremities:  No edema  Additional:      Labs:    Recent Results (from the past 24 hour(s))   HGB & HCT    Collection Time: 10/11/17  3:04 AM Result Value Ref Range    HGB 7.8 (L) 13.0 - 16.0 g/dL    HCT 23.0 (L) 36.0 - 48.0 %   HGB & HCT    Collection Time: 10/11/17  5:21 PM   Result Value Ref Range    HGB 8.0 (L) 13.0 - 16.0 g/dL    HCT 24.2 (L) 36.0 - 48.0 %       Signed By: Mable Palmer MD     October 11, 2017 9:48 PM

## 2017-10-13 ENCOUNTER — PATIENT OUTREACH (OUTPATIENT)
Dept: FAMILY MEDICINE CLINIC | Age: 82
End: 2017-10-13

## 2017-10-13 NOTE — PROGRESS NOTES
NNTOCIP  Patient admitted to [facility] on 10/9/17 to 10/12/17 for Acute blood loss. Patient had been in the hospital prior to that at [facility] from 10/2/17-10/6/17  For a GI bleed. Course of hospitalization per discharge summary:     - GI bleed, recurrent. S/p recent admission for same, just dc'd a few days ago. Colonscopy recent (performed during his recent admission here) showed diverticulosis and internal hemorrhoids but was unrevealing in terms of source of GI bleed. S/p GI eval. Recommend bleeding scan if with overt GI bleed. Pt did not have further significant rectal bleeding and in fact did not have BM until a few days into his hospitalization. BM was described by both pt and nursing staff as dark, no melena, no hematochezia. His hemoglobin stayed 7-8 range. Last hgb today is 7.5. Options of transfusion before discharge vs dc w/o transfusion offered to pt and he opts for no transfusion. He states that he has been walking the hallways w/o SOB or difficulty, he denies fatigue. I have instructed him to follow up with his PCP in the next few days for hgb check and to continue taking iron supplement. I have also instructed pt to not take aspirin for a few weeks until seen by his PCP and stability of his GI bleed issues have been established.     -Acute blood loss anemia secondary to above. S/p PRBC transfusion. Hgb stable and above 7, please see above.      -History of aflutters/p ablation per wife- no active issues.      -HTN-on bp meds-stable. on bpmeds.     -Hypokalemia likely secondary to HCTZ use. He has been started on K supplement.      Pertinent labs:  Results for Brooks Hartmann (MRN [id number]) as of 10/13/2017 16:08   Ref.  Range 10/10/2017 03:06 10/10/2017 13:02 10/11/2017 03:04 10/11/2017 17:21 10/12/2017 04:05   HGB Latest Ref Range: 13.0 - 16.0 g/dL 7.5 (L) 7.9 (L) 7.8 (L) 8.0 (L) 7.5 (L)   HCT Latest Ref Range: 36.0 - 48.0 % 22.3 (L) 23.4 (L) 23.0 (L) 24.2 (L) 22.6 (L)     Inpatient Consults per discharge summary:  Dr. Maddi Hood, D-GI    Discharge diagnoses per discharge summary :   GI bleed, likely lower. Resolved. Acute blood loss anemia. RRAT Score: 14  CCI: 4  Hospital admissions in past year: 2  ED admissions in past year: 2    Contacted patient for hospital follow up. Introduced self, role and reason for call. Verified 2 patient identifiers. Patient reports:  Patient doing ok today. Was un able to get the Potassium bicarbonate at any out patient pharmacy and had to go back to the hospital pharmacy to get it. Patient is taking all of his medication now. Had a bowel movement with formed stool today. Patient went originally to the ED on 10/2 because he was dizzy and did not feel goo while at the ED he had a bowel movement passed out and there was \"blood all over the floor\"  He was admitted and received 2 units of blood and stayed 5 days. He went home and was fine on Saturday and Sunday but at 230 in the morning on 10-9 the patient complained of being dizzy again. His wife was not comfortable transporting him in her franco and called 911. Patient was admitted to hospital and received another 2 units of blood while there. Prior to discharge patient had BM with no blood. Wife wanted to make sure I was aware that the patient needed an H&H Monday when he comes to the office. Patient's wife denies:  Bleeding with bowel movement  ADL's:  Preforms all ADL's  by self with out difficulty     DME:   none  Resources/Support:   Patient has adequate support at home from wife and has no detectable resource needs at this time. AMD:   None on file  Reconciled home medications and reviewed allergies. Instructed to bring a list of all medications with him to next appointment. Educated patient to monitor and report the following Red flags: bleeding or any new or concerning symptoms.  Patient verbalized understanding of information discussed and is aware of  when to seek medical attention from PCP, urgent care or ED. Opportunity to ask questions was provided. Contact information was provided for future reference or further questions. Appointments:  10/16/2017 8:45 AM Leatha Hector, [address] [phone] [address]         Patient aware of appointments. Wife will provide transportation. Potential Barriers to care  No apparent barriers to care identified at this time. Adherence to previous treatment and likelihood for follow-up:  Patient's wife verbalized understanding of discharge instructions and need for follow up. Plan of Care/Goals:  Goals      Attends follow-up appointments as directed.  Prevent complications post hospitalization. Nurse Navigator to discuss with PCP about possible need for GI follow up not addressed in discharge              This represents Transitions of Care. Nurse Navigator spoke with patient within 1-2 business day(s) of discharge. Patient's transition of care follow up appointment is scheduled with Leatha Hector MD on 10/16/17 at [address] [place] [address] which is within 7-14 days of discharge. During call confirmed patient will be at his [facility] appointment.

## 2017-10-13 NOTE — PROGRESS NOTES
Nurse Navigator attempted to contact patient. Patient was admitted to [facility] on 10/9/17 to 10/12/17 for c/o Acute blood loss most likely from GI bleed. Message left introducing myself, the purpose of the call and giving my contact information.   Requested that patient call back at his/her earliest convenience

## 2017-10-16 ENCOUNTER — PATIENT OUTREACH (OUTPATIENT)
Dept: FAMILY MEDICINE CLINIC | Age: 82
End: 2017-10-16

## 2017-10-16 ENCOUNTER — OFFICE VISIT (OUTPATIENT)
Dept: FAMILY MEDICINE CLINIC | Age: 82
End: 2017-10-16

## 2017-10-16 VITALS
HEIGHT: 70 IN | RESPIRATION RATE: 14 BRPM | HEART RATE: 76 BPM | TEMPERATURE: 98.7 F | SYSTOLIC BLOOD PRESSURE: 136 MMHG | OXYGEN SATURATION: 97 % | DIASTOLIC BLOOD PRESSURE: 72 MMHG | BODY MASS INDEX: 31.35 KG/M2 | WEIGHT: 219 LBS

## 2017-10-16 DIAGNOSIS — K57.91 GASTROINTESTINAL HEMORRHAGE ASSOCIATED WITH INTESTINAL DIVERTICULOSIS: Primary | ICD-10-CM

## 2017-10-16 DIAGNOSIS — E66.9 OBESITY, UNSPECIFIED CLASSIFICATION, UNSPECIFIED OBESITY TYPE, UNSPECIFIED WHETHER SERIOUS COMORBIDITY PRESENT: ICD-10-CM

## 2017-10-16 DIAGNOSIS — N28.1 RENAL CYST: ICD-10-CM

## 2017-10-16 DIAGNOSIS — K64.9 HEMORRHOIDS, UNSPECIFIED HEMORRHOID TYPE: ICD-10-CM

## 2017-10-16 DIAGNOSIS — D64.9 ANEMIA, UNSPECIFIED TYPE: ICD-10-CM

## 2017-10-16 LAB — HGB BLD-MCNC: 9.2 G/DL

## 2017-10-16 RX ORDER — LANOLIN ALCOHOL/MO/W.PET/CERES
325 CREAM (GRAM) TOPICAL
Qty: 30 TAB | Refills: 1 | Status: ON HOLD | OUTPATIENT
Start: 2017-10-16 | End: 2018-02-15

## 2017-10-16 RX ORDER — ASCORBIC ACID 500 MG
1000 TABLET ORAL DAILY
COMMUNITY

## 2017-10-16 NOTE — PATIENT INSTRUCTIONS

## 2017-10-16 NOTE — PROGRESS NOTES
SUBJECTIVE  Chief Complaint   Patient presents with    GI Problem     DORA follow from [facility] for GI Bleed      Mr. Daphne Scott is a 80y.o. year old male, he is seen today for Transition of Care services following a hospital admission on discharge for GI bleeding on 10/2/2017 through 10/6/2017 and again on 10/9/2017 through 10/12/2017. Our office Nurse Navigator performed an outreach to Mr. Cliff Hugo on 10/13/2017 (within 2 business days of discharge) to complete medication reconciliation and a telephonic assessment of his condition. Patient presents for hospital follow-up. We reviewed the recent hospitalization in detail. The patient reports doing much better but still is fatigued and with some dyspnea. He has no coughing or wheezing. No longer with any rectal bleeding. Initially he had a colonoscopy at the first admission. He also had a CT of his chest due to dyspnea. He had no PE but had an incidental renal cyst.  That was further investigated with an U/S to be a likely benign cyst.  The cause of the bleed was believed to be diverticular and possibly due to hemorrhoids. He went back for recurrent signs of anemia and had a scan that could not identify a source of bleeding. He was discharged, opting not to have a transfusion. OBJECTIVE    Blood pressure 136/72, pulse 76, temperature 98.7 °F (37.1 °C), temperature source Oral, resp. rate 14, height 5' 10\" (1.778 m), weight 219 lb (99.3 kg), SpO2 97 %. General:  Alert, cooperative, well appearing, in no apparent distress. ENT:  The tongue and mucous membranes are pink and moist without lesions. CV:  The heart sounds are regular in rate and rhythm. There is a normal S1 and S2.    Lungs: Inspiratory and expiratory efforts are full and unlabored. Lung sounds are clear and equal to auscultation throughout all lung fields without wheezing, rales, or rhonchi. Abd: soft, nontender. Skin:  No rashes, no jaundice.     Results for orders placed or performed in visit on 10/16/17   AMB POC HEMOGLOBIN (HGB)   Result Value Ref Range    Hemoglobin (POC) 9.2      ASSESSMENT / PLAN    ICD-10-CM ICD-9-CM    1. Gastrointestinal hemorrhage associated with intestinal diverticulosis K57.91 562.12 AMB POC HEMOGLOBIN (HGB)   2. Hemorrhoids, unspecified hemorrhoid type K64.9 455.6    3. Renal cyst N28.1 753.10    4. Anemia, unspecified type D64.9 285.9 AMB POC HEMOGLOBIN (HGB)   5. Obesity, unspecified classification, unspecified obesity type, unspecified whether serious comorbidity present E66.9 278.00      Anemia secondary to GI bleeding - Hg is on an upward trend on iron. Iron rich diet handout provided. Cont Feosol for 8 weeks. Reviewed hospital records. Suggested follow-up with GI as well. Renal cyst - reviewed testing, likely benign. Obesity - diet and exercise once improved. He has lost weight with dieting so far approx 11 pounds since April. All chart history elements were reviewed by me at the time of the visit even though marked at time of note closure. Patient understands our medical plan. Patient has provided input and agrees with goals. Alternatives have been explained and offered. All questions answered. The patient is to call if condition worsens or fails to improve. Follow-up Disposition:  Return in about 2 weeks (around 10/30/2017) for  routine care /repeat  POC hemoglobin .

## 2017-10-16 NOTE — PROGRESS NOTES
1. Have you been to the ER, urgent care clinic since your last visit? Hospitalized since your last visit? Yes When: 10- to 10- Where: [facility]  Reason for visit: GI Bleed and on 10- to 10- to [facility]  for GI Bleed    2. Have you seen or consulted any other health care providers outside of the [address] [place] [address] since your last visit? Include any pap smears or colon screening.  No

## 2017-10-16 NOTE — PROGRESS NOTES
Met with patient and his wife at the time of appointment. Discussed ways to manage his anemia. Encouraged patient to increase fluid intake, eat diet rich in iron. Discussed taking stool softener while taking iron supplement to prevent constipation and hard stools. Provided patient with a list of iron rich foods as well as foods rich in vitamin C put out by the [facility] for anemia patients. Patient's wife stated that they were interested in filling out advanced Care plans.   Provided them with paper work and information booklet on ACP and instructed them to make and appointment with me in 2 weeks when Mr Nova Bui is here for his appointment

## 2017-10-16 NOTE — MR AVS SNAPSHOT
Visit Information Date & Time Provider Department Dept. Phone Encounter #  
 10/16/2017  8:45 AM Leonre Salcedo, [address] [id number] Follow-up Instructions Return in about 2 weeks (around 10/30/2017) for  routine care /repeat  POC hemoglobin . Your Appointments 12/14/2017  8:00 AM  
Follow Up with Zehra Marroquin MD  
[facility] ([facility] Appt Note: 1 yr f/up Irene Vasquez [id number]  
[phone] [address] Upcoming Health Maintenance Date Due Pneumococcal 65+ Low/Medium Risk (2 of 2 - PPSV23) 10/28/2017 MEDICARE YEARLY EXAM 4/18/2018 GLAUCOMA SCREENING Q2Y 8/1/2019 DTaP/Tdap/Td series (2 - Td) 3/13/2027 COLONOSCOPY 10/6/2027 Allergies as of 10/16/2017  Review Complete On: 10/16/2017 By: Yomi Gil No Known Allergies Current Immunizations  Reviewed on 10/16/2017 Name Date Influenza High Dose Vaccine PF 10/16/2016 Influenza Vaccine 10/14/2014 Influenza Vaccine (Quad) PF 10/11/2017  5:09 PM  
 Influenza Vaccine Whole 10/28/2012 Pneumococcal Polysaccharide (PPSV-23) 10/28/2012 ZZZ-RETIRED (DO NOT USE) Pneumococcal Vaccine (Unspecified Type) 10/28/2012 Reviewed by Yomi Gil on 10/16/2017 at  8:22 AM  
You Were Diagnosed With   
  
 Codes Comments Gastrointestinal hemorrhage associated with intestinal diverticulosis    -  Primary ICD-10-CM: K57.91 
ICD-9-CM: 562.12 Hemorrhoids, unspecified hemorrhoid type     ICD-10-CM: K64.9 ICD-9-CM: 455.6 Renal cyst     ICD-10-CM: N28.1 ICD-9-CM: 753.10 Anemia, unspecified type     ICD-10-CM: D64.9 ICD-9-CM: 976. 9 Vitals BP Pulse Temp Resp Height(growth percentile) Weight(growth percentile)  136/72 (BP 1 Location: Left arm, BP Patient Position: Sitting) 76 98.7 °F (37.1 °C) (Oral) 14 5' 10\" (1.778 m) 219 lb (99.3 kg) SpO2 BMI Smoking Status 97% 31.42 kg/m2 Former Smoker Vitals History BMI and BSA Data Body Mass Index Body Surface Area  
 31.42 kg/m 2 2.21 m 2 Preferred Pharmacy Pharmacy Name Phone [facility] [address], [address] Your Updated Medication List  
  
   
This list is accurate as of: 10/16/17  9:17 AM.  Always use your most recent med list.  
  
  
  
  
 ferrous sulfate 325 mg (65 mg iron) tablet Take 1 Tab by mouth daily (with breakfast). FISH OIL 1,000 mg Cap Generic drug:  omega-3 fatty acids-vitamin e Take 1 Cap by mouth two (2) times a day. hydroCHLOROthiazide 25 mg tablet Commonly known as:  HYDRODIURIL Take 0.5 Tabs by mouth daily. METAMUCIL 3.4 gram/5.4 gram Powd Generic drug:  psyllium husk Take  by mouth.  
  
 metoprolol succinate 25 mg XL tablet Commonly known as:  TOPROL-XL  
TAKE ONE TABLET BY MOUTH ONCE DAILY  
  
 multivitamin tablet Commonly known as:  ONE A DAY Take 1 Tab by mouth daily. OSTEO BI-FLEX (5-LOXIN) 1,500-400-100 mg-unit-mg Tab Generic drug:  glucosamine-D3-Boswellia serr Take 1 Tab by mouth two (2) times a day. pantoprazole 40 mg tablet Commonly known as:  PROTONIX Take 1 Tab by mouth daily. potassium bicarbonate 25 mEq tablet Commonly known as:  Robb Roses Take 1 Tab by mouth two (2) times a day. senna-docusate 8.6-50 mg per tablet Commonly known as:  Lizeth Ezio Take 1 Tab by mouth daily. VITAMIN C 500 mg tablet Generic drug:  ascorbic acid (vitamin C) Take 1,000 mg by mouth daily. Prescriptions Sent to Pharmacy Refills  
 ferrous sulfate 325 mg (65 mg iron) tablet 1 Sig: Take 1 Tab by mouth daily (with breakfast).   
 Class: Normal  
 Pharmacy: [address] [place] [address] [address] Ph #: [phone] Route: Oral  
  
We Performed the Following AMB POC HEMOGLOBIN (HGB) [39784 CPT(R)] Follow-up Instructions Return in about 2 weeks (around 10/30/2017) for  routine care /repeat  POC hemoglobin . Patient Instructions A Healthy Lifestyle: Care Instructions Your Care Instructions A healthy lifestyle can help you feel good, stay at a healthy weight, and have plenty of energy for both work and play. A healthy lifestyle is something you can share with your whole family. A healthy lifestyle also can lower your risk for serious health problems, such as high blood pressure, heart disease, and diabetes. You can follow a few steps listed below to improve your health and the health of your family. Follow-up care is a key part of your treatment and safety. Be sure to make and go to all appointments, and call your doctor if you are having problems. Its also a good idea to know your test results and keep a list of the medicines you take. How can you care for yourself at home? · Do not eat too much sugar, fat, or fast foods. You can still have dessert and treats now and then. The goal is moderation. · Start small to improve your eating habits. Pay attention to portion sizes, drink less juice and soda pop, and eat more fruits and vegetables. ¨ Eat a healthy amount of food. A 3-ounce serving of meat, for example, is about the size of a deck of cards. Fill the rest of your plate with vegetables and whole grains. ¨ Limit the amount of soda and sports drinks you have every day. Drink more water when you are thirsty. ¨ Eat at least 5 servings of fruits and vegetables every day. It may seem like a lot, but it is not hard to reach this goal. A serving or helping is 1 piece of fruit, 1 cup of vegetables, or 2 cups of leafy, raw vegetables.  Have an apple or some carrot sticks as an afternoon snack instead of a candy bar. Try to have fruits and/or vegetables at every meal. 
· Make exercise part of your daily routine. You may want to start with simple activities, such as walking, bicycling, or slow swimming. Try to be active 30 to 60 minutes every day. You do not need to do all 30 to 60 minutes all at once. For example, you can exercise 3 times a day for 10 or 20 minutes. Moderate exercise is safe for most people, but it is always a good idea to talk to your doctor before starting an exercise program. 
· Keep moving. [place] the lawn, work in the garden, or [employer] Take the stairs instead of the elevator at work. · If you smoke, quit. People who smoke have an increased risk for heart attack, stroke, cancer, and other lung illnesses. Quitting is hard, but there are ways to boost your chance of quitting tobacco for good. ¨ Use nicotine gum, patches, or lozenges. ¨ Ask your doctor about stop-smoking programs and medicines. ¨ Keep trying. In addition to reducing your risk of diseases in the future, you will notice some benefits soon after you stop using tobacco. If you have shortness of breath or asthma symptoms, they will likely get better within a few weeks after you quit. · Limit how much alcohol you drink. Moderate amounts of alcohol (up to 2 drinks a day for men, 1 drink a day for women) are okay. But drinking too much can lead to liver problems, high blood pressure, and other health problems. Family health If you have a family, there are many things you can do together to improve your health. · Eat meals together as a family as often as possible. · Eat healthy foods. This includes fruits, vegetables, lean meats and dairy, and whole grains. · Include your family in your fitness plan. Most people think of activities such as jogging or tennis as the way to fitness, but there are many ways you and your family can be more active.  Anything that makes you breathe hard and gets your heart pumping is exercise. Here are some tips: 
¨ Walk to do errands or to take your child to school or the bus. ¨ Go for a family bike ride after dinner instead of watching TV. Where can you learn more? Go to http://fouzia-bere.info/. Enter U085 in the search box to learn more about \"A Healthy Lifestyle: Care Instructions. \" Current as of: July 26, 2016 Content Version: 11.3 © 0333-8074 [employer] Care instructions adapted under license by [employer] (which disclaims liability or warranty for this information). If you have questions about a medical condition or this instruction, always ask your healthcare professional. [address] any warranty or liability for your use of this information. Follow up in 2 weeks for routine care Introducing [facility]! Jesus Barrera introduces [employer] patient portal. Now you can access parts of your medical record, email your doctor's office, and request medication refills online. 1. In your internet browser, go to [email or web address] 2. Click on the First Time User? Click Here link in the Sign In box. You will see the New Member Sign Up page. 3. Enter your [employer] Access Code exactly as it appears below. You will not need to use this code after youve completed the sign-up process. If you do not sign up before the expiration date, you must request a new code. · [employer] Access Code: [email or web address] Expires: 1/3/2018 11:00 AM 
 
4. Enter the last four digits of your Social Security Number (xxxx) and Date of Birth (mm/dd/yyyy) as indicated and click Submit. You will be taken to the next sign-up page. 5. Create a [employer] ID. This will be your [employer] login ID and cannot be changed, so think of one that is secure and easy to remember. 6. Create a [employer] password. You can change your password at any time. 7. Enter your Password Reset Question and Answer. This can be used at a later time if you forget your password. 8. Enter your e-mail address. You will receive e-mail notification when new information is available in [address] 9. Click Sign Up. You can now view and download portions of your medical record. 10. Click the Download Summary menu link to download a portable copy of your medical information. If you have questions, please visit the Frequently Asked Questions section of the WebMD website. Remember, WebMD is NOT to be used for urgent needs. For medical emergencies, dial 911. Now available from your iPhone and Android! Please provide this summary of care documentation to your next provider. Your primary care clinician is listed as Brinda Morales. If you have any questions after today's visit, please call [phone].

## 2017-10-21 ENCOUNTER — APPOINTMENT (OUTPATIENT)
Dept: GENERAL RADIOLOGY | Age: 82
DRG: 166 | End: 2017-10-21
Attending: EMERGENCY MEDICINE
Payer: MEDICARE

## 2017-10-21 ENCOUNTER — HOSPITAL ENCOUNTER (INPATIENT)
Age: 82
LOS: 2 days | Discharge: HOME | DRG: 166 | End: 2017-10-23
Attending: EMERGENCY MEDICINE | Admitting: HOSPITALIST
Payer: MEDICARE

## 2017-10-21 ENCOUNTER — APPOINTMENT (OUTPATIENT)
Dept: CT IMAGING | Age: 82
DRG: 166 | End: 2017-10-21
Attending: EMERGENCY MEDICINE
Payer: MEDICARE

## 2017-10-21 DIAGNOSIS — D64.9 ANEMIA, UNSPECIFIED TYPE: ICD-10-CM

## 2017-10-21 DIAGNOSIS — I26.99 OTHER ACUTE PULMONARY EMBOLISM WITHOUT ACUTE COR PULMONALE (HCC): Primary | ICD-10-CM

## 2017-10-21 DIAGNOSIS — I48.0 PAROXYSMAL ATRIAL FIBRILLATION (HCC): ICD-10-CM

## 2017-10-21 PROBLEM — R06.09 DYSPNEA ON EXERTION: Status: ACTIVE | Noted: 2017-10-21

## 2017-10-21 LAB
ANION GAP SERPL CALC-SCNC: 10 MMOL/L (ref 3–18)
APTT PPP: 36.8 SEC (ref 23–36.4)
APTT PPP: >180 SEC (ref 23–36.4)
BASOPHILS # BLD: 0 K/UL (ref 0–0.1)
BASOPHILS NFR BLD: 0 % (ref 0–2)
BNP SERPL-MCNC: 122 PG/ML (ref 0–1800)
BUN SERPL-MCNC: 9 MG/DL (ref 7–18)
BUN/CREAT SERPL: 11 (ref 12–20)
CALCIUM SERPL-MCNC: 8.6 MG/DL (ref 8.5–10.1)
CHLORIDE SERPL-SCNC: 94 MMOL/L (ref 100–108)
CK MB CFR SERPL CALC: ABNORMAL % (ref 0–4)
CK MB SERPL-MCNC: <1 NG/ML (ref 5–25)
CK SERPL-CCNC: 29 U/L (ref 39–308)
CO2 SERPL-SCNC: 28 MMOL/L (ref 21–32)
CREAT SERPL-MCNC: 0.8 MG/DL (ref 0.6–1.3)
D DIMER PPP FEU-MCNC: 16.53 UG/ML(FEU)
DIFFERENTIAL METHOD BLD: ABNORMAL
EOSINOPHIL # BLD: 0.1 K/UL (ref 0–0.4)
EOSINOPHIL NFR BLD: 1 % (ref 0–5)
ERYTHROCYTE [DISTWIDTH] IN BLOOD BY AUTOMATED COUNT: 15.2 % (ref 11.6–14.5)
GLUCOSE SERPL-MCNC: 109 MG/DL (ref 74–99)
HCT VFR BLD AUTO: 31.2 % (ref 36–48)
HGB BLD-MCNC: 10.2 G/DL (ref 13–16)
INR PPP: 1.1 (ref 0.8–1.2)
LYMPHOCYTES # BLD: 1.8 K/UL (ref 0.9–3.6)
LYMPHOCYTES NFR BLD: 21 % (ref 21–52)
MCH RBC QN AUTO: 29.8 PG (ref 24–34)
MCHC RBC AUTO-ENTMCNC: 32.7 G/DL (ref 31–37)
MCV RBC AUTO: 91.2 FL (ref 74–97)
MONOCYTES # BLD: 0.9 K/UL (ref 0.05–1.2)
MONOCYTES NFR BLD: 10 % (ref 3–10)
NEUTS SEG # BLD: 5.9 K/UL (ref 1.8–8)
NEUTS SEG NFR BLD: 68 % (ref 40–73)
PLATELET # BLD AUTO: 260 K/UL (ref 135–420)
PMV BLD AUTO: 9.3 FL (ref 9.2–11.8)
POTASSIUM SERPL-SCNC: 3.2 MMOL/L (ref 3.5–5.5)
PROTHROMBIN TIME: 13.2 SEC (ref 11.5–15.2)
RBC # BLD AUTO: 3.42 M/UL (ref 4.7–5.5)
SODIUM SERPL-SCNC: 132 MMOL/L (ref 136–145)
TROPONIN I SERPL-MCNC: <0.02 NG/ML (ref 0–0.04)
WBC # BLD AUTO: 8.7 K/UL (ref 4.6–13.2)

## 2017-10-21 PROCEDURE — B5191ZZ FLUOROSCOPY OF INFERIOR VENA CAVA USING LOW OSMOLAR CONTRAST: ICD-10-PCS | Performed by: INTERNAL MEDICINE

## 2017-10-21 PROCEDURE — 65660000000 HC RM CCU STEPDOWN

## 2017-10-21 PROCEDURE — 85730 THROMBOPLASTIN TIME PARTIAL: CPT | Performed by: EMERGENCY MEDICINE

## 2017-10-21 PROCEDURE — 74011250636 HC RX REV CODE- 250/636: Performed by: EMERGENCY MEDICINE

## 2017-10-21 PROCEDURE — 06H03DZ INSERTION OF INTRALUMINAL DEVICE INTO INFERIOR VENA CAVA, PERCUTANEOUS APPROACH: ICD-10-PCS | Performed by: INTERNAL MEDICINE

## 2017-10-21 PROCEDURE — 80048 BASIC METABOLIC PNL TOTAL CA: CPT | Performed by: EMERGENCY MEDICINE

## 2017-10-21 PROCEDURE — 85610 PROTHROMBIN TIME: CPT | Performed by: EMERGENCY MEDICINE

## 2017-10-21 PROCEDURE — 71275 CT ANGIOGRAPHY CHEST: CPT

## 2017-10-21 PROCEDURE — 74011636320 HC RX REV CODE- 636/320: Performed by: EMERGENCY MEDICINE

## 2017-10-21 PROCEDURE — 93005 ELECTROCARDIOGRAM TRACING: CPT

## 2017-10-21 PROCEDURE — 96374 THER/PROPH/DIAG INJ IV PUSH: CPT

## 2017-10-21 PROCEDURE — 82550 ASSAY OF CK (CPK): CPT | Performed by: EMERGENCY MEDICINE

## 2017-10-21 PROCEDURE — 93971 EXTREMITY STUDY: CPT

## 2017-10-21 PROCEDURE — 99284 EMERGENCY DEPT VISIT MOD MDM: CPT

## 2017-10-21 PROCEDURE — 85025 COMPLETE CBC W/AUTO DIFF WBC: CPT | Performed by: EMERGENCY MEDICINE

## 2017-10-21 PROCEDURE — 71010 XR CHEST PORT: CPT

## 2017-10-21 PROCEDURE — 83880 ASSAY OF NATRIURETIC PEPTIDE: CPT | Performed by: EMERGENCY MEDICINE

## 2017-10-21 PROCEDURE — 85379 FIBRIN DEGRADATION QUANT: CPT | Performed by: EMERGENCY MEDICINE

## 2017-10-21 RX ORDER — SODIUM CHLORIDE 0.9 % (FLUSH) 0.9 %
5-10 SYRINGE (ML) INJECTION EVERY 8 HOURS
Status: DISCONTINUED | OUTPATIENT
Start: 2017-10-22 | End: 2017-10-23 | Stop reason: HOSPADM

## 2017-10-21 RX ORDER — METOPROLOL SUCCINATE 25 MG/1
25 TABLET, EXTENDED RELEASE ORAL DAILY
Status: DISCONTINUED | OUTPATIENT
Start: 2017-10-22 | End: 2017-10-23 | Stop reason: HOSPADM

## 2017-10-21 RX ORDER — HEPARIN SODIUM 10000 [USP'U]/100ML
18-36 INJECTION, SOLUTION INTRAVENOUS
Status: DISCONTINUED | OUTPATIENT
Start: 2017-10-21 | End: 2017-10-22

## 2017-10-21 RX ORDER — HEPARIN SODIUM 1000 [USP'U]/ML
80 INJECTION, SOLUTION INTRAVENOUS; SUBCUTANEOUS ONCE
Status: COMPLETED | OUTPATIENT
Start: 2017-10-21 | End: 2017-10-21

## 2017-10-21 RX ORDER — ASCORBIC ACID 250 MG
1000 TABLET ORAL DAILY
Status: DISCONTINUED | OUTPATIENT
Start: 2017-10-22 | End: 2017-10-23 | Stop reason: HOSPADM

## 2017-10-21 RX ORDER — LANOLIN ALCOHOL/MO/W.PET/CERES
325 CREAM (GRAM) TOPICAL
Status: DISCONTINUED | OUTPATIENT
Start: 2017-10-22 | End: 2017-10-23 | Stop reason: HOSPADM

## 2017-10-21 RX ORDER — SODIUM CHLORIDE 0.9 % (FLUSH) 0.9 %
5-10 SYRINGE (ML) INJECTION AS NEEDED
Status: DISCONTINUED | OUTPATIENT
Start: 2017-10-21 | End: 2017-10-22 | Stop reason: SDUPTHER

## 2017-10-21 RX ORDER — ONDANSETRON 2 MG/ML
4 INJECTION INTRAMUSCULAR; INTRAVENOUS
Status: DISCONTINUED | OUTPATIENT
Start: 2017-10-21 | End: 2017-10-23 | Stop reason: HOSPADM

## 2017-10-21 RX ORDER — HYDROCHLOROTHIAZIDE 25 MG/1
12.5 TABLET ORAL DAILY
Status: DISCONTINUED | OUTPATIENT
Start: 2017-10-22 | End: 2017-10-23 | Stop reason: HOSPADM

## 2017-10-21 RX ORDER — AMOXICILLIN 250 MG
1 CAPSULE ORAL DAILY
Status: DISCONTINUED | OUTPATIENT
Start: 2017-10-22 | End: 2017-10-23 | Stop reason: HOSPADM

## 2017-10-21 RX ORDER — LANOLIN ALCOHOL/MO/W.PET/CERES
1 CREAM (GRAM) TOPICAL 2 TIMES DAILY
Status: DISCONTINUED | OUTPATIENT
Start: 2017-10-22 | End: 2017-10-23 | Stop reason: HOSPADM

## 2017-10-21 RX ORDER — PANTOPRAZOLE SODIUM 40 MG/1
40 TABLET, DELAYED RELEASE ORAL DAILY
Status: DISCONTINUED | OUTPATIENT
Start: 2017-10-22 | End: 2017-10-22

## 2017-10-21 RX ADMIN — IOPAMIDOL 50 ML: 755 INJECTION, SOLUTION INTRAVENOUS at 12:04

## 2017-10-21 RX ADMIN — HEPARIN SODIUM 7910 UNITS: 1000 INJECTION, SOLUTION INTRAVENOUS; SUBCUTANEOUS at 15:07

## 2017-10-21 RX ADMIN — HEPARIN SODIUM AND DEXTROSE 18 UNITS/KG/HR: 10000; 5 INJECTION INTRAVENOUS at 15:08

## 2017-10-21 RX ADMIN — HEPARIN SODIUM AND DEXTROSE 15 UNITS/KG/HR: 10000; 5 INJECTION INTRAVENOUS at 22:25

## 2017-10-21 NOTE — IP AVS SNAPSHOT
[address] [place] [address] Patient: Aletha Huitron Sr. MRN: [id number] DWP:[id number] You are allergic to the following No active allergies Recent Documentation Height Weight BMI Smoking Status 1.753 m 99.3 kg 32.33 kg/m2 Former Smoker Emergency Contacts Name Discharge Info Relation Home Work Mobile Lilliam Espinal DISCHARGE CAREGIVER [3] Spouse [3] [phone] About your hospitalization You were admitted on:  October 21, 2017 You last received care in the:  [facility] [address] You were discharged on:  October 23, 2017 Unit phone number:  [phone] Why you were hospitalized Your primary diagnosis was:  Pulmonary Embolism (Hcc) Your diagnoses also included:  Pulmonary Emboli (Hcc), Essential Hypertension, Dyspnea On Exertion, Acute Pulmonary Embolism (Hcc) Providers Seen During Your Hospitalizations Provider Role Specialty Primary office phone Jose Reynoso MD Attending Provider Emergency Medicine [phone] Radha Hager MD Attending Provider [facility] [phone] Your Primary Care Physician (PCP) Primary Care Physician Office Phone Office Fax Carmela Curry [phone] Follow-up Information Follow up With Details Comments Contact Info Yogi Hernandez MD Go on 10/30/2017 follow up @9:00am 1818 [facility] 250 200 [facility] 
[phone] Ynes Cui NP Go on 10/25/2017 follow up @2:00pm [facility] [address] Cardi[facility] [address] [address] 
[phone] Eliana Lerner MD Go on 11/7/2017 follow up @1:15pm [facility] [address] 200 [facility] 
[phone] Candelario Finley MD Go on 11/8/2017 follow up @12:40pm [place] [address] [address] [facility] 200 [facility] 
[phone] Your Appointments Wednesday October 25, 2017  2:00 PM EDT ANTICOAG NURSE with Pt Inr Hv Csi [facility] ([address] [place] [address]) [facility] [address] [address] [id number] [phone] Monday October 30, 2017  9:00 AM EDT ROUTINE CARE with Parul Chamorro MD  
2056 [facility] ([address] [place] [address]) [address] [facility] [address] 200 [facility]  
[phone] Tuesday November 07, 2017  1:15 PM EST HOSPITAL DISCHARGE with PARAMJIT Pinto [facility] ([address] [place] [address]) [address] [facility] Molly Proing [address] 200 [facility]  
[phone] Wednesday November 08, 2017 12:40 PM EST  
POST HOSPITAL with Racheal Salazar MD  
Card[facility] ([address] [place] [address]) [address] [address] [phone] Current Discharge Medication List  
  
START taking these medications Dose & Instructions Dispensing Information Comments Morning Noon Evening Bedtime  
 warfarin 5 mg tablet Commonly known as:  COUMADIN Your last dose was: Your next dose is:    
   
   
 Dose:  5 mg Take 1 Tab by mouth daily. Quantity:  30 Tab Refills:  0 CONTINUE these medications which have NOT CHANGED Dose & Instructions Dispensing Information Comments Morning Noon Evening Bedtime  
 ferrous sulfate 325 mg (65 mg iron) tablet Your last dose was: Your next dose is:    
   
   
 Dose:  325 mg Take 1 Tab by mouth daily (with breakfast). Quantity:  30 Tab Refills:  1 FISH OIL 1,000 mg Cap Generic drug:  omega-3 fatty acids-vitamin e Your last dose was: Your next dose is:    
   
   
 Dose:  1 Cap Take 1 Cap by mouth two (2) times a day. Refills:  0 hydroCHLOROthiazide 25 mg tablet Commonly known as:  HYDRODIURIL Your last dose was: Your next dose is:    
   
   
 Dose:  12.5 mg Take 0.5 Tabs by mouth daily. Quantity:  90 Tab Refills:  2 METAMUCIL 3.4 gram/5.4 gram Powd Generic drug:  psyllium husk Your last dose was: Your next dose is: Take  by mouth. Refills:  0  
     
   
   
   
  
 metoprolol succinate 25 mg XL tablet Commonly known as:  TOPROL-XL Your last dose was: Your next dose is: TAKE ONE TABLET BY MOUTH ONCE DAILY Quantity:  90 Tab Refills:  1  
     
   
   
   
  
 multivitamin tablet Commonly known as:  ONE A DAY Your last dose was: Your next dose is:    
   
   
 Dose:  1 Tab Take 1 Tab by mouth daily. Refills:  0  
     
   
   
   
  
 OSTEO BI-FLEX (5-LOXIN) 1,500-400-100 mg-unit-mg Tab Generic drug:  glucosamine-D3-Boswellia serr Your last dose was: Your next dose is:    
   
   
 Dose:  1 Tab Take 1 Tab by mouth two (2) times a day. Refills:  0  
     
   
   
   
  
 pantoprazole 40 mg tablet Commonly known as:  PROTONIX Your last dose was: Your next dose is:    
   
   
 Dose:  40 mg Take 1 Tab by mouth daily. Quantity:  10 Tab Refills:  0  
     
   
   
   
  
 potassium bicarbonate 25 mEq tablet Commonly known as:  Barbadian Gin Your last dose was: Your next dose is:    
   
   
 Dose:  25 mEq Take 1 Tab by mouth two (2) times a day. Quantity:  60 Tab Refills:  0  
     
   
   
   
  
 senna-docusate 8.6-50 mg per tablet Commonly known as:  Wild Valdes Your last dose was: Your next dose is:    
   
   
 Dose:  1 Tab Take 1 Tab by mouth daily. Quantity:  10 Tab Refills:  0  
     
   
   
   
  
 VITAMIN C 500 mg tablet Generic drug:  ascorbic acid (vitamin C) Your last dose was: Your next dose is: Dose:  1000 mg Take 1,000 mg by mouth daily. Refills:  0 Where to Get Your Medications Information on where to get these meds will be given to you by the nurse or doctor. ! Ask your nurse or doctor about these medications  
  warfarin 5 mg tablet Discharge Instructions Pulmonary Embolism: Care Instructions Your Care Instructions Pulmonary embolism is the sudden blockage of an artery in the lung. Blood clots in the deep veins of the leg or pelvis (deep vein thrombosis, or DVT) are the most common cause. These blood clots can travel to the lungs. Pulmonary embolism can be very serious. Because you have had one pulmonary embolism, you are at greater risk for having another one. But you can take steps to prevent another pulmonary embolism by following your doctor's instructions. You will probably take a prescription blood-thinning medicine to prevent blood clots. A blood thinner can stop a blood clot from growing larger and prevent new clots from forming. Follow-up care is a key part of your treatment and safety. Be sure to make and go to all appointments, and call your doctor if you are having problems. It's also a good idea to know your test results and keep a list of the medicines you take. How can you care for yourself at home? · Take your medicines exactly as prescribed. Call your doctor if you think you are having a problem with your medicine. You will get more details on the specific medicines your doctor prescribes. · If you are taking a blood thinner, be sure you get instructions about how to take your medicine safely. Blood thinners can cause serious bleeding problems. Preventing future pulmonary embolisms · Exercise. Keep blood moving in your legs to keep clots from forming. If you are traveling by car, stop every hour or so. Get out and walk around for a few minutes.  If you are traveling by bus, train, or plane, get out of your seat and walk up and down the aisles every hour or so. You also can do leg exercises while you are seated. Pump your feet up and down by pulling your toes up toward your knees then pointing them down. · Get up out of bed as soon as possible after an illness or surgery. · Do not smoke. If you need help quitting, talk to your doctor about stop-smoking programs and medicines. These can increase your chances of quitting for good. · Check with your doctor before taking hormone or birth control pills. These may increase your risk of blot clots. · Ask your doctor about wearing compression stockings to help prevent blood clots in your legs. You can buy these with a prescription at medical supply stores and some drugstores. When should you call for help? Call 911 anytime you think you may need emergency care. For example, call if: 
· You have shortness of breath. · You have chest pain. · You passed out (lost consciousness). · You cough up blood. Call your doctor now or seek immediate medical care if: 
· You have new or worsening pain or swelling in your leg. Watch closely for changes in your health, and be sure to contact your doctor if: 
· You do not get better as expected. Where can you learn more? Go to http://fouzia-bere.info/. Enter D206 in the search box to learn more about \"Pulmonary Embolism: Care Instructions. \" Current as of: June 4, 2016 Content Version: 11.3 © 0382-0637 [employer]. Care instructions adapted under license by [employer] (which disclaims liability or warranty for this information). If you have questions about a medical condition or this instruction, always ask your healthcare professional. [address] any warranty or liability for your use of this information. Shortness of Breath: Care Instructions Your Care Instructions Shortness of breath has many causes.  Sometimes conditions such as anxiety can lead to shortness of breath. Some people get mild shortness of breath when they exercise. Trouble breathing also can be a symptom of a serious problem, such as asthma, lung disease, emphysema, heart problems, and pneumonia. If your shortness of breath continues, you may need tests and treatment. Watch for any changes in your breathing and other symptoms. Follow-up care is a key part of your treatment and safety. Be sure to make and go to all appointments, and call your doctor if you are having problems. Its also a good idea to know your test results and keep a list of the medicines you take. How can you care for yourself at home? · Do not smoke or allow others to smoke around you. If you need help quitting, talk to your doctor about stop-smoking programs and medicines. These can increase your chances of quitting for good. · Get plenty of rest and sleep. · Take your medicines exactly as prescribed. Call your doctor if you think you are having a problem with your medicine. · Find healthy ways to deal with stress. ¨ Exercise daily. ¨ Get plenty of sleep. ¨ Eat regularly and well. When should you call for help? Call 911 anytime you think you may need emergency care. For example, call if: 
· You have severe shortness of breath. · You have symptoms of a heart attack. These may include: ¨ Chest pain or pressure, or a strange feeling in the chest. 
¨ Sweating. ¨ Shortness of breath. ¨ Nausea or vomiting. ¨ Pain, pressure, or a strange feeling in the back, neck, jaw, or upper belly or in one or both shoulders or arms. ¨ Lightheadedness or sudden weakness. ¨ A fast or irregular heartbeat. After you call 911, the  may tell you to chew 1 adult-strength or 2 to 4 low-dose aspirin. Wait for an ambulance. Do not try to drive yourself. Call your doctor now or seek immediate medical care if: 
· Your shortness of breath gets worse or you start to wheeze.  Wheezing is a high-pitched sound when you breathe. · You wake up at night out of breath or have to prop your head up on several pillows to breathe. · You are short of breath after only light activity or while at rest. 
Watch closely for changes in your health, and be sure to contact your doctor if: 
· You do not get better over the next 1 to 2 days. Where can you learn more? Go to http://fouzia-bere.info/. Enter S780 in the search box to learn more about \"Shortness of Breath: Care Instructions. \" Current as of: 2017 Content Version: 11.3 © 3656-7374 [employer]. Care instructions adapted under license by [employer] (which disclaims liability or warranty for this information). If you have questions about a medical condition or this instruction, always ask your healthcare professional. [address] any warranty or liability for your use of this information. Patient armband removed and shredded MyChart Activation Thank you for requesting access to TruLeaf. Please follow the instructions below to securely access and download your online medical record. TruLeaf allows you to send messages to your doctor, view your test results, renew your prescriptions, schedule appointments, and more. How Do I Sign Up? 1. In your internet browser, go to [email or web address] 
2. Click on the First Time User? Click Here link in the Sign In box. You will be redirect to the New Member Sign Up page. 3. Enter your TruLeaf Access Code exactly as it appears below. You will not need to use this code after youve completed the sign-up process. If you do not sign up before the expiration date, you must request a new code. TruLeaf Access Code: [email or web address] Expires: 1/3/2018 11:00 AM (This is the date your TruLeaf access code will ) 4.  Enter the last four digits of your Social Security Number (xxxx) and Date of Birth (mm/dd/yyyy) as indicated and click Submit. You will be taken to the next sign-up page. 5. Create a SightCine ID. This will be your SightCine login ID and cannot be changed, so think of one that is secure and easy to remember. 6. Create a SightCine password. You can change your password at any time. 7. Enter your Password Reset Question and Answer. This can be used at a later time if you forget your password. 8. Enter your e-mail address. You will receive e-mail notification when new information is available in [address] 9. Click Sign Up. You can now view and download portions of your medical record. 10. Click the Download Summary menu link to download a portable copy of your medical information. Additional Information If you have questions, please visit the Frequently Asked Questions section of the SightCine website at [email or web address] Remember, SightCine is NOT to be used for urgent needs. For medical emergencies, dial 911. DISCHARGE SUMMARY from Nurse The following personal items are in your possession at time of discharge: 
 
Dental Appliances: At bedside Visual Aid: Glasses, At bedside Home Medications: None Jewelry: None Clothing: None Other Valuables: None PATIENT INSTRUCTIONS: 
 
 
F-face looks uneven A-arms unable to move or move unevenly S-speech slurred or non-existent T-time-call 911 as soon as signs and symptoms begin-DO NOT go Back to bed or wait to see if you get better-TIME IS BRAIN. Warning Signs of HEART ATTACK Call 911 if you have these symptoms: ? Chest discomfort. Most heart attacks involve discomfort in the center of the chest that lasts more than a few minutes, or that goes away and comes back. It can feel like uncomfortable pressure, squeezing, fullness, or pain. ? Discomfort in other areas of the upper body. Symptoms can include pain or discomfort in one or both arms, the back, neck, jaw, or stomach. ? Shortness of breath with or without chest discomfort. ? Other signs may include breaking out in a cold sweat, nausea, or lightheadedness. Don't wait more than five minutes to call [address]! Fast action can save your life. Calling 911 is almost always the fastest way to get lifesaving treatment. Emergency Medical Services staff can begin treatment when they arrive  up to an hour sooner than if someone gets to the hospital by car. The discharge information has been reviewed with the patient. The patient verbalized understanding. Discharge medications reviewed with the patient and appropriate educational materials and side effects teaching were provided. Discharge Instructions Attachments/References WARFARIN (BY MOUTH) (ENGLISH) WARFARIN SAFETY TIPS (ENGLISH) VITAMIN K DIET (ENGLISH) Discharge Orders None ACO Transitions of Care Introducing [employer] [address] Patricia Bunn offers a voluntary care coordination program to provide high quality service and care to [facility] fee-for-service beneficiaries. Alexandria Backer was designed to help you enhance your health and well-being through the following services: ? Transitions of Care  support for individuals who are transitioning from one care setting to another (example: Hospital to home). ?  Chronic and Complex Care Coordination  support for individuals and caregivers of those with serious or chronic illnesses or with more than one chronic (ongoing) condition and those who take a number of different medications. If you meet specific medical criteria, a [facility] [address] may call you directly to coordinate your care with your primary care physician and your other care providers. For questions about the [facility] programs, please, contact your physicians office. For general questions or additional information about Accountable Care Organizations: 
Please visit www.medicare.gov/acos. html or call 1-800-MEDICARE (7-232.781.9620) TTY users should call 6-636.628.4473. DynaOptics Announcement We are excited to announce that we are making your provider's discharge notes available to you in DynaOptics. You will see these notes when they are completed and signed by the physician that discharged you from your recent hospital stay. If you have any questions or concerns about any information you see in DynaOptics, please call the Health Information Department where you were seen or reach out to your Primary Care Provider for more information about your plan of care. Introducing [facility]! [facility] introduces DynaOptics patient portal. Now you can access parts of your medical record, email your doctor's office, and request medication refills online. 1. In your internet browser, go to [email or web address] 2. Click on the First Time User? Click Here link in the Sign In box. You will see the New Member Sign Up page. 3. Enter your DynaOptics Access Code exactly as it appears below. You will not need to use this code after youve completed the sign-up process. If you do not sign up before the expiration date, you must request a new code. · DynaOptics Access Code: [email or web address] Expires: 1/3/2018 11:00 AM 
 
4. Enter the last four digits of your Social Security Number (xxxx) and Date of Birth (mm/dd/yyyy) as indicated and click Submit. You will be taken to the next sign-up page. 5. Create a DynaOptics ID.  This will be your DynaOptics login ID and cannot be changed, so think of one that is secure and easy to remember. 6. Create a Weemba password. You can change your password at any time. 7. Enter your Password Reset Question and Answer. This can be used at a later time if you forget your password. 8. Enter your e-mail address. You will receive e-mail notification when new information is available in [address] 9. Click Sign Up. You can now view and download portions of your medical record. 10. Click the Download Summary menu link to download a portable copy of your medical information. If you have questions, please visit the Frequently Asked Questions section of the Weemba website. Remember, Weemba is NOT to be used for urgent needs. For medical emergencies, dial 911. Now available from your iPhone and Android! General Information Please provide this summary of care documentation to your next provider. Patient Signature:  ____________________________________________________________ Date:  ____________________________________________________________  
  
Josph Perfect Provider Signature:  ____________________________________________________________ Date:  ____________________________________________________________ More Information Warfarin (By mouth) Warfarin (WAR-far-in) Prevents and treats blood clots. May lower the risk of serious complications after a heart attack. This medicine is a blood thinner. Brand Name(s): Coumadin, Keira Wing There may be other brand names for this medicine. When This Medicine Should Not Be Used: This medicine is not right for everyone. Do not use it if you had an allergic reaction to warfarin, if you are pregnant, or if you have health problems that could cause bleeding. How to Use This Medicine:  
Tablet · Take your medicine as directed. Your dose may need to be changed several times to find what works best for you. · This medicine should come with a Medication Guide. Ask your pharmacist for a copy if you do not have one. · Missed dose: Take a dose as soon as you remember. If it is almost time for your next dose, wait until then and take a regular dose. Do not take extra medicine to make up for a missed dose. · Store the medicine in a closed container at room temperature, away from heat, moisture, and direct light. Drugs and Foods to Avoid: Ask your doctor or pharmacist before using any other medicine, including over-the-counter medicines, vitamins, and herbal products. · Many medicines and foods can affect how warfarin works and may affect the PT/INR test results. Tell your doctor before you start or stop any medicine, especially the following:  
¨ Co-enzyme W53, echinacea, garlic, ginkgo, ginseng, goldenseal, or Velma's wort ¨ Another blood thinner, including apixaban, argatroban, bivalirudin, cilostazol, clopidogrel, dabigatran, desirudin, dipyridamole, heparin, lepirudin, prasugrel, rivaroxaban, ticlopidine ¨ Medicine to treat depression or anxiety, including citalopram, desvenlafaxine, duloxetine, escitalopram, fluoxetine, fluvoxamine, milnacipran, paroxetine, sertraline, venlafaxine, vilazodone ¨ Medicine to treat an infection ¨ NSAID pain or arthritis medicine, including aspirin, celecoxib, diclofenac, diflunisal, fenoprofen, ibuprofen, indomethacin, ketoprofen, ketorolac, mefenamic acid, naproxen, oxaprozin, piroxicam, sulindac. Check labels for over-the-counter medicines to find out if they contain an NSAID. ¨ Steroid medicine, including dexamethasone, hydrocortisone, methylprednisolone, prednisolone, prednisone · Warfarin works best if you eat about the same amount of vitamin K every day. Foods high in vitamin K include asparagus, broccoli, brussels sprouts, cabbage, green leafy vegetables, plums, rhubarb, and canola oil. Talk to your doctor before you make changes to your normal diet. · Do not eat grapefruit or drink grapefruit juice while you are using this medicine. Warnings While Using This Medicine: · It is not safe to take this medicine during pregnancy. It could harm an unborn baby. Tell your doctor right away if you become pregnant. Use an effective form of birth control to keep from getting pregnant during treatment and for at least 1 month after your last dose. · Tell your doctor if you are breastfeeding, or if you have kidney disease, liver disease, heart disease, diabetes, heart failure, high blood pressure, an infection, a stomach ulcer, or protein C deficiency. Also tell your doctor if you had recent surgery or an injury, or a history of stroke, anemia, severe bleeding or bruising, or problems caused by heparin use. · This medicine may cause the following problems: ¨ Bleeding, which may be life-threatening ¨ Gangrene (skin or tissue damage) ¨ Calciphylaxis or calcium uremic arteriolopathy ¨ Kidney problems, including acute kidney injury ¨ Purple toes syndrome · You must have a PT/INR blood test while you use this medicine to check how well your blood is clotting. Your doctor will use the test results to make sure the medicine is working properly. Keep all appointments for the PT/INR blood tests. · You may bleed or bruise more easily with warfarin. To prevent injury or cuts, do not play rough sports, be careful with sharp objects, and brush and floss your teeth gently. Nena Parker your nose gently, and do not pick your nose. · Carry an ID card or wear a medical alert bracelet to let emergency caregivers know that you use warfarin. · Tell any doctor or dentist who treats you that you are using this medicine. You may need to stop using this medicine several days before you have surgery or medical tests. · Keep all medicine out of the reach of children. Never share your medicine with anyone. Possible Side Effects While Using This Medicine: Call your doctor right away if you notice any of these side effects: · Allergic reaction: Itching or hives, swelling in your face or hands, swelling or tingling in your mouth or throat, chest tightness, trouble breathing · Bleeding from your gums or nose, coughing up blood, heavy monthly periods · Bleeding that does not stop, bruising, or weakness · Dizziness, fainting, lightheadedness · Pain, brown or black skin, or skin that is cool to the touch · Purple toes or feet, or new pain in your leg, foot, or toes · Purplish red, net-like, blotchy spots on the skin · Red or dark brown urine, or red or black, tarry stools · Vomiting blood or material that looks like coffee grounds If you notice other side effects that you think are caused by this medicine, tell your doctor. Call your doctor for medical advice about side effects. You may report side effects to FDA at 4-269-FDA-5395 © 2017 [facility] Information is for End User's use only and may not be sold, redistributed or otherwise used for commercial purposes. The above information is an  only. It is not intended as medical advice for individual conditions or treatments. Talk to your doctor, nurse or pharmacist before following any medical regimen to see if it is safe and effective for you. Taking Warfarin Safely: Care Instructions Your Care Instructions Warfarin is a medicine that you take to prevent blood clots. It is often called a blood thinner. Doctors give warfarin (such as Coumadin) to reduce the risk of blood clots. You may be at risk for blood clots if you have atrial fibrillation or deep vein thrombosis. Some other health problems may also put you at risk. Warfarin slows the amount of time it takes for your blood to clot. It can cause bleeding problems. Even if you've been taking warfarin for a while, it's important to know how to take it safely. Foods and other medicines can affect the way warfarin works. Some can make warfarin work too well. This can cause bleeding problems. And some can make it work poorly, so that it does not prevent blood clots very well. You will need regular blood tests to check how long it takes for your blood to form a clot. This test is called a PT or prothrombin time test. The result of the test is called an INR level. Depending on the test results, your doctor or anticoagulation clinic may adjust your dose of warfarin. Follow-up care is a key part of your treatment and safety. Be sure to make and go to all appointments, and call your doctor if you are having problems. It's also a good idea to know your test results and keep a list of the medicines you take. How can you care for yourself at home? Take warfarin safely · Take your warfarin at the same time each day. · If you miss a dose of warfarin, don't take an extra dose to make up for it. Your doctor can tell you exactly what to do so you don't take too much or too little. · Wear medical alert jewelry that lets others know that you take warfarin. You can buy this at most drugstores. · Don't take warfarin if you are pregnant or planning to get pregnant. Talk to your doctor about how you can prevent getting pregnant while you are taking it. · Don't change your dose or stop taking warfarin unless your doctor tells you to. Effects of medicines and food on warfarin · Don't start or stop taking any medicines, vitamins, or natural remedies unless you first talk to your doctor. Many medicines can affect how warfarin works. These include aspirin and other pain relievers, over-the-counter medicines, multivitamins, dietary supplements, and herbal products. · Tell all of your doctors and pharmacists that you take warfarin. Some prescription medicines can affect how warfarin works.  
· Keep the amount of vitamin K in your diet about the same from day to day. Do not suddenly eat a lot more or a lot less food that is rich in vitamin K than you usually do. Vitamin K affects how warfarin works and how your blood clots. Talk with your doctor before making big changes in your diet. Foods that have a lot of vitamin K include cooked green vegetables, such as: ¨ Kale, spinach, turnip greens, willy greens, Swiss chard, and mustard greens. ¨ [place] sprouts, broccoli, and asparagus. · Limit your use of alcohol. Avoid bleeding by preventing falls and injuries · Wear slippers or shoes with nonskid soles. · Remove throw rugs and clutter. · Rearrange furniture and electrical cords to keep them out of walking paths. · Keep stairways, porches, and outside walkways well lit. Use night-lights in hallways and bathrooms. · Be extra careful when you work with sharp tools or knives. When should you call for help? Call 911 anytime you think you may need emergency care. For example, call if: 
· You have a sudden, severe headache that is different from past headaches. Call your doctor now or seek immediate medical care if: 
· You have any abnormal bleeding, such as: 
¨ Nosebleeds. ¨ Vaginal bleeding that is different (heavier, more frequent, at a different time of the month) than what you are used to. ¨ Bloody or black stools, or rectal bleeding. ¨ Bloody or pink urine. Watch closely for changes in your health, and be sure to contact your doctor if you have any problems. Where can you learn more? Go to http://fouzia-bere.info/. Enter P016 in the search box to learn more about \"Taking Warfarin Safely: Care Instructions. \" Current as of: November 15, 2016 Content Version: 11.3 © 4403-4173 [employer]. Care instructions adapted under license by [employer] (which disclaims liability or warranty for this information).  If you have questions about a medical condition or this instruction, always ask your healthcare professional. [address] any warranty or liability for your use of this information. Consistent Vitamin K Diet: Care Instructions Your Care Instructions Your body needs vitamin K to clot blood and keep your bones strong. It's found in leafy green vegetables such as kale and spinach. If you take the blood thinner warfarin (Coumadin), you need to be careful about how much vitamin K you get. Vitamin K can keep your warfarin from working as it should. Most people who take warfarin can eat normally. The important thing is to get about the same amount of vitamin K each day. Don't suddenly start eating foods with a lot more or a lot less vitamin K. You can choose how much vitamin K you eat. For example, if you already eat a lot of leafy green vegetables, that's fine. Just keep it about the same amount each day. Follow-up care is a key part of your treatment and safety. Be sure to make and go to all appointments, and call your doctor if you are having problems. It's also a good idea to know your test results and keep a list of the medicines you take. How can you care for yourself at home? You don't need to stop eating food high in vitamin K. But you do need to know what foods contain vitamin K. Then you can try to eat about the same amount of vitamin K each day. · You might limit foods that are high in vitamin K to about 1 serving a day. These foods have about 250 to 500 micrograms (mcg) of vitamin K in each serving. They include: ¨ Cooked leafy green vegetables. Examples are kale, spinach, turnip greens, willy greens, Swiss chard, and mustard greens. One serving is ½ cup. · You might limit foods that are medium-high in vitamin K to about 3 servings a day. These foods have about 50 to 150 mcg of vitamin K in each serving. These include: ¨ Cooked brussels sprouts, broccoli, cabbage, and asparagus. One serving is ½ cup. ¨ Raw leafy green vegetables. Examples are spinach, green leaf lettuce, louis lettuce, and endive. One serving is 1 cup. · Vitamin K also is found in many multivitamins. You don't need to stop taking your multivitamin if it has vitamin K. But you do need to take it every day. · Check with your doctor before you start or stop taking any supplements or herbal products. Some of these may contain vitamin K. Where can you learn more? Go to http://fouzia-bere.info/. Enter L043 in the search box to learn more about \"Consistent Vitamin K Diet: Care Instructions. \" Current as of: March 2, 2017 Content Version: 11.3 © 6785-3453 [employer]. Care instructions adapted under license by [employer] (which disclaims liability or warranty for this information). If you have questions about a medical condition or this instruction, always ask your healthcare professional. [address] any warranty or liability for your use of this information.

## 2017-10-21 NOTE — Clinical Note
Status[de-identified] Inpatient [101] Type of Bed: Telemetry [19] Inpatient Hospitalization Certified Necessary for the Following Reasons: 3. Patient receiving treatment that can only be provided in an inpatient setting (further clarification in H&P documentation) Admitting Diagnosis: Pulmonary embolism ([facility] [address] [[id number]] Admitting Physician: Serenity Sepulveda [[id number]] Attending Physician: Serenity Sepulveda [[id number]] Estimated Length of Stay: 3-4 Midnights Discharge Plan[de-identified] Home with Office Follow-up

## 2017-10-21 NOTE — ED PROVIDER NOTES
HPI Comments: 9:42 AM Trae Jeter Sr. is a 80 y.o. male with h/o diverticulitis, HTN, AFIB, and emphysema who presents to ED complaining of swollen LLE onset 1 day ago. The patient states that he went to [facility] this morning where they believed he may have a blood clot so they referred him to the ED for testing. He admits to SOB with exertion, swollen LLE, and erythema of LLE. He states his pain is a 5/10. He denies CP, nausea, vomiting, coughing, and fever. He denies h/o DVT or PE. The patient denies trauma. The patient was recently hospitalized for GI bleed requiring transfusions. deneis any weight loss, night sweats. PCP: Dawson Huertas MD      The history is provided by the patient. Past Medical History:   Diagnosis Date    Ankle fracture, left approx 2011    medial, tibial    Arthritis     knees    Cardiac echocardiogram 06/16/2011    Normal LV systolic function. Mild conc LVH. Gr 1 DDfx. RVSP 35-40 mmHg. Marked LAE. Marked LISA. Mild-mod MR.  Mild-mod AI. Mild AoRE. Mild aortic arch dil.  Cardiac Holter monitor study 06/22/2011    Baseline sinus rhythm to sinus bradycardia, avg HR 60 bpm (range  bpm). Questionable chronotropic intolerance. No sustained arrhythmias.  Emphysema lung (HCC)     GERD (gastroesophageal reflux disease)     Glaucoma suspect     posterior vitreous detachment b/l, pterygium left eye, aseroid hyalosis / synchisis, b/l pseudophakia    H/O colonoscopy 04/13/2011    1 polyp thermally treated    History of atrial fibrillation 2009    Hypertension     Unspecified adverse effect of anesthesia     H/o A-fib immediately post colonoscopy.        Past Surgical History:   Procedure Laterality Date    CARDIAC SURG PROCEDURE UNLIST      hx atrial flutter ablation 2013    COLONOSCOPY N/A 10/6/2017    COLONOSCOPY performed by William Zurita MD at [address] HX COLONOSCOPY      HX GI      HX KNEE REPLACEMENT Right 02/19/2013    Dr. Patricio Ken  HX ORTHOPAEDIC Right 2/13    TKR, Fabio    HX SVT ABLATION  1/2/2013    by Dr. Jessica Thomas EGD [address] SPHNCTR/CARDIA GERD           Family History:   Problem Relation Age of Onset    Pacemaker Father     Pacemaker Sister     Heart Failure Brother     Diabetes Brother     Cancer Brother      Lung Cancer       Social History     Social History    Marital status:      Spouse name: N/A    Number of children: N/A    Years of education: N/A     Occupational History    Not on file. Social History Main Topics    Smoking status: Former Smoker     Packs/day: 1.00     Quit date: 1/1/1965    Smokeless tobacco: Never Used    Alcohol use Yes      Comment: occasionally.  Drug use: No    Sexual activity: Not Currently     Partners: Female     Other Topics Concern    Not on file     Social History Narrative         ALLERGIES: Review of patient's allergies indicates no known allergies. Review of Systems   Constitutional: Negative for fever. HENT: Negative for congestion. Respiratory: Positive for shortness of breath (with exertion). Negative for cough. Cardiovascular: Positive for leg swelling (LLE). Negative for chest pain. Gastrointestinal: Negative for abdominal pain, nausea and vomiting. Genitourinary: Negative for dysuria. Musculoskeletal: Negative. Skin: Positive for color change (Erythema LLE). Neurological: Negative for light-headedness and headaches. All other systems reviewed and are negative. Vitals:    10/21/17 1045 10/21/17 1115 10/21/17 1400 10/21/17 1430   BP: 130/70 139/65 120/68 133/88   Pulse: 72 74     Resp: 20 14     Temp:       SpO2: 98% 99% 98% 97%   Weight:       Height:                Physical Exam   Constitutional: He is oriented to person, place, and time. HENT:   Head: Atraumatic. Eyes: Conjunctivae are normal.   Neck: Neck supple. No JVD present.    Cardiovascular: Normal rate, regular rhythm and normal heart sounds. Good distal pulses. Pulmonary/Chest: Effort normal and breath sounds normal. No respiratory distress. He exhibits no tenderness. Abdominal: Soft. Bowel sounds are normal. He exhibits no distension. There is no tenderness. There is no rebound and no guarding. Musculoskeletal: Normal range of motion. He exhibits edema (LLE) and tenderness (Minimal tenderness to palpitation ). Neurological: He is alert and oriented to person, place, and time. Skin: Skin is warm and dry. There is erythema (Streaking up left medial calf). No focal fluctuance. Psychiatric: He has a normal mood and affect. Nursing note and vitals reviewed. MDM  Number of Diagnoses or Management Options  Other acute pulmonary embolism without acute cor pulmonale [facility]):   Diagnosis management comments: Arianna Addison is a 80 y.o. male presenting for evaluation of LLE and concern for DVT in setting of recent hospitalization. Pt also reports WOODY which he attributed to anemia. No distress or complaints at rest. Labs obtained including dimer which was elevated. I have discussed results of work up with patient. Pt and wife agree with plan of duplex and cta. Given recent admission for GI bleed and pt stable will hold off on anticoagulation until cta and duplex results. Critical Care  Total time providing critical care: 30-74 minutes    ED Course       Procedures        Vitals:  Patient Vitals for the past 12 hrs:   Temp Pulse Resp BP SpO2   10/21/17 1430 - - - 133/88 97 %   10/21/17 1400 - - - 120/68 98 %   10/21/17 1115 - 74 14 139/65 99 %   10/21/17 1045 - 72 20 130/70 98 %   10/21/17 1015 - 72 22 116/65 99 %   10/21/17 0945 - 74 23 122/61 97 %   10/21/17 0915 - 78 23 100/66 95 %   10/21/17 0849 97.4 °F (36.3 °C) 86 18 121/75 96 %           EKG interpretation by ED Physician:  9:41AM Sinus rhythm at a rate of 74bpm. First degree AV block. No STEMI.          X-Ray, CT or other radiology findings or impressions:  Cta Chest W Or W Wo Cont  IMPRESSION: Multiple pulmonary emboli bilaterally with right ventricular strain. See above All CT scans at this facility are performed using dose optimization technique as appropriate to the performed exam, to include automated exposure control, adjustment of the mA and/or kV according to patient's size (Including appropriate matching for site-specific examinations), or use of iterative reconstruction technique. Duplex Lower EXT Venous Left  PRELIMINARY: INTERPRETATION/FINDINGS  Duplex images were obtained using 2-D gray scale, color flow, and  spectral Doppler analysis.     Left leg :  1. No evidence of deep venous thrombosis detected in the veins  visualized. 2. Deep vein(s) visualized include the common femoral, femoral,  popliteal, posterior tibial, and peroneal veins. 3. Acute, partially occlusive superficial venous thrombosis identified   in the proximal, mid, and distal upper thigh great saphenous vein and   acute occlusive at the proximal, mid, and distal calf segment. 4. Superficial vein(s) visualized include the great saphenous vein. .  5. No evidence of deep vein thrombosis in the contralateral common  femoral vein. Progress notes, Consult notes or additional Procedure notes:   Consult:  Discussed care with Dr. Isidoro Benavides, pulmonary. Standard discussion; including history of patients chief complaint, available diagnostic results, and treatment course. Recommends Heparin drip and will follow along in consult. 2:37 PM, 10/21/2017   Consult:  Discussed care with Dr. Amalia Garland, cardiology. Standard discussion; including history of patients chief complaint, available diagnostic results, and treatment course. Agrees to follow along in consult. 2:43 PM, 10/21/2017   Consult:  Discussed care with Dr. Roldan, hospitalist. Standard discussion; including history of patients chief complaint, available diagnostic results, and treatment course.  Agrees with plan of admission. 3:00 PM, 10/21/2017               Diagnosis:   1. Other acute pulmonary embolism without acute cor pulmonale ([facility] [address]    2. Anemia, unspecified type    3. Paroxysmal atrial fibrillation ([facility]            Scribe Attestation      Radha ILA PowellBrady Screen acting as a scribe for and in the presence of Esteban Walsh MD      October 21, 2017 at 9:48 AM       Provider Attestation:      I personally performed the services described in the documentation, reviewed the documentation, as recorded by the scribe in my presence, and it accurately and completely records my words and actions.  October 21, 2017 at 9:48 AM - Esteban Walsh MD

## 2017-10-21 NOTE — PROCEDURES
[facility] ***    Name: Arnol Man  MRN: [id number]  : 13 Aug 1932  HIS Order #: [id number]  Gabriela Visit #: [id number]  Date: 21 Oct 2017    TYPE OF TEST: Peripheral Venous Testing    REASON FOR TEST  Pain in limb, Limb swelling    Left Leg:-  Deep venous thrombosis:           No  Superficial venous thrombosis:    Yes  Deep venous insufficiency:        Not examined  Superficial venous insufficiency: Not examined      INTERPRETATION/FINDINGS  Duplex images were obtained using 2-D gray scale, color flow, and  spectral Doppler analysis. Left leg :  1. No evidence of deep venous thrombosis detected in the veins  visualized. 2. Deep vein(s) visualized include the common femoral, femoral,  popliteal, posterior tibial, and peroneal veins. 3. Acute, partially occlusive superficial venous thrombosis identified   in the proximal, mid, and distal upper thigh great saphenous vein and   acute occlusive at the proximal, mid, and distal calf segment. 4. Superficial vein(s) visualized include the great saphenous vein. .  5. No evidence of deep vein thrombosis in the contralateral common  femoral vein. ADDITIONAL COMMENTS  Results to Dr. William Magdaleno @ 12:36 p.m. I have personally reviewed the data relevant to the interpretation of  this  study. TECHNOLOGIST: Chun James RVT  Signed: 10/21/2017 12:37 PM    PHYSICIAN: Jessica Serna MD  Signed: 10/22/2017 10:44 AM

## 2017-10-21 NOTE — ED TRIAGE NOTES
Pt sent from [facility] for evaluation of a blood clot. Pt c/o left leg pain; erythema noted to left lower extremity.

## 2017-10-21 NOTE — IP AVS SNAPSHOT
[address] [address] [place] Patient: Tal Colon Sr. MRN: [id number] ZTC:1/66/0715 Current Discharge Medication List  
  
START taking these medications Dose & Instructions Dispensing Information Comments Morning Noon Evening Bedtime  
 warfarin 5 mg tablet Commonly known as:  COUMADIN Your last dose was: Your next dose is:    
   
   
 Dose:  5 mg Take 1 Tab by mouth daily. Quantity:  30 Tab Refills:  0 CONTINUE these medications which have NOT CHANGED Dose & Instructions Dispensing Information Comments Morning Noon Evening Bedtime  
 ferrous sulfate 325 mg (65 mg iron) tablet Your last dose was: Your next dose is:    
   
   
 Dose:  325 mg Take 1 Tab by mouth daily (with breakfast). Quantity:  30 Tab Refills:  1 FISH OIL 1,000 mg Cap Generic drug:  omega-3 fatty acids-vitamin e Your last dose was: Your next dose is:    
   
   
 Dose:  1 Cap Take 1 Cap by mouth two (2) times a day. Refills:  0  
     
   
   
   
  
 hydroCHLOROthiazide 25 mg tablet Commonly known as:  HYDRODIURIL Your last dose was: Your next dose is:    
   
   
 Dose:  12.5 mg Take 0.5 Tabs by mouth daily. Quantity:  90 Tab Refills:  2 METAMUCIL 3.4 gram/5.4 gram Powd Generic drug:  psyllium husk Your last dose was: Your next dose is: Take  by mouth. Refills:  0  
     
   
   
   
  
 metoprolol succinate 25 mg XL tablet Commonly known as:  TOPROL-XL Your last dose was: Your next dose is: TAKE ONE TABLET BY MOUTH ONCE DAILY Quantity:  90 Tab Refills:  1  
     
   
   
   
  
 multivitamin tablet Commonly known as:  ONE A DAY Your last dose was: Your next dose is:    
   
   
 Dose:  1 Tab Take 1 Tab by mouth daily. Refills:  0  
     
   
   
   
  
 OSTEO BI-FLEX (5-LOXIN) 1,500-400-100 mg-unit-mg Tab Generic drug:  glucosamine-D3-Boswellia serr Your last dose was: Your next dose is:    
   
   
 Dose:  1 Tab Take 1 Tab by mouth two (2) times a day. Refills:  0  
     
   
   
   
  
 pantoprazole 40 mg tablet Commonly known as:  PROTONIX Your last dose was: Your next dose is:    
   
   
 Dose:  40 mg Take 1 Tab by mouth daily. Quantity:  10 Tab Refills:  0  
     
   
   
   
  
 potassium bicarbonate 25 mEq tablet Commonly known as:  Levora Beat Your last dose was: Your next dose is:    
   
   
 Dose:  25 mEq Take 1 Tab by mouth two (2) times a day. Quantity:  60 Tab Refills:  0  
     
   
   
   
  
 senna-docusate 8.6-50 mg per tablet Commonly known as:  Julieta Anon Your last dose was: Your next dose is:    
   
   
 Dose:  1 Tab Take 1 Tab by mouth daily. Quantity:  10 Tab Refills:  0  
     
   
   
   
  
 VITAMIN C 500 mg tablet Generic drug:  ascorbic acid (vitamin C) Your last dose was: Your next dose is:    
   
   
 Dose:  1000 mg Take 1,000 mg by mouth daily. Refills:  0 Where to Get Your Medications Information on where to get these meds will be given to you by the nurse or doctor. ! Ask your nurse or doctor about these medications  
  warfarin 5 mg tablet

## 2017-10-21 NOTE — CONSULTS
[facility]  Pulmonary, Critical Care, and Sleep Medicine    Name: Paige Shi Sr. MRN: [id number]   : 1932 Hospital: [facility]   Date: 10/21/2017        Critical Care Initial Patient Consult    Requesting MD:                                                  Reason for CC Consult:  IMPRESSION:   · Acute PE,   · LLE thrombophlebitis  · Diverticuli with recent bleed x2  · P. afib  · Emphysema       RECOMMENDATIONS:   · Start heparin drip given right heart strain,   · Pt will likely need IVC filter, if patient bleeds from diverticuli. At that point decision will need to make surgical intervention. · Follow h/h q6  · Schedule bronchodilator  · ICS  · No need for abx  · GI proph   · No need for DVT proph as patient will be on heparin drip. Subjective/History: This patient has been seen and evaluated at the request of Dr. Lizz Victoria for Acute PE  Patient is a 80 y.o. male with multiple medical problems listed below, recent hospitalization x2 due to diverticuli bleed. Referred here via [facility] due to swollen LLE and pain. At ED LE duplex with positive superficial thrombus of LLE from thigh to calf, with evidence of thrombophlebitis. Due to hx of sob. CTA obtained. It confirmed multiple PE>   Due to his bleeding hx I was asked to see the patient. I advised to iniitate the heparin,           Past Medical History:   Diagnosis Date    Ankle fracture, left approx     medial, tibial    Arthritis     knees    Cardiac echocardiogram 2011    Normal LV systolic function. Mild conc LVH. Gr 1 DDfx. RVSP 35-40 mmHg. Marked LAE. Marked LISA. Mild-mod MR.  Mild-mod AI. Mild AoRE. Mild aortic arch dil.  Cardiac Holter monitor study 2011    Baseline sinus rhythm to sinus bradycardia, avg HR 60 bpm (range  bpm). Questionable chronotropic intolerance. No sustained arrhythmias.     Emphysema lung (HCC)     GERD (gastroesophageal reflux disease)     Glaucoma suspect     posterior vitreous detachment b/l, pterygium left eye, aseroid hyalosis / synchisis, b/l pseudophakia    H/O colonoscopy 04/13/2011    1 polyp thermally treated    History of atrial fibrillation 2009    Hypertension     Unspecified adverse effect of anesthesia     H/o A-fib immediately post colonoscopy. Past Surgical History:   Procedure Laterality Date    CARDIAC SURG PROCEDURE UNLIST      hx atrial flutter ablation 2013    COLONOSCOPY N/A 10/6/2017    COLONOSCOPY performed by Melani Guan MD at [address] HX COLONOSCOPY      HX GI      HX KNEE REPLACEMENT Right 02/19/2013    Dr. Pratt Arm HX ORTHOPAEDIC Right 2/13    TKR, Fabio    HX SVT ABLATION  1/2/2013    by Dr. Rosa Herndon EGD [address] SPHNCTR/CARDIA GERD        Prior to Admission medications    Medication Sig Start Date End Date Taking? Authorizing Provider   ascorbic acid, vitamin C, (VITAMIN C) 500 mg tablet Take 1,000 mg by mouth daily. Historical Provider   psyllium husk (METAMUCIL) 3.4 gram/5.4 gram powd Take  by mouth. Historical Provider   ferrous sulfate 325 mg (65 mg iron) tablet Take 1 Tab by mouth daily (with breakfast). 10/16/17   Tamie Silva MD   potassium bicarbonate (KLYTE) 25 mEq tablet Take 1 Tab by mouth two (2) times a day. 10/12/17   Laney Cao MD   hydroCHLOROthiazide (HYDRODIURIL) 25 mg tablet Take 0.5 Tabs by mouth daily. 10/9/17   Jamila Bellamy MD   pantoprazole (PROTONIX) 40 mg tablet Take 1 Tab by mouth daily. 10/5/17   Jamila Bellamy MD   senna-docusate (PERICOLACE) 8.6-50 mg per tablet Take 1 Tab by mouth daily. 10/5/17   Jamila Bellamy MD   metoprolol succinate (TOPROL-XL) 25 mg XL tablet TAKE ONE TABLET BY MOUTH ONCE DAILY 4/17/17   Tamie Silva MD   multivitamin (ONE A DAY) tablet Take 1 Tab by mouth daily.       Historical Provider   glucosamine-D3-boswellia serr (OSTEO BI-FLEX) 1,500-400-100 mg-unit-mg Tab Take 1 Tab by mouth two (2) times a day. Historical Provider   omega-3 fatty acids-vitamin e (FISH OIL) 1,000 mg cap Take 1 Cap by mouth two (2) times a day. Historical Provider     Current Facility-Administered Medications   Medication Dose Route Frequency    iopamidol (ISOVUE-370) 76 % injection  mL   mL IntraVENous RAD ONCE    heparin 25,000 units in D5W 250 ml infusion  18-36 Units/kg/hr IntraVENous TITRATE     No Known Allergies   Social History   Substance Use Topics    Smoking status: Former Smoker     Packs/day: 1.00     Quit date: 1965    Smokeless tobacco: Never Used    Alcohol use Yes      Comment: occasionally. Family History   Problem Relation Age of Onset   [facility] [employer] Father    [employer] Sister     Heart Failure Brother     Diabetes Brother     Cancer Brother      Lung Cancer        Review of Systems:  A comprehensive review of systems was negative except for that written in the HPI. Objective:   Vital Signs:    Visit Vitals    /88    Pulse 74    Temp 97.4 °F (36.3 °C)    Resp 14    Ht 5' 9\" (1.753 m)    Wt 98.9 kg (218 lb)    SpO2 97%    BMI 32.19 kg/m2       O2 Device: Room air       Temp (24hrs), Av.4 °F (36.3 °C), Min:97.4 °F (36.3 °C), Max:97.4 °F (36.3 °C)       Intake/Output:   Last shift:         Last 3 shifts:    No intake or output data in the 24 hours ending 10/21/17 1609  Hemodynamics:   . @MAP     . @CVP       Ventilator Settings:  Mode Rate Tidal Volume Pressure FiO2 PEEP                    Peak airway pressure:      Minute ventilation:        ARDS network Guidelines: Lung protective strategy and Pl pressure goals____________    Physical Exam:    General:  Alert, cooperative, no distress, appears stated age. Head:  Normocephalic, without obvious abnormality, atraumatic. Eyes:  Conjunctivae/corneas clear. PERRL, EOMs intact. Nose: Nares normal. Septum midline. Mucosa normal. No drainage or sinus tenderness.    Throat: Lips, mucosa, and tongue normal. Teeth and gums normal.   Neck: Supple, symmetrical, trachea midline, no adenopathy, thyroid: no enlargment/tenderness/nodules, no carotid bruit and no JVD. Back:   Symmetric, no curvature. ROM normal.   Lungs:   Clear to auscultation bilaterally. Chest wall:  No tenderness or deformity. Heart:  Regular rate and rhythm, S1, S2 normal, no murmur, click, rub or gallop. Abdomen:   Soft, non-tender. Bowel sounds normal. No masses,  No organomegaly. Extremities: Extremities normal, atraumatic, no cyanosis or edema. Pulses: 2+ and symmetric all extremities. Skin: Skin color, texture, turgor normal. No rashes or lesions   Lymph nodes: Cervical, supraclavicular, and axillary nodes normal.   Neurologic: Grossly nonfocal       Data:     Recent Results (from the past 24 hour(s))   CBC WITH AUTOMATED DIFF    Collection Time: 10/21/17  9:39 AM   Result Value Ref Range    WBC 8.7 4.6 - 13.2 K/uL    RBC 3.42 (L) 4.70 - 5.50 M/uL    HGB 10.2 (L) 13.0 - 16.0 g/dL    HCT 31.2 (L) 36.0 - 48.0 %    MCV 91.2 74.0 - 97.0 FL    MCH 29.8 24.0 - 34.0 PG    MCHC 32.7 31.0 - 37.0 g/dL    RDW 15.2 (H) 11.6 - 14.5 %    PLATELET 606 847 - 004 K/uL    MPV 9.3 9.2 - 11.8 FL    NEUTROPHILS 68 40 - 73 %    LYMPHOCYTES 21 21 - 52 %    MONOCYTES 10 3 - 10 %    EOSINOPHILS 1 0 - 5 %    BASOPHILS 0 0 - 2 %    ABS. NEUTROPHILS 5.9 1.8 - 8.0 K/UL    ABS. LYMPHOCYTES 1.8 0.9 - 3.6 K/UL    ABS. MONOCYTES 0.9 0.05 - 1.2 K/UL    ABS. EOSINOPHILS 0.1 0.0 - 0.4 K/UL    ABS.  BASOPHILS 0.0 0.0 - 0.1 K/UL    DF AUTOMATED     METABOLIC PANEL, BASIC    Collection Time: 10/21/17  9:39 AM   Result Value Ref Range    Sodium 132 (L) 136 - 145 mmol/L    Potassium 3.2 (L) 3.5 - 5.5 mmol/L    Chloride 94 (L) 100 - 108 mmol/L    CO2 28 21 - 32 mmol/L    Anion gap 10 3.0 - 18 mmol/L    Glucose 109 (H) 74 - 99 mg/dL    BUN 9 7.0 - 18 MG/DL    Creatinine 0.80 0.6 - 1.3 MG/DL    BUN/Creatinine ratio 11 (L) 12 - 20      GFR est AA >60 >60 ml/min/1.73m2    GFR est non-AA >60 >60 ml/min/1.73m2    Calcium 8.6 8.5 - 10.1 MG/DL   NT-PRO BNP    Collection Time: 10/21/17  9:39 AM   Result Value Ref Range    NT pro- 0 - 1800 PG/ML   D DIMER    Collection Time: 10/21/17  9:39 AM   Result Value Ref Range    D DIMER 16.53 (H) <0.46 ug/ml(FEU)   CARDIAC PANEL,(CK, CKMB & TROPONIN)    Collection Time: 10/21/17  9:39 AM   Result Value Ref Range    CK 29 (L) 39 - 308 U/L    CK - MB <1.0 <3.6 ng/ml    CK-MB Index  0.0 - 4.0 %     CALCULATION NOT PERFORMED WHEN RESULT IS BELOW LINEAR LIMIT    Troponin-I, Qt. <0.02 0.0 - 0.045 NG/ML   PROTHROMBIN TIME + INR    Collection Time: 10/21/17  9:39 AM   Result Value Ref Range    Prothrombin time 13.2 11.5 - 15.2 sec    INR 1.1 0.8 - 1.2     PTT    Collection Time: 10/21/17  9:39 AM   Result Value Ref Range    aPTT 36.8 (H) 23.0 - 36.4 SEC   EKG, 12 LEAD, INITIAL    Collection Time: 10/21/17  9:40 AM   Result Value Ref Range    Ventricular Rate 74 BPM    Atrial Rate 74 BPM    P-R Interval 210 ms    QRS Duration 92 ms    Q-T Interval 406 ms    QTC Calculation (Bezet) 450 ms    Calculated P Axis 79 degrees    Calculated R Axis -20 degrees    Calculated T Axis 48 degrees    Diagnosis       Sinus rhythm with 1st degree AV block  Otherwise normal ECG  When compared with ECG of 09-OCT-2017 02:38,  No significant change was found               Telemetry:AFIB    Imaging:  I have personally reviewed the patients radiographs and have reviewed the reports:      Final result (Exam End: 10/21/2017 12:03 PM) Open        Study Result      CT Pulmonary Angiogram     CPT CODE: 23957     CLINICAL: Blood clot left leg.     COMPARISON: None.     TECHNICAL: Volumetric data acquisition performed through the chest with a  multislice scanner. Reconstructions were created in the axial, coronal, and  sagittal planes.  Coronal and sagittal reconstructions were created using maximal  intensity projection methodology to maximize sensitivity for emboli. Bolus  timing was optimized for a pulmonary embolism evaluation. 100 cc of Isovue-370  were utilized for this examination.         FINDINGS:     The lungs show reticular strands of subsegmental atelectasis or fibrosis. .  Pulmonary vascular opacification is satisfactory. .  Multiple emboli are identified throughout the pulmonary vasculature. There is  convexity of the interventricular septum suggesting right heart strain. .  There is no pleural fluid or pneumothorax. Mediastinum, abd, chest wall there is no mediastinal adenopathy or mass. The liver is cirrhotic. Superficial calcifications are noted on the spleen.     IMPRESSION  IMPRESSION:     Multiple pulmonary emboli bilaterally with right ventricular strain.     See above           All CT scans at this facility are performed using dose optimization technique as  appropriate to the performed exam, to include automated exposure control,  adjustment of the mA and/or kV according to patient's size (Including  appropriate matching for site-specific examinations), or use of iterative  reconstruction technique.         Best practice :           Luke Michel MD  10/21/2017, 4:09 PM

## 2017-10-22 ENCOUNTER — APPOINTMENT (OUTPATIENT)
Dept: GENERAL RADIOLOGY | Age: 82
DRG: 166 | End: 2017-10-22
Attending: SURGERY
Payer: MEDICARE

## 2017-10-22 ENCOUNTER — ANESTHESIA (OUTPATIENT)
Dept: CARDIOTHORACIC SURGERY | Age: 82
DRG: 166 | End: 2017-10-22
Payer: MEDICARE

## 2017-10-22 ENCOUNTER — ANESTHESIA EVENT (OUTPATIENT)
Dept: CARDIOTHORACIC SURGERY | Age: 82
DRG: 166 | End: 2017-10-22
Payer: MEDICARE

## 2017-10-22 LAB
APTT PPP: 134.5 SEC (ref 23–36.4)
APTT PPP: 37.6 SEC (ref 23–36.4)
APTT PPP: 88.6 SEC (ref 23–36.4)
ATRIAL RATE: 74 BPM
CALCULATED P AXIS, ECG09: 79 DEGREES
CALCULATED R AXIS, ECG10: -20 DEGREES
CALCULATED T AXIS, ECG11: 48 DEGREES
DIAGNOSIS, 93000: NORMAL
HCT VFR BLD AUTO: 29.5 % (ref 36–48)
HCT VFR BLD AUTO: 29.9 % (ref 36–48)
HCT VFR BLD AUTO: 31.2 % (ref 36–48)
HGB BLD-MCNC: 10 G/DL (ref 13–16)
HGB BLD-MCNC: 9.5 G/DL (ref 13–16)
HGB BLD-MCNC: 9.9 G/DL (ref 13–16)
P-R INTERVAL, ECG05: 210 MS
Q-T INTERVAL, ECG07: 406 MS
QRS DURATION, ECG06: 92 MS
QTC CALCULATION (BEZET), ECG08: 450 MS
TROPONIN I SERPL-MCNC: <0.02 NG/ML (ref 0–0.04)
VENTRICULAR RATE, ECG03: 74 BPM

## 2017-10-22 PROCEDURE — 74011000258 HC RX REV CODE- 258

## 2017-10-22 PROCEDURE — C1769 GUIDE WIRE: HCPCS | Performed by: SURGERY

## 2017-10-22 PROCEDURE — 85018 HEMOGLOBIN: CPT | Performed by: HOSPITALIST

## 2017-10-22 PROCEDURE — 74011250636 HC RX REV CODE- 250/636

## 2017-10-22 PROCEDURE — 77030018836 HC SOL IRR NACL ICUM -A: Performed by: SURGERY

## 2017-10-22 PROCEDURE — C1894 INTRO/SHEATH, NON-LASER: HCPCS | Performed by: SURGERY

## 2017-10-22 PROCEDURE — C1880 VENA CAVA FILTER: HCPCS | Performed by: SURGERY

## 2017-10-22 PROCEDURE — 76010000107 HC CV SURG FIRST 0.5 HR: Performed by: SURGERY

## 2017-10-22 PROCEDURE — 36415 COLL VENOUS BLD VENIPUNCTURE: CPT | Performed by: HOSPITALIST

## 2017-10-22 PROCEDURE — 74011250637 HC RX REV CODE- 250/637: Performed by: HOSPITALIST

## 2017-10-22 PROCEDURE — 84484 ASSAY OF TROPONIN QUANT: CPT | Performed by: HOSPITALIST

## 2017-10-22 PROCEDURE — 74011250636 HC RX REV CODE- 250/636: Performed by: NURSE ANESTHETIST, CERTIFIED REGISTERED

## 2017-10-22 PROCEDURE — 85730 THROMBOPLASTIN TIME PARTIAL: CPT | Performed by: HOSPITALIST

## 2017-10-22 PROCEDURE — 76060000031 HC ANESTHESIA FIRST 0.5 HR: Performed by: SURGERY

## 2017-10-22 PROCEDURE — 77030003390 HC NDL ANGI MRTM -A: Performed by: SURGERY

## 2017-10-22 PROCEDURE — 93308 TTE F-UP OR LMTD: CPT

## 2017-10-22 PROCEDURE — 76210000006 HC OR PH I REC 0.5 TO 1 HR: Performed by: SURGERY

## 2017-10-22 PROCEDURE — 74011250636 HC RX REV CODE- 250/636: Performed by: EMERGENCY MEDICINE

## 2017-10-22 PROCEDURE — 74011000250 HC RX REV CODE- 250: Performed by: SURGERY

## 2017-10-22 PROCEDURE — 65660000000 HC RM CCU STEPDOWN

## 2017-10-22 DEVICE — FILTER VENA CAVA 7FR 79X65CM -- IGTCFS-65-1-FEM-CELECT-PT: Type: IMPLANTABLE DEVICE | Site: GROIN | Status: FUNCTIONAL

## 2017-10-22 RX ORDER — PROPOFOL 10 MG/ML
INJECTION, EMULSION INTRAVENOUS
Status: DISCONTINUED | OUTPATIENT
Start: 2017-10-22 | End: 2017-10-22 | Stop reason: HOSPADM

## 2017-10-22 RX ORDER — DEXTROSE 50 % IN WATER (D50W) INTRAVENOUS SYRINGE
25-50 AS NEEDED
Status: CANCELLED | OUTPATIENT
Start: 2017-10-22

## 2017-10-22 RX ORDER — SODIUM CHLORIDE 0.9 % (FLUSH) 0.9 %
5-10 SYRINGE (ML) INJECTION AS NEEDED
Status: CANCELLED | OUTPATIENT
Start: 2017-10-22

## 2017-10-22 RX ORDER — DIPHENHYDRAMINE HYDROCHLORIDE 50 MG/ML
12.5 INJECTION, SOLUTION INTRAMUSCULAR; INTRAVENOUS
Status: DISCONTINUED | OUTPATIENT
Start: 2017-10-22 | End: 2017-10-23 | Stop reason: HOSPADM

## 2017-10-22 RX ORDER — LIDOCAINE HYDROCHLORIDE 10 MG/ML
INJECTION, SOLUTION EPIDURAL; INFILTRATION; INTRACAUDAL; PERINEURAL
Status: DISPENSED
Start: 2017-10-22 | End: 2017-10-23

## 2017-10-22 RX ORDER — NALBUPHINE HYDROCHLORIDE 10 MG/ML
5 INJECTION, SOLUTION INTRAMUSCULAR; INTRAVENOUS; SUBCUTANEOUS
Status: DISCONTINUED | OUTPATIENT
Start: 2017-10-22 | End: 2017-10-23 | Stop reason: HOSPADM

## 2017-10-22 RX ORDER — ONDANSETRON 2 MG/ML
4 INJECTION INTRAMUSCULAR; INTRAVENOUS ONCE
Status: ACTIVE | OUTPATIENT
Start: 2017-10-22 | End: 2017-10-23

## 2017-10-22 RX ORDER — LIDOCAINE HYDROCHLORIDE 10 MG/ML
INJECTION, SOLUTION EPIDURAL; INFILTRATION; INTRACAUDAL; PERINEURAL AS NEEDED
Status: DISCONTINUED | OUTPATIENT
Start: 2017-10-22 | End: 2017-10-22 | Stop reason: HOSPADM

## 2017-10-22 RX ORDER — SODIUM CHLORIDE, SODIUM LACTATE, POTASSIUM CHLORIDE, CALCIUM CHLORIDE 600; 310; 30; 20 MG/100ML; MG/100ML; MG/100ML; MG/100ML
75 INJECTION, SOLUTION INTRAVENOUS CONTINUOUS
Status: DISCONTINUED | OUTPATIENT
Start: 2017-10-22 | End: 2017-10-23 | Stop reason: HOSPADM

## 2017-10-22 RX ORDER — INSULIN LISPRO 100 [IU]/ML
INJECTION, SOLUTION INTRAVENOUS; SUBCUTANEOUS ONCE
Status: ACTIVE | OUTPATIENT
Start: 2017-10-22 | End: 2017-10-23

## 2017-10-22 RX ORDER — MAGNESIUM SULFATE 100 %
4 CRYSTALS MISCELLANEOUS AS NEEDED
Status: DISCONTINUED | OUTPATIENT
Start: 2017-10-22 | End: 2017-10-23 | Stop reason: HOSPADM

## 2017-10-22 RX ORDER — FENTANYL CITRATE 50 UG/ML
25 INJECTION, SOLUTION INTRAMUSCULAR; INTRAVENOUS AS NEEDED
Status: DISCONTINUED | OUTPATIENT
Start: 2017-10-22 | End: 2017-10-23 | Stop reason: ALTCHOICE

## 2017-10-22 RX ORDER — DEXTROSE 50 % IN WATER (D50W) INTRAVENOUS SYRINGE
25-50 AS NEEDED
Status: DISCONTINUED | OUTPATIENT
Start: 2017-10-22 | End: 2017-10-23 | Stop reason: HOSPADM

## 2017-10-22 RX ORDER — SODIUM CHLORIDE 0.9 % (FLUSH) 0.9 %
5-10 SYRINGE (ML) INJECTION AS NEEDED
Status: DISCONTINUED | OUTPATIENT
Start: 2017-10-22 | End: 2017-10-23 | Stop reason: HOSPADM

## 2017-10-22 RX ORDER — SODIUM CHLORIDE, SODIUM LACTATE, POTASSIUM CHLORIDE, CALCIUM CHLORIDE 600; 310; 30; 20 MG/100ML; MG/100ML; MG/100ML; MG/100ML
50 INJECTION, SOLUTION INTRAVENOUS CONTINUOUS
Status: CANCELLED | OUTPATIENT
Start: 2017-10-22 | End: 2017-10-23

## 2017-10-22 RX ORDER — HEPARIN SODIUM 200 [USP'U]/100ML
INJECTION, SOLUTION INTRAVENOUS
Status: DISPENSED
Start: 2017-10-22 | End: 2017-10-23

## 2017-10-22 RX ORDER — MIDAZOLAM HYDROCHLORIDE 1 MG/ML
1 INJECTION, SOLUTION INTRAMUSCULAR; INTRAVENOUS
Status: CANCELLED | OUTPATIENT
Start: 2017-10-22

## 2017-10-22 RX ORDER — LIDOCAINE HYDROCHLORIDE 10 MG/ML
0.1 INJECTION, SOLUTION EPIDURAL; INFILTRATION; INTRACAUDAL; PERINEURAL AS NEEDED
Status: CANCELLED | OUTPATIENT
Start: 2017-10-22

## 2017-10-22 RX ORDER — PANTOPRAZOLE SODIUM 40 MG/1
40 TABLET, DELAYED RELEASE ORAL
Status: DISCONTINUED | OUTPATIENT
Start: 2017-10-22 | End: 2017-10-23 | Stop reason: HOSPADM

## 2017-10-22 RX ORDER — IODIXANOL 320 MG/ML
INJECTION, SOLUTION INTRAVASCULAR
Status: DISPENSED
Start: 2017-10-22 | End: 2017-10-23

## 2017-10-22 RX ORDER — MAGNESIUM SULFATE 100 %
4 CRYSTALS MISCELLANEOUS AS NEEDED
Status: CANCELLED | OUTPATIENT
Start: 2017-10-22

## 2017-10-22 RX ORDER — SODIUM CHLORIDE 0.9 % (FLUSH) 0.9 %
5-10 SYRINGE (ML) INJECTION EVERY 8 HOURS
Status: CANCELLED | OUTPATIENT
Start: 2017-10-22

## 2017-10-22 RX ADMIN — PSYLLIUM HUSK 1 PACKET: 3.4 POWDER ORAL at 17:59

## 2017-10-22 RX ADMIN — Medication 1 CAPSULE: at 18:00

## 2017-10-22 RX ADMIN — METOPROLOL SUCCINATE 25 MG: 25 TABLET, EXTENDED RELEASE ORAL at 08:39

## 2017-10-22 RX ADMIN — Medication 10 ML: at 21:36

## 2017-10-22 RX ADMIN — Medication 10 ML: at 05:35

## 2017-10-22 RX ADMIN — Medication 10 ML: at 18:01

## 2017-10-22 RX ADMIN — HYDROCHLOROTHIAZIDE 12.5 MG: 25 TABLET ORAL at 08:39

## 2017-10-22 RX ADMIN — Medication 10 ML: at 00:09

## 2017-10-22 RX ADMIN — POTASSIUM BICARBONATE 25 MEQ: 25 TABLET, EFFERVESCENT ORAL at 17:59

## 2017-10-22 RX ADMIN — SODIUM CHLORIDE, SODIUM LACTATE, POTASSIUM CHLORIDE, AND CALCIUM CHLORIDE 75 ML/HR: 600; 310; 30; 20 INJECTION, SOLUTION INTRAVENOUS at 18:01

## 2017-10-22 RX ADMIN — HEPARIN SODIUM AND DEXTROSE 13 UNITS/KG/HR: 10000; 5 INJECTION INTRAVENOUS at 05:40

## 2017-10-22 RX ADMIN — PROPOFOL 10 MCG/KG/MIN: 10 INJECTION, EMULSION INTRAVENOUS at 14:10

## 2017-10-22 RX ADMIN — HEPARIN SODIUM AND DEXTROSE 13 UNITS/KG/HR: 10000; 5 INJECTION INTRAVENOUS at 07:34

## 2017-10-22 NOTE — ANESTHESIA PREPROCEDURE EVALUATION
Anesthetic History   No history of anesthetic complications            Review of Systems / Medical History  Patient summary reviewed and pertinent labs reviewed    Pulmonary  Within defined limits  COPD: mild               Neuro/Psych   Within defined limits           Cardiovascular  Within defined limits  Hypertension: well controlled        Dysrhythmias            GI/Hepatic/Renal  Within defined limits   GERD: well controlled           Endo/Other  Within defined limits      Morbid obesity, arthritis and anemia     Other Findings   Comments:   Risk Factors for Postoperative nausea/vomiting:       History of postoperative nausea/vomiting? NO       Female? NO       Motion sickness? NO       Intended opioid administration for postoperative analgesia? NO      Smoking Abstinence  Current Smoker? NO  Elective Surgery? YES  Seen preoperatively by anesthesiologist or proxy prior to day of surgery? YES  Pt abstained from smoking 24 hours prior to anesthesia?  N/A           Physical Exam    Airway  Mallampati: II  TM Distance: 4 - 6 cm  Neck ROM: normal range of motion   Mouth opening: Normal     Cardiovascular               Dental    Dentition: Edentulous     Pulmonary                 Abdominal  GI exam deferred       Other Findings            Anesthetic Plan    ASA: 3, emergent  Anesthesia type: MAC            Anesthetic plan and risks discussed with: Patient

## 2017-10-22 NOTE — PROGRESS NOTES
Full consult to follow  disucssed with patient and wife reason for vascular request  Has PE and lower extremity phlebitis (extensive in GSV)  Had recent diverticular bleed  Though anticoagulation is standard treatment, with this recent bleed, it has been suggested to place ivc filter  Explained to wife/pt at bedside this morning, including details of the procedure and reason for this recommendation, and able to do today with Dr Derek Kumari

## 2017-10-22 NOTE — ANESTHESIA POSTPROCEDURE EVALUATION
Post-Anesthesia Evaluation and Assessment    Patient: Coty Petersen Sr. MRN: [id number]  SSN: [id number]    YOB: 1932  Age: 80 y.o. Sex: male       Cardiovascular Function/Vital Signs  Visit Vitals    /68    Pulse 74    Temp 36.8 °C (98.2 °F)    Resp 14    Ht 5' 9\" (1.753 m)    Wt 99.6 kg (219 lb 9.6 oz)    SpO2 99%    BMI 32.43 kg/m2       Patient is status post MAC anesthesia for Procedure(s):  VENA CAVA FILTER INSERTION. Nausea/Vomiting: None    Postoperative hydration reviewed and adequate. Pain:  Pain Scale 1: Numeric (0 - 10) (10/22/17 1127)  Pain Intensity 1: 0 (10/22/17 1127)   Managed    Neurological Status: At baseline    Mental Status and Level of Consciousness: Alert and oriented     Pulmonary Status:   O2 Device: Room air (10/22/17 1432)   Adequate oxygenation and airway patent    Complications related to anesthesia: None    Post-anesthesia assessment completed.  No concerns    Signed By: Shani Bowling CRNA     October 22, 2017

## 2017-10-22 NOTE — ROUTINE PROCESS
Primary Nurse Chepe Stephens RN and Judie Montana RN performed a dual skin assessment on this patient No impairment noted  Tye score is 18

## 2017-10-22 NOTE — ROUTINE PROCESS
Bedside and Verbal shift change report given to LOIDA Orellana (oncoming nurse) by Ines Dominguez (offgoing nurse). Report included the following information SBAR, Kardex, Procedure Summary and Cardiac Rhythm NSR.

## 2017-10-22 NOTE — ROUTINE PROCESS
TRANSFER - IN REPORT:    Verbal report received from [facility]) on [address]  being received from ED(unit) for routine progression of care      Report consisted of patients Situation, Background, Assessment and   Recommendations(SBAR). Information from the following report(s) SBAR, ED Summary, [facility] and Recent Results was reviewed with the receiving nurse. Opportunity for questions and clarification was provided. Assessment completed upon patients arrival to unit and care assumed.

## 2017-10-22 NOTE — H&P
[facility]  ROUTINE H AND PS    Name:  Viviana Addison  MR#:  [id number]  :  1932  Account #:  [de-identified]  Date of Adm:  10/21/2017      The patient has previously been admitted on 10/02 and 10/09/2017  with lower GI bleed, and symptomatic anemia. Initially he was  observed and had stabilization of his hemoglobin and hematocrit, and  was discharged home. He returned on  10/09/2017 with recurrent GI  bleed and colonoscopy was done which showed diverticulosis and  internal hemorrhoids, but did not show a source of his GI bleeding. During his previous hospitalization he did require 4 units of blood to be  transfused for stabilization of his hemoglobin and hematocrit. He states that since discharge on 10/12/2017 he has continued to  have ongoing shortness of breath with any type of exertion. He initially  chalked that up as caused by his anemia and low hemoglobin levels. However, he states that this has not improved over the last 10 days. He states that it only occurs with any type of exertion. He has no  dyspnea with rest. He denies any dyspnea, wheezing, chest pain, or  other constitutional symptoms at night. He denies any further bleeding  since his discharge on 10/12/2017. He states that, since that  discharge, he has been very sedentary at home only getting up to  move around the house infrequently. He states that 2 days ago he  began to develop a lump with tenderness in his left groin. Yesterday he  notes that he had some erythema, swelling, and tightness in his left  calf with tenderness. He thought that this was muscle spasm and  swelling and so he rubbed BenGay on it, which did not seem to help. The patient came back to the emergency department today over  concern for this tenderness and redness in his left calf. CT scan of the  chest was done, which did show a fairly significant clot burden in his  lungs.  I am asked to admit the patient for management of this new  onset pulmonary embolism. He did have a CTA of the chest done on  10/02/2017 which was negative for pulmonary embolism at that time. PAST MEDICAL HISTORY  1. Gastroesophageal reflux disease. 2. Recent lower GI bleed, unknown source. 3. History of atrial fibrillation. 4. Hypertension, benign essential.  5. Bilateral lower extremity arthritis. SURGICAL HISTORY  1. Atrial flutter ablation in . 2. Colonoscopy. 3. Right knee replacement. 4. Tonsillectomy. FAMILY HISTORY: Father and mother are both , father with  a history of cardiac disease and pacemaker placement. He also had a  sister who had a pacemaker. He has a brother with diabetes, a brother  with cancer, and a brother with heart failure. SOCIAL HISTORY: The patient is . His wife is present in the  room at the bedside. He denies alcohol or tobacco use. He denies any  illicit drug use. HE IS A FULL CODE. ALLERGIES: Negative for asthma, hives, eczema, rhinitis. PHYSICAL EXAMINATION  VITAL SIGNS: Temperature 97.4, pulse 86, respiration 18, blood  pressure 121/75. Pulse oximetry 96% on room air. GENERAL: The patient is a well-nourished, 54-year-old male  appearing age appropriate. HEAD: Normocephalic, atraumatic. EENT: Eyes: PERRLA. Ears: Clear. Nose: Clear, no rhinorrhea or  sinusitis. Throat: Clear with no pharyngitis or oral lesions. NECK: Supple without masses or tenderness. No thyromegaly or  lymphadenopathy is present. No JVD is present. No carotid bruits are  present. LUNGS: Clear to auscultation bilaterally. HEART: Regular. No murmurs, rubs or gallops. ABDOMEN: Soft, nontender, nondistended, with bowel sounds present  in all 4 quadrants. EXTREMITIES: Warm. Pulses are patent bilaterally. No lower  extremity edema is present on the right. He has mild edema present on  the left with a cord-like structure palpable posteriorly and medially. SKIN: No lesions or rashes.   NEUROLOGIC: Cranial nerves 2-12 are grossly intact. He has normal  reflexes present in the upper and lower extremities. Muscle strength is  4/5 bilaterally in the upper and lower extremities. The patient is mood  appropriate and shows good thought content and appropriate  judgment. LYMPH: Lymphatic negative for enlarged lymph nodes or  splenectomy. PSYCHIATRIC: Negative for depression or anxiety. ENDOCRINOLOGIC: Negative for sweating, cold, heat intolerance,  polyuria, or polydipsia. LABORATORY DATA: WBC is 8.7, hemoglobin 10.2, hematocrit 31.2,  platelets 598. INR is 1.1. D-dimer is 16.53. Sodium 132, potassium 3.2,  chloride 94, bicarbonate 28. Glucose 109. BUN 9, creatinine 0.80. Troponin I less than 0.02. BNP is 122. CT scan of the chest with contrast is reviewed. This shows multiple  pulmonary emboli bilaterally with right ventricular strain. EKG is  reviewed; this showed sinus rhythm with first-degree AV block. ASSESSMENT  1. Pulmonary embolism with right ventricular strain, complicated by  recent lower gastrointestinal bleed. 2. High risk for anticoagulation. 3. Hypertension, benign essential.  4. Exertional dyspnea. PLAN: The patient is admitted to the cardiac telemetry unit for close  monitoring. Again he represents a fairly significant risk for  anticoagulation at this point due to his recent history of lower GI bleed. I have placed the patient on heparin at this time, and will follow  hemoglobins and hematocrits every 6 hours for any precipitous drop. If, in fact, his hemoglobin and hematocrit suddenly drop precipitously  we will stop the heparin and consider immediate placement of an IVC  filter. It looks at this point like he already has a fairly significant clot  burden. Cardiology has been consulted and 2-D echo has been  ordered, which will be done sometime this weekend. For now the  patient is stable on room air and is not requiring any supplemental  oxygen.  For DVT prophylaxis he is already on full dose heparin at this  point and this should suffice. CODE STATUS: HE IS CURRENTLY FULL CODE. I anticipate discharge home in the next couple of days, once this can  be figured out. I have placed a consult to Dr. Mccrary with  vascular surgery for placement of an IVC filter. This can be discussed  with the primary team tomorrow, as well as the patient and his wife. Again, the patient represents a very high risk with regard to discharge  on any type of anticoagulation, and I think at this point it may be safer  to just place an IVC filter.         Fartun William MD    [facility]  D:  10/21/2017   17:02  T:  10/21/2017   19:35  Job #:  [id number]

## 2017-10-22 NOTE — ROUTINE PROCESS
Patient: Zaid Monae Sr. Age: 80 y.o. Sex: male     Bedside and Verbal shift change report given to Brinda MARISCAL (oncoming nurse) by Casi Valdez RN (offgoing nurse). Report included the following information SBAR, Kardex, MAR and Recent Results. PATIENT GOALS FOR TODAY PATIENT PRIORITIES FOR TODAY   Goals are: monitor PTT pain mgmt ECHO heparin drip  Updated on Whiteboard in Patients Room: no   Patient states his/her priorities are: to better and go home with wife  Updated on Whiteboard in [facility]: no     SITUATION:   Code Status: Full Code Reason for Admission: Pulmonary emboli ([address]  Pulmonary embolism ([address]  Acute pulmonary embolism ([address]   Hospital day: 1 Problem List: Principal Problem:    Pulmonary embolism ([address] (10/21/2017)    Active Problems:    Essential hypertension (1/24/2017)      Dyspnea on exertion (10/21/2017)      Acute pulmonary embolism ([address] (10/21/2017)       Attending Provider:   Angle Henderson MD Allergies: No Known Allergies   BACKGROUND:   Past Medical History:   Past Medical History:   Diagnosis Date    Ankle fracture, left approx 2011    medial, tibial    Arthritis     knees    Cardiac echocardiogram 06/16/2011    Normal LV systolic function. Mild conc LVH. Gr 1 DDfx. RVSP 35-40 mmHg. Marked LAE. Marked LISA. Mild-mod MR.  Mild-mod AI. Mild AoRE. Mild aortic arch dil.  Cardiac Holter monitor study 06/22/2011    Baseline sinus rhythm to sinus bradycardia, avg HR 60 bpm (range  bpm). Questionable chronotropic intolerance. No sustained arrhythmias.     Emphysema lung (HCC)     GERD (gastroesophageal reflux disease)     Glaucoma suspect     posterior vitreous detachment b/l, pterygium left eye, aseroid hyalosis / synchisis, b/l pseudophakia    H/O colonoscopy 04/13/2011    1 polyp thermally treated    History of atrial fibrillation 2009    Hypertension     Unspecified adverse effect of anesthesia     H/o A-fib immediately post colonoscopy. ASSESSMENT:   Neuro:   ,   CV:  Patient on Telemetry: Cardiac/Telemetry Monitor On: Yes    Box Number: [id number]      Patient has a pace maker:   Endocrine   Recent Glucose Results:   Lab Results   Component Value Date/Time     (H) 10/21/2017 09:39 AM        Respiratory:  O2 Device: Room air       Supplemental O2         Incentive Spirometery          GI  Current diet: DIET REGULAR 2 GM NA (House Low NA)                                        Reasons if patient has a fox: n/a   Patient Safety  Restraints:    Family notified:    Other Alternatives: Fall Risk   Total Score: 3   Safety Measures: Bed/Chair-Wheels locked, Bed in low position, Call light within reach, Emergency bedside equipment, Fall prevention (comment), Gripper socks, Side rails X2, Side rails X 3 VTE Prophylaxis                WOUND (if present)  Wound Type:  n/a  Dressing present Dressing Present : No  Wound Concerns/Notes: n/a IV ACCESS     Reasons if patient has a central line: n/a     PAIN  Pain Assessment  Pain Intensity 1: 0 (10/21/17 2347)        Patient Stated Pain Goal: 0  Time of last intervention: no c/o pain  Reassessment Completed: no Last Vitals:  Vitals:    10/21/17 2230 10/21/17 2245 10/22/17 0000 10/22/17 0400   BP: 124/65 133/64 118/75 131/77   Pulse: 74 74 75 71   Resp: 20 21 20 18   Temp:   98.4 °F (36.9 °C) 98.2 °F (36.8 °C)   SpO2: 96% 96% 94% 92%   Weight:       Height:          Last 3 Weights:  Last 3 Recorded Weights in this Encounter    10/21/17 0849   Weight: 98.9 kg (218 lb)     Weight change:   LAB RESULTS  Recent Labs      10/22/17   0420  10/21/17   0939   WBC   --   8.7   HGB  9.5*  10.2*   HCT  29.5*  31.2*   PLT   --   260     Recent Labs      10/21/17   0939   NA  132*   K  3.2*   GLU  109*   BUN  9   CREA  0.80   CA  8.6   INR  1.1      RECOMMENDATIONS AND DISCHARGE PLANNING   1. Discharge plan for patient // Needs or barriers to disharge: home with wife .   2. Estimated Discharge Date: TBD Posted on Whiteboard in Patients Room: no    \"HEALS\" SAFETY CHECK   A safety check occurred in the patient's room between off going nurse and oncoming nurse listed above. The safety check included the below items  Area Items   H  High Alert Meds  - Verify all high alert medication drips (heparin, PCA, etc.)   E  Equipment - Suction is set up for ALL patients (with carissa)  - Red plugs utilized for all equipment (IV pumps, etc.)  - WOWs wiped down at end of shift.  - Room stocked with oxygen, suction, and other unit-specific supplies   A  Alarms - Bed alarm is set for fall risk patients  - Ensure chair alarm is in place and activated if patient is up in a chair   L  Lines - Check IV for any infiltration  - Arreola bag is empty if patient has a Arreola   - Tubing and IV bags are labeled   S  Safety   - Room is clean, patient is clean, and equipment is clean. - Ensure room is clear and free of clutter  - Hallways are clear from equipment besides carts.    - Fall bracelet on for fall risk patients  - Suction is set up for ALL patients (with carissa)  - Isolation precautions followed, supplies available outside room, sign posted   Ashley Tripp RN

## 2017-10-22 NOTE — PERIOP NOTES
TRANSFER - OUT REPORT:    Verbal report given to JANESSA Ansari RN(name) on Anh Godoy Oliva  being transferred to [address] [place] [address](unit) for routine progression of care       Report consisted of patients Situation, Background, Assessment and   Recommendations(SBAR). Information from the following report(s) SBAR, Kardex, OR Summary, Procedure Summary, Intake/Output, MAR, Recent Results and Med Rec Status was reviewed with the receiving nurse. Lines:   Peripheral IV 10/21/17 Left Antecubital (Active)   Site Assessment Clean, dry, & intact 10/22/2017  2:50 PM   Phlebitis Assessment 0 10/22/2017  2:50 PM   Infiltration Assessment 0 10/22/2017  2:50 PM   Dressing Status Clean, dry, & intact 10/22/2017  2:50 PM   Dressing Type Tape;Transparent 10/22/2017  2:50 PM   Hub Color/Line Status Pink; Infusing 10/22/2017  2:50 PM   Alcohol Cap Used Yes 10/21/2017 11:47 PM       Peripheral IV 10/21/17 Right Forearm (Active)   Site Assessment Clean, dry, & intact 10/22/2017  7:36 AM   Phlebitis Assessment 0 10/22/2017  7:36 AM   Infiltration Assessment 0 10/22/2017  7:36 AM   Dressing Status Clean, dry, & intact 10/22/2017  7:36 AM   Dressing Type Transparent 10/22/2017  7:36 AM   Hub Color/Line Status Pink 10/22/2017  7:36 AM   Action Taken Blood drawn 10/21/2017  9:05 PM   Alcohol Cap Used Yes 10/21/2017 11:47 PM        Opportunity for questions and clarification was provided.       Patient transported with:   Registered Nurse

## 2017-10-22 NOTE — PROGRESS NOTES
[facility]  Pulmonary, Critical Care, and Sleep Medicine    Name: Trae Jeter Sr. MRN: [id number]   : 1932 Hospital: [address] [place] [address]   Date: 10/22/2017        Critical Care Initial Patient Consult    Requesting MD:                                                  Reason for CC Consult:  IMPRESSION:   · Acute PE,   · LLE thrombophlebitis  · Diverticuli with recent bleed x2  · P. afib  · Emphysema       RECOMMENDATIONS:   · Continue heparin drip  · IVC filter by vasc. · Given high risk of bleed, may need to consider stopping the anticoagulation after 1 or 2 weeks with heparin,  . I don't think he is good candidate for long term oral anticoagulation. Will need to discuss further with patient and family. For now    · Schedule bronchodilator  · ICS  · No need for abx  · GI proph   · No need for DVT proph as patient will be on heparin drip. Subjective/History: This patient has been seen and evaluated at the request of Dr. Rosie George for Acute PE  Patient is a 80 y.o. male with multiple medical problems listed below, recent hospitalization x2 due to diverticuli bleed. Referred here via patient first due to swollen LLE and pain. At ED LE duplex with positive superficial thrombus of LLE from thigh to calf, with evidence of thrombophlebitis. Due to hx of sob. CTA obtained. It confirmed multiple PE>   Due to his bleeding hx I was asked to see the patient. I advised to iniitate the heparin,  Place IVC filter as I suspect that patient's risk of bleeding is high which anticoagulation will need to be stopped. and will not tolerate another PE     10/22  Pt tolerating heparin drip wiithout evidende of bleed. I have advised yesterday to Dr. Isela Martines ([facility]), long term risk of bleed from anticoagulation will be high given recent bleed x2 requiring hospitalization and transfusion. Advised to place IVC filter.   If patient were to bleed at that point we can stop the anticoagulation. Past Medical History:   Diagnosis Date    Ankle fracture, left approx 2011    medial, tibial    Arthritis     knees    Cardiac echocardiogram 06/16/2011    Normal LV systolic function. Mild conc LVH. Gr 1 DDfx. RVSP 35-40 mmHg. Marked LAE. Marked LISA. Mild-mod MR.  Mild-mod AI. Mild AoRE. Mild aortic arch dil.  Cardiac Holter monitor study 06/22/2011    Baseline sinus rhythm to sinus bradycardia, avg HR 60 bpm (range  bpm). Questionable chronotropic intolerance. No sustained arrhythmias.  Emphysema lung (HCC)     GERD (gastroesophageal reflux disease)     Glaucoma suspect     posterior vitreous detachment b/l, pterygium left eye, aseroid hyalosis / synchisis, b/l pseudophakia    H/O colonoscopy 04/13/2011    1 polyp thermally treated    History of atrial fibrillation 2009    Hypertension     Unspecified adverse effect of anesthesia     H/o A-fib immediately post colonoscopy. Past Surgical History:   Procedure Laterality Date    CARDIAC SURG PROCEDURE UNLIST      hx atrial flutter ablation 2013    COLONOSCOPY N/A 10/6/2017    COLONOSCOPY performed by Barbara Galeas MD at [address] HX COLONOSCOPY      HX GI      HX KNEE REPLACEMENT Right 02/19/2013    Dr. Abran Briscoe HX ORTHOPAEDIC Right 2/13    [facility]    HX SVT ABLATION  1/2/2013    by Dr. Declan Quiroz EGD [facility] SPHNCTR/CARDIA GERD        Prior to Admission medications    Medication Sig Start Date End Date Taking? Authorizing Provider   ascorbic acid, vitamin C, (VITAMIN C) 500 mg tablet Take 1,000 mg by mouth daily. Historical Provider   psyllium husk (METAMUCIL) 3.4 gram/5.4 gram powd Take  by mouth. Historical Provider   ferrous sulfate 325 mg (65 mg iron) tablet Take 1 Tab by mouth daily (with breakfast). 10/16/17   Ramona Jin MD   potassium bicarbonate (KLYTE) 25 mEq tablet Take 1 Tab by mouth two (2) times a day.  10/12/17   Sina Swain Pepe Berg MD   hydroCHLOROthiazide (HYDRODIURIL) 25 mg tablet Take 0.5 Tabs by mouth daily. 10/9/17   Jessica Martinez MD   pantoprazole (PROTONIX) 40 mg tablet Take 1 Tab by mouth daily. 10/5/17   Jessica Martinez MD   senna-docusate (PERICOLACE) 8.6-50 mg per tablet Take 1 Tab by mouth daily. 10/5/17   Jessica Martinez MD   metoprolol succinate (TOPROL-XL) 25 mg XL tablet TAKE ONE TABLET BY MOUTH ONCE DAILY 4/17/17   Vini Husain MD   multivitamin (ONE A DAY) tablet Take 1 Tab by mouth daily. Historical Provider   glucosamine-D3-boswellia serr (OSTEO BI-FLEX) 1,500-400-100 mg-unit-mg Tab Take 1 Tab by mouth two (2) times a day. Historical Provider   omega-3 fatty acids-vitamin e (FISH OIL) 1,000 mg cap Take 1 Cap by mouth two (2) times a day.     Historical Provider     Current Facility-Administered Medications   Medication Dose Route Frequency    heparin 25,000 units in D5W 250 ml infusion  18-36 Units/kg/hr IntraVENous TITRATE    ascorbic acid (vitamin C) (VITAMIN C) tablet 1,000 mg  1,000 mg Oral DAILY    ferrous sulfate tablet 325 mg  325 mg Oral DAILY WITH BREAKFAST    glucosamine-chondroitin (ARTHX) 500-400 mg capsule 1 Cap  1 Cap Oral BID    hydroCHLOROthiazide (HYDRODIURIL) tablet 12.5 mg  12.5 mg Oral DAILY    metoprolol succinate (TOPROL-XL) XL tablet 25 mg  25 mg Oral DAILY    multivitamin, tx-iron-ca-min (THERA-M w/ IRON) tablet 1 Tab  1 Tab Oral DAILY    fish oil-omega-3 fatty acids 340-1,000 mg capsule 1 Cap  1 Cap Oral BID    pantoprazole (PROTONIX) tablet 40 mg  40 mg Oral DAILY    potassium bicarbonate (KLYTE) tablet 25 mEq  25 mEq Oral BID    psyllium husk-aspartame (METAMUCIL FIBER) packet 1 Packet  1 Packet Oral DAILY    senna-docusate (PERICOLACE) 8.6-50 mg per tablet 1 Tab  1 Tab Oral DAILY    sodium chloride (NS) flush 5-10 mL  5-10 mL IntraVENous Q8H     No Known Allergies   Social History   Substance Use Topics    Smoking status: Former Smoker     Packs/day: 1.00 Quit date: 1965    Smokeless tobacco: Never Used    Alcohol use Yes      Comment: occasionally. Family History   Problem Relation Age of Onset   [employer] Father    [employer] Sister     Heart Failure Brother     Diabetes Brother     Cancer Brother      Lung Cancer        Review of Systems:  A comprehensive review of systems was negative except for that written in the HPI. Objective:   Vital Signs:    Visit Vitals    /72 (BP 1 Location: Right arm, BP Patient Position: At rest)    Pulse 71    Temp 97.8 °F (36.6 °C)    Resp 20    Ht 5' 9\" (1.753 m)    Wt 99.6 kg (219 lb 9.6 oz)    SpO2 91%    BMI 32.43 kg/m2       O2 Device: Room air       Temp (24hrs), Av.1 °F (36.7 °C), Min:97.8 °F (36.6 °C), Max:98.4 °F (36.9 °C)       Intake/Output:   Last shift:         Last 3 shifts: 10/20 1901 - 10/22 0700  In: 120 [P.O.:120]  Out: -     Intake/Output Summary (Last 24 hours) at 10/22/17 0914  Last data filed at 10/22/17 0000   Gross per 24 hour   Intake              120 ml   Output                0 ml   Net              120 ml     Hemodynamics:   . @MAP     . @CVP       Ventilator Settings:  Mode Rate Tidal Volume Pressure FiO2 PEEP                    Peak airway pressure:      Minute ventilation:        ARDS network Guidelines: Lung protective strategy and Pl pressure goals____________    Physical Exam:    General:  Alert, cooperative, no distress, appears stated age. Head:  Normocephalic, without obvious abnormality, atraumatic. Eyes:  Conjunctivae/corneas clear. PERRL, EOMs intact. Nose: Nares normal. Septum midline. Mucosa normal. No drainage or sinus tenderness. Throat: Lips, mucosa, and tongue normal. Teeth and gums normal.   Neck: Supple, symmetrical, trachea midline, no adenopathy, thyroid: no enlargment/tenderness/nodules, no carotid bruit and no JVD. Back:   Symmetric, no curvature. ROM normal.   Lungs:   Clear to auscultation bilaterally.    Chest wall:  No tenderness or deformity. Heart:  Regular rate and rhythm, S1, S2 normal, no murmur, click, rub or gallop. Abdomen:   Soft, non-tender. Bowel sounds normal. No masses,  No organomegaly. Extremities: Extremities normal, atraumatic, no cyanosis or edema. Pulses: 2+ and symmetric all extremities. Skin: Skin color, texture, turgor normal. No rashes or lesions   Lymph nodes: Cervical, supraclavicular, and axillary nodes normal.   Neurologic: Grossly nonfocal       Data:     Recent Results (from the past 24 hour(s))   CBC WITH AUTOMATED DIFF    Collection Time: 10/21/17  9:39 AM   Result Value Ref Range    WBC 8.7 4.6 - 13.2 K/uL    RBC 3.42 (L) 4.70 - 5.50 M/uL    HGB 10.2 (L) 13.0 - 16.0 g/dL    HCT 31.2 (L) 36.0 - 48.0 %    MCV 91.2 74.0 - 97.0 FL    MCH 29.8 24.0 - 34.0 PG    MCHC 32.7 31.0 - 37.0 g/dL    RDW 15.2 (H) 11.6 - 14.5 %    PLATELET 998 425 - 985 K/uL    MPV 9.3 9.2 - 11.8 FL    NEUTROPHILS 68 40 - 73 %    LYMPHOCYTES 21 21 - 52 %    MONOCYTES 10 3 - 10 %    EOSINOPHILS 1 0 - 5 %    BASOPHILS 0 0 - 2 %    ABS. NEUTROPHILS 5.9 1.8 - 8.0 K/UL    ABS. LYMPHOCYTES 1.8 0.9 - 3.6 K/UL    ABS. MONOCYTES 0.9 0.05 - 1.2 K/UL    ABS. EOSINOPHILS 0.1 0.0 - 0.4 K/UL    ABS.  BASOPHILS 0.0 0.0 - 0.1 K/UL    DF AUTOMATED     METABOLIC PANEL, BASIC    Collection Time: 10/21/17  9:39 AM   Result Value Ref Range    Sodium 132 (L) 136 - 145 mmol/L    Potassium 3.2 (L) 3.5 - 5.5 mmol/L    Chloride 94 (L) 100 - 108 mmol/L    CO2 28 21 - 32 mmol/L    Anion gap 10 3.0 - 18 mmol/L    Glucose 109 (H) 74 - 99 mg/dL    BUN 9 7.0 - 18 MG/DL    Creatinine 0.80 0.6 - 1.3 MG/DL    BUN/Creatinine ratio 11 (L) 12 - 20      GFR est AA >60 >60 ml/min/1.73m2    GFR est non-AA >60 >60 ml/min/1.73m2    Calcium 8.6 8.5 - 10.1 MG/DL   NT-PRO BNP    Collection Time: 10/21/17  9:39 AM   Result Value Ref Range    NT pro- 0 - 1800 PG/ML   D DIMER    Collection Time: 10/21/17  9:39 AM   Result Value Ref Range    D DIMER 16.53 (H) <0.46 ug/ml(FEU) CARDIAC PANEL,(CK, CKMB & TROPONIN)    Collection Time: 10/21/17  9:39 AM   Result Value Ref Range    CK 29 (L) 39 - 308 U/L    CK - MB <1.0 <3.6 ng/ml    CK-MB Index  0.0 - 4.0 %     CALCULATION NOT PERFORMED WHEN RESULT IS BELOW LINEAR LIMIT    Troponin-I, Qt. <0.02 0.0 - 0.045 NG/ML   PROTHROMBIN TIME + INR    Collection Time: 10/21/17  9:39 AM   Result Value Ref Range    Prothrombin time 13.2 11.5 - 15.2 sec    INR 1.1 0.8 - 1.2     PTT    Collection Time: 10/21/17  9:39 AM   Result Value Ref Range    aPTT 36.8 (H) 23.0 - 36.4 SEC   EKG, 12 LEAD, INITIAL    Collection Time: 10/21/17  9:40 AM   Result Value Ref Range    Ventricular Rate 74 BPM    Atrial Rate 74 BPM    P-R Interval 210 ms    QRS Duration 92 ms    Q-T Interval 406 ms    QTC Calculation (Bezet) 450 ms    Calculated P Axis 79 degrees    Calculated R Axis -20 degrees    Calculated T Axis 48 degrees    Diagnosis       Sinus rhythm with 1st degree AV block  Otherwise normal ECG  When compared with ECG of 09-OCT-2017 02:38,  No significant change was found     PTT    Collection Time: 10/21/17  8:16 PM   Result Value Ref Range    aPTT >180.0 (HH) 23.0 - 36.4 SEC   TROPONIN I    Collection Time: 10/22/17  4:20 AM   Result Value Ref Range    Troponin-I, Qt. <0.02 0.0 - 0.045 NG/ML   HGB & HCT    Collection Time: 10/22/17  4:20 AM   Result Value Ref Range    HGB 9.5 (L) 13.0 - 16.0 g/dL    HCT 29.5 (L) 36.0 - 48.0 %   PTT    Collection Time: 10/22/17  4:20 AM   Result Value Ref Range    aPTT 134.5 (H) 23.0 - 36.4 SEC             Telemetry:AFIB    Imaging:  I have personally reviewed the patients radiographs and have reviewed the reports:      Final result (Exam End: 10/21/2017 12:03 PM) Open        Study Result      CT Pulmonary Angiogram     CPT CODE: 57224     CLINICAL: Blood clot left leg.     COMPARISON: None.     TECHNICAL: Volumetric data acquisition performed through the chest with a  multislice scanner.  Reconstructions were created in the axial, coronal, and  sagittal planes. Coronal and sagittal reconstructions were created using maximal  intensity projection methodology to maximize sensitivity for emboli. Bolus  timing was optimized for a pulmonary embolism evaluation. 100 cc of Isovue-370  were utilized for this examination.         FINDINGS:     The lungs show reticular strands of subsegmental atelectasis or fibrosis. .  Pulmonary vascular opacification is satisfactory. .  Multiple emboli are identified throughout the pulmonary vasculature. There is  convexity of the interventricular septum suggesting right heart strain. .  There is no pleural fluid or pneumothorax. Mediastinum, abd, chest wall there is no mediastinal adenopathy or mass. The liver is cirrhotic. Superficial calcifications are noted on the spleen.     IMPRESSION  IMPRESSION:     Multiple pulmonary emboli bilaterally with right ventricular strain.     See above           All CT scans at this facility are performed using dose optimization technique as  appropriate to the performed exam, to include automated exposure control,  adjustment of the mA and/or kV according to patient's size (Including  appropriate matching for site-specific examinations), or use of iterative  reconstruction technique.         Best practice :           Gina Duong MD  10/22/2017, 4:09 PM

## 2017-10-22 NOTE — CONSULTS
[facility] - Consult Note    Consultation request by Heydi Carrillo MD for advice/opinion related to evaluating Pulmonary emboli [facility])  Pulmonary embolism ([facility] [address]  Acute pulmonary embolism ([facility] [address]  dx    Date of  Admission: 10/21/2017  8:51 AM   Primary Care Physician:  Jimy Morrison MD     Assessment:     Patient Active Problem List   Diagnosis Code    Atrial flutter ([facility] [address] I48.92    Cardiomyopathy ([facility] [address] I42.9    DJD (degenerative joint disease) M19.90    Atrial flutter with rapid ventricular response (HCC) I48.92    S/P ablation of atrial flutter Z98.890, Z86.79    Essential hypertension I10    Lower GI bleed K92.2    Symptomatic anemia D64.9    Syncope and collapse R55    Acute GI bleeding K92.2    Acute lower GI bleeding K92.2    Near syncope R55    GI bleeding K92.2    Obesity E66.9    Pulmonary embolism (HCC) I26.99    Dyspnea on exertion R06.09    Acute pulmonary embolism (HCC) I26.99     -Acute pulmonary embolus'. Patient on heparin drip and no evidence of bleeding noted at this time.  -L leg DVT- patient due to receive IVC filter this am.  -History of pA-fib with ablation on 1/2013 and without recurrence. He has remained on BB therapy and doing well.  -History of biatrial enlargement and transient cardiomyopathy  in 04/2010 likely due to atrial flutter and rapid response. His last echocardiogram was 2011 with normal function. -HTN  -DJD     Plan:     Stable at this time. The patients OLDSH5NTCp score is 3 and he would benefit with long term anticoagulation. This is complicated by his recent bleed noted on the bleeding scan earlier this month. His major bleed risk (HAS-BLED) is 6.5%, which is higher than average population. Would recommend receiving input from GI and pulmonary regarding the use of long term anticoagulation and would defer to their judgement when treating the PE's and DVT.   As for his a-fib; he has remained stable on Bb therapy and has been doing well.  Would watch his H&H carefully while on heparin  Continue his other meds. Echo pending   Will follow. History of Present Illness: This is a 80 y.o. male admitted for Pulmonary emboli ([facility] [address]  Pulmonary embolism ([facility] [address]  Acute pulmonary embolism (HCC)  dx. Patient complains of:  Patient with recent admission for GI bleed from bleeding scan as well as finding diverticulosis. The patient came back to the ER with the complaint of redness and swelling to his L leg and was found to have a DVT along with multiple PE's by CT scan. He is on a heparin drip. Cardiology was asked to see patient to participate in his care and offer opinion on anticoagulation. The patient states that he really feels well with the only outlier being his leg pain. He denies any chest pain, shortness of breath, dizziness or other symptoms. Cardiac risk factors: family history, sedentary life style, male gender, a-fib      Review of Symptoms:  Except as stated above include:  Constitutional:  negative  Respiratory:  negative  Cardiovascular:  negative  Gastrointestinal: negative  Genitourinary:  negative  Musculoskeletal:  Negative  Neurological:  Negative  Dermatological:  Negative  Endocrinological: Negative  Psychological:  Negative    Gastrointestinal: positive for recent GI bleed     Past Medical History:     Past Medical History:   Diagnosis Date    Ankle fracture, left approx 2011    medial, tibial    Arthritis     knees    Cardiac echocardiogram 06/16/2011    Normal LV systolic function. Mild conc LVH. Gr 1 DDfx. RVSP 35-40 mmHg. Marked LAE. Marked LISA. Mild-mod MR.  Mild-mod AI. Mild AoRE. Mild aortic arch dil.  Cardiac Holter monitor study 06/22/2011    Baseline sinus rhythm to sinus bradycardia, avg HR 60 bpm (range  bpm). Questionable chronotropic intolerance. No sustained arrhythmias.    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 Social History:     Social History     Social History    Marital status:      Spouse name: N/A    Number of children: N/A    Years of education: N/A     Social History Main Topics    Smoking status: Former Smoker     Packs/day: 1.00     Quit date: 1/1/1965    Smokeless tobacco: Never Used    Alcohol use Yes      Comment: occasionally.     Drug use: No    Sexual activity: Not Currently     Partners: Female     Other Topics Concern    Not on file     Social History Narrative        Family History:     Family History   Problem Relation Age of Onset    Pacemaker Father     Pacemaker Sister     Heart Failure Brother     Diabetes Brother     Cancer Brother      Lung Cancer        Medications:   No Known Allergies     Current Facility-Administered Medications   Medication Dose Route Frequency    pantoprazole (PROTONIX) tablet 40 mg  40 mg Oral ACB    heparin 25,000 units in D5W 250 ml infusion  18-36 Units/kg/hr IntraVENous TITRATE    ascorbic acid (vitamin C) (VITAMIN C) tablet 1,000 mg  1,000 mg Oral DAILY    ferrous sulfate tablet 325 mg  325 mg Oral DAILY WITH BREAKFAST    glucosamine-chondroitin (ARTHX) 500-400 mg capsule 1 Cap  1 Cap Oral BID    hydroCHLOROthiazide (HYDRODIURIL) tablet 12.5 mg  12.5 mg Oral DAILY    metoprolol succinate (TOPROL-XL) XL tablet 25 mg  25 mg Oral DAILY    multivitamin, tx-iron-ca-min (THERA-M w/ IRON) tablet 1 Tab  1 Tab Oral DAILY    fish oil-omega-3 fatty acids 340-1,000 mg capsule 1 Cap  1 Cap Oral BID    potassium bicarbonate (KLYTE) tablet 25 mEq  25 mEq Oral BID    psyllium husk-aspartame (METAMUCIL FIBER) packet 1 Packet  1 Packet Oral DAILY    senna-docusate (PERICOLACE) 8.6-50 mg per tablet 1 Tab  1 Tab Oral DAILY    sodium chloride (NS) flush 5-10 mL  5-10 mL IntraVENous Q8H    sodium chloride (NS) flush 5-10 mL  5-10 mL IntraVENous PRN    ondansetron (ZOFRAN) injection 4 mg  4 mg IntraVENous Q4H PRN         Physical Exam:     Visit Vitals    /72 (BP 1 Location: Right arm, BP Patient Position: At rest)    Pulse 71    Temp 97.8 °F (36.6 °C)    Resp 20    Ht 5' 9\" (1.753 m)    Wt 99.6 kg (219 lb 9.6 oz)    SpO2 91%    BMI 32.43 kg/m2     BP Readings from Last 3 Encounters:   10/22/17 116/72   10/16/17 136/72   10/12/17 145/69     Pulse Readings from Last 3 Encounters:   10/22/17 71   10/16/17 76   10/12/17 79     Wt Readings from Last 3 Encounters:   10/22/17 99.6 kg (219 lb 9.6 oz)   10/16/17 99.3 kg (219 lb)   10/12/17 104.6 kg (230 lb 9.6 oz)       General:  alert, cooperative, no distress, appears stated age  Neck:  no JVD  Lungs:  clear to auscultation bilaterally  Heart:  regular rate and rhythm, S1, S2 normal, no murmur, click, rub or gallop  Abdomen:  abdomen is soft without significant tenderness, masses, organomegaly or guarding  Extremities:  edema 1+ on L with erythema and superficial cord like prominence to calf  Skin: Warm and dry.  no hyperpigmentation, vitiligo, or suspicious lesions  Neuro: alert, oriented x3, affect appropriate, no focal neurological deficits, moves all extremities well, no involuntary movements  Psych: non focal     Data Review:     Recent Labs      10/22/17   0420  10/21/17   0939   WBC   --   8.7   HGB  9.5*  10.2*   HCT  29.5*  31.2*   PLT   --   260     Recent Labs      10/21/17   0939   NA  132*   K  3.2*   CL  94*   CO2  28   GLU  109*   BUN  9   CREA  0.80   CA  8.6   INR  1.1       Results for orders placed or performed during the hospital encounter of 10/21/17   EKG, 12 LEAD, INITIAL   Result Value Ref Range    Ventricular Rate 74 BPM    Atrial Rate 74 BPM    P-R Interval 210 ms    QRS Duration 92 ms    Q-T Interval 406 ms    QTC Calculation (Bezet) 450 ms    Calculated P Axis 79 degrees    Calculated R Axis -20 degrees    Calculated T Axis 48 degrees    Diagnosis       Sinus rhythm with 1st degree AV block  Otherwise normal ECG  When compared with ECG of 09-OCT-2017 02:38,  No significant change was found     Results for orders placed or performed in visit on 12/15/16   AMB POC EKG ROUTINE W/ 12 LEADS, INTER & REP    Impression    Sinus bradycardia at 54 bpm.  Right atrial enlargement.          All Cardiac Markers in the last 24 hours:    Lab Results   Component Value Date/Time    TROIQ <0.02 10/22/2017 04:20 AM       Last Lipid:    Lab Results   Component Value Date/Time    Cholesterol, total 155 03/28/2017 08:20 AM    HDL Cholesterol 45 03/28/2017 08:20 AM    LDL, calculated 74.2 03/28/2017 08:20 AM    Triglyceride 179 03/28/2017 08:20 AM    CHOL/HDL Ratio 3.4 03/28/2017 08:20 AM       Signed By: PARAMJIT Oneil     October 22, 2017

## 2017-10-22 NOTE — ROUTINE PROCESS
TRANSFER - OUT REPORT:    Verbal report given to Dante Delarosa (name) on [address].  being transferred to [address] (unit) for routine progression of care       Report consisted of patients Situation, Background, Assessment and   Recommendations(SBAR). Information from the following report(s) SBAR, ED Summary and MAR was reviewed with the receiving nurse. Lines:   Peripheral IV 10/21/17 Left Antecubital (Active)   Site Assessment Clean, dry, & intact 10/21/2017  7:00 PM   Phlebitis Assessment 0 10/21/2017  7:00 PM   Infiltration Assessment 0 10/21/2017  7:00 PM   Dressing Status Clean, dry, & intact 10/21/2017  7:00 PM   Dressing Type Transparent 10/21/2017  7:00 PM   Hub Color/Line Status Patent; Flushed 10/21/2017  7:00 PM       Peripheral IV 10/21/17 Right Forearm (Active)   Site Assessment Clean, dry, & intact 10/21/2017  9:05 PM   Phlebitis Assessment 0 10/21/2017  9:05 PM   Infiltration Assessment 0 10/21/2017  9:05 PM   Dressing Status Clean, dry, & intact 10/21/2017  9:05 PM   Dressing Type Transparent 10/21/2017  9:05 PM   Hub Color/Line Status Patent; Flushed 10/21/2017  9:05 PM   Action Taken Blood drawn 10/21/2017  9:05 PM        Opportunity for questions and clarification was provided.       Patient transported with:   Monitor  Registered Nurse

## 2017-10-23 VITALS
RESPIRATION RATE: 18 BRPM | OXYGEN SATURATION: 92 % | WEIGHT: 218.9 LBS | SYSTOLIC BLOOD PRESSURE: 109 MMHG | HEIGHT: 69 IN | HEART RATE: 78 BPM | BODY MASS INDEX: 32.42 KG/M2 | DIASTOLIC BLOOD PRESSURE: 68 MMHG | TEMPERATURE: 98.6 F

## 2017-10-23 LAB
APTT PPP: 39 SEC (ref 23–36.4)
HCT VFR BLD AUTO: 27.6 % (ref 36–48)
HGB BLD-MCNC: 9 G/DL (ref 13–16)

## 2017-10-23 PROCEDURE — 85730 THROMBOPLASTIN TIME PARTIAL: CPT | Performed by: HOSPITALIST

## 2017-10-23 PROCEDURE — 74011250637 HC RX REV CODE- 250/637: Performed by: HOSPITALIST

## 2017-10-23 PROCEDURE — 36415 COLL VENOUS BLD VENIPUNCTURE: CPT | Performed by: HOSPITALIST

## 2017-10-23 PROCEDURE — 85018 HEMOGLOBIN: CPT | Performed by: HOSPITALIST

## 2017-10-23 RX ORDER — WARFARIN SODIUM 5 MG/1
5 TABLET ORAL DAILY
Qty: 30 TAB | Refills: 0 | Status: SHIPPED | OUTPATIENT
Start: 2017-10-23 | End: 2017-10-25 | Stop reason: SDUPTHER

## 2017-10-23 RX ADMIN — STANDARDIZED SENNA CONCENTRATE AND DOCUSATE SODIUM 1 TABLET: 8.6; 5 TABLET, FILM COATED ORAL at 08:54

## 2017-10-23 RX ADMIN — FERROUS SULFATE TAB 325 MG (65 MG ELEMENTAL FE) 325 MG: 325 (65 FE) TAB at 08:55

## 2017-10-23 RX ADMIN — Medication 1000 MG: at 08:56

## 2017-10-23 RX ADMIN — Medication 1 CAPSULE: at 08:54

## 2017-10-23 RX ADMIN — POTASSIUM BICARBONATE 25 MEQ: 25 TABLET, EFFERVESCENT ORAL at 08:56

## 2017-10-23 RX ADMIN — Medication 1 CAPSULE: at 08:55

## 2017-10-23 RX ADMIN — Medication 10 ML: at 06:30

## 2017-10-23 RX ADMIN — MULTIPLE VITAMINS W/ MINERALS TAB 1 TABLET: TAB at 08:55

## 2017-10-23 RX ADMIN — PSYLLIUM HUSK 1 PACKET: 3.4 POWDER ORAL at 08:56

## 2017-10-23 RX ADMIN — PANTOPRAZOLE SODIUM 40 MG: 40 TABLET, DELAYED RELEASE ORAL at 08:00

## 2017-10-23 NOTE — DISCHARGE SUMMARY
Discharge Summary    Patient: Asha Dailey Sr. MRN: [id number]  CSN: [id number]    YOB: 1932  Age: 80 y.o. Sex: male    DOA: 10/21/2017 LOS:  LOS: 2 days   Discharge Date: 10/23/2017     Admission Diagnoses:   DVT/PE    Discharge Diagnoses:    Acute partially occlusive superficial thrombus of LLE veins  Multiple pulmonary emboli  HTN  Advanced age  Severe protein calorie malnutrition  PAF hx s/p ablation  OA/DJD hx    Discharge Condition: Stable    PHYSICAL EXAM  Visit Vitals    /68 (BP 1 Location: Right arm, BP Patient Position: At rest)    Pulse 78    Temp 98.6 °F (37 °C)    Resp 18    Ht 5' 9\" (1.753 m)    Wt 99.3 kg (218 lb 14.4 oz)    SpO2 92%    BMI 32.33 kg/m2       General: In NAD. HEENT: NCAT. Sclerae anicteric, EOMI. Lungs:  Clear, effort nonlabored. Heart:  RRR. Abdomen: Soft, NTTP. Extremities: Warm, no ischemia. Psych:   Mood normal.  Neurologic:  Awake and alert. Motor nonfocal.      Hospital Course:   See admission H&P for full details of HPI. Patient admitted to telemetry bed with cardiology and vascular surgery in consultation. He is well-known by Dr. Gladis Carnes and I personally discussed case with him. IVC filter has been placed without difficulty by vascular surgery. Patient has a history of GI bleed and recommendation has been to resume coumadin with close monitoring. He will follow up with [facility] office for INR check and coumadin dosing as directed. Patient is medically stable for discharge home with outpatient follow up as directed. Consults:   Gastroenterology  Cardiology  Vascular Surgery      Significant Diagnostic Studies:  2D echo:  IMPRESSIONS:  A clinically significant myopathic process is ruled out. There was no   significant valvular heart disease. SUMMARY:  Left ventricle: Systolic function was vigorous. Ejection fraction was   estimated to be 60 %. Suboptimal endocardial  visualization limits wall motion analysis.  Wall thickness was mildly   increased. There was mild concentric hypertrophy. Features were consistent with a pseudonormal left ventricular filling   pattern, with concomitant abnormal relaxation and  increased filling pressure (grade 2 diastolic dysfunction). Right ventricle: The ventricle was moderately to severely dilated. Wall   thickness was mildly to moderately increased. Systolic pressure was mildly increased. Estimated peak pressure was in the   range of 30 mmHg to 35 mmHg. Although there  was no diagnostic evidence for a mass, this study is not adequate to   completely exclude the possibility. Left atrium: The atrium was dilated. There was a possible, medium-sized mass   on the lateral aspect; may benefit from  BALAJI. Right atrium: The atrium was moderately to severely dilated. Mitral valve: There was mild regurgitation. Tricuspid valve: There was mild regurgitation. There was mild pulmonary   hypertension. LE venous PVL:  INTERPRETATION/FINDINGS  Duplex images were obtained using 2-D gray scale, color flow, and  spectral Doppler analysis.     Left leg :  1. No evidence of deep venous thrombosis detected in the veins  visualized. 2. Deep vein(s) visualized include the common femoral, femoral,  popliteal, posterior tibial, and peroneal veins. 3. Acute, partially occlusive superficial venous thrombosis identified   in the proximal, mid, and distal upper thigh great saphenous vein and   acute occlusive at the proximal, mid, and distal calf segment. 4. Superficial vein(s) visualized include the great saphenous vein. .  5. No evidence of deep vein thrombosis in the contralateral common  femoral vein. LE venous PVL:  INTERPRETATION/FINDINGS  Duplex images were obtained using 2-D gray scale, color flow, and  spectral Doppler analysis.     Left leg :  1. No evidence of deep venous thrombosis detected in the veins  visualized.   2. Deep vein(s) visualized include the common femoral, femoral,  popliteal, posterior tibial, and peroneal veins. 3. Acute, partially occlusive superficial venous thrombosis identified   in the proximal, mid, and distal upper thigh great saphenous vein and   acute occlusive at the proximal, mid, and distal calf segment. 4. Superficial vein(s) visualized include the great saphenous vein. .  5. No evidence of deep vein thrombosis in the contralateral common  femoral vein. CXR:  IMPRESSION:     No active or acute cardiopulmonary process is evident. CTA chest:  IMPRESSION:     Multiple pulmonary emboli bilaterally with right ventricular strain. IVC filter placement:  PREOPERATIVE DIAGNOSES  1. Gastrointestinal bleed. 2. Pulmonary embolism. 3. Peripheral thrombus.     POSTOPERATIVE DIAGNOSES  1. Gastrointestinal bleed. 2. Pulmonary embolism. 3. Peripheral thrombus.     PROCEDURES PERFORMED  1. Inferior venacavogram with interpretation. 2. Ultrasound of right femoral artery and vein with interpretation. 3. Placement of inferior vena cava filter. Discharge Medications:     Current Discharge Medication List      START taking these medications    Details   warfarin (COUMADIN) 5 mg tablet Take 1 Tab by mouth daily. Qty: 30 Tab, Refills: 0         CONTINUE these medications which have NOT CHANGED    Details   ascorbic acid, vitamin C, (VITAMIN C) 500 mg tablet Take 1,000 mg by mouth daily. psyllium husk (METAMUCIL) 3.4 gram/5.4 gram powd Take  by mouth. ferrous sulfate 325 mg (65 mg iron) tablet Take 1 Tab by mouth daily (with breakfast). Qty: 30 Tab, Refills: 1      potassium bicarbonate (KLYTE) 25 mEq tablet Take 1 Tab by mouth two (2) times a day. Qty: 60 Tab, Refills: 0      hydroCHLOROthiazide (HYDRODIURIL) 25 mg tablet Take 0.5 Tabs by mouth daily. Qty: 90 Tab, Refills: 2      pantoprazole (PROTONIX) 40 mg tablet Take 1 Tab by mouth daily.   Qty: 10 Tab, Refills: 0      senna-docusate (PERICOLACE) 8.6-50 mg per tablet Take 1 Tab by mouth daily. Qty: 10 Tab, Refills: 0      metoprolol succinate (TOPROL-XL) 25 mg XL tablet TAKE ONE TABLET BY MOUTH ONCE DAILY  Qty: 90 Tab, Refills: 1      multivitamin (ONE A DAY) tablet Take 1 Tab by mouth daily. glucosamine-D3-boswellia serr (OSTEO BI-FLEX) 1,500-400-100 mg-unit-mg Tab Take 1 Tab by mouth two (2) times a day. omega-3 fatty acids-vitamin e (FISH OIL) 1,000 mg cap Take 1 Cap by mouth two (2) times a day. Activity: As tolerated    Diet: Cardiac Diet    Follow-up: with PCP, Mich Velazquez MD in 1 week and Dr. Armando James, cardiology, as directed. [address].  Xochilt Evans MD  [facility]

## 2017-10-23 NOTE — DISCHARGE INSTRUCTIONS
Pulmonary Embolism: Care Instructions  Your Care Instructions  Pulmonary embolism is the sudden blockage of an artery in the lung. Blood clots in the deep veins of the leg or pelvis (deep vein thrombosis, or DVT) are the most common cause. These blood clots can travel to the lungs. Pulmonary embolism can be very serious. Because you have had one pulmonary embolism, you are at greater risk for having another one. But you can take steps to prevent another pulmonary embolism by following your doctor's instructions. You will probably take a prescription blood-thinning medicine to prevent blood clots. A blood thinner can stop a blood clot from growing larger and prevent new clots from forming. Follow-up care is a key part of your treatment and safety. Be sure to make and go to all appointments, and call your doctor if you are having problems. It's also a good idea to know your test results and keep a list of the medicines you take. How can you care for yourself at home? · Take your medicines exactly as prescribed. Call your doctor if you think you are having a problem with your medicine. You will get more details on the specific medicines your doctor prescribes. · If you are taking a blood thinner, be sure you get instructions about how to take your medicine safely. Blood thinners can cause serious bleeding problems. Preventing future pulmonary embolisms  · Exercise. Keep blood moving in your legs to keep clots from forming. If you are traveling by car, stop every hour or so. Get out and walk around for a few minutes. If you are traveling by bus, train, or plane, get out of your seat and walk up and down the aisles every hour or so. You also can do leg exercises while you are seated. Pump your feet up and down by pulling your toes up toward your knees then pointing them down. · Get up out of bed as soon as possible after an illness or surgery. · Do not smoke.  If you need help quitting, talk to your doctor about stop-smoking programs and medicines. These can increase your chances of quitting for good. · Check with your doctor before taking hormone or birth control pills. These may increase your risk of blot clots. · Ask your doctor about wearing compression stockings to help prevent blood clots in your legs. You can buy these with a prescription at medical supply stores and some drugstores. When should you call for help? Call 911 anytime you think you may need emergency care. For example, call if:  · You have shortness of breath. · You have chest pain. · You passed out (lost consciousness). · You cough up blood. Call your doctor now or seek immediate medical care if:  · You have new or worsening pain or swelling in your leg. Watch closely for changes in your health, and be sure to contact your doctor if:  · You do not get better as expected. Where can you learn more? Go to http://fouzia-bere.info/. Enter H051 in the search box to learn more about \"Pulmonary Embolism: Care Instructions. \"  Current as of: June 4, 2016  Content Version: 11.3  © 7057-3793 [employer]. Care instructions adapted under license by [employer] (which disclaims liability or warranty for this information). If you have questions about a medical condition or this instruction, always ask your healthcare professional. [place] [address] any warranty or liability for your use of this information. Shortness of Breath: Care Instructions  Your Care Instructions  Shortness of breath has many causes. Sometimes conditions such as anxiety can lead to shortness of breath. Some people get mild shortness of breath when they exercise. Trouble breathing also can be a symptom of a serious problem, such as asthma, lung disease, emphysema, heart problems, and pneumonia. If your shortness of breath continues, you may need tests and treatment.  Watch for any changes in your breathing and other symptoms. Follow-up care is a key part of your treatment and safety. Be sure to make and go to all appointments, and call your doctor if you are having problems. Its also a good idea to know your test results and keep a list of the medicines you take. How can you care for yourself at home? · Do not smoke or allow others to smoke around you. If you need help quitting, talk to your doctor about stop-smoking programs and medicines. These can increase your chances of quitting for good. · Get plenty of rest and sleep. · Take your medicines exactly as prescribed. Call your doctor if you think you are having a problem with your medicine. · Find healthy ways to deal with stress. ¨ Exercise daily. ¨ Get plenty of sleep. ¨ Eat regularly and well. When should you call for help? Call 911 anytime you think you may need emergency care. For example, call if:  · You have severe shortness of breath. · You have symptoms of a heart attack. These may include:  ¨ Chest pain or pressure, or a strange feeling in the chest.  ¨ Sweating. ¨ Shortness of breath. ¨ Nausea or vomiting. ¨ Pain, pressure, or a strange feeling in the back, neck, jaw, or upper belly or in one or both shoulders or arms. ¨ Lightheadedness or sudden weakness. ¨ A fast or irregular heartbeat. After you call 911, the  may tell you to chew 1 adult-strength or 2 to 4 low-dose aspirin. Wait for an ambulance. Do not try to drive yourself. Call your doctor now or seek immediate medical care if:  · Your shortness of breath gets worse or you start to wheeze. Wheezing is a high-pitched sound when you breathe. · You wake up at night out of breath or have to prop your head up on several pillows to breathe. · You are short of breath after only light activity or while at rest.  Watch closely for changes in your health, and be sure to contact your doctor if:  · You do not get better over the next 1 to 2 days. Where can you learn more?   Go to http://fouzia-bere.info/. Enter S780 in the search box to learn more about \"Shortness of Breath: Care Instructions. \"  Current as of: 2017  Content Version: 11.3  © 8292-3724 [employer]. Care instructions adapted under license by [employer] (which disclaims liability or warranty for this information). If you have questions about a medical condition or this instruction, always ask your healthcare professional. [place] [address] any warranty or liability for your use of this information. Patient armband removed and shredded    MyChart Activation    Thank you for requesting access to [employer]. Please follow the instructions below to securely access and download your online medical record. [employer] allows you to send messages to your doctor, view your test results, renew your prescriptions, schedule appointments, and more. How Do I Sign Up? 1. In your internet browser, go to [email or web address]  2. Click on the First Time User? Click Here link in the Sign In box. You will be redirect to the New Member Sign Up page. 3. Enter your [employer] Access Code exactly as it appears below. You will not need to use this code after youve completed the sign-up process. If you do not sign up before the expiration date, you must request a new code. [employer] Access Code: [email or web address]  Expires: 1/3/2018 11:00 AM (This is the date your [employer] access code will )    4. Enter the last four digits of your Social Security Number (xxxx) and Date of Birth (mm/dd/yyyy) as indicated and click Submit. You will be taken to the next sign-up page. 5. Create a Viagogo ID. This will be your [employer] login ID and cannot be changed, so think of one that is secure and easy to remember. 6. Create a Vi[employer] password. You can change your password at any time. 7. Enter your Password Reset Question and Answer.  This can be used at a later time if you forget your password. 8. Enter your e-mail address. You will receive e-mail notification when new information is available in [address] 9. Click Sign Up. You can now view and download portions of your medical record. 10. Click the Download Summary menu link to download a portable copy of your medical information. Additional Information    If you have questions, please visit the Frequently Asked Questions section of the [employer] website at [email or web address] Remember, [employer] is NOT to be used for urgent needs. For medical emergencies, dial 911. DISCHARGE SUMMARY from Nurse    The following personal items are in your possession at time of discharge:    Dental Appliances: At bedside  Visual Aid: Glasses, At bedside     Home Medications: None  Jewelry: None  Clothing: None  Other Valuables: None             PATIENT INSTRUCTIONS:    After general anesthesia or intravenous sedation, for 24 hours or while taking prescription Narcotics:  · Limit your activities  · Do not drive and operate hazardous machinery  · Do not make important personal or business decisions  · Do  not drink alcoholic beverages  · If you have not urinated within 8 hours after discharge, please contact your surgeon on call. Report the following to your surgeon:  · Excessive pain, swelling, redness or odor of or around the surgical area  · Temperature over 100.5  · Nausea and vomiting lasting longer than 4 hours or if unable to take medications  · Any signs of decreased circulation or nerve impairment to extremity: change in color, persistent  numbness, tingling, coldness or increase pain  · Any questions        What to do at Home:  Recommended activity: Activity as tolerated    If you experience any of the following symptoms Nausea, vomiting, diarrhea, fever greater than 100.5, dizziness, severe headache, shortness of breath, chest pain, increased pain, please follow up with PCP.       *  Please give a list of your current medications to your Primary Care Provider. *  Please update this list whenever your medications are discontinued, doses are      changed, or new medications (including over-the-counter products) are added. *  Please carry medication information at all times in case of emergency situations. These are general instructions for a healthy lifestyle:    No smoking/ No tobacco products/ Avoid exposure to second hand smoke    Surgeon General's Warning:  Quitting smoking now greatly reduces serious risk to your health. Obesity, smoking, and sedentary lifestyle greatly increases your risk for illness    A healthy diet, regular physical exercise & weight monitoring are important for maintaining a healthy lifestyle    You may be retaining fluid if you have a history of heart failure or if you experience any of the following symptoms:  Weight gain of 3 pounds or more overnight or 5 pounds in a week, increased swelling in our hands or feet or shortness of breath while lying flat in bed. Please call your doctor as soon as you notice any of these symptoms; do not wait until your next office visit. Recognize signs and symptoms of STROKE:    F-face looks uneven    A-arms unable to move or move unevenly    S-speech slurred or non-existent    T-time-call 911 as soon as signs and symptoms begin-DO NOT go       Back to bed or wait to see if you get better-TIME IS BRAIN. Warning Signs of HEART ATTACK     Call 911 if you have these symptoms:   Chest discomfort. Most heart attacks involve discomfort in the center of the chest that lasts more than a few minutes, or that goes away and comes back. It can feel like uncomfortable pressure, squeezing, fullness, or pain.  Discomfort in other areas of the upper body. Symptoms can include pain or discomfort in one or both arms, the back, neck, jaw, or stomach.  Shortness of breath with or without chest discomfort.    Other signs may include breaking out in a cold sweat, nausea, or lightheadedness. Don't wait more than five minutes to call 911 - MINUTES MATTER! Fast action can save your life. Calling 911 is almost always the fastest way to get lifesaving treatment. Emergency Medical Services staff can begin treatment when they arrive -- up to an hour sooner than if someone gets to the hospital by car. The discharge information has been reviewed with the patient. The patient verbalized understanding. Discharge medications reviewed with the patient and appropriate educational materials and side effects teaching were provided.

## 2017-10-23 NOTE — PROGRESS NOTES
[facility]  -  Progress Note      Patient: Zaid Monae Sr. MRN: [id number]  SSN: [id number]    YOB: 1932  Age: 80 y.o. Sex: male      Admit Date: 10/21/2017    Assessment:     Hospital Problems  Date Reviewed: 10/22/2017          Codes Class Noted POA    * (Principal)Pulmonary embolism ([facility] [address] ICD-10-CM: I26.99  ICD-9-CM: 415.19  10/21/2017 Unknown        Dyspnea on exertion ICD-10-CM: R06.09  ICD-9-CM: 786.09  10/21/2017 Yes        Acute pulmonary embolism ([facility] [address] ICD-10-CM: I26.99  ICD-9-CM: 415.19  10/21/2017 Unknown        Essential hypertension ICD-10-CM: I10  ICD-9-CM: 401.9  1/24/2017 Yes            -Acute pulmonary embolus'. Patient on heparin drip and no evidence of bleeding noted at this time.  -L leg DVT- patient due to receive IVC filter this am.  -L leg superficial thrombophlebitis- by US yesterday  -History of pA-fib with ablation on 1/2013 and without recurrence. He has remained on BB therapy and doing well.  -History of biatrial enlargement and transient cardiomyopathy  in 04/2010 likely due to atrial flutter and rapid response.  His last echocardiogram was 2011 with normal function. -HTN  -DJD    Plan:     Patient currently off heparin, and have seen note from pulmonary with GI input pending. Would continue with his current medications for now. Long term oral anticoagulation with family and pulmonary still pending. CV status is otherwise stable    Subjective:     No new complaints.      Objective:      Patient Vitals for the past 8 hrs:   Temp Pulse Resp BP SpO2   10/23/17 0400 98.7 °F (37.1 °C) 69 18 120/69 92 %   10/23/17 0000 98.4 °F (36.9 °C) 72 18 130/75 92 %         Patient Vitals for the past 96 hrs:   Weight   10/23/17 0701 99.3 kg (218 lb 14.4 oz)   10/22/17 0713 99.6 kg (219 lb 9.6 oz)   10/21/17 0849 98.9 kg (218 lb)         Intake/Output Summary (Last 24 hours) at 10/23/17 0794  Last data filed at 10/23/17 0640   Gross per 24 hour   Intake 1647.75 ml   Output              550 ml   Net          1097.75 ml       Physical Exam:  General:  alert, cooperative, no distress, appears stated age  Neck:  no JVD  Lungs:  clear to auscultation bilaterally  Heart:  regular rate and rhythm, S1, S2 normal, no murmur, click, rub or gallop  Extremities:  Erythema with tenderness to the L medial calf area    Data Review:     Labs: Results:       Chemistry Recent Labs      10/21/17   0939   GLU  109*   NA  132*   K  3.2*   CL  94*   CO2  28   BUN  9   CREA  0.80   CA  8.6   AGAP  10   BUCR  11*      CBC w/Diff Recent Labs      10/23/17   0411  10/22/17   1850  10/22/17   1136   10/21/17   0939   WBC   --    --    --    --   8.7   RBC   --    --    --    --   3.42*   HGB  9.0*  10.0*  9.9*   < >  10.2*   HCT  27.6*  31.2*  29.9*   < >  31.2*   PLT   --    --    --    --   260   GRANS   --    --    --    --   68   LYMPH   --    --    --    --   21   EOS   --    --    --    --   1    < > = values in this interval not displayed. Cardiac Enzymes No results found for: CPK, CK, CKMMB, CKMB, RCK3, CKMBT, CKNDX, CKND1, RALPH, TROPT, TROIQ, JEFF, TROPT, TNIPOC, BNP, BNPP   Coagulation Recent Labs      10/23/17   0411  10/22/17   1850   10/21/17   0939   PTP   --    --    --   13.2   INR   --    --    --   1.1   APTT  39.0*  37.6*   < >  36.8*    < > = values in this interval not displayed. Lipid Panel Lab Results   Component Value Date/Time    Cholesterol, total 155 03/28/2017 08:20 AM    HDL Cholesterol 45 03/28/2017 08:20 AM    LDL, calculated 74.2 03/28/2017 08:20 AM    VLDL, calculated 35.8 03/28/2017 08:20 AM    Triglyceride 179 03/28/2017 08:20 AM    CHOL/HDL Ratio 3.4 03/28/2017 08:20 AM      BNP No results found for: BNP, BNPP, XBNPT   Liver Enzymes No results for input(s): TP, ALB, TBIL, AP, SGOT, GPT in the last 72 hours.     No lab exists for component: DBIL   Digoxin    Thyroid Studies Lab Results   Component Value Date/Time    TSH 2.41 03/28/2017 08:20 AM

## 2017-10-23 NOTE — PROGRESS NOTES
NUTRITION    Nursing Referral: [facility]      RECOMMENDATIONS / PLAN:     - Add supplement: Ensure Enlive BID  - Update beverage preferences in diet order  - Continue RD inpatient monitoring and evaluation. NUTRITION INTERVENTIONS & DIAGNOSIS:     [x] Meals/Snacks: modified diet  [x] Medical food supplementation: add     Nutrition Diagnosis:  Unintentional weight loss related to inadequate oral intake as evidenced by wt loss of 12 lb (5.2%) x 2.5 weeks PTA    Patient meets criteria for Severe Protein Calorie Malnutrition as evidenced by:   ASPEN Malnutrition Criteria  Acute Illness, Chronic Illness, or Social/Enviornmental: Acute illness  Weight Loss: Greater than 5% x 1 mo  Muscle Mass: Moderate (slight depression on temples)  ASPEN Malnutrition Score - Acute Illness: 12  Acute Illness - Malnutrition Diagnosis: Severe malnutrition. ASSESSMENT:     Pt reported good appetite and meal intake PTA. Poor po dinner last night due to dislike of meal; did have good po intake breakfast today. Reported unplanned wt loss PTA. Discussed adding nutrition supplement; pt agreed with plan. Beverage preferences discussed.  Nutrition focused physical exam conducted    Average po intake adequate to meet patients estimated nutritional needs:   [] Yes     [] No   [x] Unable to determine at this time    Diet: DIET REGULAR 2 GM NA (House Low NA)      Food Allergies: none known   Current Appetite:   [x] Good     [] Fair     [] Poor     [] Other:  Appetite/meal intake prior to admission:   [x] Good     [] Fair     [] Poor     [] Other:  Feeding Limitations:  [] Swallowing difficulty    [] Chewing difficulty    [] Other:  Current Meal Intake: Patient Vitals for the past 100 hrs:   % Diet Eaten   10/22/17 1849 25 %   10/22/17 0736 0 %       BM:  10/23 - formed  Skin Integrity: catheter entry/exit right groin  Edema: none  Pertinent Medications: Reviewed    Recent Labs      10/21/17   0939   NA  132*   K  3.2*   CL  94*   CO2  28   GLU  109* BUN  9   CREA  0.80   CA  8.6       Intake/Output Summary (Last 24 hours) at 10/23/17 1132  Last data filed at 10/23/17 0934   Gross per 24 hour   Intake          1647.75 ml   Output              700 ml   Net           947.75 ml       Anthropometrics:  Ht Readings from Last 1 Encounters:   10/21/17 5' 9\" (1.753 m)     Last 3 Recorded Weights in this Encounter    10/21/17 0849 10/22/17 0713 10/23/17 0701   Weight: 98.9 kg (218 lb) 99.6 kg (219 lb 9.6 oz) 99.3 kg (218 lb 14.4 oz)     Body mass index is 32.33 kg/(m^2). Weight History:  Pt reported usual body weight weight is 244 lb; unplanned wt loss of 26 lb (10.6%) x 6 months, 6 days PTA. 12 lb (5.2%) wt loss in past 2.5 weeks PTA per chart hx. Weight Metrics 10/23/2017 10/16/2017 10/12/2017 10/6/2017 4/17/2017 4/17/2017 3/13/2017   Weight 218 lb 14.4 oz 219 lb 230 lb 9.6 oz 230 lb 6.1 oz 241 lb 241 lb 244 lb   BMI 32.33 kg/m2 31.42 kg/m2 33.09 kg/m2 33.53 kg/m2 35.59 kg/m2 35.59 kg/m2 36.03 kg/m2        Admitting Diagnosis: Pulmonary emboli (HCC)  Pulmonary embolism ([facility] [address]  Acute pulmonary embolism (HCC)  dx  Pertinent PMHx: GERD, recent lower GI bleed, HTN    Education Needs:        [x] None identified  [] Identified - Not appropriate at this time  []  Identified and addressed - refer to education log  Learning Limitations:   [x] None identified  [] Identified    Cultural, Scientology & ethnic food preferences:  [x] None identified    [] Identified and addressed     ESTIMATED NUTRITION NEEDS:     Calories: 8500-0439 kcal (MSJx1.2-1.3) based on  [x] Actual BW: 99 kg      [] IBW   Protein: 79-99 gm (0.8-1 gm/kg) based on  [x] Actual BW      [] IBW   Fluid: 1 mL/kcal     MONITORING & EVALUATION:     Nutrition Goal(s):   1. Po intake of meals will meet >75% of patient estimated nutritional needs within the next 7 days.   Outcome:  [] Met/Ongoing    []  Not Met    [x] New/Initial Goal     Monitoring:   [x] Diet tolerance   [x] Meal intake   [x] Supplement intake   [] GI symptoms/ability to tolerate po diet   [] Respiratory status   [] Plan of care      Previous Recommendations (for follow-up assessments only):     []   Implemented       []   Not Implemented (RD to address)     [] No Recommendation Made     Discharge Planning:  Cardiac diet  [x] Participated in care planning, discharge planning, & interdisciplinary rounds as appropriate      Diane Farley, [address] [place] [address]   Pager: [phone]

## 2017-10-23 NOTE — CONSULTS
[email or web address]  [phone]              GI Attending note:  Discussed with LESLIE Aldana. Agree with assessment and plan as outlined. Jeri Montoya MD  [facility].  [email or web address]  Phone: [phone]  Pager: [phone]  Cell: [phone]. [email or web address]            Impression:   1. Acute PE- s/p IVC filter  2. LE thrombophlebitis  3. H/o Gi bleed- 10/6/17 colonoscopy- diverticulosis,  Moderate in degree, involving the entire colon hemorrhoids internal, Moderate in size, no bleeding anywhere   4. HTN  5. COPD-emphysema  6. H/o afib- s/p ablation  7. H/o GERD  8. Anemia- H/H today 9/27. 6- No active GI hemorrhage  -10/22/17 H/H 10/31.2    Plan:     1. Continue monitoring H/H transfuse hct<23. Continue PPI  2. Continue monitoring for active Gi hemorrhage  Await further reccommendation from attending for [facility] [place]      Chief Complaint: [facility]ation for [facility] [place]      HPI:  Dora Huerta. is a 80 y.o. male who is being seen on consult for [facility]ation for [facility] [place]. Pt initially presented to [facility] for WOODY and Left calf tenderness. Pt found to have to have PE on Ct scan. Pt recently admitted to [facility] for diverticular bleed on 10/2/17 and again on 10/09/17. Colonoscopy was done results noted above. Presently pt denies melena, hematochezia, hematemesis. PMH:   Past Medical History:   Diagnosis Date    Ankle fracture, left approx 2011    medial, tibial    Arthritis     knees    Cardiac echocardiogram 06/16/2011    Normal LV systolic function. Mild conc LVH. Gr 1 DDfx. RVSP 35-40 mmHg. Marked LAE. Marked LISA. Mild-mod MR.  Mild-mod AI. Mild AoRE. Mild aortic arch dil.  Cardiac Holter monitor study 06/22/2011    Baseline sinus rhythm to sinus bradycardia, avg HR 60 bpm (range  bpm). Questionable chronotropic intolerance. No sustained arrhythmias.     Emphysema lung (HCC)     GERD (gastroesophageal reflux disease)     Glaucoma suspect     posterior vitreous detachment b/l, pterygium left eye, aseroid hyalosis / synchisis, b/l pseudophakia    H/O colonoscopy 04/13/2011    1 polyp thermally treated    History of atrial fibrillation 2009    Hypertension     Unspecified adverse effect of anesthesia     H/o A-fib immediately post colonoscopy. PSH:   Past Surgical History:   Procedure Laterality Date    CARDIAC SURG PROCEDURE UNLIST      hx atrial flutter ablation 2013    COLONOSCOPY N/A 10/6/2017    COLONOSCOPY performed by Melani Guan MD at [address] HX COLONOSCOPY      HX GI      HX KNEE REPLACEMENT Right 02/19/2013    Dr. Pratt Arm HX ORTHOPAEDIC Right 2/13    TKR, [place]    HX SVT ABLATION  1/2/2013    by Dr. Rosa Herndon EGD [facility] SPHNCTR/CARDIA GERD         Social HX:   Social History     Social History    Marital status:      Spouse name: N/A    Number of children: N/A    Years of education: N/A     Occupational History    Not on file. Social History Main Topics    Smoking status: Former Smoker     Packs/day: 1.00     Quit date: 1/1/1965    Smokeless tobacco: Never Used    Alcohol use Yes      Comment: occasionally.  Drug use: No    Sexual activity: Not Currently     Partners: Female     Other Topics Concern    Not on file     Social History Narrative       FHX:   Family History   Problem Relation Age of Onset    Pacemaker Father     Pacemaker Sister     Heart Failure Brother     Diabetes Brother     Cancer Brother      Lung Cancer       Allergy:   No Known Allergies    Home Medications:     Prescriptions Prior to Admission   Medication Sig    ascorbic acid, vitamin C, (VITAMIN C) 500 mg tablet Take 1,000 mg by mouth daily.  psyllium husk (METAMUCIL) 3.4 gram/5.4 gram powd Take  by mouth.  ferrous sulfate 325 mg (65 mg iron) tablet Take 1 Tab by mouth daily (with breakfast).     potassium bicarbonate (KLYTE) 25 mEq tablet Take 1 Tab by mouth two (2) times a day.    hydroCHLOROthiazide (HYDRODIURIL) 25 mg tablet Take 0.5 Tabs by mouth daily.  pantoprazole (PROTONIX) 40 mg tablet Take 1 Tab by mouth daily.  senna-docusate (PERICOLACE) 8.6-50 mg per tablet Take 1 Tab by mouth daily.  metoprolol succinate (TOPROL-XL) 25 mg XL tablet TAKE ONE TABLET BY MOUTH ONCE DAILY    multivitamin (ONE A DAY) tablet Take 1 Tab by mouth daily.  glucosamine-D3-boswellia serr (OSTEO BI-FLEX) 1,500-400-100 mg-unit-mg Tab Take 1 Tab by mouth two (2) times a day.  omega-3 fatty acids-vitamin e (FISH OIL) 1,000 mg cap Take 1 Cap by mouth two (2) times a day. Review of Systems:     A fourteen point review of systems was obtained, and was negative except per HPI. Visit Vitals    /69 (BP 1 Location: Right arm, BP Patient Position: Supine)    Pulse 86    Temp 97.4 °F (36.3 °C)    Resp 19    Ht 5' 9\" (1.753 m)    Wt 99.3 kg (218 lb 14.4 oz)    SpO2 91%    BMI 32.33 kg/m2       Physical Assessment:     constitutional: appearance: well developed, well nourished, in no acute distress. skin: no rashes, jaundice  eyes: inspection: normal conjunctivae and lids; no scleral icterus pupils: equal, round, reactive to light; extraocular movements intact  ENMT: mouth: mucous membranes moist, no thrush  neck:  no masses or tenderness; normal range of motion  respiratory: breath sounds clear, no wheeze/rales/rhonchi  cardiovascular: normal rate, regular rhythm without murmur  abdominal: soft, bowel sounds present, non-tender to palpation without rebound or guarding; no appreciable mass; no appreciable hepatosplenomegaly; no appreciable fluid wave  extremities: no clubbing, cyanosis, edema  neurologic:  Cranial nerves II-XII grossly intact; no asterixis   psychiatric:  oriented to time, space and person.         Basic Metabolic Profile   Recent Labs      10/21/17   0939   NA  132*   K  3.2*   CL  94*   CO2  28   BUN  9   GLU  109*   CA  8.6         CBC w/Diff Recent Labs      10/23/17   0411   10/21/17   0939   WBC   --    --   8.7   RBC   --    --   3.42*   HGB  9.0*   < >  10.2*   HCT  27.6*   < >  31.2*   MCV   --    --   91.2   MCH   --    --   29.8   MCHC   --    --   32.7   RDW   --    --   15.2*   PLT   --    --   260    < > = values in this interval not displayed. Recent Labs      10/21/17   0939   GRANS  68   LYMPH  21   EOS  1        Hepatic Function   No results for input(s): ALB, TP, TBILI, GPT, SGOT, AP, AML, LPSE in the last 72 hours. No lab exists for component: MAXIMO Powers NP  [facility]  [email or web address]

## 2017-10-23 NOTE — PROGRESS NOTES
[facility]  Progress Note    Patient: Tal Colon Sr. Age: 80 y.o. : 1932 MR#: [id number] SSN: [id number]  Date/Time: 10/22/2017 1:01 PM    Subjective/24-hour events:     Chart reviewed. Patient off floor for procedure. Assessment:   Acute LLE DVT with PE  HTN  Advanced age  PAF hx s/p ablation    Plan:  IV heparin as ordered. IVC filter placement today. Will ask GI to see.     Case discussed with:  [x]Patient  []Family  []Nursing  []Case Management  DVT Prophylaxis:  []Lovenox  []Hep SQ  []SCDs  []Coumadin   [x]On Heparin gtt    Objective:   VS:   Visit Vitals    /69 (BP 1 Location: Right arm, BP Patient Position: Supine)    Pulse 86    Temp 97.4 °F (36.3 °C)    Resp 19    Ht 5' 9\" (1.753 m)    Wt 99.3 kg (218 lb 14.4 oz)    SpO2 91%    BMI 32.33 kg/m2      Tmax/24hrs: Temp (24hrs), Av.1 °F (36.7 °C), Min:97.4 °F (36.3 °C), Max:98.7 °F (37.1 °C)      Labs:    Recent Results (from the past 24 hour(s))   HGB & HCT    Collection Time: 10/22/17 11:36 AM   Result Value Ref Range    HGB 9.9 (L) 13.0 - 16.0 g/dL    HCT 29.9 (L) 36.0 - 48.0 %   PTT    Collection Time: 10/22/17 12:36 PM   Result Value Ref Range    aPTT 88.6 (H) 23.0 - 36.4 SEC         Signed By: Humberto Gottlieb MD     2017 1:01 PM

## 2017-10-23 NOTE — CONSULTS
[facility]  Pulmonary, Critical Care, and Sleep Medicine    Name: Fernando Soriano Sr. MRN: [id number]   : 1932 Hospital: [address] [place] [address] Dr   Date: 10/23/2017        Pulmonary F/U Patient Consult    Requesting MD:   Pooja Masters                                                Reason for CC Consult: PE  IMPRESSION:   · Acute PE,   · LLE thrombophlebitis  · Diverticuli with recent bleed x2  · P. afib  · Emphysema       RECOMMENDATIONS:   · IVC management per Dr. Mendez Perez. We discussed his case and agree that filter should be retrieved in a few months. · Agree with GI consult regarding risk of bleeding while on anticoagulation in the setting of two diverticular bleeds. Risks and benefits of anticoagulation vs no anticoagulation reviewed with patient and his wife at bedside. I advise that if they are willing to accept the bleeding risk, pt should be restarted on anticoagulation (either with NOAC or VitK antagonist) as early as possible and continued for at least 6 months. All of their questions were answered and they informed me that their PCP - Dr. Alesha Call. If he cannot, I advised them that they may follow in our clinic for further management and they expressed understanding. · Continue bronchodilators while inpatient  · No need for abx  · GI proph   · Incentive spirometry    We will sign off at this time     Subjective/History:     Pt seen and examined at bedside. No acute events overnight. Pt doing well this afternoon, no SOB, chest pain, or N/V/D. Prior to Admission medications    Medication Sig Start Date End Date Taking? Authorizing Provider   warfarin (COUMADIN) 5 mg tablet Take 1 Tab by mouth daily. 10/23/17  Yes Mary Villegas MD   ascorbic acid, vitamin C, (VITAMIN C) 500 mg tablet Take 1,000 mg by mouth daily. Historical Provider   psyllium husk (METAMUCIL) 3.4 gram/5.4 gram powd Take  by mouth.     Historical Provider   ferrous sulfate 325 mg (65 mg iron) tablet Take 1 Tab by mouth daily (with breakfast). 10/16/17   Mesfin Carmen MD   potassium bicarbonate (KLYTE) 25 mEq tablet Take 1 Tab by mouth two (2) times a day. 10/12/17   Raymond Henson MD   hydroCHLOROthiazide (HYDRODIURIL) 25 mg tablet Take 0.5 Tabs by mouth daily. 10/9/17   Veronica Huff MD   pantoprazole (PROTONIX) 40 mg tablet Take 1 Tab by mouth daily. 10/5/17   Veronica Huff MD   senna-docusate (PERICOLACE) 8.6-50 mg per tablet Take 1 Tab by mouth daily. 10/5/17   Veronica Huff MD   metoprolol succinate (TOPROL-XL) 25 mg XL tablet TAKE ONE TABLET BY MOUTH ONCE DAILY 4/17/17   Mesfin Carmen MD   multivitamin (ONE A DAY) tablet Take 1 Tab by mouth daily. Historical Provider   glucosamine-D3-boswellia serr (OSTEO BI-FLEX) 1,500-400-100 mg-unit-mg Tab Take 1 Tab by mouth two (2) times a day. Historical Provider   omega-3 fatty acids-vitamin e (FISH OIL) 1,000 mg cap Take 1 Cap by mouth two (2) times a day.     Historical Provider     Current Facility-Administered Medications   Medication Dose Route Frequency    pantoprazole (PROTONIX) tablet 40 mg  40 mg Oral ACB    lactated Ringers infusion  75 mL/hr IntraVENous CONTINUOUS    ascorbic acid (vitamin C) (VITAMIN C) tablet 1,000 mg  1,000 mg Oral DAILY    ferrous sulfate tablet 325 mg  325 mg Oral DAILY WITH BREAKFAST    glucosamine-chondroitin (ARTHX) 500-400 mg capsule 1 Cap  1 Cap Oral BID    hydroCHLOROthiazide (HYDRODIURIL) tablet 12.5 mg  12.5 mg Oral DAILY    metoprolol succinate (TOPROL-XL) XL tablet 25 mg  25 mg Oral DAILY    multivitamin, tx-iron-ca-min (THERA-M w/ IRON) tablet 1 Tab  1 Tab Oral DAILY    fish oil-omega-3 fatty acids 340-1,000 mg capsule 1 Cap  1 Cap Oral BID    potassium bicarbonate (KLYTE) tablet 25 mEq  25 mEq Oral BID    psyllium husk-aspartame (METAMUCIL FIBER) packet 1 Packet  1 Packet Oral DAILY    senna-docusate (PERICOLACE) 8.6-50 mg per tablet 1 Tab  1 Tab Oral DAILY    sodium chloride (NS) flush 5-10 mL  5-10 mL IntraVENous Q8H     Review of Systems:  A comprehensive review of systems was negative except for that written in the HPI. Objective:   Vital Signs:    Visit Vitals    /68 (BP 1 Location: Right arm, BP Patient Position: At rest)    Pulse 78    Temp 98.6 °F (37 °C)    Resp 18    Ht 5' 9\" (1.753 m)    Wt 99.3 kg (218 lb 14.4 oz)    SpO2 92%    BMI 32.33 kg/m2       O2 Device: Room air       Temp (24hrs), Av.1 °F (36.7 °C), Min:97.4 °F (36.3 °C), Max:98.7 °F (37.1 °C)       Intake/Output:   Last shift:      10/23 0701 - 10/23 1900  In: 375 [P.O.:375]  Out: 150 [Urine:150]  Last 3 shifts: 10/21 1901 - 10/23 0700  In: 2014.8 [P.O.:690; I.V.:1324.8]  Out: 700 [Urine:700]    Intake/Output Summary (Last 24 hours) at 10/23/17 1459  Last data filed at 10/23/17 1230   Gross per 24 hour   Intake          2022.75 ml   Output              700 ml   Net          1322.75 ml       Physical Exam:    General:  Alert, cooperative, no distress, appears stated age. Head:  Normocephalic, without obvious abnormality, atraumatic. Eyes:  Conjunctivae/corneas clear. PERRL, EOMs intact. Throat: Lips, mucosa, and tongue normal. Teeth and gums normal.           Lungs:   Clear to auscultation bilaterally. Chest wall:  No tenderness or deformity. Heart:  Regular rate and rhythm, S1, S2 normal, no murmur, click, rub or gallop. Abdomen:   Soft, non-tender. Bowel sounds normal. No masses,  No organomegaly. Extremities: Extremities normal, atraumatic, no cyanosis or edema.        Skin: Skin color, texture, turgor normal. No rashes or lesions       Neurologic: Grossly nonfocal       Data:     Recent Results (from the past 24 hour(s))   HGB & HCT    Collection Time: 10/22/17  6:50 PM   Result Value Ref Range    HGB 10.0 (L) 13.0 - 16.0 g/dL    HCT 31.2 (L) 36.0 - 48.0 %   PTT    Collection Time: 10/22/17  6:50 PM   Result Value Ref Range    aPTT 37.6 (H) 23.0 - 36.4 SEC   PTT    Collection Time: 10/23/17  4:11 AM   Result Value Ref Range    aPTT 39.0 (H) 23.0 - 36.4 SEC   HGB & HCT    Collection Time: 10/23/17  4:11 AM   Result Value Ref Range    HGB 9.0 (L) 13.0 - 16.0 g/dL    HCT 27.6 (L) 36.0 - 48.0 %             Telemetry:AFIB    Imaging:  I have personally reviewed the patients radiographs and have reviewed the reports:      Final result (Exam End: 10/21/2017 12:03 PM) Open        Study Result      CT Pulmonary Angiogram     CPT CODE: 13644     CLINICAL: Blood clot left leg.     COMPARISON: None.     TECHNICAL: Volumetric data acquisition performed through the chest with a  multislice scanner. Reconstructions were created in the axial, coronal, and  sagittal planes. Coronal and sagittal reconstructions were created using maximal  intensity projection methodology to maximize sensitivity for emboli. Bolus  timing was optimized for a pulmonary embolism evaluation. 100 cc of Isovue-370  were utilized for this examination.         FINDINGS:     The lungs show reticular strands of subsegmental atelectasis or fibrosis. .  Pulmonary vascular opacification is satisfactory. .  Multiple emboli are identified throughout the pulmonary vasculature. There is  convexity of the interventricular septum suggesting right heart strain. .  There is no pleural fluid or pneumothorax. Mediastinum, abd, chest wall there is no mediastinal adenopathy or mass. The liver is cirrhotic. Superficial calcifications are noted on the spleen.     IMPRESSION  IMPRESSION:     Multiple pulmonary emboli bilaterally with right ventricular strain.     See above           All CT scans at this facility are performed using dose optimization technique as  appropriate to the performed exam, to include automated exposure control,  adjustment of the mA and/or kV according to patient's size (Including  appropriate matching for site-specific examinations), or use of iterative  reconstruction technique.               Signed By: Halina Verdin MD October 23, 2017

## 2017-10-23 NOTE — CONSULTS
Surgery Consult      Patient: Fernando Soriano Sr. MRN: [id number]  [facility]: [id number]      YOB: 1932    Age: 80 y.o. Sex: male      DOA: 10/21/2017       HPI:     Fernando Soriano Sr. is a 80 y.o. male who seen in consult Tu morning, October 22. This is a late entry for full consult. At the time of this note, he has already had placement of ivc fiter. He had a recent hospital admission earlier this month with lower GI bleed, and symptomatic anemia. Initially he was observed and had stabilization of his hemoglobin and hematocrit, and was discharged home. He returned on  10/09/2017 with recurrent GI bleed and colonoscopy was done which showed diverticulosis and internal hemorrhoids, but did not show a source of his GI bleeding. During his previous hospitalization he did require 4 units of blood to be transfused for stabilization of his hemoglobin and hematocrit.     Since discharge on 10/12/2017 he has continued to have ongoing shortness of breath with any type of exertion. He states that it only occurs with any type of exertion. He has no dyspnea with rest.  He denies any further bleeding since his discharge on 10/12/2017. He states that, since that  discharge, he has been very sedentary at home. He states that 2 days ago he began to develop a lump with tenderness in his left groin. Yesterday he notes that he had some erythema, swelling, and tightness in his left calf with tenderness. He came back to the emergency department concerned with these new symptoms. CT scan of the chest was done, which did show pulmonary embolism. PVLs also obtained, showing an extensive superficial phlebitis of the L GSV. He has been started on a heparin gtt. But in light of recent admission with GI bleed, there is concern about keeping him on [facility] at this time, and filter placement is requested.      Past Medical History:   Diagnosis Date    Ankle fracture, left approx 2011    medial, tibial    Arthritis knees    Cardiac echocardiogram 06/16/2011    Normal LV systolic function. Mild conc LVH. Gr 1 DDfx. RVSP 35-40 mmHg. Marked LAE. Marked LISA. Mild-mod MR.  Mild-mod AI. Mild AoRE. Mild aortic arch dil.  Cardiac Holter monitor study 06/22/2011    Baseline sinus rhythm to sinus bradycardia, avg HR 60 bpm (range  bpm). Questionable chronotropic intolerance. No sustained arrhythmias.  Emphysema lung (HCC)     GERD (gastroesophageal reflux disease)     Glaucoma suspect     posterior vitreous detachment b/l, pterygium left eye, aseroid hyalosis / synchisis, b/l pseudophakia    H/O colonoscopy 04/13/2011    1 polyp thermally treated    History of atrial fibrillation 2009    Hypertension     Unspecified adverse effect of anesthesia     H/o A-fib immediately post colonoscopy. Past Surgical History:   Procedure Laterality Date    CARDIAC SURG PROCEDURE UNLIST      hx atrial flutter ablation 2013    COLONOSCOPY N/A 10/6/2017    COLONOSCOPY performed by Deedee Reyes MD at [address] HX COLONOSCOPY      HX GI      HX KNEE REPLACEMENT Right 02/19/2013    Dr. Kristi Nair Right 2/13    TKR, [place]    HX SVT ABLATION  1/2/2013    by Dr. Licha Sanchez EGD [address] SPHNCTR/CARDIA GERD         Family History   Problem Relation Age of Onset    Pacemaker Father     Pacemaker Sister     Heart Failure Brother     Diabetes Brother     Cancer Brother      Lung Cancer       Social History     Social History    Marital status:      Spouse name: N/A    Number of children: N/A    Years of education: N/A     Social History Main Topics    Smoking status: Former Smoker     Packs/day: 1.00     Quit date: 1/1/1965    Smokeless tobacco: Never Used    Alcohol use Yes      Comment: occasionally.     Drug use: No    Sexual activity: Not Currently     Partners: Female     Other Topics Concern    None     Social History Narrative Prior to Admission medications    Medication Sig Start Date End Date Taking? Authorizing Provider   ascorbic acid, vitamin C, (VITAMIN C) 500 mg tablet Take 1,000 mg by mouth daily. Historical Provider   psyllium husk (METAMUCIL) 3.4 gram/5.4 gram powd Take  by mouth. Historical Provider   ferrous sulfate 325 mg (65 mg iron) tablet Take 1 Tab by mouth daily (with breakfast). 10/16/17   Erum Desir MD   potassium bicarbonate (KLYTE) 25 mEq tablet Take 1 Tab by mouth two (2) times a day. 10/12/17   Kenrick John MD   hydroCHLOROthiazide (HYDRODIURIL) 25 mg tablet Take 0.5 Tabs by mouth daily. 10/9/17   Charo Dunn MD   pantoprazole (PROTONIX) 40 mg tablet Take 1 Tab by mouth daily. 10/5/17   Charo Dunn MD   senna-docusate (PERICOLACE) 8.6-50 mg per tablet Take 1 Tab by mouth daily. 10/5/17   Charo Dunn MD   metoprolol succinate (TOPROL-XL) 25 mg XL tablet TAKE ONE TABLET BY MOUTH ONCE DAILY 4/17/17   Erum Desir MD   multivitamin (ONE A DAY) tablet Take 1 Tab by mouth daily. Historical Provider   glucosamine-D3-boswellia serr (OSTEO BI-FLEX) 1,500-400-100 mg-unit-mg Tab Take 1 Tab by mouth two (2) times a day. Historical Provider   omega-3 fatty acids-vitamin e (FISH OIL) 1,000 mg cap Take 1 Cap by mouth two (2) times a day. Historical Provider       No Known Allergies    Review of Systems  Constitutional: Negative for fever. HENT: Negative for congestion. Respiratory: Positive for SOB with exertion    Cardiovascular: Positive for swelling of left leg. No chest pain  Gastrointestinal: Negative for abdominal pain, nausea and vomiting. Genitourinary: Negative for dysuria. Musculoskeletal: Negative. Skin: Positive for color change of left leg  Neurological: Negative for light-headedness and headaches. All other systems reviewed and are negative.       Physical Exam:      Visit Vitals    /69 (BP 1 Location: Right arm, BP Patient Position: Supine)    Pulse 86    Temp 97.4 °F (36.3 °C)    Resp 19    Ht 5' 9\" (1.753 m)    Wt 218 lb 14.4 oz (99.3 kg)    SpO2 91%    BMI 32.33 kg/m2       Physical Exam:  Pt was A/O x 3, NAD, VSS  Left leg has mild edema and erythema/warmth/tenderness medially along the course of the GSV from mid thigh into mid calf. Typical appearance of phlebitis  No signs of cellulitis. No open wounds. No arterial compromise    Data Review:    CBC:   Lab Results   Component Value Date/Time    WBC 8.7 10/21/2017 09:39 AM    RBC 3.42 10/21/2017 09:39 AM    HGB 9.0 10/23/2017 04:11 AM    HCT 27.6 10/23/2017 04:11 AM    PLATELET 317 83/97/9930 09:39 AM      BMP:   Lab Results   Component Value Date/Time    Glucose 109 10/21/2017 09:39 AM    Sodium 132 10/21/2017 09:39 AM    Potassium 3.2 10/21/2017 09:39 AM    Chloride 94 10/21/2017 09:39 AM    CO2 28 10/21/2017 09:39 AM    BUN 9 10/21/2017 09:39 AM    Creatinine 0.80 10/21/2017 09:39 AM    Calcium 8.6 10/21/2017 09:39 AM     Coagulation:   Lab Results   Component Value Date/Time    Prothrombin time 13.2 10/21/2017 09:39 AM    INR 1.1 10/21/2017 09:39 AM    aPTT 39.0 10/23/2017 04:11 AM     Chest CTA:  Multiple pulmonary emboli bilaterally with right ventricular strain. PVL:  Left leg :  1. No evidence of deep venous thrombosis detected in the veins  visualized. 2. Deep vein(s) visualized include the common femoral, femoral,  popliteal, posterior tibial, and peroneal veins. 3. Acute, partially occlusive superficial venous thrombosis identified   in the proximal, mid, and distal upper thigh great saphenous vein and   acute occlusive at the proximal, mid, and distal calf segment. 4. Superficial vein(s) visualized include the great saphenous vein. .  5. No evidence of deep vein thrombosis in the contralateral common  femoral vein    Assessment/Plan     Discussed with wife and patient about concerns with his condition, about risk of repeat bleed with continuing AC at this time.  Details and consideration for IVC filter discussed and they acknowledge an understanding and are agreeable to proceed. I did discuss possibility of filter retrieval at a later time after he has had follow up with GI.      Principal Problem:    Pulmonary embolism ([address] (10/21/2017)    Active Problems:    Essential hypertension (1/24/2017)      Dyspnea on exertion (10/21/2017)      Acute pulmonary embolism ([address] (10/21/2017)        PARAMJIT Villafuerte  October 23, 2017

## 2017-10-23 NOTE — OP NOTES
[address]    Name:  Maricarmen Mak  MR#:  [id number]  :  1932  Account #:  [de-identified]  Date of Adm:  10/21/2017  Date of Surgery:  10/22/2017      PREOPERATIVE DIAGNOSES  1. Gastrointestinal bleed. 2. Pulmonary embolism. 3. Peripheral thrombus. POSTOPERATIVE DIAGNOSES  1. Gastrointestinal bleed. 2. Pulmonary embolism. 3. Peripheral thrombus. PROCEDURES PERFORMED  1. Inferior venacavogram with interpretation. 2. Ultrasound of right femoral artery and vein with interpretation. 3. Placement of inferior vena cava filter. COMPLICATIONS: 0.    ESTIMATED BLOOD LOSS: 0.    CONDITION OF THE PATIENT: Unchanged. ANESTHESIA:  mac. SPECIMENS REMOVED: o. DESCRIPTION OF PROCEDURE: The patient is an 80-year-old with  pulmonary embolism, GI bleed requiring at least 4 units of blood, and  peripheral thrombus. The plan is for placement of a filter to prevent any  further DVT while he cannot be on anticoagulation. The patient  understands. He was brought back to the room. He had a gram of  Ancef. He had moderate sedation. His groins were prepped and  draped in the usual standard fashion following [facility] compliant  guidelines for aseptic technique. Using ultrasound on the right groin, I  had good visualization of the common femoral artery, it was pulsatile. I  looked at the common femoral vein and the saphenous junction. This  was all compressible. I do not see thrombus at the common femoral. I  did a single anterior puncture into the common femoral and placed a  wire up into the inferior vena cava without difficulty. This was a Magic  torque marker wire. I placed a sheath into the iliac vein and did an  inferior venacavogram. The right iliac vein is patent. The inferior vena  cava is appropriate in size and patent. There is no visible thrombus.  I  brought the sheath up and delivered in the inferior vena cava filter into  the cava above the bifurcation and below the renal vein. There was no  migration, in a straight position. I was pleased with the delivery, pulled  the sheath, and held direct pressure for hemostasis. He tolerated this  very nicely.         DO CAROLEE Polk  D:  10/22/2017   14:25  T:  10/23/2017   09:07  Job #:  [id number]

## 2017-10-23 NOTE — PROGRESS NOTES
[facility] Hospitalist Group  Progress Note    Patient: Shanna Booth Sr. Age: 80 y.o. : 1932 MR#: [id number] SSN: [id number]  Date/Time: 10/23/2017 11:21 AM    Subjective/24-hour events:     No chest pain or SOB, feels well. Wife present at bedside. Assessment:   Acute LLE DVT with PE  HTN  Advanced age  PAF hx s/p ablation    Plan:  GI eval pending for recs re: candidacy for [facility]. Mobilize. Anticipate discharge soon. Case discussed with:  [x]Patient  [x]Family  []Nursing  []Case Management  DVT Prophylaxis:  []Lovenox  []Hep SQ  []SCDs  []Coumadin   []On Heparin gtt    Objective:   VS:   Visit Vitals    /69 (BP 1 Location: Right arm, BP Patient Position: Supine)    Pulse 86    Temp 97.4 °F (36.3 °C)    Resp 19    Ht 5' 9\" (1.753 m)    Wt 99.3 kg (218 lb 14.4 oz)    SpO2 91%    BMI 32.33 kg/m2      Tmax/24hrs: Temp (24hrs), Av.1 °F (36.7 °C), Min:97.4 °F (36.3 °C), Max:98.7 °F (37.1 °C)    Intake/Output Summary (Last 24 hours) at 10/23/17 1121  Last data filed at 10/23/17 0934   Gross per 24 hour   Intake          1647.75 ml   Output              700 ml   Net           947.75 ml       General:  In NAD. Cardiovascular:  RRR. Pulmonary:  No wheezes, effort nonlabored. GI:  Abdomen soft, NTTP. Extremities:  Warm, no ischemia. Neuro:  Awake and alert, moves extremities spontaneously.     Labs:    Recent Results (from the past 24 hour(s))   HGB & HCT    Collection Time: 10/22/17 11:36 AM   Result Value Ref Range    HGB 9.9 (L) 13.0 - 16.0 g/dL    HCT 29.9 (L) 36.0 - 48.0 %   PTT    Collection Time: 10/22/17 12:36 PM   Result Value Ref Range    aPTT 88.6 (H) 23.0 - 36.4 SEC   HGB & HCT    Collection Time: 10/22/17  6:50 PM   Result Value Ref Range    HGB 10.0 (L) 13.0 - 16.0 g/dL    HCT 31.2 (L) 36.0 - 48.0 %   PTT    Collection Time: 10/22/17  6:50 PM   Result Value Ref Range    aPTT 37.6 (H) 23.0 - 36.4 SEC   PTT    Collection Time: 10/23/17 4:11 AM   Result Value Ref Range    aPTT 39.0 (H) 23.0 - 36.4 SEC   HGB & HCT    Collection Time: 10/23/17  4:11 AM   Result Value Ref Range    HGB 9.0 (L) 13.0 - 16.0 g/dL    HCT 27.6 (L) 36.0 - 48.0 %       Signed By: Michael Alan MD     October 23, 2017 11:21 AM

## 2017-10-23 NOTE — NURSE NAVIGATOR
Spoke with patient and his wife Sebas [phone] at bedside. He was able to verify that his address and phone # on his face sheet is correct. The patient stated that he has a walker and cane at home, but rarely uses. He stated that he was independent with ADL's prior to admission and is planning on returning home upon discharge. He has a supportive wife that will transport home upon discharge and the patient states he drives. He denies any need for Home Health at this time and has never uses this service in the past. He uses [facility] for his pharmacy needs. He and is wife requested to speak to Abbe Ga [phone] patient advocate about his room accomadations.   Abbe Ga made aware of request.

## 2017-10-23 NOTE — PROGRESS NOTES
Pt is s/p ivc filter placement yesterday  He had PE and an extensive left leg GSV phlebitis  With recent diverticular bleed, AC was not advised    Cannulation site right groin from filter placement is without bleeding or hematoma  Remains with erythema/tenderness left medial leg at location of phlebitis  Advised wife and patient this can take up to 2 weeks to notice some resolution of symptoms. Can do warm compresses. Will have to be careful with NSAIDs as well as the [facility]    Will advise a 6 week follow up in clinic to duplex the leg.  It may be reasonable to retrieve filter  Considerations for long term AC, given history of a fib and then with the recent GI bleed, are to be advised pending GI input    Will sign off, but arrange follow up  Available if needed for any further input during this admission

## 2017-10-24 ENCOUNTER — PATIENT OUTREACH (OUTPATIENT)
Dept: FAMILY MEDICINE CLINIC | Age: 82
End: 2017-10-24

## 2017-10-24 NOTE — PROGRESS NOTES
NNTOCIP  Patient admitted to [facility] on 10/21/17 to 10/23/17 for blood clot and PE. Course of hospitalization:  Patient admitted to the hospital from the ED. Complained of SOB on exesion, swelling erythema and pain 5/10 in LLE. Found to have Pulmonary embolism, and DVT. Seen by Vascular and has Filter placed and was anticoagulated with heparin. Patient discharged home on Coumadin. Pertinent labs:  Results for Anita Vigil (MRN [id number]) as of 10/24/2017 14:39   Ref. Range 10/22/2017 11:36 10/22/2017 12:36 10/22/2017 14:30 10/22/2017 18:50 10/23/2017 04:11   HGB Latest Ref Range: 13.0 - 16.0 g/dL 9.9 (L)   10.0 (L) 9.0 (L)   HCT Latest Ref Range: 36.0 - 48.0 % 29.9 (L)   31.2 (L) 27.6 (L)   aPTT Latest Ref Range: 23.0 - 36.4 SEC  88.6 (H)  37.6 (H) 39.0 (H)   Results for Anita Vigil (MRN [id number]) as of 10/24/2017 14:39   Ref. Range 10/21/2017 09:39   D DIMER Latest Ref Range: <0.46 ug/ml(FEU) 16.53 (H)     Inpatient Consults per discharge summary:  Johanna Dozier MD   Pulmonary Disease  10/23/2017     Regina Hernandez MD   Gastroenterology  10/23/2017   Mychal Reed [place]   Vascular Surgery  10/23/2017     Bienvenido Beyer [place]   Cardiology  10/22/2017     Mariah Richards MD   Pulmonary Disease      Discharge diagnoses per discharge summary :   Acute LLE DVT with PE  HTN  Hx gastric Bleed    RRAT Score: 19  CCI: 3    Hospital admissions in past year: 3  ED admissions in past year: 3    Contacted patient for hospital follow up. Introduced self, role and reason for call. Verified 2 patient identifiers. Patient's wife reports:  Patient has all of his medications, taking everything prescribed. Patient had been taking Colace but stopped when taking that with Metamucil gave him frequent loose stools. Patient not drinking well.   Patient's wife denies:  Bleeding or bruising at this time   ADL's:  Preforms all ADL's  by self with out difficulty     DME:   none  Resources/Support:   Patient has adequate support at home from wife and has no detectable resource needs at this time. AMD:   Not on file Patient meeting with nurse Navigator tomorrow at 4 to discuss ACP. Reconciled home medications and reviewed allergies. Instructed to bring all medications with him to next appointment. Educated patient to monitor and report the following Red flags:     · Bloody stool,  · Vomit that looks like coffee grounds  · Bleeding gums  · Bloody nose that can not be stopped with ice and pressure   · Pink or red urine   or any new or concerning symptoms. Patient verbalized understanding of information discussed and is aware of  when to seek medical attention from PCP, urgent care or ED. Opportunity to ask questions was provided. Contact information was provided for future reference or further questions. Appointments:  10/25/2017 2:00 PM Pt Inr Hv [facility]   10/25/2017 3:15 PM Mesfin Carmen  [address] [facility] [address]   10/30/2017 9:00 AM Mesfin Carmen  [address] [facility] [address]   11/7/2017 1:15 PM [facility] ODALISSALLIE SHERIDAN   11/8/2017 12:40 PM Omari Barba MD [facility]       Patient aware of appointments. Wife will provide transportation. Potential Barriers to care  No apparent barriers to care identified at this time. Adherence to previous treatment and likelihood for follow-up:  Patient verbalized understanding of discharge instructions and need for follow up. Plan of Care/Goals:  Goals      Prevent complications post hospitalization. Nurse Navigator to discuss with PCP about possible need for GI follow up not addressed in discharge  Patient will attend all appointments          This represents Transitions of Care. Nurse Navigator spoke with patient within 1-2 business day(s) of discharge.  Patient's transition of care follow up appointment is scheduled with Erum Desir MD on 10/25/17 at [phone] which is within 7-14 days of discharge.      Patient's wife confirmed PCP appointment and Nurse Navigator appointment

## 2017-10-24 NOTE — CDMP QUERY
Please clarify if this patient is being treated/managed for:    =>   severe protein calorie malnutrition    =>Other Explanation of clinical findings  =>Unable to Determine (no explanation of clinical findings)    The medical record reflects the following:    Risk:   Nutrition Diagnosis:  Unintentional weight loss related to inadequate oral intake as evidenced by wt loss of 12 lb (5.2%) x 2.5 weeks PTA       Clinical Indicators:   Patient meets criteria for Severe Protein Calorie Malnutrition as evidenced by:   ASPEN Malnutrition Criteria  Acute Illness, Chronic Illness, or Social/Enviornmental: Acute illness  Weight Loss: Greater than 5% x 1 mo  Muscle Mass: Moderate (slight depression on temples)  ASPEN Malnutrition Score - Acute Illness: 12  Acute Illness - Malnutrition Diagnosis: Severe malnutrition. Treatment:   Add supplement: Ensure Enlive BID  - Update beverage preferences in diet order  - Continue RD inpatient monitoring and evaluation.     Thank you,   Devante Rodriguez RN   CCDS   [phone]   Erik Layne

## 2017-10-25 ENCOUNTER — ANTI-COAG VISIT (OUTPATIENT)
Dept: CARDIOLOGY CLINIC | Age: 82
End: 2017-10-25

## 2017-10-25 ENCOUNTER — OFFICE VISIT (OUTPATIENT)
Dept: FAMILY MEDICINE CLINIC | Age: 82
End: 2017-10-25

## 2017-10-25 VITALS
OXYGEN SATURATION: 96 % | DIASTOLIC BLOOD PRESSURE: 78 MMHG | TEMPERATURE: 98.1 F | RESPIRATION RATE: 16 BRPM | SYSTOLIC BLOOD PRESSURE: 126 MMHG | HEIGHT: 69 IN | BODY MASS INDEX: 32.44 KG/M2 | WEIGHT: 219 LBS | HEART RATE: 78 BPM

## 2017-10-25 DIAGNOSIS — I26.99 OTHER PULMONARY EMBOLISM WITHOUT ACUTE COR PULMONALE, UNSPECIFIED CHRONICITY (HCC): Primary | ICD-10-CM

## 2017-10-25 DIAGNOSIS — Z79.01 LONG TERM CURRENT USE OF ANTICOAGULANT THERAPY: ICD-10-CM

## 2017-10-25 DIAGNOSIS — D64.9 ANEMIA, UNSPECIFIED TYPE: ICD-10-CM

## 2017-10-25 DIAGNOSIS — I82.492 ACUTE DEEP VEIN THROMBOSIS (DVT) OF OTHER SPECIFIED VEIN OF LEFT LOWER EXTREMITY (HCC): ICD-10-CM

## 2017-10-25 DIAGNOSIS — I48.92 ATRIAL FLUTTER WITH RAPID VENTRICULAR RESPONSE (HCC): Primary | ICD-10-CM

## 2017-10-25 LAB
HGB BLD-MCNC: 9.5 G/DL
INR BLD: 1.2
PT POC: 14.2 SECONDS
VALID INTERNAL CONTROL?: YES

## 2017-10-25 RX ORDER — WARFARIN SODIUM 5 MG/1
5 TABLET ORAL DAILY
Qty: 90 TAB | Refills: 3 | Status: SHIPPED | OUTPATIENT
Start: 2017-10-25 | End: 2017-11-24 | Stop reason: DRUGHIGH

## 2017-10-25 NOTE — PROGRESS NOTES
1. Have you been to the ER, urgent care clinic since your last visit? Hospitalized since your last visit? Yes When: 10- Where: [facility] Sydnie Vigil  Reason for visit: left leg pain and on 10-21-10- [facility] for pulmonary embolism    2. Have you seen or consulted any other health care providers outside of the [address] [place] [address] since your last visit? Include any pap smears or colon screening.  No

## 2017-10-25 NOTE — PROGRESS NOTES
Verbal order and read back per Jada Degroot NP  The INR is below the therapeutic range. Please make the following adjustments to Coumadin dosing: Continue Coumadin 5 mg daily   Repeat the INR in 5 days. Patient informed of instructions, read back and verbalized understanding. Dosing calendar also provided.  Mendel Can, LPN

## 2017-10-25 NOTE — MR AVS SNAPSHOT
Visit Information Date & Time Provider Department Dept. Phone Encounter #  
 10/25/2017  3:15 PM Rashad Carrington, [address] [id number] Follow-up Instructions Return after November 7th vascular appointment. Your Appointments 10/30/2017  9:00 AM  
ROUTINE CARE with Rashad Carrington MD  
[employer] ([address] [place] [address] Road) Appt Note: 2 week followup [address] [facility] [address] [facility] [address] [facility]  
  
    
 10/30/2017 11:30 AM  
NEW COUMADIN with Lucia Baltazar NP [facility] ([address] [place] [address]) Appt Note: New coumadin teaching [place] [id number]  
[phone] [place] [phone]  
  
    
 11/7/2017  1:15 PM  
HOSPITAL DISCHARGE with PARAMJIT [facility] ([address] [place] [address]) Appt Note: DC post IVC filter, appt made with [address] [facility] [address]  
[phone] [address] [place] [address]  
  
    
 11/8/2017 12:40 PM  
POST HOSPITAL with Vijay Guzman MD  
[facility] ([address] [place] [address]) Appt Note: Post hosp 2 weeks given to [facility] [address] [place] [address]  
[phone] [address] [facility] [address] [address]  
  
    
 12/14/2017  8:00 AM  
Follow Up with Vijay Guzman MD  
[facility] ([address] [place] [address]) Appt Note: 1 yr f/up [place] [phone]  
[phone] [address] [facility] [address] Upcoming Health Maintenance  Date Due  
 Pneumococcal 65+ Low/Medium Risk (2 of 2 - PPSV23) 10/28/2017 MEDICARE YEARLY EXAM 4/18/2018 GLAUCOMA SCREENING Q2Y 8/1/2019 DTaP/Tdap/Td series (2 - Td) 3/13/2027 COLONOSCOPY 10/6/2027 Allergies as of 10/25/2017  Review Complete On: 10/25/2017 By: Yomi Rank No Known Allergies Current Immunizations  Reviewed on 10/16/2017 Name Date Influenza High Dose Vaccine PF 10/16/2016 Influenza Vaccine 10/14/2014 Influenza Vaccine (Quad) PF 10/11/2017  5:09 PM  
 Influenza Vaccine Whole 10/28/2012 Pneumococcal Polysaccharide (PPSV-23) 10/28/2012 ZZZ-RETIRED (DO NOT USE) Pneumococcal Vaccine (Unspecified Type) 10/28/2012 Not reviewed this visit You Were Diagnosed With   
  
 Codes Comments Other pulmonary embolism without acute cor pulmonale, unspecified chronicity (HCC)    -  Primary ICD-10-CM: I26.99 
ICD-9-CM: 415.19 Acute deep vein thrombosis (DVT) of other specified vein of left lower extremity (HCC)     ICD-10-CM: V33.921 ICD-9-CM: 453.40 Anemia, unspecified type     ICD-10-CM: D64.9 ICD-9-CM: 536. 9 Vitals BP Pulse Temp Resp Height(growth percentile) Weight(growth percentile) 126/78 (BP 1 Location: Left arm, BP Patient Position: Sitting) 78 98.1 °F (36.7 °C) (Oral) 16 5' 9\" (1.753 m) 219 lb (99.3 kg) SpO2 BMI Smoking Status 96% 32.34 kg/m2 Former Smoker Vitals History BMI and BSA Data Body Mass Index Body Surface Area  
 32.34 kg/m 2 2.2 m 2 Preferred Pharmacy Pharmacy Name Phone [facility] [address] [place] [address] Pia Mccord [address] Your Updated Medication List  
  
   
This list is accurate as of: 10/25/17  3:48 PM.  Always use your most recent med list.  
  
  
  
  
 ferrous sulfate 325 mg (65 mg iron) tablet Take 1 Tab by mouth daily (with breakfast). FISH OIL 1,000 mg Cap Generic drug:  omega-3 fatty acids-vitamin e  
 Take 1 Cap by mouth two (2) times a day. hydroCHLOROthiazide 25 mg tablet Commonly known as:  HYDRODIURIL Take 0.5 Tabs by mouth daily. METAMUCIL 3.4 gram/5.4 gram Powd Generic drug:  psyllium husk Take  by mouth.  
  
 metoprolol succinate 25 mg XL tablet Commonly known as:  TOPROL-XL  
TAKE ONE TABLET BY MOUTH ONCE DAILY  
  
 multivitamin tablet Commonly known as:  ONE A DAY Take 1 Tab by mouth daily. OSTEO BI-FLEX (5-LOXIN) 1,500-400-100 mg-unit-mg Tab Generic drug:  glucosamine-D3-Boswellia serr Take 1 Tab by mouth two (2) times a day. pantoprazole 40 mg tablet Commonly known as:  PROTONIX Take 1 Tab by mouth daily. potassium bicarbonate 25 mEq tablet Commonly known as:  Luana Cristian Take 1 Tab by mouth two (2) times a day. senna-docusate 8.6-50 mg per tablet Commonly known as:  Myrna Holms Take 1 Tab by mouth daily. VITAMIN C 500 mg tablet Generic drug:  ascorbic acid (vitamin C) Take 1,000 mg by mouth daily. warfarin 5 mg tablet Commonly known as:  COUMADIN Take 1 Tab by mouth daily. We Performed the Following AMB POC HEMOGLOBIN (HGB) [99632 CPT(R)] Follow-up Instructions Return after November 7th vascular appointment. Introducing \[facility]\"" & HEALTH SERVICES! Jacquelin Portillo introduces [employer] patient portal. Now you can access parts of your medical record, email your doctor's office, and request medication refills online. 1. In your internet browser, go to [email or web address] 2. Click on the First Time User? Click Here link in the Sign In box. You will see the New Member Sign Up page. 3. Enter your [employer] Access Code exactly as it appears below. You will not need to use this code after youve completed the sign-up process. If you do not sign up before the expiration date, you must request a new code. · [employer] Access Code: [email or web address] Expires: 1/3/2018 11:00 AM 
 
 4. Enter the last four digits of your Social Security Number (xxxx) and Date of Birth (mm/dd/yyyy) as indicated and click Submit. You will be taken to the next sign-up page. 5. Create a [email or web address] ID. This will be your [email or web address] login ID and cannot be changed, so think of one that is secure and easy to remember. 6. Create a [email or web address] password. You can change your password at any time. 7. Enter your Password Reset Question and Answer. This can be used at a later time if you forget your password. 8. Enter your e-mail address. You will receive e-mail notification when new information is available in [address] 9. Click Sign Up. You can now view and download portions of your medical record. 10. Click the Download Summary menu link to download a portable copy of your medical information. If you have questions, please visit the Frequently Asked Questions section of the [email or web address] website. Remember, [email or web address] is NOT to be used for urgent needs. For medical emergencies, dial 911. Now available from your iPhone and Android! Please provide this summary of care documentation to your next provider. Your primary care clinician is listed as Guru Thompson. If you have any questions after today's visit, please call [phone].

## 2017-10-26 NOTE — PROGRESS NOTES
SUBJECTIVE  Chief Complaint   Patient presents with   [facility] Follow Up     Pulmonary embolism [facility])     Shortness of Breath     only when walking for a while     Mr. Jenna Roque is a 80y.o. year old male, he is seen today for Transition of Care services following a hospital admission on discharge for PE and DVT on 10/21/2017 through 10/23/2017. Our office Nurse Navigator performed an outreach to Mr. Shaneka Esparza on 10/24/2017 (within 2 business days of discharge) to complete medication reconciliation and a telephonic assessment of his condition. Patient presents for hospital follow-up. We reviewed the recent hospitalization in detail. [facility] with [facility] pain and swelling and had some chest symptoms as well. He was sent to the ER and admitted. He had a retrievable IVC filter placed. The patient reports doing well. No increase in fatigued or dyspnea. No longer with GI bleeding or signs of. He is seeing the coumadin clinic for the PE/DVT at cardiology with a goal INR of 2-2.5. OBJECTIVE    Blood pressure 126/78, pulse 78, temperature 98.1 °F (36.7 °C), temperature source Oral, resp. rate 16, height 5' 9\" (1.753 m), weight 219 lb (99.3 kg), SpO2 96 %. General:  Alert, cooperative, well appearing, in no apparent distress. ENT:  The tongue and mucous membranes are pink and moist without lesions. CV:  The heart sounds are regular in rate and rhythm. There is a normal S1 and S2.    Lungs: Inspiratory and expiratory efforts are full and unlabored. Lung sounds are clear and equal to auscultation throughout all lung fields without wheezing, rales, or rhonchi. Left LE: tenderness and firmness medially in calf. Skin:  No rashes, no jaundice. Results for orders placed or performed in visit on 10/25/17   AMB POC HEMOGLOBIN (HGB)   Result Value Ref Range    Hemoglobin (POC) 9.5      ASSESSMENT / PLAN    ICD-10-CM ICD-9-CM    1.  Other pulmonary embolism without acute cor pulmonale, unspecified chronicity (HCC) I26.99 415.19    2. Acute deep vein thrombosis (DVT) of other specified vein of left lower extremity (HCC) I82.492 453.40    3. Anemia, unspecified type D64.9 285.9 AMB POC HEMOGLOBIN (HGB)     PE / DVT - cont anticoagulation. He will watch closely for signs of bleeding. Cont per coumadin clinic. Anemia secondary to GI bleeding - Hg is stable. Iron rich diet handout provided. Cont Feosol. Reviewed hospital records. Suggested follow-up with GI as well. All chart history elements were reviewed by me at the time of the visit even though marked at time of note closure. Patient understands our medical plan. Patient has provided input and agrees with goals. Alternatives have been explained and offered. All questions answered. The patient is to call if condition worsens or fails to improve. Follow-up Disposition:  Return after November 7th vascular appointment.

## 2017-10-30 ENCOUNTER — OFFICE VISIT (OUTPATIENT)
Dept: CARDIOLOGY CLINIC | Age: 82
End: 2017-10-30

## 2017-10-30 VITALS — WEIGHT: 219 LBS | HEIGHT: 69 IN | BODY MASS INDEX: 32.44 KG/M2

## 2017-10-30 DIAGNOSIS — I48.3 TYPICAL ATRIAL FLUTTER (HCC): Primary | ICD-10-CM

## 2017-10-30 DIAGNOSIS — I48.92 ATRIAL FLUTTER WITH RAPID VENTRICULAR RESPONSE (HCC): ICD-10-CM

## 2017-10-30 DIAGNOSIS — I27.82 OTHER CHRONIC PULMONARY EMBOLISM WITHOUT ACUTE COR PULMONALE (HCC): ICD-10-CM

## 2017-10-30 DIAGNOSIS — Z79.01 LONG TERM CURRENT USE OF ANTICOAGULANT THERAPY: ICD-10-CM

## 2017-10-30 LAB
INR BLD: 5.4
PT POC: ABNORMAL SECONDS
VALID INTERNAL CONTROL?: YES

## 2017-10-30 NOTE — PROGRESS NOTES
1. Have you been to the ER, urgent care clinic since your last visit? Hospitalized since your last visit? No     2. Have you seen or consulted any other health care providers outside of the [address] [place] [address] since your last visit? Include any pap smears or colon screening.  No

## 2017-10-30 NOTE — PATIENT INSTRUCTIONS
Coumadin as directed on calendar  Protimes as scheduled  Follow-up with Dr. Kade Ruano as scheduled on 11/8/17

## 2017-10-30 NOTE — MR AVS SNAPSHOT
Visit Information Date & Time Provider Department Dept. Phone Encounter #  
 10/30/2017 11:30 AM Rosario Rodriguez NP [facility] [address] [id number] Your Appointments 11/7/2017  1:15 PM  
HOSPITAL DISCHARGE with [facility] ([address] [place] [address]) Appt Note: DC post IVC filter, appt made with [place] [address] Shivani Call 741 [address] [facility]  
[phone] 2300 [facility] Micah López [address] [place] [address]  
  
    
 11/7/2017  2:30 PM  
ROUTINE CARE with Jerrica Cuenca MD  
2056 [facility] ([address] [place] [address]) Appt Note: 2 week followup-PT D/C [facility] 10/23/17-PULAMONARY; Meeting with Kassie Cisneros after appt.; ROUTINE F/U  
 [address] 200 [facility]  
225 [facility] [address] 200 [facility]  
  
    
 11/8/2017 12:40 PM  
POST HOSPITAL with Maynor Pickard MD  
[facility] ([address] [place] [address]) Appt Note: Post hosp 2 weeks given to [facility] Shaheen Mulligan [address] [address] [address] [address] [phone] [place]  
  
    
 11/8/2017 12:40 PM  
ANTICOAG NURSE with Pt Inr Hv Csi [facility] ([address] [place] [address]) Appt Note: also has appt w/Dr. Wicho Castle [phone] [address] [id number] [phone] 2300 [facility] [address] [address]  
  
    
 12/14/2017  8:00 AM  
Follow Up with Maynor Pickard MD  
[facility] ([address] [place] [address]) Appt Note: 1 yr f/up [place] [address] [address] [address]  
[phone] 2300 [facility] [address] Upcoming Health Maintenance  Date Due  
 Pneumococcal 65+ Low/Medium Risk (2 of 2 - PPSV23) 10/28/2017 MEDICARE YEARLY EXAM 4/18/2018 GLAUCOMA SCREENING Q2Y 8/1/2019 DTaP/Tdap/Td series (2 - Td) 3/13/2027 COLONOSCOPY 10/6/2027 Allergies as of 10/30/2017  Review Complete On: 10/26/2017 By: Kaden Woods MD  
 No Known Allergies Current Immunizations  Reviewed on 10/16/2017 Name Date Influenza High Dose Vaccine PF 10/16/2016 Influenza Vaccine 10/14/2014 Influenza Vaccine (Quad) PF 10/11/2017  5:09 PM  
 Influenza Vaccine Whole 10/28/2012 Pneumococcal Polysaccharide (PPSV-23) 10/28/2012 ZZZ-RETIRED (DO NOT USE) Pneumococcal Vaccine (Unspecified Type) 10/28/2012 Not reviewed this visit Vitals Height(growth percentile) Weight(growth percentile) BMI Smoking Status 5' 9\" (1.753 m) 219 lb (99.3 kg) 32.34 kg/m2 Former Smoker Vitals History BMI and BSA Data Body Mass Index Body Surface Area  
 32.34 kg/m 2 2.2 m 2 Preferred Pharmacy Pharmacy Name Phone [facility]  Your Updated Medication List  
  
   
This list is accurate as of: 10/30/17 12:26 PM.  Always use your most recent med list.  
  
  
  
  
 ferrous sulfate 325 mg (65 mg iron) tablet Take 1 Tab by mouth daily (with breakfast). FISH OIL 1,000 mg Cap Generic drug:  omega-3 fatty acids-vitamin e Take 1 Cap by mouth two (2) times a day. hydroCHLOROthiazide 25 mg tablet Commonly known as:  HYDRODIURIL Take 0.5 Tabs by mouth daily. METAMUCIL 3.4 gram/5.4 gram Powd Generic drug:  psyllium husk Take  by mouth.  
  
 metoprolol succinate 25 mg XL tablet Commonly known as:  TOPROL-XL  
TAKE ONE TABLET BY MOUTH ONCE DAILY  
  
 multivitamin tablet Commonly known as:  ONE A DAY Take 1 Tab by mouth daily. OSTEO BI-FLEX (5-LOXIN) 1,500-400-100 mg-unit-mg Tab Generic drug:  glucosamine-D3-Boswellia serr Take 1 Tab by mouth two (2) times a day. potassium bicarbonate 25 mEq tablet Commonly known as:  Bary Muck Take 1 Tab by mouth two (2) times a day. senna-docusate 8.6-50 mg per tablet Commonly known as:  Hebert Spikes Take 1 Tab by mouth daily. VITAMIN C 500 mg tablet Generic drug:  ascorbic acid (vitamin C) Take 1,000 mg by mouth daily. warfarin 5 mg tablet Commonly known as:  COUMADIN Take 1 Tab by mouth daily. Introducing [facility]! Sylvie Tsang introduces [employer] patient portal. Now you can access parts of your medical record, email your doctor's office, and request medication refills online. 1. In your internet browser, go to [email or web address] 2. Click on the First Time User? Click Here link in the Sign In box. You will see the New Member Sign Up page. 3. Enter your [employer] Access Code exactly as it appears below. You will not need to use this code after youve completed the sign-up process. If you do not sign up before the expiration date, you must request a new code. · [employer] Access Code: [email or web address] Expires: 1/3/2018 11:00 AM 
 
4. Enter the last four digits of your Social Security Number (xxxx) and Date of Birth (mm/dd/yyyy) as indicated and click Submit. You will be taken to the next sign-up page. 5. Create a PointCare ID. This will be your PointCare login ID and cannot be changed, so think of one that is secure and easy to remember. 6. Create a [employer] password. You can change your password at any time. 7. Enter your Password Reset Question and Answer. This can be used at a later time if you forget your password. 8. Enter your e-mail address. You will receive e-mail notification when new information is available in [address] 9. Click Sign Up. You can now view and download portions of your medical record. 10. Click the Download Summary menu link to download a portable copy of your medical information. If you have questions, please visit the Frequently Asked Questions section of the wavecatcht website. Remember, [employer] is NOT to be used for urgent needs. For medical emergencies, dial 911. Now available from your iPhone and Android! Please provide this summary of care documentation to your next provider. Your primary care clinician is listed as Dinesh Siddiqui. If you have any questions after today's visit, please call [phone].

## 2017-10-30 NOTE — PROGRESS NOTES
The INR is above the therapeutic range. Ask the patient about bleeding complications.  - None noted  Please make the following adjustments to Coumadin dosing: Hold x 2 and then decrease Coumadin to 5mg daily except 2.5mg on Tu-Th-Sat. Took dose this morning  Repeat the INR in 1 week    His wife states that he has not been eating any greens. Advised to eat greens once or twice a week (consistently) and warfarin will be adjusted as needed. Coumadin teaching done:  Purpose, side effects, food/drug interactions, effects of vitamin K discussed at length. List of vitamin K-rich foods given and consistency with diet encouraged. Instructed to call us with med changes/additions, especially antibiotics, prednisone, pain medications, muscle relaxers, antidepressants, and any new prescription medications. Also advised to inform us of the use of OTC cold preparations as many of them have ingredients that may affect INR. Advised to limit alcohol intake as alcohol can impact INR, be careful with sharp objects, falls, avoid contact sports, etc. If a fall results in injury to the head, advised to seek medical evaluation so that intracranial hemorrhage can be ruled out. Instructed to notify us of upcoming procedures/surgeries in advance so that we can give him/her instructions regarding when to begin holding Coumadin and how to restart therapy. Advised to take warfarin in the evening. 100% of a 30 minute visit spent in discussion, counseling, and answering questions.

## 2017-11-07 ENCOUNTER — OFFICE VISIT (OUTPATIENT)
Dept: VASCULAR SURGERY | Age: 82
End: 2017-11-07

## 2017-11-07 VITALS
WEIGHT: 219 LBS | HEIGHT: 69 IN | HEART RATE: 86 BPM | DIASTOLIC BLOOD PRESSURE: 74 MMHG | SYSTOLIC BLOOD PRESSURE: 140 MMHG | RESPIRATION RATE: 22 BRPM | BODY MASS INDEX: 32.44 KG/M2

## 2017-11-07 DIAGNOSIS — I80.02 PHLEBITIS AND THROMBOPHLEBITIS OF SUPERFICIAL VESSELS OF LEFT LOWER EXTREMITY: Primary | ICD-10-CM

## 2017-11-07 DIAGNOSIS — I26.99 OTHER PULMONARY EMBOLISM WITHOUT ACUTE COR PULMONALE, UNSPECIFIED CHRONICITY (HCC): ICD-10-CM

## 2017-11-07 NOTE — MR AVS SNAPSHOT
Visit Information Date & Time Provider Department Dept. Phone Encounter #  
 11/7/2017  1:15 PM Nadia Castellon, [address] [facility] [phone] [id number] Your Appointments 11/8/2017 12:40 PM  
POST HOSPITAL with Racheal Salazar MD  
[facility] [address] [place] Appt Note: Post hosp 2 weeks given to [facility] Sha[facility]igan [address] [address] [address] [address] [phone] [place]  
  
    
 11/8/2017 12:40 PM  
ANTICOAG NURSE with Pt Inr Hv Csi [facility] [address] ([facility] Appt Note: also has appt w/ 81 Jen White[address] [address] [address] [address] [phone] [place] [address] [address]  
  
    
 11/20/2017  9:30 AM  
ROUTINE CARE with Parul Chamorro MD  
[address] [place] [address] ([facility] Appt Note: 2 week followup-PT D/C [facility] 10/23/17-PULAMONARY; Meeting with Kita Matthew after appt.; ROUTINE F/U; pt wife 11/07/17 rb 10/30/17 [address] [place] [address] [address] [place] [address]  
  
    
 11/20/2017  1:45 PM  
PROCEDURE with [facility]ING 1 [facility] ([facility] Appt Note: L/FITO [facility]  WILL CALL [facility] WITH RESULTS  
 27 [place], [place] [address] [facility]  
[phone] 2305 [facility] [address]  
  
    
 12/14/2017  8:00 AM  
Follow Up with Racheal Salazar MD  
[facility] [address] ([facility] Appt Note: 1 yr f/up [place] [address] [address] [phone] [phone] 2300 [facility] [address] Upcoming Health Maintenance  Date Due  
 Pneumococcal 65+ Low/Medium Risk (2 of 2 - PPSV23) 10/28/2017 MEDICARE YEARLY EXAM 4/18/2018 GLAUCOMA SCREENING Q2Y 8/1/2019 DTaP/Tdap/Td series (2 - Td) 3/13/2027 COLONOSCOPY 10/6/2027 Allergies as of 11/7/2017  Review Complete On: 11/7/2017 By: Fanny White LPN No Known Allergies Current Immunizations  Reviewed on 10/16/2017 Name Date Influenza High Dose Vaccine PF 10/16/2016 Influenza Vaccine 10/14/2014 Influenza Vaccine (Quad) PF 10/11/2017  5:09 PM  
 Influenza Vaccine Whole 10/28/2012 Pneumococcal Polysaccharide (PPSV-23) 10/28/2012 ZZZ-RETIRED (DO NOT USE) Pneumococcal Vaccine (Unspecified Type) 10/28/2012 Not reviewed this visit You Were Diagnosed With   
  
 Codes Comments Phlebitis and thrombophlebitis of superficial vessels of left lower extremity    -  Primary ICD-10-CM: I80.02 
ICD-9-CM: 451.0 Other pulmonary embolism without acute cor pulmonale, unspecified chronicity (HCC)     ICD-10-CM: I26.99 
ICD-9-CM: 415.19 Vitals BP Pulse Resp Height(growth percentile) Weight(growth percentile) BMI  
 140/74 (BP 1 Location: Left arm, BP Patient Position: Sitting) 86 22 5' 9\" (1.753 m) 219 lb (99.3 kg) 32.34 kg/m2 Smoking Status Former Smoker Vitals History BMI and BSA Data Body Mass Index Body Surface Area  
 32.34 kg/m 2 2.2 m 2 Preferred Pharmacy Pharmacy Name Phone [facility] [address] [place] [address] 32 Your Updated Medication List  
  
   
This list is accurate as of: 11/7/17  1:48 PM.  Always use your most recent med list.  
  
  
  
  
 ferrous sulfate 325 mg (65 mg iron) tablet Take 1 Tab by mouth daily (with breakfast). FISH OIL 1,000 mg Cap Generic drug:  omega-3 fatty acids-vitamin e Take 1 Cap by mouth two (2) times a day. hydroCHLOROthiazide 25 mg tablet Commonly known as:  HYDRODIURIL  
 Take 0.5 Tabs by mouth daily. METAMUCIL 3.4 gram/5.4 gram Powd Generic drug:  psyllium husk Take  by mouth.  
  
 metoprolol succinate 25 mg XL tablet Commonly known as:  TOPROL-XL  
TAKE ONE TABLET BY MOUTH ONCE DAILY  
  
 multivitamin tablet Commonly known as:  ONE A DAY Take 1 Tab by mouth daily. OSTEO BI-FLEX (5-LOXIN) 1,500-400-100 mg-unit-mg Tab Generic drug:  glucosamine-D3-Boswellia serr Take 1 Tab by mouth two (2) times a day. potassium bicarbonate 25 mEq tablet Commonly known as:  Benjamín Coronel Take 1 Tab by mouth two (2) times a day. senna-docusate 8.6-50 mg per tablet Commonly known as:  Maximilian Saldivar Take 1 Tab by mouth daily. VITAMIN C 500 mg tablet Generic drug:  ascorbic acid (vitamin C) Take 1,000 mg by mouth daily. warfarin 5 mg tablet Commonly known as:  COUMADIN Take 1 Tab by mouth daily. To-Do List   
 11/21/2017 Imaging:  DUPLEX LOWER EXT VENOUS LEFT AMB Introducing [facility]! Efren Herman introduces Proformative patient portal. Now you can access parts of your medical record, email your doctor's office, and request medication refills online. 1. In your internet browser, go to [email or web address] 2. Click on the First Time User? Click Here link in the Sign In box. You will see the New Member Sign Up page. 3. Enter your Proformative Access Code exactly as it appears below. You will not need to use this code after youve completed the sign-up process. If you do not sign up before the expiration date, you must request a new code. · [employer] Access Code: [email or web address] Expires: 1/3/2018 10:00 AM 
 
4. Enter the last four digits of your Social Security Number (xxxx) and Date of Birth (mm/dd/yyyy) as indicated and click Submit. You will be taken to the next sign-up page. 5. Create a Proformative ID.  This will be your Proformative login ID and cannot be changed, so think of one that is secure and easy to remember. 6. Create a [employer] password. You can change your password at any time. 7. Enter your Password Reset Question and Answer. This can be used at a later time if you forget your password. 8. Enter your e-mail address. You will receive e-mail notification when new information is available in [address] 9. Click Sign Up. You can now view and download portions of your medical record. 10. Click the Download Summary menu link to download a portable copy of your medical information. If you have questions, please visit the Frequently Asked Questions section of the [employer] website. Remember, [employer] is NOT to be used for urgent needs. For medical emergencies, dial 911. Now available from your iPhone and Android! Please provide this summary of care documentation to your next provider. Your primary care clinician is listed as Morena Salazar. If you have any questions after today's visit, please call [phone].

## 2017-11-08 ENCOUNTER — OFFICE VISIT (OUTPATIENT)
Dept: CARDIOLOGY CLINIC | Age: 82
End: 2017-11-08

## 2017-11-08 ENCOUNTER — ANTI-COAG VISIT (OUTPATIENT)
Dept: CARDIOLOGY CLINIC | Age: 82
End: 2017-11-08

## 2017-11-08 VITALS
HEART RATE: 73 BPM | RESPIRATION RATE: 18 BRPM | DIASTOLIC BLOOD PRESSURE: 72 MMHG | SYSTOLIC BLOOD PRESSURE: 124 MMHG | WEIGHT: 222.8 LBS | HEIGHT: 69 IN | BODY MASS INDEX: 33 KG/M2 | OXYGEN SATURATION: 97 %

## 2017-11-08 DIAGNOSIS — D64.9 SYMPTOMATIC ANEMIA: ICD-10-CM

## 2017-11-08 DIAGNOSIS — I42.9 CARDIOMYOPATHY, UNSPECIFIED TYPE (HCC): ICD-10-CM

## 2017-11-08 DIAGNOSIS — I48.92 ATRIAL FLUTTER WITH RAPID VENTRICULAR RESPONSE (HCC): Primary | ICD-10-CM

## 2017-11-08 DIAGNOSIS — I26.99 OTHER PULMONARY EMBOLISM WITHOUT ACUTE COR PULMONALE, UNSPECIFIED CHRONICITY (HCC): ICD-10-CM

## 2017-11-08 DIAGNOSIS — Z79.01 LONG TERM CURRENT USE OF ANTICOAGULANT THERAPY: ICD-10-CM

## 2017-11-08 DIAGNOSIS — Q20.8 ABNORMALITY OF LEFT ATRIAL APPENDAGE: ICD-10-CM

## 2017-11-08 DIAGNOSIS — R06.09 DOE (DYSPNEA ON EXERTION): ICD-10-CM

## 2017-11-08 DIAGNOSIS — I48.92 ATRIAL FLUTTER WITH RAPID VENTRICULAR RESPONSE (HCC): ICD-10-CM

## 2017-11-08 DIAGNOSIS — I26.99 OTHER ACUTE PULMONARY EMBOLISM WITHOUT ACUTE COR PULMONALE (HCC): Primary | ICD-10-CM

## 2017-11-08 DIAGNOSIS — I82.412 ACUTE DEEP VEIN THROMBOSIS (DVT) OF FEMORAL VEIN OF LEFT LOWER EXTREMITY (HCC): ICD-10-CM

## 2017-11-08 LAB
INR BLD: 4.8
PT POC: ABNORMAL SECONDS
VALID INTERNAL CONTROL?: YES

## 2017-11-08 NOTE — PROGRESS NOTES
History of Present Illness: The patient is an 27-year-old male here for a followup after presenting to the hospital in mid October with leg pain. He was found to have a DVT. He was also found to have a pulmonary embolism. He has been started on Coumadin. The Coumadin was discontinued by me a number of years ago after ablating his atrial flutter and he did not have any recurrence. His echocardiogram in the hospital showed normal function and valves; however, a left atrial mass could not be excluded. He is doing well at this point. He denies any chest pain, dyspnea, PND, orthopnea or edema. He still has some pain on his left leg and is seeing Dr. Amber Sandhu. A repeat ultrasound is due November 20th. He has an IVC filter and there has been talk of removing it. Impression and Plan:      Recent DVT on the left, as well as PE, now on Coumadin. Goal INR between 2-3. History of remote atrial flutter with ablation in January 2013 without recurrence. He has been on a betablocker and limited by mild bradycardia. He was taken off Coumadin by me a few years ago. History of transient cardiomyopathy in 2010 likely due to atrial flutter rapid response. Echocardiogram in October of 2017 with normal function and possible left atrial mass. Degenerative joint disease. Hypertension. At this point, it appears that he had a spontaneous DVT and pulmonary embolism. He is on Coumadin. Continue to adjust his dosing to keep his INR between 2-3. It is 4.8 today. His rhythm has been stable. His blood pressure is controlled. His echocardiogram today showed possible left atrial mass. Given his history, I recommended that we proceed with transesophageal echocardiogram to further evaluate. All questions were answered. I will make arrangements.         Past Medical History:   Diagnosis Date    Ankle fracture, left approx 2011    medial, tibial    Arthritis     knees    Cardiac echocardiogram 06/16/2011    Normal LV systolic function. Mild conc LVH. Gr 1 DDfx. RVSP 35-40 mmHg. Marked LAE. Marked LISA. Mild-mod MR.  Mild-mod AI. Mild AoRE. Mild aortic arch dil.  Cardiac Holter monitor study 06/22/2011    Baseline sinus rhythm to sinus bradycardia, avg HR 60 bpm (range  bpm). Questionable chronotropic intolerance. No sustained arrhythmias.  Emphysema lung (HCC)     GERD (gastroesophageal reflux disease)     Glaucoma suspect     posterior vitreous detachment b/l, pterygium left eye, aseroid hyalosis / synchisis, b/l pseudophakia    H/O colonoscopy 04/13/2011    1 polyp thermally treated    History of atrial fibrillation 2009    Hypertension     Pulmonary embolism ([facility] [address]     Unspecified adverse effect of anesthesia     H/o A-fib immediately post colonoscopy. Current Outpatient Prescriptions   Medication Sig Dispense Refill    warfarin (COUMADIN) 5 mg tablet Take 1 Tab by mouth daily. 90 Tab 3    ascorbic acid, vitamin C, (VITAMIN C) 500 mg tablet Take 1,000 mg by mouth daily.  psyllium husk (METAMUCIL) 3.4 gram/5.4 gram powd Take  by mouth.  ferrous sulfate 325 mg (65 mg iron) tablet Take 1 Tab by mouth daily (with breakfast). 30 Tab 1    potassium bicarbonate (KLYTE) 25 mEq tablet Take 1 Tab by mouth two (2) times a day. 60 Tab 0    hydroCHLOROthiazide (HYDRODIURIL) 25 mg tablet Take 0.5 Tabs by mouth daily. 90 Tab 2    senna-docusate (PERICOLACE) 8.6-50 mg per tablet Take 1 Tab by mouth daily. 10 Tab 0    metoprolol succinate (TOPROL-XL) 25 mg XL tablet TAKE ONE TABLET BY MOUTH ONCE DAILY 90 Tab 1    multivitamin (ONE A DAY) tablet Take 1 Tab by mouth daily.  glucosamine-D3-boswellia serr (OSTEO BI-FLEX) 1,500-400-100 mg-unit-mg Tab Take 1 Tab by mouth two (2) times a day.  omega-3 fatty acids-vitamin e (FISH OIL) 1,000 mg cap Take 1 Cap by mouth two (2) times a day.          Social History reports that he quit smoking about 52 years ago. He smoked 1.00 pack per day. He has never used smokeless tobacco.   reports that he drinks alcohol. Family History  family history includes Cancer in his brother; Diabetes in his brother; Heart Failure in his brother; Pacemaker in his father and sister. Review of Systems  Except as stated above include:  Constitutional: Negative for fever, chills and malaise/fatigue. HEENT: No congestion or recent URI. Gastrointestinal: No nausea, vomiting, abdominal pain, bloody stools. Pulmonary:  Negative except as stated above. Cardiac:  Negative except as stated above. Musculoskeletal: Negative except as stated above. Neurological:  No localized symptoms. Skin:  Negative except as stated above. Psych:  No mood swings. Endocrine:  No heat/cold intolerance. PHYSICAL EXAM  BP Readings from Last 3 Encounters:   11/08/17 124/72   11/07/17 140/74   10/25/17 126/78     Pulse Readings from Last 3 Encounters:   11/08/17 73   11/07/17 86   10/25/17 78     Wt Readings from Last 3 Encounters:   11/08/17 101.1 kg (222 lb 12.8 oz)   11/07/17 99.3 kg (219 lb)   10/30/17 99.3 kg (219 lb)     General:   Well developed, well groomed. Head/Neck:   No jugular venous distention     No carotid bruits. No evidence of xanthelasma. Lungs:   No respiratory distress. Clear bilaterally. Heart:    Regular rate and rhythm. Normal S1/S2. Palpation of heart with normal point of maximum impulse. No significant murmurs, rubs or gallops. Abdomen:   Soft and nontender. No palpable abdominal mass or bruits. Extremities:   Intact peripheral pulses. Left leg with mild edema. Neurological:   Alert and oriented to person, place, time. No focal neurological deficit visually. Blood Pressure Metric:  Cliff Hugo has been given the following recommendations today due to his elevated BP reading: electrocardiogram ordered.

## 2017-11-08 NOTE — PROGRESS NOTES
Mr Sydney Amaro is here for hospital follow up  He had placement of ivc filter    He had an extensive saphenous vein phlebitis and PE  He had recent GI bleed the month prior   Though he is now taking anticoagulation (coumadin), during the admission, the filter was suggested in case he could not tolerate AC due to that recent bleed    But so far he is doing well with no notable bleeding concerns  There is question whether to remove the filter    His leg is improved. Though there is some edema, the erythema/warmth/tenderness of the phlebitis is resolved. Can still feel the \"ropiness\" of the vein in the calf but it is not tender    I discussed with Dr Camille Griffin as well. It is certainly reasonable to plan filter retrieval. Would repeat a duplex first. We will arrange this within the next 1-2 weeks.  We often plan a filter retrieval within 6 weeks after placement so the PVL will be arranged within that time frame and if reasonable to retrieve filter we will discuss that via phone and schedule within that time frame as well    Details for filter retrieval were discussed, in case agree to move forward  He can use warm compresses and compression sleeves as needed for symptom control otherwise  Avoid NSAIDs due to [facility]    Total time spent was 15 minutes

## 2017-11-08 NOTE — PROGRESS NOTES
The INR is above the therapeutic range. Ask the patient about bleeding complications. Please make the following adjustments to Coumadin dosing: Hold x 2 then decrease Coumadin to 25mg daily except 5mg on Mon & Fri  Repeat the INR in 1 week.

## 2017-11-09 ENCOUNTER — ANESTHESIA EVENT (OUTPATIENT)
Dept: CARDIAC CATH/INVASIVE PROCEDURES | Age: 82
End: 2017-11-09
Payer: MEDICARE

## 2017-11-09 NOTE — H&P
Plan BALAJI. Elevated INR noted, no bleeding issues. History of Present Illness: The patient is an 80-year-old male here for a followup after presenting to the hospital in mid October with leg pain. He was found to have a DVT. He was also found to have a pulmonary embolism. He has been started on Coumadin. The Coumadin was discontinued by me a number of years ago after ablating his atrial flutter and he did not have any recurrence. His echocardiogram in the hospital showed normal function and valves; however, a left atrial mass could not be excluded. He is doing well at this point. He denies any chest pain, dyspnea, PND, orthopnea or edema. He still has some pain on his left leg and is seeing Dr. Angeles Bauer. A repeat ultrasound is due November 20th. He has an IVC filter and there has been talk of removing it.        Impression and Plan:      Recent DVT on the left, as well as PE, now on Coumadin. Goal INR between 2-3. History of remote atrial flutter with ablation in January 2013 without recurrence. He has been on a betablocker and limited by mild bradycardia. He was taken off Coumadin by me a few years ago. History of transient cardiomyopathy in 2010 likely due to atrial flutter rapid response. Echocardiogram in October of 2017 with normal function and possible left atrial mass. Degenerative joint disease. Hypertension.       At this point, it appears that he had a spontaneous DVT and pulmonary embolism. He is on Coumadin. Continue to adjust his dosing to keep his INR between 2-3. It is 4.8 today. His rhythm has been stable. His blood pressure is controlled. His echocardiogram today showed possible left atrial mass. Given his history, I recommended that we proceed with transesophageal echocardiogram to further evaluate. All questions were answered.   I will make arrangements.              Past Medical History:   Diagnosis Date    Ankle fracture, left approx 2011     medial, tibial    Arthritis       knees    Cardiac echocardiogram 06/16/2011     Normal LV systolic function. Mild conc LVH. Gr 1 DDfx. RVSP 35-40 mmHg. Marked LAE. Marked LISA. Mild-mod MR.  Mild-mod AI. Mild AoRE. Mild aortic arch dil.  Cardiac Holter monitor study 06/22/2011     Baseline sinus rhythm to sinus bradycardia, avg HR 60 bpm (range  bpm). Questionable chronotropic intolerance. No sustained arrhythmias.  Emphysema lung (HCC)      GERD (gastroesophageal reflux disease)      Glaucoma suspect       posterior vitreous detachment b/l, pterygium left eye, aseroid hyalosis / synchisis, b/l pseudophakia    H/O colonoscopy 04/13/2011     1 polyp thermally treated    History of atrial fibrillation 2009    Hypertension      Pulmonary embolism ([address]      Unspecified adverse effect of anesthesia       H/o A-fib immediately post colonoscopy.         Current Outpatient Prescriptions   Medication Sig Dispense Refill    warfarin (COUMADIN) 5 mg tablet Take 1 Tab by mouth daily. 90 Tab 3    ascorbic acid, vitamin C, (VITAMIN C) 500 mg tablet Take 1,000 mg by mouth daily.        psyllium husk (METAMUCIL) 3.4 gram/5.4 gram powd Take  by mouth.        ferrous sulfate 325 mg (65 mg iron) tablet Take 1 Tab by mouth daily (with breakfast). 30 Tab 1    potassium bicarbonate (KLYTE) 25 mEq tablet Take 1 Tab by mouth two (2) times a day. 60 Tab 0    hydroCHLOROthiazide (HYDRODIURIL) 25 mg tablet Take 0.5 Tabs by mouth daily. 90 Tab 2    senna-docusate (PERICOLACE) 8.6-50 mg per tablet Take 1 Tab by mouth daily.  10 Tab 0    metoprolol succinate (TOPROL-XL) 25 mg XL tablet TAKE ONE TABLET BY MOUTH ONCE DAILY 90 Tab 1    multivitamin (ONE A DAY) tablet Take 1 Tab by mouth daily.          glucosamine-D3-boswellia serr (OSTEO BI-FLEX) 1,500-400-100 mg-unit-mg Tab Take 1 Tab by mouth two (2) times a day.          omega-3 fatty acids-vitamin e (FISH OIL) 1,000 mg cap Take 1 Cap by mouth two (2) times a day.             Social History   reports that he quit smoking about 52 years ago. He smoked 1.00 pack per day. He has never used smokeless tobacco.   reports that he drinks alcohol.     Family History  family history includes Cancer in his brother; Diabetes in his brother; Heart Failure in his brother; Pacemaker in his father and sister.     Review of Systems  Except as stated above include:  Constitutional: Negative for fever, chills and malaise/fatigue. HEENT: No congestion or recent URI. Gastrointestinal: No nausea, vomiting, abdominal pain, bloody stools. Pulmonary:  Negative except as stated above. Cardiac:  Negative except as stated above. Musculoskeletal: Negative except as stated above. Neurological:  No localized symptoms. Skin:  Negative except as stated above. Psych:  No mood swings. Endocrine:  No heat/cold intolerance.     PHYSICAL EXAM      BP Readings from Last 3 Encounters:   11/08/17 124/72   11/07/17 140/74   10/25/17 126/78          Pulse Readings from Last 3 Encounters:   11/08/17 73   11/07/17 86   10/25/17 78          Wt Readings from Last 3 Encounters:   11/08/17 101.1 kg (222 lb 12.8 oz)   11/07/17 99.3 kg (219 lb)   10/30/17 99.3 kg (219 lb)      General:                    Well developed, well groomed. Head/Neck:               No jugular venous distention                                               No carotid bruits. No evidence of xanthelasma. Lungs:                       No respiratory distress. Clear bilaterally. Heart:                                              Regular rate and rhythm. Normal S1/S2. Palpation of heart with normal point of maximum impulse. No significant murmurs, rubs or gallops. Abdomen:                  Soft and nontender. No palpable abdominal mass or bruits. Extremities:               Intact peripheral pulses. Left leg with mild edema. Neurological:             Alert and oriented to person, place, time.                                                 No focal neurological deficit visually.     Blood Pressure Metric:  Cliff Hugo has been given the following recommendations today due to his elevated BP reading: electrocardiogram ordered.         Electronically signed by Howie Anglin MD at 11/08/17 1162

## 2017-11-10 ENCOUNTER — HOSPITAL ENCOUNTER (OUTPATIENT)
Dept: NON INVASIVE DIAGNOSTICS | Age: 82
Discharge: HOME | End: 2017-11-10
Attending: INTERNAL MEDICINE | Admitting: INTERNAL MEDICINE
Payer: MEDICARE

## 2017-11-10 ENCOUNTER — ANESTHESIA (OUTPATIENT)
Dept: CARDIAC CATH/INVASIVE PROCEDURES | Age: 82
End: 2017-11-10
Payer: MEDICARE

## 2017-11-10 VITALS
DIASTOLIC BLOOD PRESSURE: 68 MMHG | RESPIRATION RATE: 14 BRPM | HEIGHT: 70 IN | OXYGEN SATURATION: 96 % | HEART RATE: 71 BPM | WEIGHT: 219 LBS | BODY MASS INDEX: 31.35 KG/M2 | SYSTOLIC BLOOD PRESSURE: 113 MMHG

## 2017-11-10 LAB
INR PPP: 2.3 (ref 0.8–1.2)
PROTHROMBIN TIME: 24.2 SEC (ref 11.5–15.2)

## 2017-11-10 PROCEDURE — 85610 PROTHROMBIN TIME: CPT | Performed by: INTERNAL MEDICINE

## 2017-11-10 PROCEDURE — 76060000031 HC ANESTHESIA FIRST 0.5 HR

## 2017-11-10 PROCEDURE — 74011000250 HC RX REV CODE- 250

## 2017-11-10 PROCEDURE — 74011250636 HC RX REV CODE- 250/636

## 2017-11-10 PROCEDURE — 93312 ECHO TRANSESOPHAGEAL: CPT

## 2017-11-10 RX ORDER — LIDOCAINE HYDROCHLORIDE 20 MG/ML
INJECTION, SOLUTION EPIDURAL; INFILTRATION; INTRACAUDAL; PERINEURAL AS NEEDED
Status: DISCONTINUED | OUTPATIENT
Start: 2017-11-10 | End: 2017-11-10 | Stop reason: HOSPADM

## 2017-11-10 RX ORDER — PROPOFOL 10 MG/ML
INJECTION, EMULSION INTRAVENOUS AS NEEDED
Status: DISCONTINUED | OUTPATIENT
Start: 2017-11-10 | End: 2017-11-10 | Stop reason: HOSPADM

## 2017-11-10 RX ORDER — SODIUM CHLORIDE 9 MG/ML
INJECTION, SOLUTION INTRAVENOUS
Status: DISCONTINUED | OUTPATIENT
Start: 2017-11-10 | End: 2017-11-10 | Stop reason: HOSPADM

## 2017-11-10 RX ADMIN — LIDOCAINE HYDROCHLORIDE 40 MG: 20 INJECTION, SOLUTION EPIDURAL; INFILTRATION; INTRACAUDAL; PERINEURAL at 11:41

## 2017-11-10 RX ADMIN — PROPOFOL 30 MG: 10 INJECTION, EMULSION INTRAVENOUS at 11:43

## 2017-11-10 RX ADMIN — SODIUM CHLORIDE: 9 INJECTION, SOLUTION INTRAVENOUS at 11:29

## 2017-11-10 RX ADMIN — PROPOFOL 20 MG: 10 INJECTION, EMULSION INTRAVENOUS at 11:47

## 2017-11-10 RX ADMIN — PROPOFOL 70 MG: 10 INJECTION, EMULSION INTRAVENOUS at 11:41

## 2017-11-10 NOTE — PROGRESS NOTES
Post BALAJI pt alert and oriented times four. Able to follow commands. Talking without difficulty. VSS and at baseline.  Will continue to monitor

## 2017-11-10 NOTE — DISCHARGE INSTRUCTIONS
DISCHARGE SUMMARY from Nurse    PATIENT INSTRUCTIONS:    After general anesthesia or intravenous sedation, for 24 hours or while taking prescription Narcotics:  · Limit your activities  · Do not drive and operate hazardous machinery  · Do not make important personal or business decisions  · Do  not drink alcoholic beverages  · If you have not urinated within 8 hours after discharge, please contact your surgeon on call. Report the following to your surgeon:  · Excessive pain, swelling, redness or odor of or around the surgical area  · Temperature over 100.5  · Nausea and vomiting lasting longer than 4 hours or if unable to take medications  · Any signs of decreased circulation or nerve impairment to extremity: change in color, persistent  numbness, tingling, coldness or increase pain  · Any questions    What to do at Home:  Recommended activity: Activity as tolerated and no driving for today,     If you experience any of the following symptoms fever greater than 100.5, bleeding, swelling, difficulty swallowing or numbness, please follow up with emergency services. *  Please give a list of your current medications to your Primary Care Provider. *  Please update this list whenever your medications are discontinued, doses are      changed, or new medications (including over-the-counter products) are added. *  Please carry medication information at all times in case of emergency situations. These are general instructions for a healthy lifestyle:    No smoking/ No tobacco products/ Avoid exposure to second hand smoke  Surgeon General's Warning:  Quitting smoking now greatly reduces serious risk to your health.     Obesity, smoking, and sedentary lifestyle greatly increases your risk for illness    A healthy diet, regular physical exercise & weight monitoring are important for maintaining a healthy lifestyle    You may be retaining fluid if you have a history of heart failure or if you experience any of the following symptoms:  Weight gain of 3 pounds or more overnight or 5 pounds in a week, increased swelling in our hands or feet or shortness of breath while lying flat in bed. Please call your doctor as soon as you notice any of these symptoms; do not wait until your next office visit. Recognize signs and symptoms of STROKE:    F-face looks uneven    A-arms unable to move or move unevenly    S-speech slurred or non-existent    T-time-call 911 as soon as signs and symptoms begin-DO NOT go       Back to bed or wait to see if you get better-TIME IS BRAIN. Warning Signs of HEART ATTACK     Call 911 if you have these symptoms:   Chest discomfort. Most heart attacks involve discomfort in the center of the chest that lasts more than a few minutes, or that goes away and comes back. It can feel like uncomfortable pressure, squeezing, fullness, or pain.  Discomfort in other areas of the upper body. Symptoms can include pain or discomfort in one or both arms, the back, neck, jaw, or stomach.  Shortness of breath with or without chest discomfort.  Other signs may include breaking out in a cold sweat, nausea, or lightheadedness. Don't wait more than five minutes to call 911 - MINUTES MATTER! Fast action can save your life. Calling 911 is almost always the fastest way to get lifesaving treatment. Emergency Medical Services staff can begin treatment when they arrive -- up to an hour sooner than if someone gets to the hospital by car. The discharge information has been reviewed with the patient. The patient verbalized understanding. Discharge medications reviewed with the patient and appropriate educational materials and side effects teaching were provided. ___________________________________________________________________________________________________________________________________    No driving x 24 hours.          Transesophageal Echocardiogram: What to Expect at [address] transesophageal echocardiogram is a test to help your doctor look at the inside of your heart. A small device called a transducer directs sound waves toward your heart. The sound waves make a picture of the heart's valves and chambers. Before the test, your throat was sprayed with medicine to numb it. You won't be able to eat or drink until the numbness wears off. Your throat may be sore for a few days. You may have had a sedative to help you relax. You may be unsteady after having sedation. It can take a few hours for the medicine's effects to wear off. Common side effects include nausea, vomiting, and feeling sleepy or tired. This care sheet gives you a general idea about how long it will take for you to recover. But each person recovers at a different pace. Follow the steps below to feel better as quickly as possible. How can you care for yourself at home? Activity  ? · If a sedative was used, your doctor will tell you when it is safe for you to do your normal activities. ? · For your safety, do not drive or operate any machinery that could be dangerous. Wait until the medicine wears off and you can think clearly and react easily. Diet  ? · You can eat your normal diet. Follow-up care is a key part of your treatment and safety. Be sure to make and go to all appointments, and call your doctor if you are having problems. It's also a good idea to know your test results and keep a list of the medicines you take. When should you call for help? Watch closely for changes in your health, and be sure to contact your doctor if you have any problems. Where can you learn more? Go to http://fouzia-bere.info/. Enter Q786 in the search box to learn more about \"Transesophageal Echocardiogram: What to Expect at Home. \"  Current as of: September 21, 2016  Content Version: 11.4  © 0855-8964 [employer].  Care instructions adapted under license by [employer] (which disclaims liability or warranty for this information). If you have questions about a medical condition or this instruction, always ask your healthcare professional. [place] [address] any warranty or liability for your use of this information.

## 2017-11-10 NOTE — IP AVS SNAPSHOT
[address] [facility] 
 
 
 [address] Patient: Asha Dailey Sr. MRN: [id number] About your hospitalization You were admitted on:  November 10, 2017 You last received care in the:  [facility] You were discharged on:  November 10, 2017 Why you were hospitalized Your primary diagnosis was:  Not on File Things You Need To Do (next 8 weeks) Follow up with Parul Chamorro MD  
  
Phone:  [phone] Where:  [address] [facility] [address] [facility], [facility] [address], [address] [facility] [address] Follow up with Racheal Salazar MD in 6 month(s) Continue coumadin Phone:  [phone] Where:  [address], [facility], [address] [place] [address] Thursday Nov 16, 2017 ANTICOAG NURSE with Pt Inr Hv Csi at  8:20 AM  
Where:  [facility] ([facility]) Monday Nov 20, 2017 ROUTINE CARE with Parul Chamorro MD at  9:30 AM  
Where:  [facility] ([facility]) PROCEDURE with [facility] 1 at  1:45 PM  
Where: [facility] ([facility]) Thursday Dec 14, 2017 Follow Up with Racheal Salazar MD at  8:00 AM  
Where:  [facility] ([facility]) Discharge Orders None A check yas indicates which time of day the medication should be taken. My Medications TAKE these medications as instructed Instructions Each Dose to Equal  
 Morning Noon Evening Bedtime  
 ferrous sulfate 325 mg (65 mg iron) tablet Your last dose was: Your next dose is: Take 1 Tab by mouth daily (with breakfast). 325 mg FISH OIL 1,000 mg Cap Generic drug:  omega-3 fatty acids-vitamin e Your last dose was: Your next dose is: Take 1 Cap by mouth two (2) times a day. 1 Cap  
    
   
   
   
  
 hydroCHLOROthiazide 25 mg tablet Commonly known as:  HYDRODIURIL Your last dose was: Your next dose is: Take 0.5 Tabs by mouth daily. 12.5 mg  
    
   
   
   
  
 METAMUCIL 3.4 gram/5.4 gram Powd Generic drug:  psyllium husk Your last dose was: Your next dose is: Take  by mouth.  
     
   
   
   
  
 metoprolol succinate 25 mg XL tablet Commonly known as:  TOPROL-XL Your last dose was: Your next dose is: TAKE ONE TABLET BY MOUTH ONCE DAILY  
     
   
   
   
  
 multivitamin tablet Commonly known as:  ONE A DAY Your last dose was: Your next dose is: Take 1 Tab by mouth daily. 1 Tab OSTEO BI-FLEX (5-LOXIN) 1,500-400-100 mg-unit-mg Tab Generic drug:  glucosamine-D3-Boswellia serr Your last dose was: Your next dose is: Take 1 Tab by mouth two (2) times a day. 1 Tab  
    
   
   
   
  
 potassium bicarbonate 25 mEq tablet Commonly known as:  Nancy Milch Your last dose was: Your next dose is: Take 1 Tab by mouth two (2) times a day. 25 mEq  
    
   
   
   
  
 senna-docusate 8.6-50 mg per tablet Commonly known as:  Bessy Lock Your last dose was: Your next dose is: Take 1 Tab by mouth daily. 1 Tab VITAMIN C 500 mg tablet Generic drug:  ascorbic acid (vitamin C) Your last dose was: Your next dose is: Take 1,000 mg by mouth daily. 1000 mg  
    
   
   
   
  
 warfarin 5 mg tablet Commonly known as:  COUMADIN Your last dose was: Your next dose is: Take 1 Tab by mouth daily. 5 mg Discharge Instructions DISCHARGE SUMMARY from Nurse PATIENT INSTRUCTIONS: 
 
 
F-face looks uneven A-arms unable to move or move unevenly S-speech slurred or non-existent T-time-call 911 as soon as signs and symptoms begin-DO NOT go Back to bed or wait to see if you get better-TIME IS BRAIN. Warning Signs of HEART ATTACK Call 911 if you have these symptoms: 
? Chest discomfort. Most heart attacks involve discomfort in the center of the chest that lasts more than a few minutes, or that goes away and comes back. It can feel like uncomfortable pressure, squeezing, fullness, or pain. ? Discomfort in other areas of the upper body. Symptoms can include pain or discomfort in one or both arms, the back, neck, jaw, or stomach. ? Shortness of breath with or without chest discomfort. ? Other signs may include breaking out in a cold sweat, nausea, or lightheadedness. Don't wait more than five minutes to call [address]! Fast action can save your life. Calling 911 is almost always the fastest way to get lifesaving treatment. Emergency Medical Services staff can begin treatment when they arrive  up to an hour sooner than if someone gets to the hospital by car. The discharge information has been reviewed with the patient. The patient verbalized understanding. Discharge medications reviewed with the patient and appropriate educational materials and side effects teaching were provided. ___________________________________________________________________________________________________________________________________ No driving x 24 hours. Transesophageal Echocardiogram: What to Expect at [facility] Your Recovery A transesophageal echocardiogram is a test to help your doctor look at the inside of your heart. A small device called a transducer directs sound waves toward your heart. The sound waves make a picture of the heart's valves and chambers. Before the test, your throat was sprayed with medicine to numb it. You won't be able to eat or drink until the numbness wears off. Your throat may be sore for a few days. You may have had a sedative to help you relax. You may be unsteady after having sedation. It can take a few hours for the medicine's effects to wear off. Common side effects include nausea, vomiting, and feeling sleepy or tired. This care sheet gives you a general idea about how long it will take for you to recover. But each person recovers at a different pace. Follow the steps below to feel better as quickly as possible. How can you care for yourself at home? Activity ? · If a sedative was used, your doctor will tell you when it is safe for you to do your normal activities. ? · For your safety, do not drive or operate any machinery that could be dangerous. Wait until the medicine wears off and you can think clearly and react easily. Diet ? · You can eat your normal diet. Follow-up care is a key part of your treatment and safety. Be sure to make and go to all appointments, and call your doctor if you are having problems. It's also a good idea to know your test results and keep a list of the medicines you take. When should you call for help? Watch closely for changes in your health, and be sure to contact your doctor if you have any problems. Where can you learn more? Go to http://fouzia-bere.info/. Enter M310 in the search box to learn more about \"Transesophageal Echocardiogram: What to Expect at Home. \" Current as of: September 21, 2016 Content Version: 11.4 © 8838-9808 [employer].  Care instructions adapted under license by [address] (which disclaims liability or warranty for this information). If you have questions about a medical condition or this instruction, always ask your healthcare professional. [address] any warranty or liability for your use of this information. ACO Transitions of Care Introducing [employer] [address] Patricia Bunn offers a voluntary care coordination program to provide high quality service and care to [facility] fee-for-service beneficiaries. Quan Beth was designed to help you enhance your health and well-being through the following services: ? Transitions of Care  support for individuals who are transitioning from one care setting to another (example: Hospital to home). ? Chronic and Complex Care Coordination  support for individuals and caregivers of those with serious or chronic illnesses or with more than one chronic (ongoing) condition and those who take a number of different medications. If you meet specific medical criteria, a [address] [place] [address] Rd may call you directly to coordinate your care with your primary care physician and your other care providers. For questions about the [facility] programs, please, contact your physicians office. For general questions or additional information about Accountable Care Organizations: 
Please visit www.medicare.gov/acos. html or call 1-800-MEDICARE (8-875.602.3128) TTY users should call 9-807.342.4438. Introducing hospitals & HEALTH SERVICES! Jesus Barrera introduces [employer] patient portal. Now you can access parts of your medical record, email your doctor's office, and request medication refills online. 1. In your internet browser, go to [email or web address] 2. Click on the First Time User? Click Here link in the Sign In box. You will see the New Member Sign Up page. 3. Enter your idiag Access Code exactly as it appears below. You will not need to use this code after youve completed the sign-up process. If you do not sign up before the expiration date, you must request a new code. · idiag Access Code: [email or web address] Expires: 1/3/2018 10:00 AM 
 
4. Enter the last four digits of your Social Security Number (xxxx) and Date of Birth (mm/dd/yyyy) as indicated and click Submit. You will be taken to the next sign-up page. 5. Create a idiag ID. This will be your idiag login ID and cannot be changed, so think of one that is secure and easy to remember. 6. Create a idiag password. You can change your password at any time. 7. Enter your Password Reset Question and Answer. This can be used at a later time if you forget your password. 8. Enter your e-mail address. You will receive e-mail notification when new information is available in [address]. 9. Click Sign Up. You can now view and download portions of your medical record. 10. Click the Download Summary menu link to download a portable copy of your medical information. If you have questions, please visit the Frequently Asked Questions section of the idiag website. Remember, idiag is NOT to be used for urgent needs. For medical emergencies, dial 911. Now available from your iPhone and Android! Providers Seen During Your Hospitalization Provider Specialty Primary office phone Sammi Soler MD Cardiology [phone] Your Primary Care Physician (PCP) Primary Care Physician Office Phone Office Fax Leandro Gabriel [phone] You are allergic to the following No active allergies Recent Documentation Height Weight BMI Smoking Status 1.765 m 99.3 kg 31.88 kg/m2 Former Smoker Emergency Contacts Name Discharge Info Relation Home Work Mobile Lilliam Espinal DISCHARGE CAREGIVER [3] Spouse [3] [phone] Patient Belongings The following personal items are in your possession at time of discharge: 
     Visual Aid: None Please provide this summary of care documentation to your next provider. Signatures-by signing, you are acknowledging that this After Visit Summary has been reviewed with you and you have received a copy. Patient Signature:  ____________________________________________________________ Date:  ____________________________________________________________  
  
Juanito Mais Provider Signature:  ____________________________________________________________ Date:  ____________________________________________________________

## 2017-11-10 NOTE — PROCEDURES
Procedure:  -Transesophageal echocardiogram with anesthesia assistance    Preliminary findings:  -No left atrial mass or thrombus.  -Prominent Coumadin ridge and septum. Final report to follow.

## 2017-11-10 NOTE — ANESTHESIA PREPROCEDURE EVALUATION
Anesthetic History   No history of anesthetic complications            Review of Systems / Medical History  Patient summary reviewed and pertinent labs reviewed    Pulmonary    COPD: mild               Neuro/Psych   Within defined limits           Cardiovascular    Hypertension: well controlled        Dysrhythmias (S/P SVT ablation) : atrial flutter      Exercise tolerance: >4 METS  Comments: H/O pulmonary embolism   GI/Hepatic/Renal     GERD: well controlled           Endo/Other        Morbid obesity and arthritis     Other Findings   Comments:   Risk Factors for Postoperative nausea/vomiting:       History of postoperative nausea/vomiting? NO       Female? NO       Motion sickness? NO       Intended opioid administration for postoperative analgesia? NO      Smoking Abstinence  Current Smoker? NO  Elective Surgery? YES  Seen preoperatively by anesthesiologist or proxy prior to day of surgery? YES  Pt abstained from smoking 24 hours prior to anesthesia?  N/A         Physical Exam    Airway  Mallampati: II  TM Distance: 4 - 6 cm  Neck ROM: normal range of motion   Mouth opening: Normal     Cardiovascular  Regular rate and rhythm,  S1 and S2 normal,  no murmur, click, rub, or gallop             Dental    Dentition: Full lower dentures and Full upper dentures     Pulmonary  Breath sounds clear to auscultation               Abdominal  GI exam deferred       Other Findings            Anesthetic Plan    ASA: 3  Anesthesia type: MAC          Induction: Intravenous  Anesthetic plan and risks discussed with: Patient

## 2017-11-10 NOTE — ANESTHESIA POSTPROCEDURE EVALUATION
Post-Anesthesia Evaluation and Assessment    Patient: Jaron Julian Sr. MRN: [id number]  SSN: [id number]    YOB: 1932  Age: 80 y.o. Sex: male       Cardiovascular Function/Vital Signs  Visit Vitals    /68    Pulse 71    Resp 14    Ht 5' 9.5\" (1.765 m)    Wt 99.3 kg (219 lb)    SpO2 96%    BMI 31.88 kg/m2       Patient is status post MAC anesthesia for * No procedures listed *. Nausea/Vomiting: None    Postoperative hydration reviewed and adequate. Pain:  Pain Scale 1: Numeric (0 - 10) (11/10/17 1106)  Pain Intensity 1: 0 (11/10/17 1106)   Managed    Neurological Status: At baseline    Mental Status and Level of Consciousness: Arousable    Pulmonary Status:   O2 Device: Nasal cannula (11/10/17 1153)   Adequate oxygenation and airway patent    Complications related to anesthesia: None    Post-anesthesia assessment completed.  No concerns    Signed By: Francisco Ro MD     November 10, 2017

## 2017-11-14 ENCOUNTER — PATIENT OUTREACH (OUTPATIENT)
Dept: FAMILY MEDICINE CLINIC | Age: 82
End: 2017-11-14

## 2017-11-14 DIAGNOSIS — E87.6 HYPOKALEMIA: ICD-10-CM

## 2017-11-14 DIAGNOSIS — D64.9 ANEMIA, UNSPECIFIED TYPE: Primary | ICD-10-CM

## 2017-11-14 NOTE — PROGRESS NOTES
DORA call to patients wife. Patient transesophageal echo on Friday that went well per wife. Patient has follow up with Dr. Curry Carty office for PT/ INR on 11/16. He has a follow up with Dr. Vijaya Merida on Monday 11/20/17 at 0930  After that appointment the patient and his wife will meet with nurse navigator for ACPs. Patient's wife voiced concern that the patient is running low on potassium tablets. Message sent to Dr. Vijaya Merida relaying concern. Patient has not had potassium level drawn since 10/21/17 and at that time level was 3.2.

## 2017-11-14 NOTE — PROGRESS NOTES
He can stop them and check a BMP one day prior to his visit. If low, we can start them back up. If normal, we can stop permanently.     ===View-only below this line===    ----- Message -----     From: Sharee Hyde RN     Sent: 11/14/2017  11:36 AM       To: Shelly Alan MD  Subject: patient question                                 I was just on the phone with Mrs Anna Liang doing a follow up call from his heart Cath. She mentioned to me that he only had 2 potassium tablets left and wanted to know if he should still be taking them. I looked and the last time he had a potassium level drawn that I can see was 10/21/17 and at that time his potassium was 3.2 . I was not sure if you wanted labs from him prior to his appointment or if we have an iSTAT lab that does BMP.

## 2017-11-15 ENCOUNTER — PATIENT OUTREACH (OUTPATIENT)
Dept: FAMILY MEDICINE CLINIC | Age: 82
End: 2017-11-15

## 2017-11-15 NOTE — PROGRESS NOTES
Call placed to patient's wife made her aware of the need for labs. Mrs. Livia Ogden said the patient ad to have his PT INR drawn tomorrow so he will come by  the lab slip and get labs drawn then.

## 2017-11-16 ENCOUNTER — HOSPITAL ENCOUNTER (OUTPATIENT)
Dept: LAB | Age: 82
Discharge: HOME | End: 2017-11-16
Payer: MEDICARE

## 2017-11-16 ENCOUNTER — ANTI-COAG VISIT (OUTPATIENT)
Dept: CARDIOLOGY CLINIC | Age: 82
End: 2017-11-16

## 2017-11-16 DIAGNOSIS — D64.9 ANEMIA, UNSPECIFIED TYPE: ICD-10-CM

## 2017-11-16 DIAGNOSIS — E87.6 HYPOKALEMIA: ICD-10-CM

## 2017-11-16 DIAGNOSIS — Z79.01 LONG TERM CURRENT USE OF ANTICOAGULANT THERAPY: ICD-10-CM

## 2017-11-16 DIAGNOSIS — I48.92 ATRIAL FLUTTER WITH RAPID VENTRICULAR RESPONSE (HCC): Primary | ICD-10-CM

## 2017-11-16 DIAGNOSIS — I26.99 OTHER PULMONARY EMBOLISM WITHOUT ACUTE COR PULMONALE, UNSPECIFIED CHRONICITY (HCC): ICD-10-CM

## 2017-11-16 LAB
ANION GAP SERPL CALC-SCNC: 8 MMOL/L (ref 3–18)
BUN SERPL-MCNC: 11 MG/DL (ref 7–18)
BUN/CREAT SERPL: 16 (ref 12–20)
CALCIUM SERPL-MCNC: 8.7 MG/DL (ref 8.5–10.1)
CHLORIDE SERPL-SCNC: 100 MMOL/L (ref 100–108)
CO2 SERPL-SCNC: 29 MMOL/L (ref 21–32)
CREAT SERPL-MCNC: 0.69 MG/DL (ref 0.6–1.3)
ERYTHROCYTE [DISTWIDTH] IN BLOOD BY AUTOMATED COUNT: 15.3 % (ref 11.6–14.5)
FERRITIN SERPL-MCNC: 114 NG/ML (ref 8–388)
GLUCOSE SERPL-MCNC: 96 MG/DL (ref 74–99)
HCT VFR BLD AUTO: 37.2 % (ref 36–48)
HGB BLD-MCNC: 12 G/DL (ref 13–16)
INR BLD: 3.4
MCH RBC QN AUTO: 28.9 PG (ref 24–34)
MCHC RBC AUTO-ENTMCNC: 32.3 G/DL (ref 31–37)
MCV RBC AUTO: 89.6 FL (ref 74–97)
PLATELET # BLD AUTO: 276 K/UL (ref 135–420)
PMV BLD AUTO: 11.3 FL (ref 9.2–11.8)
POTASSIUM SERPL-SCNC: 3.7 MMOL/L (ref 3.5–5.5)
PT POC: 40.2 SECONDS
RBC # BLD AUTO: 4.15 M/UL (ref 4.7–5.5)
SODIUM SERPL-SCNC: 137 MMOL/L (ref 136–145)
VALID INTERNAL CONTROL?: YES
WBC # BLD AUTO: 6.9 K/UL (ref 4.6–13.2)

## 2017-11-16 PROCEDURE — 82728 ASSAY OF FERRITIN: CPT | Performed by: FAMILY MEDICINE

## 2017-11-16 PROCEDURE — 36415 COLL VENOUS BLD VENIPUNCTURE: CPT | Performed by: FAMILY MEDICINE

## 2017-11-16 PROCEDURE — 80048 BASIC METABOLIC PNL TOTAL CA: CPT | Performed by: FAMILY MEDICINE

## 2017-11-16 PROCEDURE — 85027 COMPLETE CBC AUTOMATED: CPT | Performed by: FAMILY MEDICINE

## 2017-11-16 NOTE — PROGRESS NOTES
Verbal order and read back per Chidi Martinez NP  The INR is above the therapeutic range. Ask the patient about bleeding complications. Please make the following adjustments to Coumadin dosing: Hold x 1 then decrease Coumadin to 2.5mg daily except 5mg on Mon  Repeat the INR in 1 week. Patient and wife informed of instructions, read back and verbalized understanding. Dosing calendar also provided.  Catie Barber LPN

## 2017-11-20 ENCOUNTER — PATIENT OUTREACH (OUTPATIENT)
Dept: FAMILY MEDICINE CLINIC | Age: 82
End: 2017-11-20

## 2017-11-20 ENCOUNTER — OFFICE VISIT (OUTPATIENT)
Dept: VASCULAR SURGERY | Age: 82
End: 2017-11-20

## 2017-11-20 ENCOUNTER — OFFICE VISIT (OUTPATIENT)
Dept: FAMILY MEDICINE CLINIC | Age: 82
End: 2017-11-20

## 2017-11-20 VITALS
BODY MASS INDEX: 31.35 KG/M2 | OXYGEN SATURATION: 97 % | SYSTOLIC BLOOD PRESSURE: 120 MMHG | DIASTOLIC BLOOD PRESSURE: 70 MMHG | WEIGHT: 219 LBS | HEIGHT: 70 IN | TEMPERATURE: 97.4 F | RESPIRATION RATE: 16 BRPM | HEART RATE: 70 BPM

## 2017-11-20 DIAGNOSIS — D64.9 SYMPTOMATIC ANEMIA: Primary | ICD-10-CM

## 2017-11-20 DIAGNOSIS — I72.4 POPLITEAL ARTERY ANEURYSM (HCC): Primary | ICD-10-CM

## 2017-11-20 DIAGNOSIS — I10 ESSENTIAL HYPERTENSION: ICD-10-CM

## 2017-11-20 DIAGNOSIS — Z79.01 LONG TERM CURRENT USE OF ANTICOAGULANT THERAPY: ICD-10-CM

## 2017-11-20 DIAGNOSIS — I26.99 OTHER PULMONARY EMBOLISM WITHOUT ACUTE COR PULMONALE, UNSPECIFIED CHRONICITY (HCC): ICD-10-CM

## 2017-11-20 DIAGNOSIS — E87.6 HYPOKALEMIA: ICD-10-CM

## 2017-11-20 DIAGNOSIS — K92.2 LOWER GI BLEED: ICD-10-CM

## 2017-11-20 DIAGNOSIS — I80.9 THROMBOPHLEBITIS: Primary | ICD-10-CM

## 2017-11-20 DIAGNOSIS — I48.92 ATRIAL FLUTTER WITH RAPID VENTRICULAR RESPONSE (HCC): ICD-10-CM

## 2017-11-20 DIAGNOSIS — E66.9 OBESITY, UNSPECIFIED CLASSIFICATION, UNSPECIFIED OBESITY TYPE, UNSPECIFIED WHETHER SERIOUS COMORBIDITY PRESENT: ICD-10-CM

## 2017-11-20 DIAGNOSIS — I42.9 CARDIOMYOPATHY, UNSPECIFIED TYPE (HCC): ICD-10-CM

## 2017-11-20 PROBLEM — R06.09 DYSPNEA ON EXERTION: Status: RESOLVED | Noted: 2017-10-21 | Resolved: 2017-11-20

## 2017-11-20 PROBLEM — R55 NEAR SYNCOPE: Status: RESOLVED | Noted: 2017-10-09 | Resolved: 2017-11-20

## 2017-11-20 PROBLEM — R55 SYNCOPE AND COLLAPSE: Status: RESOLVED | Noted: 2017-10-02 | Resolved: 2017-11-20

## 2017-11-20 RX ORDER — POTASSIUM CHLORIDE 1500 MG/1
20 TABLET, FILM COATED, EXTENDED RELEASE ORAL DAILY
Qty: 30 TAB | Refills: 4 | Status: SHIPPED | OUTPATIENT
Start: 2017-11-20 | End: 2018-04-23 | Stop reason: SDUPTHER

## 2017-11-20 NOTE — PROGRESS NOTES
Patient had duplex venous ultrasound of bilateral lower extremities today and Dr. 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 Will have CTA scheduled and follow up appointment moved with either Dr. Regis Fajardo or Dr. Cira Hanks.

## 2017-11-20 NOTE — PROGRESS NOTES
1. Have you been to the ER, urgent care clinic since your last visit? Hospitalized since your last visit? No    2. Have you seen or consulted any other health care providers outside of the [address] [place] [address] since your last visit? Include any pap smears or colon screening.  Yes Where: Dr. Julissa Richards and Dr. John Harrison

## 2017-11-20 NOTE — PROCEDURES
[facility]   *** FINAL REPORT ***    Name: Shani Nick  MRN: [id number]       Outpatient  : 13 Aug 1932  HIS Order #: [id number]  [id number] [facility] Visit #: [id number]  Date: 2017    TYPE OF TEST: Peripheral Venous Testing    REASON FOR TEST  Superficial thrombophlebitis    Right Leg:-  Deep venous thrombosis:           Yes  Proximal extent of thrombus:      Common Femoral  Superficial venous thrombosis:    Yes  Deep venous insufficiency:        Not examined  Superficial venous insufficiency: Not examined    Left Leg:-  Deep venous thrombosis:           No  Superficial venous thrombosis:    Yes  Deep venous insufficiency:        Not examined  Superficial venous insufficiency: Not examined      INTERPRETATION/FINDINGS  Duplex images were obtained using 2-D gray scale, color flow and  spectral doppler analysis. 1. No evidence of acute deep venous thrombosis identified in the  common femoral, femoral, profunda, popliteal, posterior tibial or  peroneal veins bilaterally. Deep venous reflux noted in 1 of 2 left  peroneal vein (2.8 sec). 2. 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 3. Evidence of acute, essentially occlusive superficial venous  thrombus in the right great saphenous vein from the knee level  extending to the junction. 4. Extensive acute essentially occlusive superficial venous thrombus  in the left great saphenous vein from the ankle extending to the  junction involving distal calf/lower leg . 5. Triphasic doppler signals in the tibial vessel at rest.  6. 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 Multiphasic signals noted. ADDITIONAL COMMENTS  will need cta to eval right pop aneurysm    I have personally reviewed the data relevant to the interpretation of  this  study. TECHNOLOGIST: Andi Caldera RVT, BS  Signed: 2017 09:32 AM    PHYSICIAN: Selena Richardson MD  Signed: 11/20/2017 02:51 PM

## 2017-11-20 NOTE — PATIENT INSTRUCTIONS
Preventing Falls: Care Instructions  Your Care Instructions    Getting around your home safely can be a challenge if you have injuries or health problems that make it easy for you to fall. Loose rugs and furniture in walkways are among the dangers for many older people who have problems walking or who have poor eyesight. People who have conditions such as arthritis, osteoporosis, or dementia also have to be careful not to fall. You can make your home safer with a few simple measures. Follow-up care is a key part of your treatment and safety. Be sure to make and go to all appointments, and call your doctor if you are having problems. It's also a good idea to know your test results and keep a list of the medicines you take. How can you care for yourself at home? Taking care of yourself  · You may get dizzy if you do not drink enough water. To prevent dehydration, drink plenty of fluids, enough so that your urine is light yellow or clear like water. Choose water and other caffeine-free clear liquids. If you have kidney, heart, or liver disease and have to limit fluids, talk with your doctor before you increase the amount of fluids you drink. · Exercise regularly to improve your strength, muscle tone, and balance. Walk if you can. Swimming may be a good choice if you cannot walk easily. · Have your vision and hearing checked each year or any time you notice a change. If you have trouble seeing and hearing, you might not be able to avoid objects and could lose your balance. · Know the side effects of the medicines you take. Ask your doctor or pharmacist whether the medicines you take can affect your balance. Sleeping pills or sedatives can affect your balance. · Limit the amount of alcohol you drink. Alcohol can impair your balance and other senses. · Ask your doctor whether calluses or corns on your feet need to be removed.  If you wear loose-fitting shoes because of calluses or corns, you can lose your balance and fall. · Talk to your doctor if you have numbness in your feet. Preventing falls at home  · Remove raised doorway thresholds, throw rugs, and clutter. Repair loose carpet or raised areas in the floor. · Move furniture and electrical cords to keep them out of walking paths. · Use nonskid floor wax, and wipe up spills right away, especially on ceramic tile floors. · If you use a walker or cane, put rubber tips on it. If you use crutches, clean the bottoms of them regularly with an abrasive pad, such as steel wool. · Keep your house well lit, especially [place], and outside walkways. Use night-lights in areas such as hallways and bathrooms. Add extra light switches or use remote switches (such as switches that go on or off when you clap your hands) to make it easier to turn lights on if you have to get up during the night. · Install sturdy handrails on stairways. · Move items in your cabinets so that the things you use a lot are on the lower shelves (about waist level). · Keep a cordless phone and a flashlight with new batteries by your bed. If possible, put a phone in each of the main rooms of your house, or carry a cell phone in case you fall and cannot reach a phone. Or, you can wear a device around your neck or wrist. You push a button that sends a signal for help. · Wear low-heeled shoes that fit well and give your feet good support. Use footwear with nonskid soles. Check the heels and soles of your shoes for wear. Repair or replace worn heels or soles. · Do not wear socks without shoes on wood floors. · Walk on the grass when the sidewalks are slippery. If you live in an area that gets snow and ice in the winter, sprinkle salt on slippery steps and sidewalks. Preventing falls in the bath  · Install grab bars and nonskid mats inside and outside your shower or tub and near the toilet and sinks. · Use shower chairs and bath benches.   · Use a hand-held shower head that will allow you to sit while showering. · Get into a tub or shower by putting the weaker leg in first. Get out of a tub or shower with your strong side first.  · Repair loose toilet seats and consider installing a raised toilet seat to make getting on and off the toilet easier. · Keep your bathroom door unlocked while you are in the shower. Where can you learn more? Go to http://fouzia-bere.info/. Enter 0476 [id number] 69 [id number] in the search box to learn more about \"Preventing Falls: Care Instructions. \"  Current as of: May 12, 2017  Content Version: 11.4  © 2532-4524 [employer]. Care instructions adapted under license by [employer] (which disclaims liability or warranty for this information). If you have questions about a medical condition or this instruction, always ask your healthcare professional. [place] [address] any warranty or liability for your use of this information. Preventing Outdoor Falls: Care Instructions  Your Care Instructions    Worries about falls don't need to keep you indoors. Outdoor activities like walking have big benefits for your health. You will need to watch your step and learn a few safety measures. If you are worried about having a fall outdoors, ask your doctor about exercises, classes, or physical therapy that may help. You can learn ways to gain strength, flexibility, and balance. Ask if it might help to use a cane or walker. You can make your time outdoors safer with a few simple measures. Follow-up care is a key part of your treatment and safety. Be sure to make and go to all appointments, and call your doctor if you are having problems. It's also a good idea to know your test results and keep a list of the medicines you take. How can you prevent falls outdoors? · Wear shoes with firm soles and low heels. If you have to walk on an icy surface, use grippers that can be worn over your shoes in bad weather.   · Be extra careful if weather is bad. Walk on the grass when the sidewalks are slick. If you live in a place that gets snow and ice in the winter, sprinkle salt on slippery stairs and sidewalks. · Be careful getting on or off buses and trains or getting in and out of cars. If handrails are available, use them. · Be careful when you cross the street. Look for crosswalks or places where curb cuts or ramps are present. · Try not to hurry, especially if you are carrying something. · Be cautious in parking lots or garages. There may be curbs or changes in pavement, or the height of the pavement may vary. · Make sure to wear the correct eyeglasses, if you need them. Reading glasses or bifocals can make it harder to see hazards that might be in your way. · If you are walking outdoors for exercise, try to:  ¨ Walk in well-lighted, well-maintained areas. These include high school or college tracks, shopping malls, and public spaces. ¨ Walk with a partner. ¨ Watch out for cracked sidewalks, curbs, changes in the height of the pavement, exposed tree roots, and debris such as fallen leaves or branches. Where can you learn more? Go to http://fouzia-bere.info/. Enter F265 in the search box to learn more about \"Preventing Outdoor Falls: Care Instructions. \"  Current as of: May 12, 2017  Content Version: 11.4  © 8590-3311 [employer]. Care instructions adapted under license by [employer] (which disclaims liability or warranty for this information). If you have questions about a medical condition or this instruction, always ask your healthcare professional. [place] [address] any warranty or liability for your use of this information. How to Get Up Safely After a Fall: Care Instructions  Your Care Instructions    If you have injuries, health problems, or other reasons that may make it easy for you to fall at home, it is a good idea to learn how to get up safely after a fall.  Learning how to get up correctly can help you avoid making an injury worse. Also, knowing what to do if you cannot get up can help you stay safe until help arrives. Follow-up care is a key part of your treatment and safety. Be sure to make and go to all appointments, and call your doctor if you are having problems. It's also a good idea to know your test results and keep a list of the medicines you take. How can you care for yourself after a fall? If you think you can get up  First lie still for a few minutes and think about how you feel. If your body feels okay and you think you can get up safely, follow the rest of the steps below:  1. Look for a chair or other piece of furniture that is close to you. 2. Roll onto your side and rest. Roll by turning your head in the direction you want to roll, move your shoulder and arm, then hip and leg in the same direction. 3. Lie still for a moment to let your blood pressure adjust.  4. Slowly push your upper body up, lift your head, and take a moment to rest.  5. Slowly get up on your hands and knees, and crawl to the chair or other stable piece of furniture. 6. Put your hands on the chair. 7. Move one foot forward, and place it flat on the floor. Your other leg should be bent with the knee on the floor. 8. Rise slowly, turn your body, and sit in the chair. Stay seated for a bit and think about how you feel. Call for help. Even if you feel okay, let someone know what happened to you. You might not know that you have a serious injury. If you cannot get up  1. If you think you are injured after a fall or you cannot get up, try not to panic. 2. Call out for help. 3. If you have a phone within reach or you have an emergency call device, use it to call for help. 4. If you do not have a phone within reach, try to slide yourself toward it. If you cannot get to the phone, try to slide toward a door or window or a place where you think you can be heard.   5. [place] or use an object to make noise so someone might hear you. 6. If you can reach something that you can use for a pillow, place it under your head. Try to stay warm by covering yourself with a blanket or clothing while you wait for help. When should you call for help? Call 911 anytime you think you may need emergency care. For example, call if:  ? · You passed out (lost consciousness). ? · You cannot get up after a fall. ? · You have severe pain. ?Call your doctor now or seek immediate medical care if:  ? · You have new or worse pain. ? · You are dizzy or lightheaded. ? · You hit your head. ? Watch closely for changes in your health, and be sure to contact your doctor if:  ? · You do not get better as expected. Where can you learn more? Go to http://fouzia-bere.info/. Enter D876 in the search box to learn more about \"How to Get Up Safely After a Fall: Care Instructions. \"  Current as of: May 12, 2017  Content Version: 11.4  © 7548-0230 [employer]. Care instructions adapted under license by [employer] (which disclaims liability or warranty for this information). If you have questions about a medical condition or this instruction, always ask your healthcare professional. [address] any warranty or liability for your use of this information.

## 2017-11-20 NOTE — PROGRESS NOTES
SUBJECTIVE  Chief Complaint   Patient presents with    Results    Anemia    Other     h/o hyopkalemia     Patient presents for follow-up of anemia and hypokalemia. We reviewed the recent specialist notes. He sees vascular and cardiology. He has had a scan this morning of his legs for his DVTs. He still has a retrievable IVC filter in placed. He is on coumadin as well. The patient reports doing well. No increase in fatigued or dyspnea. No longer with GI bleeding or signs of that. No further falls. No dizziness or lightheadedness. No chest pain or dyspnea. He is taking potassium twice daily and he has had recent labs. He never had this issue with low potassium until recent GI bleeds he says. OBJECTIVE    Blood pressure 120/70, pulse 70, temperature 97.4 °F (36.3 °C), temperature source Oral, resp. rate 16, height 5' 9.5\" (1.765 m), weight 219 lb (99.3 kg), SpO2 97 %. General:  Alert, cooperative, well appearing, in no apparent distress. ENT:  The tongue and mucous membranes are pink and moist without lesions. CV:  The heart sounds are regular in rate and rhythm. There is a normal S1 and S2.    Lungs: Inspiratory and expiratory efforts are full and unlabored. Lung sounds are clear and equal to auscultation throughout all lung fields without wheezing, rales, or rhonchi. Abd: soft, nontender, nondistended. No HSM. No r/g. Ext: no swelling or tenderness. Skin:  No rashes, no jaundice.     Results for orders placed or performed during the hospital encounter of 11/16/17   CBC W/O DIFF   Result Value Ref Range    WBC 6.9 4.6 - 13.2 K/uL    RBC 4.15 (L) 4.70 - 5.50 M/uL    HGB 12.0 (L) 13.0 - 16.0 g/dL    HCT 37.2 36.0 - 48.0 %    MCV 89.6 74.0 - 97.0 FL    MCH 28.9 24.0 - 34.0 PG    MCHC 32.3 31.0 - 37.0 g/dL    RDW 15.3 (H) 11.6 - 14.5 %    PLATELET 376 371 - 628 K/uL    MPV 11.3 9.2 - 11.8 FL   FERRITIN   Result Value Ref Range    Ferritin 114 8 - 488 NG/ML   METABOLIC PANEL, BASIC   Result Value Ref Range    Sodium 137 136 - 145 mmol/L    Potassium 3.7 3.5 - 5.5 mmol/L    Chloride 100 100 - 108 mmol/L    CO2 29 21 - 32 mmol/L    Anion gap 8 3.0 - 18 mmol/L    Glucose 96 74 - 99 mg/dL    BUN 11 7.0 - 18 MG/DL    Creatinine 0.69 0.6 - 1.3 MG/DL    BUN/Creatinine ratio 16 12 - 20      GFR est AA >60 >60 ml/min/1.73m2    GFR est non-AA >60 >60 ml/min/1.73m2    Calcium 8.7 8.5 - 10.1 MG/DL     ASSESSMENT / PLAN    ICD-10-CM ICD-9-CM    1. Symptomatic anemia D64.9 285.9 CBC W/O DIFF      FERRITIN   2. Lower GI bleed K92.2 578.9    3. Hypokalemia A31.8 363.0 METABOLIC PANEL, BASIC   4. Cardiomyopathy, unspecified type ([facility] [address] I42.9 425.4    5. Atrial flutter with rapid ventricular response (HCC) I48.92 427.32    6. Other pulmonary embolism without acute cor pulmonale, unspecified chronicity ([facility]) I26.99 415.19    7. Long term current use of anticoagulant therapy Z79.01 V58.61    8. Essential hypertension I10 401.9    9. Obesity, unspecified classification, unspecified obesity type, unspecified whether serious comorbidity present E66.9 278.00      Labs reviewed. Anemia secondary to GI bleeding - Hg is looking normal now. He can wean off iron. We will recheck Hg and ferritin in April. Hypokalemia - we will change him to daily potassium with Kdur 20meq daily and recheck in April. Paroxysmal atrial flutter - ablation 01/2013 without clinical recurrence - He is on beta-blocker. He sees cardiology. Notes reviewed. Cardiomyopathy - History of biatrial enlargement and transient cardiomyopathy in 04/2010 likely due to atrial flutter and rapid response. His last echocardiogram was 2011 with normal function. Notes reviewed. PE / DVT on anticoagulation - cont anticoagulation. He will watch closely for signs of bleeding. Cont per coumadin clinic. HTN - controlled. Obesity - dieting advised for weight loss.      All chart history elements were reviewed by me at the time of the visit even though marked at time of note closure. Patient understands our medical plan. Patient has provided input and agrees with goals. Alternatives have been explained and offered. All questions answered. The patient is to call if condition worsens or fails to improve. Follow-up Disposition:  Return in about 5 months (around 4/20/2018), or routine care and Medicare [place] exam.  Non-fasting labs due 3-7 days prior to appointment, for  .

## 2017-11-20 NOTE — ACP (ADVANCE CARE PLANNING)
Non-Provider Advance Care Planning (ACP) Note    Date of ACP Conversation: 11/20/2017  Persons included in Conversation: patient and family  Length of ACP Conversation in minutes: 35 minutes    Conversation requested by:   Patient      Authorized Decision Maker (if patient is incapable of making informed decisions): This person is:  patient    General ACP for ALL Patients with Decision Making Capacity:    Advance Directive Conversation with Patients who have not yet planned:  Importance of advance care planning, including choosing a healthcare agent to communicate patient's healthcare decisions if patient lost the ability to make decisions, such as after a sudden illness or accident  \"Would this person honor your decisions even if he/she does not agree with them? \"  YES  Reviewed healthcare situations as outlined in advance directive form, such as:   Imminent death  Severe, permanent brain injury  Offered opportunity to explore how cultural, Shinto, spiritual, or personal beliefs affect patient's decisions for future care     Review of Existing Advance Directive: (Select questions covered)       Interventions Provided:  Provided ACP educational materials:  Advance Directive Form  Completed new Advance Directive with patient  Provided copy of completed document for scanning into medical record

## 2017-11-20 NOTE — MR AVS SNAPSHOT
Visit Information Date & Time Provider Department Dept. Phone Encounter #  
 11/20/2017  9:30 AM Laura Barney, [address] [place] [address] Road [id number] Follow-up Instructions Return in about 5 months (around 4/20/2018), or routine care and Medicare Welness exam.  Non-fasting labs due 3-7 days prior to appointment, for  . Your Appointments 11/24/2017  8:30 AM  
ANTICOAG NURSE with [facility] ([address] [place] [address]) Appt Note: 1 week INR  
 [place] [address] [address] [id number]  
[phone] [address] [facility] [address] [address]  
  
    
 12/14/2017  8:00 AM  
Follow Up with Guille Black MD  
[facility] ([address] [place] [address]) Appt Note: 1 yr f/up [place] [address] [address] [id number] [phone] [address] [facility] [address] Upcoming Health Maintenance Date Due  
 MEDICARE YEARLY EXAM 4/18/2018 GLAUCOMA SCREENING Q2Y 8/1/2019 DTaP/Tdap/Td series (2 - Td) 3/13/2027 COLONOSCOPY 10/6/2027 Allergies as of 11/20/2017  Review Complete On: 11/20/2017 By: Laura Barney MD  
 No Known Allergies Current Immunizations  Reviewed on 10/16/2017 Name Date Influenza High Dose Vaccine PF 10/16/2016 Influenza Vaccine 10/14/2014 Influenza Vaccine (Quad) PF 10/11/2017  5:09 PM  
 Influenza Vaccine Whole 10/28/2012 Pneumococcal Polysaccharide (PPSV-23) 10/28/2012 ZZZ-RETIRED (DO NOT USE) Pneumococcal Vaccine (Unspecified Type) 10/28/2012 Not reviewed this visit You Were Diagnosed With   
  
 Codes Comments Symptomatic anemia    -  Primary ICD-10-CM: D64.9 ICD-9-CM: 285.9 Lower GI bleed     ICD-10-CM: K92.2 ICD-9-CM: 578.9 Hypokalemia     ICD-10-CM: E87.6 ICD-9-CM: 276.8 Cardiomyopathy, unspecified type ([facility] [address]     ICD-10-CM: I42.9 ICD-9-CM: 425.4 Atrial flutter with rapid ventricular response (HCC)     ICD-10-CM: I48.92 
ICD-9-CM: 427.32 Other pulmonary embolism without acute cor pulmonale, unspecified chronicity (HCC)     ICD-10-CM: I26.99 
ICD-9-CM: 415.19 Long term current use of anticoagulant therapy     ICD-10-CM: Z79.01 
ICD-9-CM: V58.61 Essential hypertension     ICD-10-CM: I10 
ICD-9-CM: 401.9 Obesity, unspecified classification, unspecified obesity type, unspecified whether serious comorbidity present     ICD-10-CM: E66.9 ICD-9-CM: 278.00 Vitals BP Pulse Temp Resp Height(growth percentile) Weight(growth percentile) 120/70 (BP 1 Location: Right arm, BP Patient Position: Sitting) 70 97.4 °F (36.3 °C) (Oral) 16 5' 9.5\" (1.765 m) 219 lb (99.3 kg) SpO2 BMI Smoking Status 97% 31.88 kg/m2 Former Smoker BMI and BSA Data Body Mass Index Body Surface Area  
 31.88 kg/m 2 2.21 m 2 Preferred Pharmacy Pharmacy Name Phone [facility] [address] [place] [address] 32 Your Updated Medication List  
  
   
This list is accurate as of: 11/20/17 10:01 AM.  Always use your most recent med list.  
  
  
  
  
 ferrous sulfate 325 mg (65 mg iron) tablet Take 1 Tab by mouth daily (with breakfast). FISH OIL 1,000 mg Cap Generic drug:  omega-3 fatty acids-vitamin e Take 1 Cap by mouth two (2) times a day. hydroCHLOROthiazide 25 mg tablet Commonly known as:  HYDRODIURIL Take 0.5 Tabs by mouth daily. METAMUCIL 3.4 gram/5.4 gram Powd Generic drug:  psyllium husk Take  by mouth two (2) times a day. metoprolol succinate 25 mg XL tablet Commonly known as:  TOPROL-XL  
TAKE ONE TABLET BY MOUTH ONCE DAILY  
  
 multivitamin tablet Commonly known as:  ONE A DAY Take 1 Tab by mouth daily. OSTEO BI-FLEX (5-LOXIN) 1,500-400-100 mg-unit-mg Tab Generic drug:  glucosamine-D3-[place] serr Take 1 Tab by mouth two (2) times a day. potassium chloride SR 20 mEq tablet Commonly known as:  K-TAB Take 1 Tab by mouth daily. senna-docusate 8.6-50 mg per tablet Commonly known as:  Reggy [place] Take 1 Tab by mouth daily. VITAMIN C 500 mg tablet Generic drug:  ascorbic acid (vitamin C) Take 1,000 mg by mouth daily. warfarin 5 mg tablet Commonly known as:  COUMADIN Take 1 Tab by mouth daily. Prescriptions Sent to Pharmacy Refills  
 potassium chloride SR (K-TAB) 20 mEq tablet 4 Sig: Take 1 Tab by mouth daily. Class: Normal  
 Pharmacy: [facility] [address] [place] [address] [facility] #: [phone] Route: Oral  
  
Follow-up Instructions Return in about 5 months (around 4/20/2018), or routine care and Medicare Welness exam.  Non-fasting labs due 3-7 days prior to appointment, for  . To-Do List   
 11/20/2017 Lab:  CBC W/O DIFF   
  
 11/20/2017 Lab:  FERRITIN   
  
 11/20/2017 Lab:  METABOLIC PANEL, BASIC Patient Instructions Preventing Falls: Care Instructions Your Care Instructions Getting around your home safely can be a challenge if you have injuries or health problems that make it easy for you to fall. Loose rugs and furniture in walkways are among the dangers for many older people who have problems walking or who have poor eyesight. People who have conditions such as arthritis, osteoporosis, or dementia also have to be careful not to fall. You can make your home safer with a few simple measures. Follow-up care is a key part of your treatment and safety. Be sure to make and go to all appointments, and call your doctor if you are having problems. It's also a good idea to know your test results and keep a list of the medicines you take. How can you care for yourself at home? Taking care of yourself · You may get dizzy if you do not drink enough water.  To prevent dehydration, drink plenty of fluids, enough so that your urine is light yellow or clear like water. Choose water and other caffeine-free clear liquids. If you have kidney, heart, or liver disease and have to limit fluids, talk with your doctor before you increase the amount of fluids you drink. · Exercise regularly to improve your strength, muscle tone, and balance. Walk if you can. Swimming may be a good choice if you cannot walk easily. · Have your vision and hearing checked each year or any time you notice a change. If you have trouble seeing and hearing, you might not be able to avoid objects and could lose your balance. · Know the side effects of the medicines you take. Ask your doctor or pharmacist whether the medicines you take can affect your balance. Sleeping pills or sedatives can affect your balance. · Limit the amount of alcohol you drink. Alcohol can impair your balance and other senses. · Ask your doctor whether calluses or corns on your feet need to be removed. If you wear loose-fitting shoes because of calluses or corns, you can lose your balance and fall. · Talk to your doctor if you have numbness in your feet. Preventing falls at home · Remove raised doorway thresholds, throw rugs, and clutter. Repair loose carpet or raised areas in the floor. · Move furniture and electrical cords to keep them out of walking paths. · Use nonskid floor wax, and wipe up spills right away, especially on ceramic tile floors. · If you use a walker or cane, put rubber tips on it. If you use crutches, clean the bottoms of them regularly with an abrasive pad, such as steel wool. · Keep your house well lit, especially [place]ie [place], and outside walkways. Use night-lights in areas such as hallways and bathrooms. Add extra light switches or use remote switches (such as switches that go on or off when you clap your hands) to make it easier to turn lights on if you have to get up during the night. · Install sturdy handrails on stairways. · Move items in your cabinets so that the things you use a lot are on the lower shelves (about waist level). · Keep a cordless phone and a flashlight with new batteries by your bed. If possible, put a phone in each of the main rooms of your house, or carry a cell phone in case you fall and cannot reach a phone. Or, you can wear a device around your neck or wrist. You push a button that sends a signal for help. · Wear low-heeled shoes that fit well and give your feet good support. Use footwear with nonskid soles. Check the heels and soles of your shoes for wear. Repair or replace worn heels or soles. · Do not wear socks without shoes on wood floors. · Walk on the grass when the sidewalks are slippery. If you live in an area that gets snow and ice in the winter, sprinkle salt on slippery steps and sidewalks. Preventing falls in the bath · Install grab bars and nonskid mats inside and outside your shower or tub and near the toilet and sinks. · Use shower chairs and bath benches. · Use a hand-held shower head that will allow you to sit while showering. · Get into a tub or shower by putting the weaker leg in first. Get out of a tub or shower with your strong side first. 
· Repair loose toilet seats and consider installing a raised toilet seat to make getting on and off the toilet easier. · Keep your bathroom door unlocked while you are in the shower. Where can you learn more? Go to http://fouzia-bere.info/. Enter 0476 [id number] in the search box to learn more about \"Preventing Falls: Care Instructions. \" Current as of: May 12, 2017 Content Version: 11.4 © 3714-4911 Healthwise, [employer]. Care instructions adapted under license by "[employer] (which disclaims liability or warranty for this information).  If you have questions about a medical condition or this instruction, always ask your healthcare professional. [employer] disclaims any warranty or liability for your use of this information. Preventing Outdoor Falls: Care Instructions Your Care Instructions Worries about falls don't need to keep you indoors. Outdoor activities like walking have big benefits for your health. You will need to watch your step and learn a few safety measures. If you are worried about having a fall outdoors, ask your doctor about exercises, classes, or physical therapy that may help. You can learn ways to gain strength, flexibility, and balance. Ask if it might help to use a cane or walker. You can make your time outdoors safer with a few simple measures. Follow-up care is a key part of your treatment and safety. Be sure to make and go to all appointments, and call your doctor if you are having problems. It's also a good idea to know your test results and keep a list of the medicines you take. How can you prevent falls outdoors? · Wear shoes with firm soles and low heels. If you have to walk on an icy surface, use grippers that can be worn over your shoes in bad weather. · Be extra careful if weather is bad. Walk on the grass when the sidewalks are slick. If you live in a place that gets snow and ice in the winter, sprinkle salt on slippery stairs and sidewalks. · Be careful getting on or off buses and trains or getting in and out of cars. If handrails are available, use them. · Be careful when you cross the street. Look for crosswalks or places where curb cuts or ramps are present. · Try not to hurry, especially if you are carrying something. · Be cautious in parking lots or garages. There may be curbs or changes in pavement, or the height of the pavement may vary. · Make sure to wear the correct eyeglasses, if you need them. Reading glasses or bifocals can make it harder to see hazards that might be in your way. · If you are walking outdoors for exercise, try to: ¨ Walk in well-lighted, well-maintained areas. These include high school or college tracks, shopping malls, and public spaces. ¨ Walk with a partner. ¨ Watch out for cracked sidewalks, curbs, changes in the height of the pavement, exposed tree roots, and debris such as fallen leaves or branches. Where can you learn more? Go to http://fouzia-bere.info/. Enter T197 in the search box to learn more about \"Preventing Outdoor Falls: Care Instructions. \" Current as of: May 12, 2017 Content Version: 11.4 © 5960-8241 [employer]. Care instructions adapted under license by [employer] (which disclaims liability or warranty for this information). If you have questions about a medical condition or this instruction, always ask your healthcare professional. [address] any warranty or liability for your use of this information. How to Get Up Safely After a Fall: Care Instructions Your Care Instructions If you have injuries, health problems, or other reasons that may make it easy for you to fall at home, it is a good idea to learn how to get up safely after a fall. Learning how to get up correctly can help you avoid making an injury worse. Also, knowing what to do if you cannot get up can help you stay safe until help arrives. Follow-up care is a key part of your treatment and safety. Be sure to make and go to all appointments, and call your doctor if you are having problems. It's also a good idea to know your test results and keep a list of the medicines you take. How can you care for yourself after a fall? If you think you can get up First lie still for a few minutes and think about how you feel. If your body feels okay and you think you can get up safely, follow the rest of the steps below: 1. Look for a chair or other piece of furniture that is close to you.  
2. Roll onto your side and rest. Roll by turning your head in the direction you want to roll, move your shoulder and arm, then hip and leg in the same direction. 3. Lie still for a moment to let your blood pressure adjust. 
4. Slowly push your upper body up, lift your head, and take a moment to rest. 
5. Slowly get up on your hands and knees, and crawl to the chair or other stable piece of furniture. 6. Put your hands on the chair. 7. Move one foot forward, and place it flat on the floor. Your other leg should be bent with the knee on the floor. 8. Rise slowly, turn your body, and sit in the chair. Stay seated for a bit and think about how you feel. Call for help. Even if you feel okay, let someone know what happened to you. You might not know that you have a serious injury. If you cannot get up 1. If you think you are injured after a fall or you cannot get up, try not to panic. 2. Call out for help. 3. If you have a phone within reach or you have an emergency call device, use it to call for help. 4. If you do not have a phone within reach, try to slide yourself toward it. If you cannot get to the phone, try to slide toward a door or window or a place where you think you can be heard. 5. [place] or use an object to make noise so someone might hear you. 6. If you can reach something that you can use for a pillow, place it under your head. Try to stay warm by covering yourself with a blanket or clothing while you wait for help. When should you call for help? Call 911 anytime you think you may need emergency care. For example, call if: 
? · You passed out (lost consciousness). ? · You cannot get up after a fall. ? · You have severe pain. ?Call your doctor now or seek immediate medical care if: 
? · You have new or worse pain. ? · You are dizzy or lightheaded. ? · You hit your head. ? Watch closely for changes in your health, and be sure to contact your doctor if: 
? · You do not get better as expected. Where can you learn more? Go to http://fouzia-bere.info/. Enter F834 in the search box to learn more about \"How to Get Up Safely After a Fall: Care Instructions. \" Current as of: May 12, 2017 Content Version: 11.4 © 8897-3704 Healthwise, Incorporated. Care instructions adapted under license by [employer] (which disclaims liability or warranty for this information). If you have questions about a medical condition or this instruction, always ask your healthcare professional. [address] any warranty or liability for your use of this information. Introducing [facility]! Anh Olivo introduces [employer] patient portal. Now you can access parts of your medical record, email your doctor's office, and request medication refills online. 1. In your internet browser, go to [email or web address] 2. Click on the First Time User? Click Here link in the Sign In box. You will see the New Member Sign Up page. 3. Enter your [employer] Access Code exactly as it appears below. You will not need to use this code after youve completed the sign-up process. If you do not sign up before the expiration date, you must request a new code. · [employer] Access Code: [email or web address] Expires: 1/3/2018 10:00 AM 
 
4. Enter the last four digits of your Social Security Number (xxxx) and Date of Birth (mm/dd/yyyy) as indicated and click Submit. You will be taken to the next sign-up page. 5. Create a [place] ID. This will be your [employer] login ID and cannot be changed, so think of one that is secure and easy to remember. 6. Create a [employer] password. You can change your password at any time. 7. Enter your Password Reset Question and Answer. This can be used at a later time if you forget your password. 8. Enter your e-mail address. You will receive e-mail notification when new information is available in [address] 9. Click Sign Up. You can now view and download portions of your medical record. 10. Click the Download Summary menu link to download a portable copy of your medical information. If you have questions, please visit the Frequently Asked Questions section of the [employer] website. Remember, [employer] is NOT to be used for urgent needs. For medical emergencies, dial 911. Now available from your iPhone and Android! Please provide this summary of care documentation to your next provider. Your primary care clinician is listed as Juliette Guadalupe. If you have any questions after today's visit, please call [phone].

## 2017-11-21 ENCOUNTER — DOCUMENTATION ONLY (OUTPATIENT)
Dept: VASCULAR SURGERY | Age: 82
End: 2017-11-21

## 2017-11-21 NOTE — PROGRESS NOTES
Patient's wife was notified of incidental finding on duplex ultrasound completed yesterday and the need for CTA to be scheduled. Wife stated understood. Will have PSR call patient's wife to schedule appointments and follow up with Dr. Alesha Verdin.

## 2017-11-22 ENCOUNTER — TELEPHONE (OUTPATIENT)
Dept: VASCULAR SURGERY | Age: 82
End: 2017-11-22

## 2017-11-22 NOTE — TELEPHONE ENCOUNTER
Patient had ultra sound done and results were shown to Dr. Monisha Carney who ordered CTA , advised that Dr. Kathrin Apgar recommended leaving the filter in and ultrasound in 2 months, patient is following up with provider after CTA results.

## 2017-11-24 ENCOUNTER — ANTI-COAG VISIT (OUTPATIENT)
Dept: CARDIOLOGY CLINIC | Age: 82
End: 2017-11-24

## 2017-11-24 DIAGNOSIS — Z79.01 LONG TERM CURRENT USE OF ANTICOAGULANT THERAPY: ICD-10-CM

## 2017-11-24 DIAGNOSIS — I26.99 OTHER PULMONARY EMBOLISM WITHOUT ACUTE COR PULMONALE, UNSPECIFIED CHRONICITY (HCC): ICD-10-CM

## 2017-11-24 DIAGNOSIS — I48.92 ATRIAL FLUTTER WITH RAPID VENTRICULAR RESPONSE (HCC): Primary | ICD-10-CM

## 2017-11-24 LAB
INR BLD: 5
PT POC: 59.9 SECONDS
VALID INTERNAL CONTROL?: YES

## 2017-11-24 RX ORDER — WARFARIN 2.5 MG/1
2.5 TABLET ORAL DAILY
Qty: 90 TAB | Refills: 3 | Status: SHIPPED | OUTPATIENT
Start: 2017-11-24 | End: 2018-11-10 | Stop reason: SDUPTHER

## 2017-11-24 NOTE — TELEPHONE ENCOUNTER
Verbal order and read back per Clarisa Bolton DO  Coumadin tablet switched from 5 mg to 2.5 mg   Patient informed of instructions, read back and verbalized understanding [place], LPN

## 2017-11-24 NOTE — PROGRESS NOTES
Verbal order and read back per Dr. Brigitte Lamas  The INR is above the therapeutic range. Ask the patient about bleeding complications. Please make the following adjustments to Coumadin dosing: Hold x 2 then decrease Coumadin to 2.5mg daily except 1.25 mg on Sun and Wed  Repeat the INR in 10 days. Coumadin tablet changed from 5 mg to 2.5 mg. Patient made aware and instructed to  new prescription from pharmacy.

## 2017-11-29 ENCOUNTER — HOSPITAL ENCOUNTER (OUTPATIENT)
Dept: CT IMAGING | Age: 82
Discharge: HOME | End: 2017-11-29
Attending: SURGERY
Payer: MEDICARE

## 2017-11-29 DIAGNOSIS — I72.4 POPLITEAL ARTERY ANEURYSM (HCC): ICD-10-CM

## 2017-11-29 LAB — CREAT UR-MCNC: 0.7 MG/DL (ref 0.6–1.3)

## 2017-11-29 PROCEDURE — 82565 ASSAY OF CREATININE: CPT

## 2017-11-29 PROCEDURE — 75635 CT ANGIO ABDOMINAL ARTERIES: CPT

## 2017-11-29 PROCEDURE — 74011636320 HC RX REV CODE- 636/320: Performed by: SURGERY

## 2017-11-29 RX ADMIN — IOPAMIDOL 100 ML: 755 INJECTION, SOLUTION INTRAVENOUS at 08:31

## 2017-12-04 ENCOUNTER — OFFICE VISIT (OUTPATIENT)
Dept: VASCULAR SURGERY | Age: 82
End: 2017-12-04

## 2017-12-04 ENCOUNTER — ANTI-COAG VISIT (OUTPATIENT)
Dept: CARDIOLOGY CLINIC | Age: 82
End: 2017-12-04

## 2017-12-04 VITALS
DIASTOLIC BLOOD PRESSURE: 70 MMHG | HEIGHT: 70 IN | WEIGHT: 219 LBS | HEART RATE: 76 BPM | SYSTOLIC BLOOD PRESSURE: 118 MMHG | BODY MASS INDEX: 31.35 KG/M2

## 2017-12-04 DIAGNOSIS — Z79.01 LONG TERM CURRENT USE OF ANTICOAGULANT THERAPY: ICD-10-CM

## 2017-12-04 DIAGNOSIS — I26.99 OTHER PULMONARY EMBOLISM WITHOUT ACUTE COR PULMONALE, UNSPECIFIED CHRONICITY (HCC): ICD-10-CM

## 2017-12-04 DIAGNOSIS — I48.92 ATRIAL FLUTTER WITH RAPID VENTRICULAR RESPONSE (HCC): Primary | ICD-10-CM

## 2017-12-04 DIAGNOSIS — I72.4 ANEURYSM OF RIGHT POPLITEAL ARTERY (HCC): Primary | ICD-10-CM

## 2017-12-04 LAB
INR BLD: 1.9
PT POC: 23.2 SECONDS
VALID INTERNAL CONTROL?: YES

## 2017-12-04 NOTE — MR AVS SNAPSHOT
Visit Information Date & Time Provider Department Dept. Phone Encounter #  
 12/4/2017  9:00 AM Lon Francis  [facility] [phone] [id number] Follow-up Instructions Follow-up and Disposition History Your Appointments 12/14/2017  8:00 AM  
Follow Up with Virginia Israel MD  
[facility] ([facility]) Appt Note: 1 yr f/up Irene Henry [place] [id number]  
[phone] [place]  
  
    
 12/14/2017  8:20 AM  
ANTICOAG NURSE with Pt Inr Hv Csi [facility] ([facility]) Appt Note: 10 day INR  
 Irene Henry [place] [id number]  
[phone] [place]  
  
    
 4/23/2018  8:30 AM  
Office Visit with Erum Desir MD  
[facility] ([facility]) Appt Note: AWV  and 5 mo f/u  
 [address] [facility] [address]  
[address] [facility] [address] [facility] [address] Upcoming Health Maintenance Date Due  
 MEDICARE YEARLY EXAM 4/18/2018 GLAUCOMA SCREENING Q2Y 8/1/2019 DTaP/Tdap/Td series (2 - Td) 3/13/2027 COLONOSCOPY 10/6/2027 Allergies as of 12/4/2017  Review Complete On: 12/4/2017 By: Lon Francis MD  
 No Known Allergies Current Immunizations  Reviewed on 10/16/2017 Name Date Influenza High Dose Vaccine PF 10/16/2016 Influenza Vaccine 10/14/2014 Influenza Vaccine (Quad) PF 10/11/2017  5:09 PM  
 Influenza Vaccine Whole 10/28/2012 Pneumococcal Polysaccharide (PPSV-23) 10/28/2012 ZZZ-RETIRED (DO NOT USE) Pneumococcal Vaccine (Unspecified Type) 10/28/2012 Not reviewed this visit You Were Diagnosed With   
  
 Codes Comments Aneurysm of right popliteal artery (HCC)    -  Primary ICD-10-CM: I72.4 ICD-9-CM: 128. 3 Vitals BP Pulse Height(growth percentile) Weight(growth percentile) BMI Smoking Status 118/70 (BP 1 Location: Left arm, BP Patient Position: Sitting) 76 5' 9.5\" (1.765 m) 219 lb (99.3 kg) 31.88 kg/m2 Former Smoker BMI and BSA Data Body Mass Index Body Surface Area  
 31.88 kg/m 2 2.21 m 2 Preferred Pharmacy Pharmacy Name Phone [facility] [address], [address] Your Updated Medication List  
  
   
This list is accurate as of: 12/4/17  9:50 AM.  Always use your most recent med list.  
  
  
  
  
 ferrous sulfate 325 mg (65 mg iron) tablet Take 1 Tab by mouth daily (with breakfast). FISH OIL 1,000 mg Cap Generic drug:  omega-3 fatty acids-vitamin e Take 1 Cap by mouth two (2) times a day. hydroCHLOROthiazide 25 mg tablet Commonly known as:  HYDRODIURIL Take 0.5 Tabs by mouth daily. METAMUCIL 3.4 gram/5.4 gram Powd Generic drug:  psyllium husk Take  by mouth two (2) times a day. metoprolol succinate 25 mg XL tablet Commonly known as:  TOPROL-XL  
TAKE ONE TABLET BY MOUTH ONCE DAILY  
  
 multivitamin tablet Commonly known as:  ONE A DAY Take 1 Tab by mouth daily. OSTEO BI-FLEX (5-LOXIN) 1,500-400-100 mg-unit-mg Tab Generic drug:  glucosamine-D3-Boswellia serr Take 1 Tab by mouth two (2) times a day. potassium chloride SR 20 mEq tablet Commonly known as:  K-TAB Take 1 Tab by mouth daily. senna-docusate 8.6-50 mg per tablet Commonly known as:  Maximilian Brantleyman Take 1 Tab by mouth daily. VITAMIN C 500 mg tablet Generic drug:  ascorbic acid (vitamin C) Take 1,000 mg by mouth daily. warfarin 2.5 mg tablet Commonly known as:  COUMADIN Take 1 Tab by mouth daily. Introducing [facility] & HEALTH SERVICES! Acrhana Rose introduces Roomish patient portal. Now you can access parts of your medical record, email your doctor's office, and request medication refills online. 1. In your internet browser, go to [email or web address] 2. Click on the First Time User? Click Here link in the Sign In box. You will see the New Member Sign Up page. 3. Enter your Roomish Access Code exactly as it appears below. You will not need to use this code after youve completed the sign-up process. If you do not sign up before the expiration date, you must request a new code. · Roomish Access Code: [email or web address] Expires: 1/3/2018 10:00 AM 
 
4. Enter the last four digits of your Social Security Number (xxxx) and Date of Birth (mm/dd/yyyy) as indicated and click Submit. You will be taken to the next sign-up page. 5. Create a Roomish ID. This will be your Roomish login ID and cannot be changed, so think of one that is secure and easy to remember. 6. Create a Roomish password. You can change your password at any time. 7. Enter your Password Reset Question and Answer. This can be used at a later time if you forget your password. 8. Enter your e-mail address. You will receive e-mail notification when new information is available in [address] 9. Click Sign Up. You can now view and download portions of your medical record. 10. Click the Download Summary menu link to download a portable copy of your medical information. If you have questions, please visit the Frequently Asked Questions section of the Roomish website. Remember, Roomish is NOT to be used for urgent needs. For medical emergencies, dial 911. Now available from your iPhone and Android! Please provide this summary of care documentation to your next provider. Your primary care clinician is listed as Mikael Ovalle. If you have any questions after today's visit, please call [phone].

## 2017-12-04 NOTE — PROGRESS NOTES
Verbal order and read back per Dali Huitron NP  The INR is slightly below the therapeutic range. Please make the following adjustments to Coumadin dosing: Increase Coumadin to 2.5mg daily except 1.25 mg on Sun   Repeat the INR in 10 days. Patient informed of instructions, read back and verbalized understanding. Dosing calendar also provided.  Edilia Rae LPN

## 2017-12-04 NOTE — PROGRESS NOTES
[address] Chief Complaint   Patient presents with    Leg Pain       History and Physical    Guzman Rider Sr. is a 80 y.o. 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 Patient does not have any claudication or rest pain. No fevers or chills. No previous history of aneurysm. No family history of aneurysm. Past Medical History:   Diagnosis Date    Ankle fracture, left approx 2011    medial, tibial    Arthritis     knees    Cardiac echocardiogram 06/16/2011    Normal LV systolic function. Mild conc LVH. Gr 1 DDfx. RVSP 35-40 mmHg. Marked LAE. Marked LISA. Mild-mod MR.  Mild-mod AI. Mild AoRE. Mild aortic arch dil.  Cardiac Holter monitor study 06/22/2011    Baseline sinus rhythm to sinus bradycardia, avg HR 60 bpm (range  bpm). Questionable chronotropic intolerance. No sustained arrhythmias.  Emphysema lung (HCC)     GERD (gastroesophageal reflux disease)     Glaucoma suspect     posterior vitreous detachment b/l, pterygium left eye, aseroid hyalosis / synchisis, b/l pseudophakia    H/O colonoscopy 04/13/2011    1 polyp thermally treated    History of atrial fibrillation 2009    Hypertension     Pulmonary embolism ([facility]     Unspecified adverse effect of anesthesia     H/o A-fib immediately post colonoscopy.      Past Surgical History:   Procedure Laterality Date    CARDIAC SURG PROCEDURE UNLIST      hx atrial flutter ablation 2013    COLONOSCOPY N/A 10/6/2017    COLONOSCOPY performed by Melani Guan MD at [address] HX COLONOSCOPY      HX GI      HX KNEE REPLACEMENT Right 02/19/2013    Dr. Pratt Arm HX ORTHOPAEDIC Right 2/13    TKR, Fabio    HX SVT ABLATION  1/2/2013    by Dr. Rosa Herndon EGD [facility] SPHNCTR/CARDIA GERD       Patient Active Problem List   Diagnosis Code    Cardiomyopathy ([facility] I42.9    DJD (degenerative joint disease) M19.90    Atrial flutter with rapid ventricular response ([facility]) I48.92    S/P ablation of atrial flutter Z98.890, Z86.79    Essential hypertension I10    Lower GI bleed K92.2    Symptomatic anemia D64.9    Obesity E66.9    Pulmonary embolism ([facility]) I26.99    Long term current use of anticoagulant therapy Z79.01    Popliteal artery aneurysm ([facility]) I72.4     Current Outpatient Prescriptions   Medication Sig Dispense Refill    warfarin (COUMADIN) 2.5 mg tablet Take 1 Tab by mouth daily. 90 Tab 3    potassium chloride SR (K-TAB) 20 mEq tablet Take 1 Tab by mouth daily. 30 Tab 4    ascorbic acid, vitamin C, (VITAMIN C) 500 mg tablet Take 1,000 mg by mouth daily.  psyllium husk (METAMUCIL) 3.4 gram/5.4 gram powd Take  by mouth two (2) times a day.  ferrous sulfate 325 mg (65 mg iron) tablet Take 1 Tab by mouth daily (with breakfast). (Patient taking differently: Take 325 mg by mouth every other day.) 30 Tab 1    hydroCHLOROthiazide (HYDRODIURIL) 25 mg tablet Take 0.5 Tabs by mouth daily. 90 Tab 2    senna-docusate (PERICOLACE) 8.6-50 mg per tablet Take 1 Tab by mouth daily. 10 Tab 0    metoprolol succinate (TOPROL-XL) 25 mg XL tablet TAKE ONE TABLET BY MOUTH ONCE DAILY 90 Tab 1    multivitamin (ONE A DAY) tablet Take 1 Tab by mouth daily.  glucosamine-D3-boswellia serr (OSTEO BI-FLEX) 1,500-400-100 mg-unit-mg Tab Take 1 Tab by mouth two (2) times a day.  omega-3 fatty acids-vitamin e (FISH OIL) 1,000 mg cap Take 1 Cap by mouth two (2) times a day. No Known Allergies  Social History     Social History    Marital status:      Spouse name: N/A    Number of children: N/A    Years of education: N/A     Occupational History    Not on file. Social History Main Topics    Smoking status: Former Smoker     Packs/day: 1.00     Quit date: 1/1/1965    Smokeless tobacco: Never Used    Alcohol use Yes      Comment: occasionally.     Drug use: No    Sexual activity: Not Currently     Partners: Female     Other Topics Concern  Not on file     Social History Narrative      Family History   Problem Relation Age of Onset    Pacemaker Father     Pacemaker Sister     Heart Failure Brother     Diabetes Brother     Cancer Brother      Lung Cancer       Physical Exam:    Visit Vitals    /70 (BP 1 Location: Left arm, BP Patient Position: Sitting)    Pulse 76    Ht 5' 9.5\" (1.765 m)    Wt 219 lb (99.3 kg)    BMI 31.88 kg/m2      General: Well-appearing male in no acute distress  HEENT: EOMI no scleral icterus is noted  Pulmonary: No increased work of breathing is noted  Extremities: Warm and perfused bilaterally. Patient does have 1+ edema bilateral lower extremities. No ulcerations are identified  Neuro: Cranial nerves II through XII grossly intact    Impression and Plan:  Cassandra Wang is a 80 y.o. 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 CTA was performed showing arteriomegaly but no other arterial aneurysms identified by patient does have a 3 cm right popliteal artery aneurysm. Unable to identify if he has flow within any of his tibial arteries secondary to loss of contrast within the popliteal aneurysm. I have had a long talk with him and his wife in clinic today about the different options that can be performed. We talked about open surgery as well as watchful waiting as well as endovascular repair. We talked about all of the risks and benefits of each of these options. Given the extensive discussion he has decided on endovascular repair. We will stop his Coumadin today and aim to perform Viabahn stenting of his right popliteal artery aneurysm Thursday. We reviewed the plan with the patient and the patient understands. We also gave the patient appropriate instructions on their disease process and when to call back. Follow-up Disposition: Not on [address] [place] [address] Road, MD    PLEASE NOTE:  This document has been produced using voice recognition software. Unrecognized errors in transcription may be present.

## 2017-12-07 ENCOUNTER — HOSPITAL ENCOUNTER (OUTPATIENT)
Dept: CARDIAC CATH/INVASIVE PROCEDURES | Age: 82
Discharge: HOME | End: 2017-12-07
Attending: SURGERY | Admitting: SURGERY
Payer: MEDICARE

## 2017-12-07 VITALS
HEART RATE: 58 BPM | RESPIRATION RATE: 17 BRPM | SYSTOLIC BLOOD PRESSURE: 123 MMHG | DIASTOLIC BLOOD PRESSURE: 79 MMHG | OXYGEN SATURATION: 98 % | BODY MASS INDEX: 31.4 KG/M2 | TEMPERATURE: 97.7 F | WEIGHT: 212 LBS | HEIGHT: 69 IN

## 2017-12-07 LAB
ANION GAP BLD CALC-SCNC: 18 MMOL/L (ref 10–20)
BUN BLD-MCNC: 15 MG/DL (ref 7–18)
CA-I BLD-MCNC: 1.21 MMOL/L (ref 1.12–1.32)
CHLORIDE BLD-SCNC: 102 MMOL/L (ref 100–108)
CO2 BLD-SCNC: 25 MMOL/L (ref 19–24)
CREAT UR-MCNC: 0.7 MG/DL (ref 0.6–1.3)
GLUCOSE BLD STRIP.AUTO-MCNC: 99 MG/DL (ref 74–106)
HCT VFR BLD CALC: 37 % (ref 36–49)
HGB BLD-MCNC: 12.6 G/DL (ref 12–16)
INR PPP: 1.1 (ref 0.8–1.2)
POTASSIUM BLD-SCNC: 3.9 MMOL/L (ref 3.5–5.5)
PROTHROMBIN TIME: 13.5 SEC (ref 11.5–15.2)
SODIUM BLD-SCNC: 140 MMOL/L (ref 136–145)

## 2017-12-07 PROCEDURE — 80047 BASIC METABLC PNL IONIZED CA: CPT

## 2017-12-07 PROCEDURE — 74011636320 HC RX REV CODE- 636/320: Performed by: SURGERY

## 2017-12-07 PROCEDURE — 74011000250 HC RX REV CODE- 250: Performed by: SURGERY

## 2017-12-07 PROCEDURE — 74011250637 HC RX REV CODE- 250/637: Performed by: SURGERY

## 2017-12-07 PROCEDURE — 74011250636 HC RX REV CODE- 250/636: Performed by: SURGERY

## 2017-12-07 PROCEDURE — 85610 PROTHROMBIN TIME: CPT | Performed by: SURGERY

## 2017-12-07 PROCEDURE — 75710 ARTERY X-RAYS ARM/LEG: CPT

## 2017-12-07 RX ORDER — LIDOCAINE HYDROCHLORIDE 10 MG/ML
1-60 INJECTION, SOLUTION EPIDURAL; INFILTRATION; INTRACAUDAL; PERINEURAL
Status: DISCONTINUED | OUTPATIENT
Start: 2017-12-07 | End: 2017-12-07 | Stop reason: HOSPADM

## 2017-12-07 RX ORDER — OXYCODONE AND ACETAMINOPHEN 5; 325 MG/1; MG/1
1 TABLET ORAL
Status: DISCONTINUED | OUTPATIENT
Start: 2017-12-07 | End: 2017-12-07 | Stop reason: HOSPADM

## 2017-12-07 RX ORDER — VERAPAMIL HYDROCHLORIDE 2.5 MG/ML
2.5-5 INJECTION, SOLUTION INTRAVENOUS ONCE
Status: DISCONTINUED | OUTPATIENT
Start: 2017-12-07 | End: 2017-12-07 | Stop reason: HOSPADM

## 2017-12-07 RX ORDER — LIDOCAINE HYDROCHLORIDE 10 MG/ML
1-60 INJECTION, SOLUTION EPIDURAL; INFILTRATION; INTRACAUDAL; PERINEURAL
Status: DISCONTINUED | OUTPATIENT
Start: 2017-12-07 | End: 2017-12-07

## 2017-12-07 RX ORDER — HEPARIN SODIUM 200 [USP'U]/100ML
500 INJECTION, SOLUTION INTRAVENOUS
Status: DISCONTINUED | OUTPATIENT
Start: 2017-12-07 | End: 2017-12-07 | Stop reason: HOSPADM

## 2017-12-07 RX ORDER — MIDAZOLAM HYDROCHLORIDE 1 MG/ML
.5-1 INJECTION, SOLUTION INTRAMUSCULAR; INTRAVENOUS
Status: DISCONTINUED | OUTPATIENT
Start: 2017-12-07 | End: 2017-12-07 | Stop reason: HOSPADM

## 2017-12-07 RX ORDER — NITROGLYCERIN 40 MG/100ML
5 INJECTION INTRAVENOUS CONTINUOUS
Status: DISCONTINUED | OUTPATIENT
Start: 2017-12-07 | End: 2017-12-07 | Stop reason: HOSPADM

## 2017-12-07 RX ORDER — ACETAMINOPHEN 325 MG/1
650 TABLET ORAL
Status: DISCONTINUED | OUTPATIENT
Start: 2017-12-07 | End: 2017-12-07 | Stop reason: HOSPADM

## 2017-12-07 RX ORDER — ONDANSETRON 2 MG/ML
4 INJECTION INTRAMUSCULAR; INTRAVENOUS
Status: DISCONTINUED | OUTPATIENT
Start: 2017-12-07 | End: 2017-12-07 | Stop reason: HOSPADM

## 2017-12-07 RX ORDER — NITROGLYCERIN 40 MG/100ML
10000 INJECTION INTRAVENOUS
Status: DISCONTINUED | OUTPATIENT
Start: 2017-12-07 | End: 2017-12-07 | Stop reason: SDUPTHER

## 2017-12-07 RX ORDER — CLOPIDOGREL BISULFATE 75 MG/1
300 TABLET ORAL
Status: COMPLETED | OUTPATIENT
Start: 2017-12-07 | End: 2017-12-07

## 2017-12-07 RX ORDER — MORPHINE SULFATE 10 MG/ML
1 INJECTION, SOLUTION INTRAMUSCULAR; INTRAVENOUS
Status: DISCONTINUED | OUTPATIENT
Start: 2017-12-07 | End: 2017-12-07 | Stop reason: HOSPADM

## 2017-12-07 RX ORDER — HEPARIN SODIUM 1000 [USP'U]/ML
1000-10000 INJECTION, SOLUTION INTRAVENOUS; SUBCUTANEOUS AS NEEDED
Status: DISCONTINUED | OUTPATIENT
Start: 2017-12-07 | End: 2017-12-07 | Stop reason: HOSPADM

## 2017-12-07 RX ORDER — DIPHENHYDRAMINE HYDROCHLORIDE 50 MG/ML
12.5 INJECTION, SOLUTION INTRAMUSCULAR; INTRAVENOUS
Status: DISCONTINUED | OUTPATIENT
Start: 2017-12-07 | End: 2017-12-07 | Stop reason: HOSPADM

## 2017-12-07 RX ORDER — FENTANYL CITRATE 50 UG/ML
12.5-1 INJECTION, SOLUTION INTRAMUSCULAR; INTRAVENOUS
Status: DISCONTINUED | OUTPATIENT
Start: 2017-12-07 | End: 2017-12-07 | Stop reason: HOSPADM

## 2017-12-07 RX ORDER — HEPARIN SODIUM 200 [USP'U]/100ML
500 INJECTION, SOLUTION INTRAVENOUS ONCE
Status: COMPLETED | OUTPATIENT
Start: 2017-12-07 | End: 2017-12-07

## 2017-12-07 RX ADMIN — FENTANYL CITRATE 50 MCG: 50 INJECTION INTRAMUSCULAR; INTRAVENOUS at 10:15

## 2017-12-07 RX ADMIN — CLOPIDOGREL BISULFATE 300 MG: 75 TABLET ORAL at 11:16

## 2017-12-07 RX ADMIN — HEPARIN SODIUM 8000 UNITS: 1000 INJECTION, SOLUTION INTRAVENOUS; SUBCUTANEOUS at 10:28

## 2017-12-07 RX ADMIN — MIDAZOLAM HYDROCHLORIDE 1 MG: 1 INJECTION, SOLUTION INTRAMUSCULAR; INTRAVENOUS at 10:15

## 2017-12-07 RX ADMIN — FENTANYL CITRATE 50 MCG: 50 INJECTION INTRAMUSCULAR; INTRAVENOUS at 10:31

## 2017-12-07 RX ADMIN — HEPARIN SODIUM IN SODIUM CHLORIDE 1000 UNITS: 200 INJECTION INTRAVENOUS at 10:20

## 2017-12-07 RX ADMIN — LIDOCAINE HYDROCHLORIDE 10 ML: 10 INJECTION, SOLUTION EPIDURAL; INFILTRATION; INTRACAUDAL; PERINEURAL at 10:21

## 2017-12-07 RX ADMIN — IOPAMIDOL 17 ML: 612 INJECTION, SOLUTION INTRAVENOUS at 11:01

## 2017-12-07 NOTE — IP AVS SNAPSHOT
Kiesha Sharma 
 
 
 [address] Patient: Guzman Rider Sr. MRN: [id number] PQR:9/93/7913 About your hospitalization You were admitted on:  December 7, 2017 You last received care in the:  [facility] HOLDING You were discharged on:  December 7, 2017 Why you were hospitalized Your primary diagnosis was:  Not on File Things You Need To Do (next 8 weeks) Schedule an appointment with Eriberto Garza MD as soon as possible for a visit in 3 day(s) Phone:  [phone] Where:  [address] [facility], [facility] [address] Thursday Dec 14, 2017 Follow Up with Belgica Stewart MD at  8:00 AM  
Where:  [facility] ([facility]) ANTICOAG NURSE with Pt Inr Hv Csi at  8:20 AM  
Where:  [facility] ([facility]) Wednesday Dec 20, 2017 HOSPITAL DISCHARGE with Eriberto Garza MD at 10:00 AM  
Where: [facility] ([facility]) Discharge Orders None A check yas indicates which time of day the medication should be taken. My Medications TAKE these medications as instructed Instructions Each Dose to Equal  
 Morning Noon Evening Bedtime  
 ferrous sulfate 325 mg (65 mg iron) tablet Your last dose was: Your next dose is: Take 1 Tab by mouth daily (with breakfast). 325 mg FISH OIL 1,000 mg Cap Generic drug:  omega-3 fatty acids-vitamin e Your last dose was: Your next dose is: Take 1 Cap by mouth two (2) times a day. 1 Cap  
    
   
   
   
  
 hydroCHLOROthiazide 25 mg tablet Commonly known as:  HYDRODIURIL Your last dose was: Your next dose is: Take 0.5 Tabs by mouth daily.   
 12.5 mg  
    
   
   
   
  
 METAMUCIL 3.4 gram/5.4 gram Powd Generic drug:  psyllium husk Your last dose was: Your next dose is: Take  by mouth two (2) times a day. metoprolol succinate 25 mg XL tablet Commonly known as:  TOPROL-XL Your last dose was: Your next dose is: TAKE ONE TABLET BY MOUTH ONCE DAILY  
     
   
   
   
  
 multivitamin tablet Commonly known as:  ONE A DAY Your last dose was: Your next dose is: Take 1 Tab by mouth daily. 1 Tab OSTEO BI-FLEX (5-LOXIN) 1,500-400-100 mg-unit-mg Tab Generic drug:  glucosamine-D3-Boswellia serr Your last dose was: Your next dose is: Take 1 Tab by mouth two (2) times a day. 1 Tab  
    
   
   
   
  
 potassium chloride SR 20 mEq tablet Commonly known as:  K-TAB Your last dose was: Your next dose is: Take 1 Tab by mouth daily. 20 mEq  
    
   
   
   
  
 senna-docusate 8.6-50 mg per tablet Commonly known as:  Roselinda Art Your last dose was: Your next dose is: Take 1 Tab by mouth daily. 1 Tab VITAMIN C 500 mg tablet Generic drug:  ascorbic acid (vitamin C) Your last dose was: Your next dose is: Take 1,000 mg by mouth daily. 1000 mg  
    
   
   
   
  
 warfarin 2.5 mg tablet Commonly known as:  COUMADIN Your last dose was: Your next dose is: Take 1 Tab by mouth daily. 2.5 mg  
    
   
   
   
  
  
  
  
Discharge Instructions Please restart your coumadin tonight. [facility] Angiography Discharge Instructions General Information: This test is done to evaluate circulation in the extremities.   In the case of a stenosis (narrowing) of the arteries, we can sometimes fix the problem either with a balloon, stent, or a combination. If there is a stenosis that we cannot fix or if there is no problem seen on the angiography study, then you will be able to go home today. If there is something that we can fix, you will be spending the night in the hospital.  If we do find a stenosis that we are not able to correct, then the study will be forwarded to the vascular surgeon, who can take you to the operating room to improve the circulation. You will be asked to lie flat on your back for 3-6 hours after the procedure to prevent bleeding complications. Sometimes the physician will use an arterial closure device that will decrease your recovery time. If this is used, your nurse will explain the difference in recovery. We have no way to determine if we can use a closure device until the procedure is started. We will let you know when you wake up after the procedure. Home Care Instructions: You can resume your regular diet. Do not shower, bathe, swim, drink alcohol, or make any important legal decisions in the next 48 hours. You may drive after 24 hours. Do not lift anything heavier than a gallon of milk for 5 days. Watch the site carefully for signs of infection, like fever, drainage, redness, and/or swelling. If you take Glucophage (Metformin) for diabetes, do not take it for the next 48 hours. If you were asked to hold any blood thinners prior to the procedure, you may restart that medicine tonight. Recline your car seat for the ride home. Call If: You should call your Physician and/or the Radiology Nurse if you have any signs of infection like fever, drainage, redness, and/or swelling. If the puncture site should ooze, please call. Also call if you have any pain, decreased sensation, numbness, tingling, swelling, or change in color to the affected extremity. SEEK IMMEDIATE MEDICAL CARE IF YOUR PUNCTURE SITE STARTS TO BLEED.   APPLY ENOUGH FIRM PRESSURE TO THE SITE WITH THE TIPS OF YOUR FINGERS TO STOP THE BLEEDING. Arterial bleeding is a medical emergency and should be evaluated immediately. Follow-Up Instructions:  Please see your ordering doctor as he/she has requested. Patient Signature: 
Date: 12/7/2017 Discharging Nurse: Maynor Plascencia RN 
 
 
ACO Transitions of Care Introducing [facility] [address] Bunn offers a voluntary care coordination program to provide high quality service and care to [facility] fee-for-service beneficiaries. Doretha Campbell was designed to help you enhance your health and well-being through the following services: ? Transitions of Care  support for individuals who are transitioning from one care setting to another (example: Hospital to home). ? Chronic and Complex Care Coordination  support for individuals and caregivers of those with serious or chronic illnesses or with more than one chronic (ongoing) condition and those who take a number of different medications. If you meet specific medical criteria, a [address] [place] [address] Rd may call you directly to coordinate your care with your primary care physician and your other care providers. For questions about the [facility] programs, please, contact your physicians office. For general questions or additional information about Accountable Care Organizations: 
Please visit www.medicare.gov/acos. html or call 1-800-MEDICARE (2-786.992.5447) TTY users should call 7-125.227.6893. Introducing [facility] & HEALTH SERVICES! Romayne Duster introduces Be[employer]an patient portal. Now you can access parts of your medical record, email your doctor's office, and request medication refills online. 1. In your internet browser, go to [email or web address] 2. Click on the First Time User? Click Here link in the Sign In box. You will see the New Member Sign Up page. 3. Enter your [employer] Access Code exactly as it appears below. You will not need to use this code after youve completed the sign-up process. If you do not sign up before the expiration date, you must request a new code. · [employer] Access Code: [email or web address] Expires: 1/3/2018 10:00 AM 
 
4. Enter the last four digits of your Social Security Number (xxxx) and Date of Birth (mm/dd/yyyy) as indicated and click Submit. You will be taken to the next sign-up page. 5. Create a [employer] ID. This will be your [employer] login ID and cannot be changed, so think of one that is secure and easy to remember. 6. Create a [employer] password. You can change your password at any time. 7. Enter your Password Reset Question and Answer. This can be used at a later time if you forget your password. 8. Enter your e-mail address. You will receive e-mail notification when new information is available in [address] 9. Click Sign Up. You can now view and download portions of your medical record. 10. Click the Download Summary menu link to download a portable copy of your medical information. If you have questions, please visit the Frequently Asked Questions section of the [employer] website. Remember, [employer] is NOT to be used for urgent needs. For medical emergencies, dial 911. Now available from your iPhone and Android! Providers Seen During Your Hospitalization Provider Specialty Primary office phone Nafisa Parks MD Vascular Surgery [phone] Your Primary Care Physician (PCP) Primary Care Physician Office Phone Office Fax [facility] [phone] You are allergic to the following No active allergies Recent Documentation Height Weight BMI Smoking Status 1.753 m 96.2 kg 31.31 kg/m2 Former Smoker Emergency Contacts Name Discharge Info Relation Home Work Mobile Teddy,[place] DISCHARGE CAREGIVER [3] Spouse [3] [phone] Patient Belongings The following personal items are in your possession at time of discharge: 
     Visual Aid: None Please provide this summary of care documentation to your next provider. Signatures-by signing, you are acknowledging that this After Visit Summary has been reviewed with you and you have received a copy. Patient Signature:  ____________________________________________________________ Date:  ____________________________________________________________  
  
Aloma Snellen Provider Signature:  ____________________________________________________________ Date:  ____________________________________________________________

## 2017-12-07 NOTE — ROUTINE PROCESS
TRANSFER - OUT REPORT:    Verbal report given to LOIDA Humphreys(name) on [address]  being transferred to [facility]) for routine progression of care       Report consisted of patients Situation, Background, Assessment and   Recommendations(SBAR). Information from the following report(s) SBAR, Procedure Summary and MAR was reviewed with the receiving nurse. Lines:       Opportunity for questions and clarification was provided.       Patient transported with:   Atari

## 2017-12-07 NOTE — DISCHARGE INSTRUCTIONS
Please restart your coumadin tonight. [address] Angiography Discharge Instructions    General Information: This test is done to evaluate circulation in the extremities. In the case of a stenosis (narrowing) of the arteries, we can sometimes fix the problem either with a balloon, stent, or a combination. If there is a stenosis that we cannot fix or if there is no problem seen on the angiography study, then you will be able to go home today. If there is something that we can fix, you will be spending the night in the hospital.  If we do find a stenosis that we are not able to correct, then the study will be forwarded to the vascular surgeon, who can take you to the operating room to improve the circulation. You will be asked to lie flat on your back for 3-6 hours after the procedure to prevent bleeding complications. Sometimes the physician will use an arterial closure device that will decrease your recovery time. If this is used, your nurse will explain the difference in recovery. We have no way to determine if we can use a closure device until the procedure is started. We will let you know when you wake up after the procedure. Home Care Instructions: You can resume your regular diet. Do not shower, bathe, swim, drink alcohol, or make any important legal decisions in the next 48 hours. You may drive after 24 hours. Do not lift anything heavier than a gallon of milk for 5 days. Watch the site carefully for signs of infection, like fever, drainage, redness, and/or swelling. If you take Glucophage (Metformin) for diabetes, do not take it for the next 48 hours. If you were asked to hold any blood thinners prior to the procedure, you may restart that medicine tonight. Recline your car seat for the ride home. Call If: You should call your Physician and/or the Radiology Nurse if you have any signs of infection like fever, drainage, redness, and/or swelling.   If the puncture site should ooze, please call. Also call if you have any pain, decreased sensation, numbness, tingling, swelling, or change in color to the affected extremity. SEEK IMMEDIATE MEDICAL CARE IF YOUR PUNCTURE SITE STARTS TO BLEED. APPLY ENOUGH FIRM PRESSURE TO THE SITE WITH THE TIPS OF YOUR FINGERS TO STOP THE BLEEDING. Arterial bleeding is a medical emergency and should be evaluated immediately. Follow-Up Instructions:  Please see your ordering doctor as he/she has requested.       Patient Signature:  Date: 12/7/2017  Discharging Nurse: Oc Salcedo RN

## 2017-12-07 NOTE — PROGRESS NOTES
12/07/17 1108   Vital Signs   Cardiac Rhythm A Fib   Level of Consciousness Alert   /74   MAP (Monitor) 95   Oxygen Therapy   O2 Device Room air   Modified Sam Score   Activity 2   Respiration 2   Circulation 2   Consciousness 2   O2 Saturation 2   Sam Score 10   Neuro   Neuro (WDL) WDL   Post-Procedure Site Assessment (1)   Wound Type Puncture   Location Groin   Orientation  Left   Closure Device Angioseal;Perclose   Site Assessment No bleeding; No hematoma;Dry/intact   [REMOVED] Sheath 12/07/17   Removal Date/Time: 12/07/17 1104  Placement Date/Time: 12/07/17 1030   Number of Attempts: 1  Inserted By: Dr. Isela Donahue  Present on Admission/Arrival: No  Sheath Type: Single port  Size: Other(comment)  Location: Left;Femoral;Artery  Cath Tip Intact: Yes   Site Assessment Clean, dry, & intact   Patient  Assessment   Skin Color Appropriate for ethnicity   Skin Condition/Temp Warm   Peripheral Vascular   Peripheral Vascular (WDL) WDL   Activity   Activity Level Bed Rest   received patient from vascular lab. Pt alert oriented x3. Bilateral lower extremity neurovascular checks wdl. Pulses 2+ palpable. Left groin site wdl no bleeding or hematoma.

## 2017-12-07 NOTE — INTERVAL H&P NOTE
H&P Update:  Ruma Lau Sr. was seen and examined. History and physical has been reviewed. The patient has been examined.  There have been no significant clinical changes since the completion of the originally dated History and Physical.    Signed By: Silvestre Kennedy MD     December 7, 2017 9:46 AM

## 2017-12-07 NOTE — PROGRESS NOTES
Pt and spouse provided discharge instructions. Pt ambulated to restroom and back-site wdl no bleeding or hematoma. All questions answered and pt and spouse verbalized understanding. PIV removed. No hematoma or bleeding noted at this time. Procedure site within normal limits. Pt escorted to front of building for discharge.  Patient armband removed and shredded

## 2017-12-07 NOTE — H&P (VIEW-ONLY)
[address] Chief Complaint   Patient presents with    Leg Pain       History and Physical    Guzman Rider Sr. is a 80 y.o. 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 Patient does not have any claudication or rest pain. No fevers or chills. No previous history of aneurysm. No family history of aneurysm. Past Medical History:   Diagnosis Date    Ankle fracture, left approx 2011    medial, tibial    Arthritis     knees    Cardiac echocardiogram 06/16/2011    Normal LV systolic function. Mild conc LVH. Gr 1 DDfx. RVSP 35-40 mmHg. Marked LAE. Marked LISA. Mild-mod MR.  Mild-mod AI. Mild AoRE. Mild aortic arch dil.  Cardiac Holter monitor study 06/22/2011    Baseline sinus rhythm to sinus bradycardia, avg HR 60 bpm (range  bpm). Questionable chronotropic intolerance. No sustained arrhythmias.  Emphysema lung (HCC)     GERD (gastroesophageal reflux disease)     Glaucoma suspect     posterior vitreous detachment b/l, pterygium left eye, aseroid hyalosis / synchisis, b/l pseudophakia    H/O colonoscopy 04/13/2011    1 polyp thermally treated    History of atrial fibrillation 2009    Hypertension     Pulmonary embolism ([facility] [address]     Unspecified adverse effect of anesthesia     H/o A-fib immediately post colonoscopy.      Past Surgical History:   Procedure Laterality Date    CARDIAC SURG PROCEDURE UNLIST      hx atrial flutter ablation 2013    COLONOSCOPY N/A 10/6/2017    COLONOSCOPY performed by Melani Guan MD at [address] HX COLONOSCOPY      HX GI      HX KNEE REPLACEMENT Right 02/19/2013    Dr. Pratt Arm HX ORTHOPAEDIC Right 2/13    TKR, Fabio    HX SVT ABLATION  1/2/2013    by Dr. Rosa Herndon EGD [facility] SPHNCTR/CARDIA GERD       Patient Active Problem List   Diagnosis Code    Cardiomyopathy ([facility] [address] I42.9    DJD (degenerative joint disease) M19.90    Atrial flutter with rapid ventricular response ([facility]) I48.92    S/P ablation of atrial flutter Z98.890, Z86.79    Essential hypertension I10    Lower GI bleed K92.2    Symptomatic anemia D64.9    Obesity E66.9    Pulmonary embolism ([facility]) I26.99    Long term current use of anticoagulant therapy Z79.01    Popliteal artery aneurysm ([facility]) I72.4     Current Outpatient Prescriptions   Medication Sig Dispense Refill    warfarin (COUMADIN) 2.5 mg tablet Take 1 Tab by mouth daily. 90 Tab 3    potassium chloride SR (K-TAB) 20 mEq tablet Take 1 Tab by mouth daily. 30 Tab 4    ascorbic acid, vitamin C, (VITAMIN C) 500 mg tablet Take 1,000 mg by mouth daily.  psyllium husk (METAMUCIL) 3.4 gram/5.4 gram powd Take  by mouth two (2) times a day.  ferrous sulfate 325 mg (65 mg iron) tablet Take 1 Tab by mouth daily (with breakfast). (Patient taking differently: Take 325 mg by mouth every other day.) 30 Tab 1    hydroCHLOROthiazide (HYDRODIURIL) 25 mg tablet Take 0.5 Tabs by mouth daily. 90 Tab 2    senna-docusate (PERICOLACE) 8.6-50 mg per tablet Take 1 Tab by mouth daily. 10 Tab 0    metoprolol succinate (TOPROL-XL) 25 mg XL tablet TAKE ONE TABLET BY MOUTH ONCE DAILY 90 Tab 1    multivitamin (ONE A DAY) tablet Take 1 Tab by mouth daily.  glucosamine-D3-boswellia serr (OSTEO BI-FLEX) 1,500-400-100 mg-unit-mg Tab Take 1 Tab by mouth two (2) times a day.  omega-3 fatty acids-vitamin e (FISH OIL) 1,000 mg cap Take 1 Cap by mouth two (2) times a day. No Known Allergies  Social History     Social History    Marital status:      Spouse name: N/A    Number of children: N/A    Years of education: N/A     Occupational History    Not on file. Social History Main Topics    Smoking status: Former Smoker     Packs/day: 1.00     Quit date: 1/1/1965    Smokeless tobacco: Never Used    Alcohol use Yes      Comment: occasionally.     Drug use: No    Sexual activity: Not Currently     Partners: Female     Other Topics Concern  Not on file     Social History Narrative      Family History   Problem Relation Age of Onset    Pacemaker Father     Pacemaker Sister     Heart Failure Brother     Diabetes Brother     Cancer Brother      Lung Cancer       Physical Exam:    Visit Vitals    /70 (BP 1 Location: Left arm, BP Patient Position: Sitting)    Pulse 76    Ht 5' 9.5\" (1.765 m)    Wt 219 lb (99.3 kg)    BMI 31.88 kg/m2      General: Well-appearing male in no acute distress  HEENT: EOMI no scleral icterus is noted  Pulmonary: No increased work of breathing is noted  Extremities: Warm and perfused bilaterally. Patient does have 1+ edema bilateral lower extremities. No ulcerations are identified  Neuro: Cranial nerves II through XII grossly intact    Impression and Plan:  Marilee Loo. is a 80 y.o. 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 CTA was performed showing arteriomegaly but no other arterial aneurysms identified by patient does have a 3 cm right popliteal artery aneurysm. Unable to identify if he has flow within any of his tibial arteries secondary to loss of contrast within the popliteal aneurysm. I have had a long talk with him and his wife in clinic today about the different options that can be performed. We talked about open surgery as well as watchful waiting as well as endovascular repair. We talked about all of the risks and benefits of each of these options. Given the extensive discussion he has decided on endovascular repair. We will stop his Coumadin today and aim to perform Viabahn stenting of his right popliteal artery aneurysm Thursday. We reviewed the plan with the patient and the patient understands. We also gave the patient appropriate instructions on their disease process and when to call back. Follow-up Disposition: Not on [address] [place] [address], MD    PLEASE NOTE:  This document has been produced using voice recognition software. Unrecognized errors in transcription may be present.

## 2017-12-14 ENCOUNTER — ANTI-COAG VISIT (OUTPATIENT)
Dept: CARDIOLOGY CLINIC | Age: 82
End: 2017-12-14

## 2017-12-14 ENCOUNTER — OFFICE VISIT (OUTPATIENT)
Dept: CARDIOLOGY CLINIC | Age: 82
End: 2017-12-14

## 2017-12-14 VITALS
OXYGEN SATURATION: 98 % | DIASTOLIC BLOOD PRESSURE: 74 MMHG | BODY MASS INDEX: 33.47 KG/M2 | HEIGHT: 69 IN | WEIGHT: 226 LBS | SYSTOLIC BLOOD PRESSURE: 124 MMHG | HEART RATE: 73 BPM

## 2017-12-14 DIAGNOSIS — I48.92 PAROXYSMAL ATRIAL FLUTTER (HCC): Primary | ICD-10-CM

## 2017-12-14 DIAGNOSIS — R55 SYNCOPE, UNSPECIFIED SYNCOPE TYPE: ICD-10-CM

## 2017-12-14 DIAGNOSIS — I42.9 CARDIOMYOPATHY, UNSPECIFIED TYPE (HCC): ICD-10-CM

## 2017-12-14 DIAGNOSIS — I26.99 OTHER PULMONARY EMBOLISM WITHOUT ACUTE COR PULMONALE, UNSPECIFIED CHRONICITY (HCC): ICD-10-CM

## 2017-12-14 DIAGNOSIS — R06.09 DOE (DYSPNEA ON EXERTION): ICD-10-CM

## 2017-12-14 DIAGNOSIS — R55 NEAR SYNCOPE: ICD-10-CM

## 2017-12-14 DIAGNOSIS — Z98.890 S/P ABLATION OF ATRIAL FLUTTER: ICD-10-CM

## 2017-12-14 DIAGNOSIS — I48.92 ATRIAL FLUTTER WITH RAPID VENTRICULAR RESPONSE (HCC): Primary | ICD-10-CM

## 2017-12-14 DIAGNOSIS — Z79.01 LONG TERM CURRENT USE OF ANTICOAGULANT THERAPY: ICD-10-CM

## 2017-12-14 DIAGNOSIS — Z86.79 S/P ABLATION OF ATRIAL FLUTTER: ICD-10-CM

## 2017-12-14 LAB
INR BLD: 1.4
PT POC: 17.3 SECONDS
VALID INTERNAL CONTROL?: YES

## 2017-12-14 NOTE — MR AVS SNAPSHOT
Visit Information Date & Time Provider Department Dept. Phone Encounter #  
 12/14/2017  8:00 AM Virginia Israel MD [facility] [address] [id number] Follow-up Instructions Follow-up and Disposition History Your Appointments 12/20/2017  8:50 AM  
ANTICOAG NURSE with Pt Inr Hv [facility] ([facility]) Appt Note: 6 day INR (pt has appt with [facility] V[facility]) Irene Acosta [id number]  
[phone] [address] [facility]  
  
    
 12/20/2017 10:00 AM  
HOSPITAL DISCHARGE with Lon Francis MD  
[address] [facility] and [facility]) Appt Note: DC RLE angio/pop aneurysum stent [address] [facility] [address] [facility] [address]  
[phone] [address] [facility] [address] [facility]  
  
    
 4/23/2018  8:30 AM  
Office Visit with Erum Desir MD  
[address] [facility] ([facility]) Appt Note: AWV  and 5 mo f/u  
 [address] [facility] [address]  
[address] [facility] [address] [facility] [address] Upcoming Health Maintenance Date Due  
 MEDICARE YEARLY EXAM 4/18/2018 GLAUCOMA SCREENING Q2Y 8/1/2019 DTaP/Tdap/Td series (2 - Td) 3/13/2027 COLONOSCOPY 10/6/2027 Allergies as of 12/14/2017  Review Complete On: 12/14/2017 By: Virginia Israel MD  
 No Known Allergies Current Immunizations  Reviewed on 10/16/2017 Name Date Influenza High Dose Vaccine PF 10/16/2016 Influenza Vaccine 10/14/2014 Influenza Vaccine (Quad) PF 10/11/2017  5:09 PM  
 Influenza Vaccine Whole 10/28/2012 Pneumococcal Polysaccharide (PPSV-23) 10/28/2012 ZZZ-RETIRED (DO NOT USE) Pneumococcal Vaccine (Unspecified Type) 10/28/2012 Not reviewed this visit You Were Diagnosed With   
  
 Codes Comments Paroxysmal atrial flutter (HCC)    -  Primary ICD-10-CM: I48.92 
ICD-9-CM: 427.32 Long term current use of anticoagulant therapy     ICD-10-CM: Z79.01 
ICD-9-CM: V58.61 Cardiomyopathy, unspecified type ([facility] [address]     ICD-10-CM: I42.9 ICD-9-CM: 425.4 WOODY (dyspnea on exertion)     ICD-10-CM: R06.09 
ICD-9-CM: 786.09 Syncope, unspecified syncope type     ICD-10-CM: R55 
ICD-9-CM: 780.2 S/P ablation of atrial flutter     ICD-10-CM: Z98.890, Z86.79 
ICD-9-CM: V45.89 Near syncope     ICD-10-CM: R55 
ICD-9-CM: 780. 2 Vitals BP Pulse Height(growth percentile) Weight(growth percentile) SpO2 BMI  
 124/74 73 5' 9\" (1.753 m) 226 lb (102.5 kg) 98% 33.37 kg/m2 Smoking Status Former Smoker BMI and BSA Data Body Mass Index Body Surface Area  
 33.37 kg/m 2 2.23 m 2 Preferred Pharmacy Pharmacy Name Phone [facility] [address] [place], [address] Your Updated Medication List  
  
   
This list is accurate as of: 12/14/17  8:34 AM.  Always use your most recent med list.  
  
  
  
  
 ferrous sulfate 325 mg (65 mg iron) tablet Take 1 Tab by mouth daily (with breakfast). FISH OIL 1,000 mg Cap Generic drug:  omega-3 fatty acids-vitamin e Take 1 Cap by mouth two (2) times a day. hydroCHLOROthiazide 25 mg tablet Commonly known as:  HYDRODIURIL Take 0.5 Tabs by mouth daily. METAMUCIL 3.4 gram/5.4 gram Powd Generic drug:  psyllium husk Take  by mouth two (2) times a day. metoprolol succinate 25 mg XL tablet Commonly known as:  TOPROL-XL  
TAKE ONE TABLET BY MOUTH ONCE DAILY  
  
 multivitamin tablet Commonly known as:  ONE A DAY Take 1 Tab by mouth daily. OSTEO BI-FLEX (5-LOXIN) 1,500-400-100 mg-unit-mg Tab Generic drug:  glucosamine-D3-Boswellia serr Take 1 Tab by mouth two (2) times a day. potassium chloride SR 20 mEq tablet Commonly known as:  K-TAB Take 1 Tab by mouth daily. senna-docusate 8.6-50 mg per tablet Commonly known as:  Piedad Distad Take 1 Tab by mouth daily. VITAMIN C 500 mg tablet Generic drug:  ascorbic acid (vitamin C) Take 1,000 mg by mouth daily. warfarin 2.5 mg tablet Commonly known as:  COUMADIN Take 1 Tab by mouth daily. Introducing [facility]! [facility] introduces [employer] patient portal. Now you can access parts of your medical record, email your doctor's office, and request medication refills online. 1. In your internet browser, go to [email or web address] 2. Click on the First Time User? Click Here link in the Sign In box. You will see the New Member Sign Up page. 3. Enter your [employer] Access Code exactly as it appears below. You will not need to use this code after youve completed the sign-up process. If you do not sign up before the expiration date, you must request a new code. · [employer] Access Code: [email or web address] Expires: 1/3/2018 10:00 AM 
 
4. Enter the last four digits of your Social Security Number (xxxx) and Date of Birth (mm/dd/yyyy) as indicated and click Submit. You will be taken to the next sign-up page. 5. Create a [employer] ID. This will be your [employer] login ID and cannot be changed, so think of one that is secure and easy to remember. 6. Create a [employer] password. You can change your password at any time. 7. Enter your Password Reset Question and Answer. This can be used at a later time if you forget your password. 8. Enter your e-mail address. You will receive e-mail notification when new information is available in [address] 9. Click Sign Up. You can now view and download portions of your medical record. 10. Click the Download Summary menu link to download a portable copy of your medical information.  
 
If you have questions, please visit the Frequently Asked Questions section of the [employer]. Remember, [employer] is NOT to be used for urgent needs. For medical emergencies, dial 911. Now available from your iPhone and Android! Please provide this summary of care documentation to your next provider. Your primary care clinician is listed as Vince Barton. If you have any questions after today's visit, please call [phone].

## 2017-12-14 NOTE — PROGRESS NOTES
History of Present Illness: An 80-year-old male here for a followup. I performed a BALAJI November 2017. There was no evidence of any atrial masses. He has a history of pulmonary embolism on Coumadin. He also has a history of remote atrial flutter. He has had ablation and been stable. Overall, he is doing relatively well. He had a popliteal aneurysm stented by Dr. Shana Mcdonnell within the past week or two. He has some mild swelling, but overall is doing well. He denies any new chest pain, dyspnea, presyncope, syncope, PND, orthopnea. He has some mild edema.     Impression and Plan:    1. History of popliteal aneurysm status post stenting December 2017 by Dr. Shana Mcdonnell.  2. History of DVT on the left, as well as PE, now on Coumadin. Goal INR between 2-3 and stable. 3. History of remote atrial flutter with ablation in January 2013 without recurrence. He has been on a betab locker and limited by mild bradycardia. I had taken him off Coumadin a few years ago prior to DVT and PE.  4. History of transient cardiomyopathy in 2010, normalized November 2017. 5. Hypertension. 6. Degenerative joint disease. He had a spontaneous DVT and PE and is now back on Coumadin with a goal INR between 2-3. He would like to get a home monitor after three months if possible. I will make arrangements. His blood pressure is controlled. He has normal left ventricular function by recent echocardiogram. There is no left atrial mass. All questions answered. I will tentatively see back in about one year. Past Medical History:   Diagnosis Date    Ankle fracture, left approx 2011    medial, tibial    Arthritis     knees    Cardiac echocardiogram 06/16/2011    Normal LV systolic function. Mild conc LVH. Gr 1 DDfx. RVSP 35-40 mmHg. Marked LAE. Marked LISA. Mild-mod MR.  Mild-mod AI. Mild AoRE. Mild aortic arch dil.       Cardiac Holter monitor study 06/22/2011    Baseline sinus rhythm to sinus bradycardia, avg HR 60 bpm (range  bpm). Questionable chronotropic intolerance. No sustained arrhythmias.  Emphysema lung (HCC)     GERD (gastroesophageal reflux disease)     Glaucoma suspect     posterior vitreous detachment b/l, pterygium left eye, aseroid hyalosis / synchisis, b/l pseudophakia    H/O colonoscopy 04/13/2011    1 polyp thermally treated    History of atrial fibrillation 2009    Hypertension     Pulmonary embolism ([address]     Unspecified adverse effect of anesthesia     H/o A-fib immediately post colonoscopy. Current Outpatient Prescriptions   Medication Sig Dispense Refill    warfarin (COUMADIN) 2.5 mg tablet Take 1 Tab by mouth daily. 90 Tab 3    potassium chloride SR (K-TAB) 20 mEq tablet Take 1 Tab by mouth daily. 30 Tab 4    ascorbic acid, vitamin C, (VITAMIN C) 500 mg tablet Take 1,000 mg by mouth daily.  psyllium husk (METAMUCIL) 3.4 gram/5.4 gram powd Take  by mouth two (2) times a day.  ferrous sulfate 325 mg (65 mg iron) tablet Take 1 Tab by mouth daily (with breakfast). (Patient taking differently: Take 325 mg by mouth every other day.) 30 Tab 1    hydroCHLOROthiazide (HYDRODIURIL) 25 mg tablet Take 0.5 Tabs by mouth daily. 90 Tab 2    senna-docusate (PERICOLACE) 8.6-50 mg per tablet Take 1 Tab by mouth daily. 10 Tab 0    metoprolol succinate (TOPROL-XL) 25 mg XL tablet TAKE ONE TABLET BY MOUTH ONCE DAILY 90 Tab 1    multivitamin (ONE A DAY) tablet Take 1 Tab by mouth daily.  glucosamine-D3-boswellia serr (OSTEO BI-FLEX) 1,500-400-100 mg-unit-mg Tab Take 1 Tab by mouth two (2) times a day.  omega-3 fatty acids-vitamin e (FISH OIL) 1,000 mg cap Take 1 Cap by mouth two (2) times a day. Social History   reports that he quit smoking about 52 years ago. He smoked 1.00 pack per day. He has never used smokeless tobacco.   reports that he drinks alcohol.     Family History  family history includes Cancer in his brother; Diabetes in his brother; Heart Failure in his brother; Pacemaker in his father and sister. Review of Systems  Except as stated above include:  Constitutional: Negative for fever, chills and malaise/fatigue. HEENT: No congestion or recent URI. Gastrointestinal: No nausea, vomiting, abdominal pain, bloody stools. Pulmonary:  Negative except as stated above. Cardiac:  Negative except as stated above. Musculoskeletal: Negative except as stated above. Neurological:  No localized symptoms. Skin:  Negative except as stated above. Psych:  No mood swings. Endocrine:  No heat/cold intolerance. PHYSICAL EXAM  BP Readings from Last 3 Encounters:   12/14/17 124/74   12/07/17 123/79   12/04/17 118/70     Pulse Readings from Last 3 Encounters:   12/14/17 73   12/07/17 (!) 58   12/04/17 76     Wt Readings from Last 3 Encounters:   12/14/17 102.5 kg (226 lb)   12/07/17 96.2 kg (212 lb)   12/04/17 99.3 kg (219 lb)     General:   Well developed, well groomed. Head/Neck:   No jugular venous distention     No carotid bruits. No evidence of xanthelasma. Lungs:   No respiratory distress. Clear bilaterally. Heart:    Regular rate and rhythm. Normal S1/S2. Palpation of heart with normal point of maximum impulse. No significant murmurs, rubs or gallops. Abdomen:   Soft and nontender. No palpable abdominal mass or bruits. Extremities:   Intact peripheral pulses. Mild edema. Neurological:   Alert and oriented to person, place, time. No focal neurological deficit visually.     Blood Pressure Metric:  controlled

## 2017-12-14 NOTE — PROGRESS NOTES
Verbal order and read back per Jada Degroot NP  The INR is below the therapeutic range. Please make the following adjustments to Coumadin dosing: Take 3.75 mg today and tomorrow then continue Coumadin 2.5mg daily except 1.25 mg on Sun   Repeat the INR in 6 days. Patient informed of instructions, read back and verbalized understanding. Dosing calendar also provided.  Mendel Can, LPN

## 2017-12-14 NOTE — PROGRESS NOTES
1. Have you been to the ER, urgent care clinic since your last visit? Hospitalized since your last visit? No     2. Have you seen or consulted any other health care providers outside of the [facility] since your last visit? Include any pap smears or colon screening.  No

## 2017-12-18 ENCOUNTER — TELEPHONE (OUTPATIENT)
Dept: VASCULAR SURGERY | Age: 82
End: 2017-12-18

## 2017-12-18 NOTE — TELEPHONE ENCOUNTER
Patient's wife called concerned about patient having right leg swelling but not a very large amount and slightly hard and explained that a little swelling after procedure is normal and the hardness is from the swelling and to have patient elevate lower leg as much as possible while resting. Wife stated understood. Also stated patient has slight rash from ankle to mid calf on both legs and suggested cortisone cream to the areas and to call back tomorrow if no improvement. Patient is scheduled Wednesday for appointment as well. Wife stated understood.

## 2017-12-18 NOTE — TELEPHONE ENCOUNTER
Wife called earlier and spoke to Masha and she called back to let you know that she might not be at home and if you call you can try her cell number. Her  is scheduled to see Dr. Yeni Garay this week on Wednesday but she did not know if he needed to be seen sooner.

## 2017-12-20 ENCOUNTER — ANTI-COAG VISIT (OUTPATIENT)
Dept: CARDIOLOGY CLINIC | Age: 82
End: 2017-12-20

## 2017-12-20 ENCOUNTER — OFFICE VISIT (OUTPATIENT)
Dept: VASCULAR SURGERY | Age: 82
End: 2017-12-20

## 2017-12-20 DIAGNOSIS — Z79.01 LONG TERM CURRENT USE OF ANTICOAGULANT THERAPY: ICD-10-CM

## 2017-12-20 DIAGNOSIS — I26.99 OTHER PULMONARY EMBOLISM WITHOUT ACUTE COR PULMONALE, UNSPECIFIED CHRONICITY (HCC): ICD-10-CM

## 2017-12-20 DIAGNOSIS — I72.4 POPLITEAL ARTERY ANEURYSM (HCC): Primary | ICD-10-CM

## 2017-12-20 DIAGNOSIS — I48.92 ATRIAL FLUTTER WITH RAPID VENTRICULAR RESPONSE (HCC): Primary | ICD-10-CM

## 2017-12-20 LAB
INR BLD: 1.6
PT POC: 19.6 SECONDS
VALID INTERNAL CONTROL?: YES

## 2017-12-20 NOTE — PROGRESS NOTES
The INR is below the therapeutic range.   Just restarted after procedure  Please make the following adjustments to Coumadin dosing: Take 3.75 mg today and tomorrow then continue Coumadin 2.5mg daily except 1.25 mg on Sun   Repeat the INR in 3 weeks

## 2017-12-20 NOTE — MR AVS SNAPSHOT
Visit Information Date & Time Provider Department Dept. Phone Encounter #  
 12/20/2017 10:00 AM Zaira Robb  [facility] 21  Follow-up Instructions Return in about 2 weeks (around 1/3/2018). Follow-up and Disposition History Your Appointments 1/12/2018  8:00 AM  
PROCEDURE with [facility] NONIMAGING [facility] ([facility]) Appt Note: genesis harmon [address] [facility] [address] [facility] [address]  
[phone] [facility]  
  
    
 1/12/2018  9:00 AM  
PROCEDURE with B[facility]VS IMAGING 2 [facility] ([facility]) Appt Note: lower right right pop aneurysm with evar harmon 2300 [facility] Narcisa [address] [facility] [address]  
[phone] 2300 [facility] 47 [facility]  
  
    
 1/17/2018 10:45 AM  
Follow Up with Zaira Robb MD  
600 [facility]) Appt Note: follow up after stuidies 2300 [facility] [address] [facility] [address]  
[phone] 2300 [facility]  
  
    
 4/23/2018  8:30 AM  
Office Visit with Roxanne Puri MD  
[facility] ([facility]) Appt Note: AWV  and 5 mo f/u  
 [address] [facility] [address]  
[address] [facility] [address] [facility] [address]  
  
    
 12/13/2018  9:20 AM  
Follow Up with Jorge Gonzalez MD  
[facility] ([facility]) Appt Note: 1 year follow up with an EKG  
 [place] [address] [address] [id number] [phone] [facility] [address] Upcoming Health Maintenance Date Due  
 MEDICARE YEARLY EXAM 4/18/2018 GLAUCOMA SCREENING Q2Y 8/1/2019 DTaP/Tdap/Td series (2 - Td) 3/13/2027 COLONOSCOPY 10/6/2027 Allergies as of 12/20/2017  Review Complete On: 12/20/2017 By: Jasmyne Sanchez MD  
 No Known Allergies Current Immunizations  Reviewed on 10/16/2017 Name Date Influenza High Dose Vaccine PF 10/16/2016 Influenza Vaccine 10/14/2014 Influenza Vaccine (Quad) PF 10/11/2017  5:09 PM  
 Influenza Vaccine Whole 10/28/2012 Pneumococcal Polysaccharide (PPSV-23) 10/28/2012 ZZZ-RETIRED (DO NOT USE) Pneumococcal Vaccine (Unspecified Type) 10/28/2012 Not reviewed this visit You Were Diagnosed With   
  
 Codes Comments Popliteal artery aneurysm (HCC)    -  Primary ICD-10-CM: I72.4 ICD-9-CM: 278. 3 Vitals Smoking Status Former Smoker Preferred Pharmacy Pharmacy Name Phone [facility] [address], [address] Your Updated Medication List  
  
   
This list is accurate as of: 12/20/17 10:52 AM.  Always use your most recent med list.  
  
  
  
  
 ferrous sulfate 325 mg (65 mg iron) tablet Take 1 Tab by mouth daily (with breakfast). FISH OIL 1,000 mg Cap Generic drug:  omega-3 fatty acids-vitamin e Take 1 Cap by mouth two (2) times a day. hydroCHLOROthiazide 25 mg tablet Commonly known as:  HYDRODIURIL Take 0.5 Tabs by mouth daily. METAMUCIL 3.4 gram/5.4 gram Powd Generic drug:  psyllium husk Take  by mouth two (2) times a day. metoprolol succinate 25 mg XL tablet Commonly known as:  TOPROL-XL  
TAKE ONE TABLET BY MOUTH ONCE DAILY  
  
 multivitamin tablet Commonly known as:  ONE A DAY Take 1 Tab by mouth daily. OSTEO BI-FLEX (5-LOXIN) 1,500-400-100 mg-unit-mg Tab Generic drug:  glucosamine-D3-Boswellia serr Take 1 Tab by mouth two (2) times a day. potassium chloride SR 20 mEq tablet Commonly known as:  K-TAB Take 1 Tab by mouth daily. senna-docusate 8.6-50 mg per tablet Commonly known as:  Vanessa Curls Take 1 Tab by mouth daily. VITAMIN C 500 mg tablet Generic drug:  ascorbic acid (vitamin C) Take 1,000 mg by mouth daily. warfarin 2.5 mg tablet Commonly known as:  COUMADIN Take 1 Tab by mouth daily. Follow-up Instructions Return in about 2 weeks (around 1/3/2018). To-Do List   
 01/03/2018 Imaging:  MIGUEL [facility] LTD SINGLE LEVEL AMB   
  
 01/03/2018 Imaging:  VAS DUPLEX LOW EXT ARTERY RIGHT AMB [facility]! [facility] introduces [facility] patient portal. Now you can access parts of your medical record, email your doctor's office, and request medication refills online. 1. In your internet browser, go to [email or web address] 2. Click on the First Time User? Click Here link in the Sign In box. You will see the New Member Sign Up page. 3. Enter your [facility] Access Code exactly as it appears below. You will not need to use this code after youve completed the sign-up process. If you do not sign up before the expiration date, you must request a new code. · [facility] Access Code: [email or web address] Expires: 1/3/2018 10:00 AM 
 
4. Enter the last four digits of your Social Security Number (xxxx) and Date of Birth (mm/dd/yyyy) as indicated and click Submit. You will be taken to the next sign-up page. 5. Create a [employer] ID. This will be your [facility] login ID and cannot be changed, so think of one that is secure and easy to remember. 6. Create a [facility] password. You can change your password at any time. 7. Enter your Password Reset Question and Answer. This can be used at a later time if you forget your password. 8. Enter your e-mail address. You will receive e-mail notification when new information is available in [address] 9. Click Sign Up. You can now view and download portions of your medical record. 10. Click the Download Summary menu link to download a portable copy of your medical information. If you have questions, please visit the Frequently Asked Questions section of the [employer] website. Remember, [employer] is NOT to be used for urgent needs. For medical emergencies, dial 911. Now available from your iPhone and Android! Please provide this summary of care documentation to your next provider. Your primary care clinician is listed as Alexsandra Law. If you have any questions after today's visit, please call [phone].

## 2017-12-20 NOTE — PROGRESS NOTES
[address] No chief complaint on file. History and Physical    Mara Koroma Sr. is a 80 y.o. 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 Overall patient does not have any change in his symptomatology. No fevers chills no claudication. No rest pain. No ulcerations. Patient does have known heart issues and bilateral lower extremity edema. He also has known previous DVT. Past Medical History:   Diagnosis Date    Ankle fracture, left approx 2011    medial, tibial    Arthritis     knees    Cardiac echocardiogram 06/16/2011    Normal LV systolic function. Mild conc LVH. Gr 1 DDfx. RVSP 35-40 mmHg. Marked LAE. Marked LISA. Mild-mod MR.  Mild-mod AI. Mild AoRE. Mild aortic arch dil.  Cardiac Holter monitor study 06/22/2011    Baseline sinus rhythm to sinus bradycardia, avg HR 60 bpm (range  bpm). Questionable chronotropic intolerance. No sustained arrhythmias.  Emphysema lung (HCC)     GERD (gastroesophageal reflux disease)     Glaucoma suspect     posterior vitreous detachment b/l, pterygium left eye, aseroid hyalosis / synchisis, b/l pseudophakia    H/O colonoscopy 04/13/2011    1 polyp thermally treated    History of atrial fibrillation 2009    Hypertension     Pulmonary embolism ([facility] [address]     Unspecified adverse effect of anesthesia     H/o A-fib immediately post colonoscopy.      Past Surgical History:   Procedure Laterality Date    CARDIAC SURG PROCEDURE UNLIST      hx atrial flutter ablation 2013    COLONOSCOPY N/A 10/6/2017    COLONOSCOPY performed by Cortes Adler MD at [address] HX COLONOSCOPY      HX GI      HX KNEE REPLACEMENT Right 02/19/2013    Dr. Alice Ortega HX ORTHOPAEDIC Right 2/13    TKR, [place]    HX SVT ABLATION  1/2/2013    by Dr. Natalie Kaur EGD [address] [facility] [address] SPHNCTR/CARDIA GERD       Patient Active Problem List   Diagnosis Code    Cardiomyopathy ([facility] I42.9  DJD (degenerative joint disease) M19.90    Atrial flutter with rapid ventricular response (HCC) I48.92    S/P ablation of atrial flutter Z98.890, Z86.79    Essential hypertension I10    Lower GI bleed K92.2    Symptomatic anemia D64.9    Obesity E66.9    Pulmonary embolism (HCC) I26.99    Long term current use of anticoagulant therapy Z79.01    Popliteal artery aneurysm (HCC) I72.4     Current Outpatient Prescriptions   Medication Sig Dispense Refill    warfarin (COUMADIN) 2.5 mg tablet Take 1 Tab by mouth daily. 90 Tab 3    potassium chloride SR (K-TAB) 20 mEq tablet Take 1 Tab by mouth daily. 30 Tab 4    ascorbic acid, vitamin C, (VITAMIN C) 500 mg tablet Take 1,000 mg by mouth daily.  psyllium husk (METAMUCIL) 3.4 gram/5.4 gram powd Take  by mouth two (2) times a day.  ferrous sulfate 325 mg (65 mg iron) tablet Take 1 Tab by mouth daily (with breakfast). (Patient taking differently: Take 325 mg by mouth every other day.) 30 Tab 1    hydroCHLOROthiazide (HYDRODIURIL) 25 mg tablet Take 0.5 Tabs by mouth daily. 90 Tab 2    senna-docusate (PERICOLACE) 8.6-50 mg per tablet Take 1 Tab by mouth daily. 10 Tab 0    metoprolol succinate (TOPROL-XL) 25 mg XL tablet TAKE ONE TABLET BY MOUTH ONCE DAILY 90 Tab 1    multivitamin (ONE A DAY) tablet Take 1 Tab by mouth daily.  glucosamine-D3-boswellia serr (OSTEO BI-FLEX) 1,500-400-100 mg-unit-mg Tab Take 1 Tab by mouth two (2) times a day.  omega-3 fatty acids-vitamin e (FISH OIL) 1,000 mg cap Take 1 Cap by mouth two (2) times a day. No Known Allergies  Social History     Social History    Marital status:      Spouse name: N/A    Number of children: N/A    Years of education: N/A     Occupational History    Not on file. Social History Main Topics    Smoking status: Former Smoker     Packs/day: 1.00     Quit date: 1/1/1965    Smokeless tobacco: Never Used    Alcohol use Yes      Comment: occasionally.     Drug use: No    Sexual activity: Not Currently     Partners: Female     Other Topics Concern    Not on file     Social History Narrative      Family History   Problem Relation Age of Onset    Pacemaker Father     Pacemaker Sister     Heart Failure Brother     Diabetes Brother     Cancer Brother      Lung Cancer       Physical Exam:    There were no vitals taken for this visit. General: Well-appearing male in no acute distress  HEENT: EOMI no scleral icterus is noted  Pulmonary: No increased work of breathing is noted  Extremities: Warm and well-perfused bilaterally patient does have 1-2+ edema bilateral lower extremities no skin changes are identified no ulcerations identified  Neuro: Cranial nerves II through XII grossly intact    Impression and Plan:  Adriana Tovar is a 80 y.o. male with right popliteal artery aneurysm now fully repaired with Viabahn stenting. I will have him come back in 2 weeks for repeat ultrasound imaging. Patient will call us if he has any difficulties. He is now able to do whatever he prefers although we did would say to try and limit the amount of time that he has his knee fully flexed. We reviewed the plan with the patient and the patient understands. We also gave the patient appropriate instructions on their disease process and when to call back. Follow-up Disposition:  Return in about 2 weeks (around 1/3/2018). Karen Steve MD    PLEASE NOTE:  This document has been produced using voice recognition software. Unrecognized errors in transcription may be present.

## 2018-01-10 ENCOUNTER — ANTI-COAG VISIT (OUTPATIENT)
Dept: CARDIOLOGY CLINIC | Age: 83
End: 2018-01-10

## 2018-01-10 DIAGNOSIS — I26.99 OTHER PULMONARY EMBOLISM WITHOUT ACUTE COR PULMONALE, UNSPECIFIED CHRONICITY (HCC): ICD-10-CM

## 2018-01-10 DIAGNOSIS — Z79.01 LONG TERM CURRENT USE OF ANTICOAGULANT THERAPY: ICD-10-CM

## 2018-01-10 DIAGNOSIS — I48.92 ATRIAL FLUTTER WITH RAPID VENTRICULAR RESPONSE (HCC): Primary | ICD-10-CM

## 2018-01-10 LAB
INR BLD: 1.8
PT POC: 21.6 SECONDS
VALID INTERNAL CONTROL?: YES

## 2018-01-10 NOTE — PROGRESS NOTES
The INR is below the therapeutic range.   Please make the following adjustments to Coumadin dosing: Take 3.75 mg today and then increase Coumadin 2.5mg daily except 1.25 mg on Sun every other Sun  Repeat the INR in 2 weeks (currently awaiting approval from [employer] for home Coagucheck machine)

## 2018-01-12 ENCOUNTER — OFFICE VISIT (OUTPATIENT)
Dept: VASCULAR SURGERY | Age: 83
End: 2018-01-12

## 2018-01-12 DIAGNOSIS — I72.4 POPLITEAL ARTERY ANEURYSM (HCC): ICD-10-CM

## 2018-01-12 NOTE — PROCEDURES
[facility]   *** FINAL REPORT ***    Name: Na Lubin  MRN: [id number]       Outpatient  : 13 Aug 1932  HIS Order #: [id number]  [id number] [facility] Visit #: [id number]  Date: 2018    TYPE OF TEST: Peripheral Arterial Testing    REASON FOR TEST  Peripheral vascular dz NOS    Right Leg  Segmentals: Normal                     mmHg  Brachial         148  High thigh  Low thigh  Calf  Posterior tibial 182  Dorsalis pedis   196  Peroneal  Metatarsal  Toe pressure  Doppler:    Normal  Ankle/Brachial: 1.29    Left Leg  Segmentals: Normal                     mmHg  Brachial         152  High thigh  Low thigh  Calf  Posterior tibial 187  Dorsalis pedis   186  Peroneal  Metatarsal  Toe pressure  Doppler:    Normal  Ankle/Brachial: 1.23    INTERPRETATION/FINDINGS  Physiologic testing was performed using continuous wave doppler and  segmental pressures. ABIs only:  1. No evidence of significant peripheral arterial disease at rest in  the right leg. 2. No evidence of significant peripheral arterial disease at rest in  the left leg. 3. The right ankle/brachial index is 1.29 and the left ankle/brachial  index is 1.23.    ADDITIONAL COMMENTS    I have personally reviewed the data relevant to the interpretation of  this  study. TECHNOLOGIST: Edison Caldera RVT, BS  Signed: 2018 09:13 AM    PHYSICIAN: RAMSES Mccrary   Signed: 2018 11:54 AM

## 2018-01-12 NOTE — PROCEDURES
[facility]   *** FINAL REPORT ***    Name: Magdalena Rivas  MRN: [id number]       Outpatient  : 13 Aug 1932  HIS Order #: [id number]  [id number] [facility] Visit #: [id number]  Date: 2018    TYPE OF TEST: Extremity Arterial Duplex    REASON FOR TEST  Aneurysm, NOS, Follow up revascularization                            Right                     Left  Artery               PSV   Finding             PSV   Finding  ------------------  -----  ---------------    -----  ---------------  External iliac:      97.0  Common femoral:      58.0  Profunda femoris:    51.0  Proximal SFA:        76.0  Mid SFA:             83.0  Distal SFA:          75.0  Popliteal:           49.0  Anterior tibial:  Posterior tibial:    57.0    Pressures               Right     Left               -----     -----     Brachial:   148       152           DP:   196       186           PT:   182       187            MIGUEL:  1.29      1.23            Toe: INTERPRETATION/FINDINGS  Duplex images were obtained using 2-D gray scale, color flow and  spectral doppler analysis. Right leg :  1. Patent distal right external iliac artery, right common femoral  artery, proximal profunda femoris artery and right femoral artery  without significant stenosis. 2. Above knee and below knee right popliteal artery stent patent  without significant stenosis. Right popliteal aneurysm measures 2.5 x   2.5 cm Trv.  3. Triphasic signals noted thorughout with diffuse plaquing in the  arteries assessed. 4. ABIs suggest normal perfusion bilaterally at rest.  The right  ankle/brachial index is 1.29 and the left ankle/brachial index is  1.23.    ADDITIONAL COMMENTS  RT FA Dst thigh: 75 cm/sec    RT POP Stent:  AK  Prx 43 cm/sec     Prx/Mid 49 cm/sec    Mid 46  cm/sec        BK  Mid/Dst 46 cm/sec   Dst 69 cm/sec    Outflow (tibio-peroneal   trunc) 64 cm/sec    I have personally reviewed the data relevant to the interpretation of  this  study. TECHNOLOGIST: Penny Caldera YENY GARVEY  Signed: 01/12/2018 09:27 AM    PHYSICIAN: Leonor Guzman D.O.   Signed: 01/12/2018 11:54 AM

## 2018-01-17 ENCOUNTER — OFFICE VISIT (OUTPATIENT)
Dept: VASCULAR SURGERY | Age: 83
End: 2018-01-17

## 2018-01-17 VITALS
SYSTOLIC BLOOD PRESSURE: 130 MMHG | HEIGHT: 69 IN | DIASTOLIC BLOOD PRESSURE: 74 MMHG | WEIGHT: 226 LBS | RESPIRATION RATE: 16 BRPM | HEART RATE: 72 BPM | BODY MASS INDEX: 33.47 KG/M2

## 2018-01-17 DIAGNOSIS — I72.4 POPLITEAL ARTERY ANEURYSM (HCC): Primary | ICD-10-CM

## 2018-01-17 NOTE — PROGRESS NOTES
[address] Chief Complaint   Patient presents with    Surgical Follow-up       History and Physical    Boogie Lyles Sr. is a 80 y.o. male who is one-month status post right popliteal artery aneurysm repair with Viabahn stenting. Patient is doing very well walking without any difficulties. He has no claudication or rest pain. No fevers or chills. Past Medical History:   Diagnosis Date    Ankle fracture, left approx 2011    medial, tibial    Arthritis     knees    Cardiac echocardiogram 06/16/2011    Normal LV systolic function. Mild conc LVH. Gr 1 DDfx. RVSP 35-40 mmHg. Marked LAE. Marked LISA. Mild-mod MR.  Mild-mod AI. Mild AoRE. Mild aortic arch dil.  Cardiac Holter monitor study 06/22/2011    Baseline sinus rhythm to sinus bradycardia, avg HR 60 bpm (range  bpm). Questionable chronotropic intolerance. No sustained arrhythmias.  Emphysema lung (HCC)     GERD (gastroesophageal reflux disease)     Glaucoma suspect     posterior vitreous detachment b/l, pterygium left eye, aseroid hyalosis / synchisis, b/l pseudophakia    H/O colonoscopy 04/13/2011    1 polyp thermally treated    History of atrial fibrillation 2009    Hypertension     Pulmonary embolism ([facility] [address]     Unspecified adverse effect of anesthesia     H/o A-fib immediately post colonoscopy.      Past Surgical History:   Procedure Laterality Date    CARDIAC SURG PROCEDURE UNLIST      hx atrial flutter ablation 2013    COLONOSCOPY N/A 10/6/2017    COLONOSCOPY performed by Nena Woo MD at [address] HX COLONOSCOPY      HX GI      HX KNEE REPLACEMENT Right 02/19/2013    Dr. 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 Keagan Brooks EGD [address] [facility] SPHNCTR/CARDIA GERD       Patient Active Problem List   Diagnosis Code    Cardiomyopathy ([facility] [address] I42.9    DJD (degenerative joint disease) M19.90    Atrial flutter with rapid ventricular response (HCC) I48.92    S/P ablation of atrial flutter Z98.890, Z86.79    Essential hypertension I10    Lower GI bleed K92.2    Symptomatic anemia D64.9    Obesity E66.9    Pulmonary embolism (HCC) I26.99    Long term current use of anticoagulant therapy Z79.01    Popliteal artery aneurysm (HCC) I72.4     Current Outpatient Prescriptions   Medication Sig Dispense Refill    warfarin (COUMADIN) 2.5 mg tablet Take 1 Tab by mouth daily. 90 Tab 3    potassium chloride SR (K-TAB) 20 mEq tablet Take 1 Tab by mouth daily. 30 Tab 4    ascorbic acid, vitamin C, (VITAMIN C) 500 mg tablet Take 1,000 mg by mouth daily.  psyllium husk (METAMUCIL) 3.4 gram/5.4 gram powd Take  by mouth two (2) times a day.  ferrous sulfate 325 mg (65 mg iron) tablet Take 1 Tab by mouth daily (with breakfast). (Patient taking differently: Take 325 mg by mouth every other day.) 30 Tab 1    hydroCHLOROthiazide (HYDRODIURIL) 25 mg tablet Take 0.5 Tabs by mouth daily. 90 Tab 2    senna-docusate (PERICOLACE) 8.6-50 mg per tablet Take 1 Tab by mouth daily. 10 Tab 0    metoprolol succinate (TOPROL-XL) 25 mg XL tablet TAKE ONE TABLET BY MOUTH ONCE DAILY 90 Tab 1    multivitamin (ONE A DAY) tablet Take 1 Tab by mouth daily.  glucosamine-D3-boswellia serr (OSTEO BI-FLEX) 1,500-400-100 mg-unit-mg Tab Take 1 Tab by mouth two (2) times a day.  omega-3 fatty acids-vitamin e (FISH OIL) 1,000 mg cap Take 1 Cap by mouth two (2) times a day. No Known Allergies  Social History     Social History    Marital status:      Spouse name: N/A    Number of children: N/A    Years of education: N/A     Occupational History    Not on file.      Social History Main Topics    Smoking status: Former Smoker     Packs/day: 1.00     Quit date: 1/1/1965    Smokeless tobacco: Never Used    Alcohol use Yes Comment: occasionally.  Drug use: No    Sexual activity: Not Currently     Partners: Female     Other Topics Concern    Not on file     Social History Narrative      Family History   Problem Relation Age of Onset    Pacemaker Father     Pacemaker Sister     Heart Failure Brother     Diabetes Brother     Cancer Brother      Lung Cancer       Physical Exam:    Visit Vitals    /74 (BP 1 Location: Left arm, BP Patient Position: Sitting)    Pulse 72    Resp 16    Ht 5' 9\" (1.753 m)    Wt 226 lb (102.5 kg)    BMI 33.37 kg/m2      General: Well-appearing male in no acute distress  HEENT: EOMI no scleral icterus is noted  Pulmonary: No increased work of breathing is noted  Extremities: Warm and well-perfused bilaterally he does have 1+ edema bilateral lower extremities  Neuro: Cranial nerves II through XII grossly intact    Impression and Plan:  Yuli Schmidt Sr. is a 80 y.o. 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 He seems to be doing quite well I reviewed his ultrasound clinic today showing that his aneurysm is excluded and is got good blood flow. I will bring him back in 5 months for six-month follow-up to check on his left side as well as continuing to check on the right side making sure that his aneurysm has no leak and he has good flow. We reviewed the plan with the patient and the patient understands. We also gave the patient appropriate instructions on their disease process and when to call back. Follow-up Disposition:  Return in about 5 months (around 6/17/2018). Adriana Dawn MD    PLEASE NOTE:  This document has been produced using voice recognition software. Unrecognized errors in transcription may be present.

## 2018-01-17 NOTE — MR AVS SNAPSHOT
[address] [address] 200 [facility] 
[phone] Patient: Shadia Lucio Sr. MRN: [id number] ESY:4/91/0847 Visit Information Date & Time Provider Department Dept. Phone Encounter #  
 1/17/2018 10:45 AM Ellyn Howard MD [facility] [id number] Follow-up Instructions Return in about 5 months (around 6/17/2018). Follow-up and Disposition History Your Appointments 1/24/2018 10:00 AM  
ANTICOAG NURSE with Pt Inr [facility] ([address] [place] [address] Road) Appt Note: 2 week INR  
 [place] [address] [address] [address] [phone] Steinberg Rosario  
  
    
 4/23/2018  8:30 AM  
Office Visit with Jaspal Huber MD  
920 [facility] ([address] [place] [address]) Appt Note: AWV  and 5 mo f/u  
 [address] [facility] [address] [facility]  
[address] [facility] [address] [facility]  
  
    
 12/13/2018  9:20 AM  
Follow Up with Hailey Dang MD  
[facility] ([address] [place] [address] Road) Appt Note: 1 year follow up with an EKG  
 [facility] [address] [address] [id number] [phone] [address] [place] [address] [address] Upcoming Health Maintenance Date Due  
 MEDICARE YEARLY EXAM 4/18/2018 GLAUCOMA SCREENING Q2Y 8/1/2019 DTaP/Tdap/Td series (2 - Td) 3/13/2027 COLONOSCOPY 10/6/2027 Allergies as of 1/17/2018  Review Complete On: 1/17/2018 By: Ellyn Howard MD  
 No Known Allergies Current Immunizations  Reviewed on 10/16/2017 Name Date Influenza High Dose Vaccine PF 10/16/2016 Influenza Vaccine 10/14/2014 Influenza Vaccine (Quad) PF 10/11/2017  5:09 PM  
 Influenza Vaccine Whole 10/28/2012 Pneumococcal Polysaccharide (PPSV-23) 10/28/2012 ZZZ-RETIRED (DO NOT USE) Pneumococcal Vaccine (Unspecified Type) 10/28/2012 Not reviewed this visit You Were Diagnosed With   
  
 Codes Comments Popliteal artery aneurysm (HCC)    -  Primary ICD-10-CM: I72.4 ICD-9-CM: 896. 3 Vitals BP Pulse Resp Height(growth percentile) Weight(growth percentile) BMI  
 130/74 (BP 1 Location: Left arm, BP Patient Position: Sitting) 72 16 5' 9\" (1.753 m) 226 lb (102.5 kg) 33.37 kg/m2 Smoking Status Former Smoker Vitals History BMI and BSA Data Body Mass Index Body Surface Area  
 33.37 kg/m 2 2.23 m 2 Preferred Pharmacy Pharmacy Name Phone Tamika Orona [address] [facility], [address] [facility] [phone] Your Updated Medication List  
  
   
This list is accurate as of: 1/17/18 10:54 AM.  Always use your most recent med list.  
  
  
  
  
 ferrous sulfate 325 mg (65 mg iron) tablet Take 1 Tab by mouth daily (with breakfast). FISH OIL 1,000 mg Cap Generic drug:  omega-3 fatty acids-vitamin e Take 1 Cap by mouth two (2) times a day. hydroCHLOROthiazide 25 mg tablet Commonly known as:  HYDRODIURIL Take 0.5 Tabs by mouth daily. METAMUCIL 3.4 gram/5.4 gram Powd Generic drug:  psyllium husk Take  by mouth two (2) times a day. metoprolol succinate 25 mg XL tablet Commonly known as:  TOPROL-XL  
TAKE ONE TABLET BY MOUTH ONCE DAILY  
  
 multivitamin tablet Commonly known as:  ONE A DAY Take 1 Tab by mouth daily. OSTEO BI-FLEX (5-LOXIN) 1,500-400-100 mg-unit-mg Tab Generic drug:  glucosamine-D3-Boswellia serr Take 1 Tab by mouth two (2) times a day. potassium chloride SR 20 mEq tablet Commonly known as:  K-TAB Take 1 Tab by mouth daily. senna-docusate 8.6-50 mg per tablet Commonly known as:  Monica Shall Take 1 Tab by mouth daily. VITAMIN C 500 mg tablet Generic drug:  ascorbic acid (vitamin C) Take 1,000 mg by mouth daily. warfarin 2.5 mg tablet Commonly known as:  COUMADIN Take 1 Tab by mouth daily. Follow-up Instructions Return in about 5 months (around 6/17/2018). To-Do List   
 06/17/2018 Imaging:  MIGUEL WBI LTD SINGLE LEVEL AMB   
  
 06/17/2018 Imaging:  VAS DUPLEX LOW EXT ARTERY BILAT AMB Introducing [facility]! [facility] introduces [employer] patient portal. Now you can access parts of your medical record, email your doctor's office, and request medication refills online. 1. In your internet browser, go to [email or web address] 2. Click on the First Time User? Click Here link in the Sign In box. You will see the New Member Sign Up page. 3. Enter your [employer] Access Code exactly as it appears below. You will not need to use this code after youve completed the sign-up process. If you do not sign up before the expiration date, you must request a new code. · [employer] Access Code: [email or web address] Expires: 4/10/2018 10:02 AM 
 
4. Enter the last four digits of your Social Security Number (xxxx) and Date of Birth (mm/dd/yyyy) as indicated and click Submit. You will be taken to the next sign-up page. 5. Create a [employer] ID. This will be your [employer] login ID and cannot be changed, so think of one that is secure and easy to remember. 6. Create a [employer] password. You can change your password at any time. 7. Enter your Password Reset Question and Answer. This can be used at a later time if you forget your password. 8. Enter your e-mail address. You will receive e-mail notification when new information is available in [address] 9. Click Sign Up. You can now view and download portions of your medical record. 10. Click the Download Summary menu link to download a portable copy of your medical information.  
 
If you have questions, please visit the Frequently Asked Questions section of the [employer]. Remember, [employer] is NOT to be used for urgent needs. For medical emergencies, dial 911. Now available from your iPhone and Android! Please provide this summary of care documentation to your next provider. Your primary care clinician is listed as Fredia Bar. If you have any questions after today's visit, please call [phone].

## 2018-01-22 ENCOUNTER — TELEPHONE (OUTPATIENT)
Dept: VASCULAR SURGERY | Age: 83
End: 2018-01-22

## 2018-01-22 NOTE — TELEPHONE ENCOUNTER
Patient's wife would like for you to call her to please let her know when his filter will be taken out. She said she was told approx 2 months. She wanted to know if you could give her a better time.

## 2018-01-24 ENCOUNTER — ANTI-COAG VISIT (OUTPATIENT)
Dept: CARDIOLOGY CLINIC | Age: 83
End: 2018-01-24

## 2018-01-24 DIAGNOSIS — I48.92 ATRIAL FLUTTER WITH RAPID VENTRICULAR RESPONSE (HCC): Primary | ICD-10-CM

## 2018-01-24 DIAGNOSIS — Z79.01 LONG TERM CURRENT USE OF ANTICOAGULANT THERAPY: ICD-10-CM

## 2018-01-24 DIAGNOSIS — I26.99 OTHER PULMONARY EMBOLISM WITHOUT ACUTE COR PULMONALE, UNSPECIFIED CHRONICITY (HCC): ICD-10-CM

## 2018-01-24 LAB
INR BLD: 2.1
PT POC: 24.8 SECONDS
VALID INTERNAL CONTROL?: YES

## 2018-01-24 NOTE — PROGRESS NOTES
The INR is stable and therapeutic.    Continue Coumadin 2.5mg daily except 1.25 mg on Sun every other Sun  Recheck INR in 3 weeks

## 2018-01-29 NOTE — TELEPHONE ENCOUNTER
Patient's wife called again, wants to know when the IVC filter will be removed, please call home number.

## 2018-01-29 NOTE — TELEPHONE ENCOUNTER
This pharmacy faxed over request for the following prescriptions to be filled:    Medication requested :   Requested Prescriptions     Pending Prescriptions Disp Refills    metoprolol succinate (TOPROL-XL) 25 mg XL tablet 90 Tab 1     Sig: TAKE ONE TABLET BY MOUTH ONCE DAILY     PCP: Ric[address] or Print: [facility] pharmacy  Mail order or Local pharmacy: [phone]    Scheduled appointment if not seen by current providers in office: LOV: 11/20/17, Next appt: 4/23/18

## 2018-01-30 RX ORDER — METOPROLOL SUCCINATE 25 MG/1
TABLET, EXTENDED RELEASE ORAL
Qty: 90 TAB | Refills: 0 | Status: SHIPPED | OUTPATIENT
Start: 2018-01-30 | End: 2018-04-23 | Stop reason: SDUPTHER

## 2018-01-30 NOTE — TELEPHONE ENCOUNTER
Left message; awaiting return call.      Per Dr. Navdeep Ibarra, can you please schedule patient for IVC removal.  thanks

## 2018-01-31 ENCOUNTER — HOSPITAL ENCOUNTER (OUTPATIENT)
Dept: GENERAL RADIOLOGY | Age: 83
Discharge: HOME | End: 2018-01-31
Attending: NURSE PRACTITIONER
Payer: MEDICARE

## 2018-01-31 DIAGNOSIS — R13.12 OROPHARYNGEAL DYSPHAGIA: ICD-10-CM

## 2018-01-31 DIAGNOSIS — E66.9 OBESITY: ICD-10-CM

## 2018-01-31 PROCEDURE — 74011000250 HC RX REV CODE- 250

## 2018-01-31 PROCEDURE — 74220 X-RAY XM ESOPHAGUS 1CNTRST: CPT

## 2018-01-31 PROCEDURE — 74011000255 HC RX REV CODE- 255

## 2018-01-31 RX ADMIN — ANTACID/ANTIFLATULENT 4 G: 380; 550; 10; 10 GRANULE, EFFERVESCENT ORAL at 08:30

## 2018-01-31 RX ADMIN — BARIUM SULFATE 135 ML: 980 POWDER, FOR SUSPENSION ORAL at 08:30

## 2018-02-05 LAB — INR, EXTERNAL: 2.3

## 2018-02-07 ENCOUNTER — TELEPHONE ANTICOAG (OUTPATIENT)
Dept: CARDIOLOGY CLINIC | Age: 83
End: 2018-02-07

## 2018-02-07 DIAGNOSIS — I26.99 OTHER PULMONARY EMBOLISM WITHOUT ACUTE COR PULMONALE, UNSPECIFIED CHRONICITY (HCC): ICD-10-CM

## 2018-02-07 DIAGNOSIS — I48.92 ATRIAL FLUTTER WITH RAPID VENTRICULAR RESPONSE (HCC): Primary | ICD-10-CM

## 2018-02-07 DIAGNOSIS — Z79.01 LONG TERM CURRENT USE OF ANTICOAGULANT THERAPY: ICD-10-CM

## 2018-02-07 NOTE — PROGRESS NOTES
Verbal order and read back per Denice Hamman, NP  The INR is stable and therapeutic. Continue same dose of coumadin and recheck in 2 weeks  Continue Coumadin 2.5mg daily except 1.25 mg on every other Sun  No answer, left instructions on voicemail and asked patient to call office to verify receipt of message.  Tish Joseph LPN

## 2018-02-08 ENCOUNTER — HOSPITAL ENCOUNTER (OUTPATIENT)
Dept: GENERAL RADIOLOGY | Age: 83
Discharge: HOME | End: 2018-02-08
Attending: INTERNAL MEDICINE
Payer: MEDICARE

## 2018-02-08 DIAGNOSIS — R13.12 OROPHARYNGEAL DYSPHAGIA: ICD-10-CM

## 2018-02-08 PROCEDURE — G8996 SWALLOW CURRENT STATUS: HCPCS

## 2018-02-08 PROCEDURE — 74230 X-RAY XM SWLNG FUNCJ C+: CPT

## 2018-02-08 PROCEDURE — 74011000255 HC RX REV CODE- 255: Performed by: INTERNAL MEDICINE

## 2018-02-08 PROCEDURE — G8997 SWALLOW GOAL STATUS: HCPCS

## 2018-02-08 PROCEDURE — G8998 SWALLOW D/C STATUS: HCPCS

## 2018-02-08 PROCEDURE — 92611 MOTION FLUOROSCOPY/SWALLOW: CPT

## 2018-02-08 RX ADMIN — BARIUM SULFATE 30 ML: 400 SUSPENSION ORAL at 14:00

## 2018-02-08 RX ADMIN — BARIUM SULFATE 60 G: 960 POWDER, FOR SUSPENSION ORAL at 14:00

## 2018-02-08 RX ADMIN — BARIUM SULFATE 700 MG: 700 TABLET ORAL at 14:00

## 2018-02-08 RX ADMIN — BARIUM SULFATE 60 ML: 400 PASTE ORAL at 14:00

## 2018-02-08 RX ADMIN — BARIUM SULFATE 60 ML: 400 SUSPENSION ORAL at 14:00

## 2018-02-08 NOTE — PROGRESS NOTES
Outpatient Modified Barium Swallow Evaluation    Patient: Shadia Lucio Sr. (80 y.o. male)  Date: 2/8/2018  Primary Diagnosis: Oropharyngeal dysphagia [R13.12]  Precautions: Aspiration       Videofluoroscopy Results  MBS completed with pt demonstrating mod pharyngeal dysphagia c/b silent penetration to the cords with thin liquids and moderate penetration with NTL during the swallow. Pt able to improve tolerance of NTL with chin tuck; however, not with thin liquids. Pt able to tolerate HTL, puree, regular solids and 13 mm Ba pill with HTL wash. Deficits include decreased laryngeal elevation/adduction with incomplete epiglottic inversion and decreased laryngeal sensation. Mild pharyngeal residue noted with thin and nectar thick liquids, improved as viscosity increased. Recommend regular diet with honey thick liquids with meds whole + HTL. Further recommend outpatient SLP to address deficits and further train related to use of chin tuck strategy. Results/recommendations discussed with pt and pt's wife with written handout who verbalized understanding. Recommend:  Regular diet with honey thick liquids  Meds whole + honey thick liquids  Aspiration precautions  Upright for all intake + 30 min after intake  Slow rate   Outpatient SLP     Video Flouroscopic Procedures  [x] Lateral View   [] A-P View [] Scanned to level of Sternum    [x] Seated at 90 deg.   [] Other:    Presentation:    [x] Spoon   [x] Cup   [] Straw   [] Syringe   [] Consecutive Swallows  [] Other:    Consistencies:   [x] Ba+ liquid   [x] Ba+ liquid (nectar)   [x] Ba+ liquid (honey)   [x] Ba+ puree [x] Ba+ cookie [x] 13 mm Ba pill with HTL Ba wash    Testing Discontinued:   [] Due to:    Treatment Techniques Attempted  [] Head Turn: [] Right [] Left     [] Head Tilt: [] Right [] Left     [x] Chin Down:  [] Small Sips/bites:  [] Effortful swallow:  [] Double swallow:  [] Other:    Results  Dysphagia Present:     [x] Yes  [] No    Ratings of Dysphagia:     [] Mild  [x] Moderate  [] Severe    Stages of Breakdown:   [] Oral  [x] Pharyngeal  [] Esophageal    Aspiration:   [] Yes    [x] No  [x] At Risk     Cough: [] Yes      [] No     Penetration:   [] Yes    [] No     Cough: [] Yes      [x] No   [] Flash/trace   [x] Mod   [x] To Chords          Consistency Aspirated:   Consistency Penetrated:   [] Thin Liquid     [x] Thin Liquid  [] Nectar-thick Liquid    [x] Nectar-thick Liquid   [] Honey-thick Liquid    [] Honey-thick Liquid   [] Puree     [] Puree  [] Solid     [] Solid  [] 13 mm Ba pill     [] 13 mm Ba pill      Motility Problems with:  [] Lip Closure:    [] Mastication:   [] Bolus Formation/control:   [] A-P Transport:  [] Tongue Base Retraction:  [] Swallow Response (delayed):  [] Velopharyngeal Closure:  [] Pharyngeal Aspirations:  [x] Laryngeal Elevation/adduction:  [x] Epiglottic Inversion:  [x] Pharyngeal motility/sensation:  [] Cricopharyngeal Relaxation:  [] Esophageal Peristalsis:  [] Other:    Timing of Aspiration/Penetration:  [] Before Swallow:  [x] During Swallow:  [] After Swallow:    Transit Time Delay:  [] >1 Second  Oral  [x] >1 Second Pharyngeal  [] >20 Second Esophageal     Residuals:  [] Vallecula:    [x] Mild  [] Mod  [] Severe  [x] Pyriform Sinus:   [x] Mild  [] Mod  [] Severe  [] Posterior Pharyngeal Wall:  [] Mild  [] Mod  [] Severe    GCODES(GN):GCODESwallowing:  Swallow Current Status CK= 40-59%   Swallow Goal Status CK= 40-59%   Swallow D/C Status CK= 40-59%     Thank you for this referral,   TONG ZimmerS., [address] [facility]  Speech-Language Pathologist

## 2018-02-09 ENCOUNTER — TELEPHONE (OUTPATIENT)
Dept: VASCULAR SURGERY | Age: 83
End: 2018-02-09

## 2018-02-15 ENCOUNTER — TELEPHONE (OUTPATIENT)
Dept: CARDIOLOGY CLINIC | Age: 83
End: 2018-02-15

## 2018-02-15 ENCOUNTER — HOSPITAL ENCOUNTER (OUTPATIENT)
Dept: CARDIAC CATH/INVASIVE PROCEDURES | Age: 83
Discharge: HOME | End: 2018-02-15
Attending: SURGERY | Admitting: SURGERY
Payer: MEDICARE

## 2018-02-15 VITALS
OXYGEN SATURATION: 97 % | WEIGHT: 222 LBS | DIASTOLIC BLOOD PRESSURE: 78 MMHG | SYSTOLIC BLOOD PRESSURE: 152 MMHG | BODY MASS INDEX: 31.78 KG/M2 | HEART RATE: 50 BPM | RESPIRATION RATE: 12 BRPM | HEIGHT: 70 IN

## 2018-02-15 LAB
ANION GAP BLD CALC-SCNC: 18 MMOL/L (ref 10–20)
BUN BLD-MCNC: 18 MG/DL (ref 7–18)
CA-I BLD-MCNC: 1.23 MMOL/L (ref 1.12–1.32)
CHLORIDE BLD-SCNC: 100 MMOL/L (ref 100–108)
CO2 BLD-SCNC: 27 MMOL/L (ref 19–24)
CREAT UR-MCNC: 1 MG/DL (ref 0.6–1.3)
GLUCOSE BLD STRIP.AUTO-MCNC: 94 MG/DL (ref 74–106)
HCT VFR BLD CALC: 38 % (ref 36–49)
HGB BLD-MCNC: 12.9 G/DL (ref 12–16)
INR PPP: 1.2 (ref 0.8–1.2)
POTASSIUM BLD-SCNC: 3.8 MMOL/L (ref 3.5–5.5)
PROTHROMBIN TIME: 15.1 SEC (ref 11.5–15.2)
SODIUM BLD-SCNC: 141 MMOL/L (ref 136–145)

## 2018-02-15 PROCEDURE — 74011250636 HC RX REV CODE- 250/636: Performed by: SURGERY

## 2018-02-15 PROCEDURE — 74011000250 HC RX REV CODE- 250: Performed by: SURGERY

## 2018-02-15 PROCEDURE — 80047 BASIC METABLC PNL IONIZED CA: CPT

## 2018-02-15 PROCEDURE — 85610 PROTHROMBIN TIME: CPT | Performed by: SURGERY

## 2018-02-15 PROCEDURE — 74011636320 HC RX REV CODE- 636/320: Performed by: SURGERY

## 2018-02-15 PROCEDURE — 99152 MOD SED SAME PHYS/QHP 5/>YRS: CPT

## 2018-02-15 RX ORDER — DIPHENHYDRAMINE HYDROCHLORIDE 50 MG/ML
12.5 INJECTION, SOLUTION INTRAMUSCULAR; INTRAVENOUS
Status: DISCONTINUED | OUTPATIENT
Start: 2018-02-15 | End: 2018-02-15 | Stop reason: HOSPADM

## 2018-02-15 RX ORDER — FENTANYL CITRATE 50 UG/ML
25-100 INJECTION, SOLUTION INTRAMUSCULAR; INTRAVENOUS
Status: DISCONTINUED | OUTPATIENT
Start: 2018-02-15 | End: 2018-02-15 | Stop reason: HOSPADM

## 2018-02-15 RX ORDER — MIDAZOLAM HYDROCHLORIDE 1 MG/ML
1-4 INJECTION, SOLUTION INTRAMUSCULAR; INTRAVENOUS
Status: DISCONTINUED | OUTPATIENT
Start: 2018-02-15 | End: 2018-02-15 | Stop reason: HOSPADM

## 2018-02-15 RX ORDER — HYDROMORPHONE HYDROCHLORIDE 1 MG/ML
0.5 INJECTION, SOLUTION INTRAMUSCULAR; INTRAVENOUS; SUBCUTANEOUS
Status: DISCONTINUED | OUTPATIENT
Start: 2018-02-15 | End: 2018-02-15 | Stop reason: HOSPADM

## 2018-02-15 RX ORDER — LIDOCAINE HYDROCHLORIDE 10 MG/ML
1-30 INJECTION, SOLUTION EPIDURAL; INFILTRATION; INTRACAUDAL; PERINEURAL
Status: DISCONTINUED | OUTPATIENT
Start: 2018-02-15 | End: 2018-02-15 | Stop reason: HOSPADM

## 2018-02-15 RX ORDER — SODIUM CHLORIDE 0.9 % (FLUSH) 0.9 %
5-10 SYRINGE (ML) INJECTION EVERY 8 HOURS
Status: DISCONTINUED | OUTPATIENT
Start: 2018-02-15 | End: 2018-02-15 | Stop reason: HOSPADM

## 2018-02-15 RX ORDER — SODIUM CHLORIDE 0.9 % (FLUSH) 0.9 %
5-10 SYRINGE (ML) INJECTION AS NEEDED
Status: DISCONTINUED | OUTPATIENT
Start: 2018-02-15 | End: 2018-02-15 | Stop reason: HOSPADM

## 2018-02-15 RX ORDER — ACETAMINOPHEN 325 MG/1
650 TABLET ORAL
Status: DISCONTINUED | OUTPATIENT
Start: 2018-02-15 | End: 2018-02-15 | Stop reason: HOSPADM

## 2018-02-15 RX ORDER — ONDANSETRON 2 MG/ML
4 INJECTION INTRAMUSCULAR; INTRAVENOUS
Status: DISCONTINUED | OUTPATIENT
Start: 2018-02-15 | End: 2018-02-15 | Stop reason: HOSPADM

## 2018-02-15 RX ORDER — HEPARIN SODIUM 200 [USP'U]/100ML
2000 INJECTION, SOLUTION INTRAVENOUS CONTINUOUS
Status: DISCONTINUED | OUTPATIENT
Start: 2018-02-15 | End: 2018-02-15 | Stop reason: HOSPADM

## 2018-02-15 RX ORDER — OXYCODONE AND ACETAMINOPHEN 5; 325 MG/1; MG/1
1 TABLET ORAL
Status: DISCONTINUED | OUTPATIENT
Start: 2018-02-15 | End: 2018-02-15 | Stop reason: HOSPADM

## 2018-02-15 RX ADMIN — HEPARIN SODIUM 2000 UNITS: 200 INJECTION, SOLUTION INTRAVENOUS at 11:44

## 2018-02-15 RX ADMIN — MIDAZOLAM HYDROCHLORIDE 1 MG: 1 INJECTION, SOLUTION INTRAMUSCULAR; INTRAVENOUS at 11:42

## 2018-02-15 RX ADMIN — FENTANYL CITRATE 50 MCG: 50 INJECTION INTRAMUSCULAR; INTRAVENOUS at 11:40

## 2018-02-15 RX ADMIN — IOPAMIDOL 10 ML: 612 INJECTION, SOLUTION INTRAVENOUS at 11:47

## 2018-02-15 RX ADMIN — LIDOCAINE HYDROCHLORIDE 4 ML: 10 INJECTION, SOLUTION EPIDURAL; INFILTRATION; INTRACAUDAL; PERINEURAL at 11:42

## 2018-02-15 NOTE — PROGRESS NOTES
02/15/18 1159   Vital Signs   Cardiac Rhythm NSR   Level of Consciousness Alert   /76   MAP (Monitor) 109   Oxygen Therapy   O2 Device Room air   Modified Sam Score   Activity 2   Respiration 2   Circulation 2   Consciousness 2   O2 Saturation 2   Sam Score 10   Neuro   Neuro (WDL) WDL   received patient from vascular lab.  Pt stable

## 2018-02-15 NOTE — ROUTINE PROCESS
TRANSFER - OUT REPORT:    Verbal report given to [facility], [place] Chicho rn(name) on Bethel Balderas Sr.  being transferred to [facility](unit) for routine post - op       Report consisted of patients Situation, Background, Assessment and   Recommendations(SBAR). Information from the following report(s) SBAR, Procedure Summary, Intake/Output and MAR was reviewed with the receiving nurse. Lines:       Opportunity for questions and clarification was provided.       Patient transported with:   [employer]

## 2018-02-15 NOTE — DISCHARGE INSTRUCTIONS
[address] Inferior Vena Cava Filter Removal Discharge Instructions    General Information:   Today you had an inferior vena cava filter removed. This was removed because it was determined that you no longer needed this filter. Home Care Instructions: You can resume your regular diet and medication regimen. Do not drink alcohol, drive, or make any important legal decisions in the next 24 hours. Do not lift anything heavier than a gallon of milk, or do anything strenuous for the next 48 hours. You will notice a dressing on your neck or groin. This was the insertion site for the retrieval of the device. Keep the site covered until the puncture site has totally healed. You make take a shower with the bandage, but do cover it with plastic wrap. Do not immerse yourself in water until the wound has totally healed. After your puncture wound heals, there is no special care that is needed. You may resume your normal level of activity slowly, after about one week of light activity. Call If:   You should call your Physician and/or the Radiology Nurse if you have any bleeding other than a small spot on your bandage. Please call if you have any signs of infection; fever, swelling, or increased pain at the site. Call if you have any questions of how to take care of your wound. Follow-Up Instructions: Please see your ordering doctor as he/she has requested.      Please resume your Coumadin tonight as prescribed      To Reach Us:      Patient Signature:  Date: 2/15/2018  Discharging Nurse: Doug Hernandez RN

## 2018-02-15 NOTE — INTERVAL H&P NOTE
H&P Update:  Alpa Mckee Sr. was seen and examined. History and physical has been reviewed.  Significant clinical changes have occurred as noted:  Now wants IVC filter removal.    Signed By: Areli James MD     February 15, 2018 10:50 AM

## 2018-02-15 NOTE — PROGRESS NOTES
Pt and spouse provided discharge instructions. All questions answered and pt and spouse verbalized understanding. PIV removed. No hematoma or bleeding noted at this time. Procedure site within normal limits. Pt escorted to front of building for discharge.  Patient armband removed and shredded

## 2018-02-15 NOTE — H&P (VIEW-ONLY)
[address] Chief Complaint   Patient presents with    Surgical Follow-up       History and Physical    Tommie Rodriguez Sr. is a 80 y.o. male who is one-month status post right popliteal artery aneurysm repair with Viabahn stenting. Patient is doing very well walking without any difficulties. He has no claudication or rest pain. No fevers or chills. Past Medical History:   Diagnosis Date    Ankle fracture, left approx 2011    medial, tibial    Arthritis     knees    Cardiac echocardiogram 06/16/2011    Normal LV systolic function. Mild conc LVH. Gr 1 DDfx. RVSP 35-40 mmHg. Marked LAE. Marked LISA. Mild-mod MR.  Mild-mod AI. Mild AoRE. Mild aortic arch dil.  Cardiac Holter monitor study 06/22/2011    Baseline sinus rhythm to sinus bradycardia, avg HR 60 bpm (range  bpm). Questionable chronotropic intolerance. No sustained arrhythmias.  Emphysema lung (HCC)     GERD (gastroesophageal reflux disease)     Glaucoma suspect     posterior vitreous detachment b/l, pterygium left eye, aseroid hyalosis / synchisis, b/l pseudophakia    H/O colonoscopy 04/13/2011    1 polyp thermally treated    History of atrial fibrillation 2009    Hypertension     Pulmonary embolism ([facility] [address]     Unspecified adverse effect of anesthesia     H/o A-fib immediately post colonoscopy.      Past Surgical History:   Procedure Laterality Date    CARDIAC SURG PROCEDURE UNLIST      hx atrial flutter ablation 2013    COLONOSCOPY N/A 10/6/2017    COLONOSCOPY performed by Yamilka Carpenter MD at [address] HX COLONOSCOPY      HX GI      HX KNEE REPLACEMENT Right 02/19/2013    Dr. Blackmon Pea Right 2/13    [place]KR, [place]    HX OTHER SURGICAL      right popliteal artery aneurysm now repaired with Viabahn stenting    HX SVT ABLATION  1/2/2013    by Dr. Jenni Morse EGD [address] [facility] SPHNCTR/CARDIA GERD       Patient Active Problem List   Diagnosis Code    Cardiomyopathy ([facility] [address] I42.9    DJD (degenerative joint disease) M19.90    Atrial flutter with rapid ventricular response (HCC) I48.92    S/P ablation of atrial flutter Z98.890, Z86.79    Essential hypertension I10    Lower GI bleed K92.2    Symptomatic anemia D64.9    Obesity E66.9    Pulmonary embolism (HCC) I26.99    Long term current use of anticoagulant therapy Z79.01    Popliteal artery aneurysm (HCC) I72.4     Current Outpatient Prescriptions   Medication Sig Dispense Refill    warfarin (COUMADIN) 2.5 mg tablet Take 1 Tab by mouth daily. 90 Tab 3    potassium chloride SR (K-TAB) 20 mEq tablet Take 1 Tab by mouth daily. 30 Tab 4    ascorbic acid, vitamin C, (VITAMIN C) 500 mg tablet Take 1,000 mg by mouth daily.  psyllium husk (METAMUCIL) 3.4 gram/5.4 gram powd Take  by mouth two (2) times a day.  ferrous sulfate 325 mg (65 mg iron) tablet Take 1 Tab by mouth daily (with breakfast). (Patient taking differently: Take 325 mg by mouth every other day.) 30 Tab 1    hydroCHLOROthiazide (HYDRODIURIL) 25 mg tablet Take 0.5 Tabs by mouth daily. 90 Tab 2    senna-docusate (PERICOLACE) 8.6-50 mg per tablet Take 1 Tab by mouth daily. 10 Tab 0    metoprolol succinate (TOPROL-XL) 25 mg XL tablet TAKE ONE TABLET BY MOUTH ONCE DAILY 90 Tab 1    multivitamin (ONE A DAY) tablet Take 1 Tab by mouth daily.  glucosamine-D3-boswellia serr (OSTEO BI-FLEX) 1,500-400-100 mg-unit-mg Tab Take 1 Tab by mouth two (2) times a day.  omega-3 fatty acids-vitamin e (FISH OIL) 1,000 mg cap Take 1 Cap by mouth two (2) times a day. No Known Allergies  Social History     Social History    Marital status:      Spouse name: N/A    Number of children: N/A    Years of education: N/A     Occupational History    Not on file.      Social History Main Topics    Smoking status: Former Smoker     Packs/day: 1.00     Quit date: 1/1/1965    Smokeless tobacco: Never Used    Alcohol use Yes Comment: occasionally.  Drug use: No    Sexual activity: Not Currently     Partners: Female     Other Topics Concern    Not on file     Social History Narrative      Family History   Problem Relation Age of Onset    Pacemaker Father     Pacemaker Sister     Heart Failure Brother     Diabetes Brother     Cancer Brother      Lung Cancer       Physical Exam:    Visit Vitals    /74 (BP 1 Location: Left arm, BP Patient Position: Sitting)    Pulse 72    Resp 16    Ht 5' 9\" (1.753 m)    Wt 226 lb (102.5 kg)    BMI 33.37 kg/m2      General: Well-appearing male in no acute distress  HEENT: EOMI no scleral icterus is noted  Pulmonary: No increased work of breathing is noted  Extremities: Warm and well-perfused bilaterally he does have 1+ edema bilateral lower extremities  Neuro: Cranial nerves II through XII grossly intact    Impression and Plan:  Mukund Mcrae Sr. is a 80 y.o. 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 He seems to be doing quite well I reviewed his ultrasound clinic today showing that his aneurysm is excluded and is got good blood flow. I will bring him back in 5 months for six-month follow-up to check on his left side as well as continuing to check on the right side making sure that his aneurysm has no leak and he has good flow. We reviewed the plan with the patient and the patient understands. We also gave the patient appropriate instructions on their disease process and when to call back. Follow-up Disposition:  Return in about 5 months (around 6/17/2018). Goran Urias MD    PLEASE NOTE:  This document has been produced using voice recognition software. Unrecognized errors in transcription may be present.

## 2018-02-15 NOTE — TELEPHONE ENCOUNTER
Wife called the office for instructions on when to resume Coumadin. She stated the patient is having a filter removed today and has been off Coumadin for 5 days. She was advised to ask the surgeon when the patient can safely restart Coumadin due to bleeding risk after the procedure. She verbalized understanding.  Radames Phalen, LPN

## 2018-02-15 NOTE — IP AVS SNAPSHOT
[address] Patient: Mukund Mcrae Sr. MRN: [id number] HYZ:2/48/7161 About your hospitalization You were admitted on:  February 15, 2018 You last received care in the:  [facility] CATH HOLDING You were discharged on:  February 15, 2018 Why you were hospitalized Your primary diagnosis was:  Not on File Follow-up Information Follow up With Details Comments Contact Info Dotty Dodson MD   18[address] [facility] 
[address] [facility] 
[phone] Goran Urias MD Schedule an appointment as soon as possible for a visit in 5 days  1818 [facility] [address] 
BS VEIN AND V[facility]ULAR  [facility] 
[phone] Your Scheduled Appointments Wednesday February 28, 2018  9:15 AM EST HOSPITAL DISCHARGE with Goran Urias MD  
[facility] [address] [id number] [address] [place] [address]  
[phone] Discharge Orders None A check yas indicates which time of day the medication should be taken. My Medications CONTINUE taking these medications Instructions Each Dose to Equal  
 Morning Noon Evening Bedtime FISH OIL 1,000 mg Cap Generic drug:  omega-3 fatty acids-vitamin e Your last dose was: Your next dose is: Take 1 Cap by mouth two (2) times a day. 1 Cap METAMUCIL 3.4 gram/5.4 gram Powd Generic drug:  psyllium husk Your last dose was: Your next dose is: Take  by mouth two (2) times a day. metoprolol succinate 25 mg XL tablet Commonly known as:  TOPROL-XL Your last dose was: Your next dose is: TAKE ONE TABLET BY MOUTH ONCE DAILY  
     
   
   
   
  
 multivitamin tablet Commonly known as:  ONE A DAY Your last dose was: Your next dose is: Take 1 Tab by mouth daily. 1 Tab OSTEO BI-FLEX (5-LOXIN) 1,500-400-100 mg-unit-mg Tab Generic drug:  glucosamine-D3-Boswellia serr Your last dose was: Your next dose is: Take 1 Tab by mouth two (2) times a day. 1 Tab  
    
   
   
   
  
 potassium chloride SR 20 mEq tablet Commonly known as:  K-TAB Your last dose was: Your next dose is: Take 1 Tab by mouth daily. 20 mEq  
    
   
   
   
  
 senna-docusate 8.6-50 mg per tablet Commonly known as:  Rhunette Anon Your last dose was: Your next dose is: Take 1 Tab by mouth daily. 1 Tab VITAMIN C 500 mg tablet Generic drug:  ascorbic acid (vitamin C) Your last dose was: Your next dose is: Take 1,000 mg by mouth daily. 1000 mg  
    
   
   
   
  
 warfarin 2.5 mg tablet Commonly known as:  COUMADIN Your last dose was: Your next dose is: Take 1 Tab by mouth daily. 2.5 mg  
    
   
   
   
  
  
  
  
Discharge Instructions [address] Inferior Vena Cava Filter Removal Discharge Instructions General Information: Today you had an inferior vena cava filter removed. This was removed because it was determined that you no longer needed this filter. Home Care Instructions: You can resume your regular diet and medication regimen. Do not drink alcohol, drive, or make any important legal decisions in the next 24 hours. Do not lift anything heavier than a gallon of milk, or do anything strenuous for the next 48 hours. You will notice a dressing on your neck or groin. This was the insertion site for the retrieval of the device. Keep the site covered until the puncture site has totally healed. You make take a shower with the bandage, but do cover it with plastic wrap.  Do not immerse yourself in water until the wound has totally healed. After your puncture wound heals, there is no special care that is needed. You may resume your normal level of activity slowly, after about one week of light activity. Call If: 
 You should call your Physician and/or the Radiology Nurse if you have any bleeding other than a small spot on your bandage. Please call if you have any signs of infection; fever, swelling, or increased pain at the site. Call if you have any questions of how to take care of your wound. Follow-Up Instructions: Please see your ordering doctor as he/she has requested. Please resume your Coumadin tonight as prescribed To Reach Us:   
 
Patient Signature: 
Date: 2/15/2018 Discharging Nurse: Dahlia Lopez RN 
 
ACO Transitions of Care Introducing [employer] [address] Patricia Bunn offers a voluntary care coordination program to provide high quality service and care to [facility] fee-for-service beneficiaries. Nate Betts was designed to help you enhance your health and well-being through the following services: ? Transitions of Care  support for individuals who are transitioning from one care setting to another (example: Hospital to home). ? Chronic and Complex Care Coordination  support for individuals and caregivers of those with serious or chronic illnesses or with more than one chronic (ongoing) condition and those who take a number of different medications. If you meet specific medical criteria, a [facility] [address] may call you directly to coordinate your care with your primary care physician and your other care providers. For questions about the [facility] programs, please, contact your physicians office. For general questions or additional information about Accountable Care Organizations: 
Please visit www.medicare.gov/acos. html or call 1-800-MEDICARE (0-515.368.6216) TTY users should call 2-473.270.7464. Providers Seen During Your Hospitalization Provider Specialty Primary office phone Yeni High MD Vascular Surgery [phone] Your Primary Care Physician (PCP) Primary Care Physician Office Phone Office Fax Raven Durham [phone] You are allergic to the following No active allergies Recent Documentation Height Weight BMI Smoking Status 1.778 m 100.7 kg 31.85 kg/m2 Former Smoker Emergency Contacts Name Discharge Info Relation Home Work Mobile Lilliam Espinal DISCHARGE CAREGIVER [3] Spouse [3] [phone] Patient Belongings The following personal items are in your possession at time of discharge: 
     Visual Aid: None Please provide this summary of care documentation to your next provider. Signatures-by signing, you are acknowledging that this After Visit Summary has been reviewed with you and you have received a copy. Patient Signature:  ____________________________________________________________ Date:  ____________________________________________________________  
  
[facility] Provider Signature:  ____________________________________________________________ Date:  ____________________________________________________________

## 2018-02-16 NOTE — OP NOTES
[address] [facility].Zaynab  MR#: [id number]  : 1932  ACCOUNT #: [de-identified]   DATE OF SERVICE: 02/15/2018    PROCEDURE PERFORMED:    1. Ultrasound-guided access of right internal jugular vein. 2.  Catheter insertion into the inferior vena cava. 3.  Venacavogram.  4. Inferior vena cava filter removal.      SURGEON:  Camila Thomas MD.     CULTURES:  None. SPECIMENS REMOVED:  None. DRAINS:  None. ESTIMATED BLOOD LOSS:  Was less than 50 mL. COMPLICATIONS:  None. ANESTHESIA:  Local anesthetic with moderate sedation of 7 minutes, not enough for full moderate sedation charging. PREOPERATIVE DIAGNOSIS:  IVC filter, no longer required. POSTOPERATIVE DIAGNOSIS:  IVC filter, no longer required. INDICATIONS FOR THE PROCEDURE:  The patient is an 80-year-old gentleman with an inferior vena cava, no longer required. The patient was recommended for removal.  The patient was given risks and benefits of the procedure including but not limited to bleeding, infection, damage to adjacent structures, MI, stroke, and death as well as loss of filter and need for further surgery. The patient was understanding of all the risks and underwent the procedure. OPERATIVE FINDINGS:  A filter is in place appropriately. There is no clot within the filter. PROCEDURE:  The patient was correctly identified in the pre-cath area, taken to the cath lab in stable condition. The patient had preincision timeout prior to any incision. The patient was prepped and draped in normal sterile fashion according to CDC guidelines for aseptic technique. Ultrasound was used to visualize the right internal jugular vein. A picture was taken and kept with the patient's chart. We numbed him up appropriately using 1% lidocaine, took a single entry needle and gained access into the internal jugular vein.   Once the needle tip was identified within the vein and there was possible blood return, a wire was then placed. A small skin incision was created using an 11 blade and a Contra Flush catheter was placed below the filter. Venacavogram was performed showing no clot and decision was then made to remove the filter. We then replaced our Jones wire and then placed our filter retrieval sheath. We then were able to bring in our gooseneck snare and snare the filter and remove it quite easily and quickly. We held pressure for 5 minutes. The patient tolerated the procedure well without any issues.       MD Karen De La Cruz  D: 02/16/2018 07:28     T: 02/16/2018 07:48  JOB #: [id number]

## 2018-02-22 ENCOUNTER — TELEPHONE ANTICOAG (OUTPATIENT)
Dept: CARDIOLOGY CLINIC | Age: 83
End: 2018-02-22

## 2018-02-22 DIAGNOSIS — I26.99 OTHER PULMONARY EMBOLISM WITHOUT ACUTE COR PULMONALE, UNSPECIFIED CHRONICITY (HCC): ICD-10-CM

## 2018-02-22 DIAGNOSIS — Z79.01 LONG TERM CURRENT USE OF ANTICOAGULANT THERAPY: ICD-10-CM

## 2018-02-22 DIAGNOSIS — I48.92 ATRIAL FLUTTER WITH RAPID VENTRICULAR RESPONSE (HCC): Primary | ICD-10-CM

## 2018-02-22 LAB — INR, EXTERNAL: 1.4

## 2018-02-22 NOTE — PROGRESS NOTES
Patient calling in with INDIRA. Selene Conrad informed she wants patient to Take 3.75mg on 2/22/2018  Then take Coumadin 2.5mg daily except 1.25 mg on every other Sun (take 2.5 mg on 2/25/18) recheck in 1 week on 1 march. Called patient back informed about the plan.

## 2018-03-01 ENCOUNTER — TELEPHONE ANTICOAG (OUTPATIENT)
Dept: CARDIOLOGY CLINIC | Age: 83
End: 2018-03-01

## 2018-03-01 DIAGNOSIS — I48.92 ATRIAL FLUTTER WITH RAPID VENTRICULAR RESPONSE (HCC): Primary | ICD-10-CM

## 2018-03-01 DIAGNOSIS — I26.99 OTHER PULMONARY EMBOLISM WITHOUT ACUTE COR PULMONALE, UNSPECIFIED CHRONICITY (HCC): ICD-10-CM

## 2018-03-01 DIAGNOSIS — Z79.01 LONG TERM CURRENT USE OF ANTICOAGULANT THERAPY: ICD-10-CM

## 2018-03-01 LAB — INR, EXTERNAL: 1.7

## 2018-03-01 NOTE — PROGRESS NOTES
Patient's wife called in . Sampson Zavaleta NP informed. She gave verbal order: Take 3.75mg on 3/1/2018  Then take Coumadin 2.5mg daily  recheck in a week. Called patient back. Wife answered. Verified  and full name. Informed about plan for coumadin per Sampson Zavaleta,  . Patient verbalized understanding and agreed with the plan of care.

## 2018-03-05 ENCOUNTER — OFFICE VISIT (OUTPATIENT)
Dept: VASCULAR SURGERY | Age: 83
End: 2018-03-05

## 2018-03-05 VITALS
HEART RATE: 74 BPM | SYSTOLIC BLOOD PRESSURE: 150 MMHG | WEIGHT: 222 LBS | RESPIRATION RATE: 19 BRPM | DIASTOLIC BLOOD PRESSURE: 84 MMHG | BODY MASS INDEX: 31.78 KG/M2 | HEIGHT: 70 IN

## 2018-03-05 DIAGNOSIS — I72.4 POPLITEAL ARTERY ANEURYSM (HCC): ICD-10-CM

## 2018-03-05 DIAGNOSIS — I26.99 OTHER PULMONARY EMBOLISM WITHOUT ACUTE COR PULMONALE, UNSPECIFIED CHRONICITY (HCC): Primary | ICD-10-CM

## 2018-03-05 NOTE — PROGRESS NOTES
[address] Chief Complaint   Patient presents with    Surgical Follow-up       History and Physical    Verner Justin Sr. is a 80 y.o. male here for follow-up of inferior vena cava filter removal.  He is doing very well without any neck pain. No abdominal or back pain. Overall seems to be doing quite well. We know him very well for bilateral popliteal artery aneurysm repair. He already has follow-up for this. He does not complain of any claudication or rest pain. No fevers or chills. Past Medical History:   Diagnosis Date    Ankle fracture, left approx 2011    medial, tibial    Arthritis     knees    Cardiac echocardiogram 06/16/2011    Normal LV systolic function. Mild conc LVH. Gr 1 DDfx. RVSP 35-40 mmHg. Marked LAE. Marked LISA. Mild-mod MR.  Mild-mod AI. Mild AoRE. Mild aortic arch dil.  Cardiac Holter monitor study 06/22/2011    Baseline sinus rhythm to sinus bradycardia, avg HR 60 bpm (range  bpm). Questionable chronotropic intolerance. No sustained arrhythmias.  Emphysema lung (HCC)     GERD (gastroesophageal reflux disease)     Glaucoma suspect     posterior vitreous detachment b/l, pterygium left eye, aseroid hyalosis / synchisis, b/l pseudophakia    H/O colonoscopy 04/13/2011    1 polyp thermally treated    History of atrial fibrillation 2009    Hypertension     Pulmonary embolism ([address]     Unspecified adverse effect of anesthesia     H/o A-fib immediately post colonoscopy.      Past Surgical History:   Procedure Laterality Date    CARDIAC SURG PROCEDURE UNLIST      hx atrial flutter ablation 2013    COLONOSCOPY N/A 10/6/2017    COLONOSCOPY performed by Gordy Gaitan MD at [address] HX COLONOSCOPY      HX GI      HX KNEE REPLACEMENT Right 02/19/2013    Dr. Patircio Clarke Right 2/13    [facility], [place]    HX OTHER SURGICAL      right popliteal artery aneurysm now repaired with Viabahn stenting    HX SVT ABLATION  1/2/2013    by Dr. 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 90 Tab 3    potassium chloride SR (K-TAB) 20 mEq tablet Take 1 Tab by mouth daily. 30 Tab 4    ascorbic acid, vitamin C, (VITAMIN C) 500 mg tablet Take 1,000 mg by mouth daily.  psyllium husk (METAMUCIL) 3.4 gram/5.4 gram powd Take  by mouth two (2) times a day.  multivitamin (ONE A DAY) tablet Take 1 Tab by mouth daily.  glucosamine-D3-boswellia serr (OSTEO BI-FLEX) 1,500-400-100 mg-unit-mg Tab Take 1 Tab by mouth two (2) times a day.  omega-3 fatty acids-vitamin e (FISH OIL) 1,000 mg cap Take 1 Cap by mouth two (2) times a day.  senna-docusate (PERICOLACE) 8.6-50 mg per tablet Take 1 Tab by mouth daily. 10 Tab 0     No Known Allergies  Social History     Social History    Marital status:      Spouse name: N/A    Number of children: N/A    Years of education: N/A     Occupational History    Not on file. Social History Main Topics    Smoking status: Former Smoker     Packs/day: 1.00     Quit date: 1/1/1965    Smokeless tobacco: Never Used    Alcohol use Yes      Comment: occasionally.     Drug use: No    Sexual activity: Not Currently     Partners: Female     Other Topics Concern  Not on file     Social History Narrative      Family History   Problem Relation Age of Onset    Pacemaker Father     Pacemaker Sister     Heart Failure Brother     Diabetes Brother     Cancer Brother      Lung Cancer       Physical Exam:    Visit Vitals    /84 (BP 1 Location: Left arm, BP Patient Position: Sitting)    Pulse 74    Resp 19    Ht 5' 10\" (1.778 m)    Wt 222 lb (100.7 kg)    BMI 31.85 kg/m2      General: Well-appearing male in no acute distress  HEENT: EOMI no scleral icterus is noted  Pulmonary: No increased work of breathing is noted  Extremities: Warm and perfused bilaterally  Neuro: Cranial nerves II through XII grossly intact    Impression and Plan:  Mic Camara. is a 80 y.o. male who is done very well with filter removal.  He already has follow-up for his popliteal artery aneurysms. We will continue to see him at that time. We reviewed the plan with the patient and the patient understands. We also gave the patient appropriate instructions on their disease process and when to call back. Follow-up Disposition: Not on [facility] of hospitals in the [place], MD    PLEASE NOTE:  This document has been produced using voice recognition software. Unrecognized errors in transcription may be present.

## 2018-03-05 NOTE — PROGRESS NOTES
1. Have you been to an emergency room or urgent care clinic since your last visit? no    Hospitalized since your last visit?no    2. Have you seen or consulted any other health care providers outside of the [facility] since your last visit including any procedures, health maintenance items. no

## 2018-03-05 NOTE — MR AVS SNAPSHOT
303 [facility] 
 
 
 [address] [place] [address] 105 200 [facility] 
[phone] Patient: Rhonda Aragon Sr. MRN: [id number] VXP:[birth year]31 Visit Information Date & Time Provider Department Dept. Phone Encounter #  
 3/5/2018 10:15 AM Yeni High  [facility] [phone] [id number] Follow-up Instructions Follow-up and Disposition History Your Appointments 4/23/2018  8:30 AM  
Office Visit with Promise Mae MD  
[facility]) Appt Note: AWV  and 5 mo f/u  
 [address] [facility] [address] 200 [facility]  
225 [facility] [address] 200 [facility]  
  
    
 6/18/2018 12:45 PM  
PROCEDURE with [facility] IMAGING 1 [facility] ([address] [place] [address]) Appt Note: lower bilateral right pop aneurysm repaired for leak and left sided small pop aneurysm check size harmon 5 months 1212 [facility] 536 200 [facility]  
[phone] 2630 [facility],[address]  
  
    
 6/18/2018  1:45 PM  
PROCEDURE with [facility] NONIMAGING [facility] ([address] [place] [address]) Appt Note: genesis harmon 5 months 1212 [facility] 168 200 [facility]  
[phone] formerly [facility]2 [address]  
  
    
 7/9/2018  9:30 AM  
Follow Up with Yeni High MD  
[address] [place] [address] and Vascular Specialists [address] [place] [address]) Appt Note: 5 month follow up after studies 1212 [facility] 126 200 [facility]  
[phone] [address]12 [address]  
  
    
 12/13/2018  9:20 AM  
Follow Up with Regino Soto MD  
[facility] ([address] [place] [address]) Appt Note: 1 year follow up with an EKG  
 [facility] [address] [address] [address] [phone] [address] Upcoming Health Maintenance Date Due  
 MEDICARE YEARLY EXAM 4/18/2018 GLAUCOMA SCREENING Q2Y 8/1/2019 DTaP/Tdap/Td series (2 - Td) 3/13/2027 COLONOSCOPY 10/6/2027 Allergies as of 3/5/2018  Review Complete On: 3/5/2018 By: Ellyn Howard MD  
 No Known Allergies Current Immunizations  Reviewed on 10/16/2017 Name Date Influenza High Dose Vaccine PF 10/16/2016 Influenza Vaccine 10/14/2014 Influenza Vaccine (Quad) PF 10/11/2017  5:09 PM  
 Influenza Vaccine Whole 10/28/2012 Pneumococcal Polysaccharide (PPSV-23) 10/28/2012 ZZZ-RETIRED (DO NOT USE) Pneumococcal Vaccine (Unspecified Type) 10/28/2012 Not reviewed this visit You Were Diagnosed With   
  
 Codes Comments Other pulmonary embolism without acute cor pulmonale, unspecified chronicity (HCC)    -  Primary ICD-10-CM: I26.99 
ICD-9-CM: 415.19 Popliteal artery aneurysm (HCC)     ICD-10-CM: I72.4 ICD-9-CM: 321. 3 Vitals BP Pulse Resp Height(growth percentile) Weight(growth percentile) BMI  
 150/84 (BP 1 Location: Left arm, BP Patient Position: Sitting) 74 19 5' 10\" (1.778 m) 222 lb (100.7 kg) 31.85 kg/m2 Smoking Status Former Smoker Vitals History BMI and BSA Data Body Mass Index Body Surface Area  
 31.85 kg/m 2 2.23 m 2 Preferred Pharmacy Pharmacy Name Phone [address] [place] [address], [address] [place] [address] [phone] Your Updated Medication List  
  
   
This list is accurate as of 3/5/18 10:31 AM.  Always use your most recent med list.  
  
  
  
  
 FISH OIL 1,000 mg Cap Generic drug:  omega-3 fatty acids-vitamin e Take 1 Cap by mouth two (2) times a day. METAMUCIL 3.4 gram/5.4 gram Powd Generic drug:  psyllium husk Take  by mouth two (2) times a day. metoprolol succinate 25 mg XL tablet Commonly known as:  TOPROL-XL  
TAKE ONE TABLET BY MOUTH ONCE DAILY  
  
 multivitamin tablet Commonly known as:  ONE A DAY Take 1 Tab by mouth daily. OSTEO BI-FLEX (5-LOXIN) 1,500-400-100 mg-unit-mg Tab Generic drug:  glucosamine-D3-Boswellia serr Take 1 Tab by mouth two (2) times a day. potassium chloride SR 20 mEq tablet Commonly known as:  K-TAB Take 1 Tab by mouth daily. senna-docusate 8.6-50 mg per tablet Commonly known as:  Carolynne Jase Take 1 Tab by mouth daily. VITAMIN C 500 mg tablet Generic drug:  ascorbic acid (vitamin C) Take 1,000 mg by mouth daily. warfarin 2.5 mg tablet Commonly known as:  COUMADIN Take 1 Tab by mouth daily. Please provide this summary of care documentation to your next provider. Your primary care clinician is listed as Channing Crawford. If you have any questions after today's visit, please call [phone].

## 2018-03-08 ENCOUNTER — TELEPHONE ANTICOAG (OUTPATIENT)
Dept: CARDIOLOGY CLINIC | Age: 83
End: 2018-03-08

## 2018-03-08 DIAGNOSIS — I48.92 ATRIAL FLUTTER WITH RAPID VENTRICULAR RESPONSE (HCC): Primary | ICD-10-CM

## 2018-03-08 DIAGNOSIS — I26.99 OTHER PULMONARY EMBOLISM WITHOUT ACUTE COR PULMONALE, UNSPECIFIED CHRONICITY (HCC): ICD-10-CM

## 2018-03-08 DIAGNOSIS — Z79.01 LONG TERM CURRENT USE OF ANTICOAGULANT THERAPY: ICD-10-CM

## 2018-03-08 LAB — INR, EXTERNAL: 2.5

## 2018-03-08 NOTE — PROGRESS NOTES
Patient wife called. Patient is in range. Patient will continue current dosage 2.5 mg.  They will recheck it in a week

## 2018-03-15 ENCOUNTER — TELEPHONE ANTICOAG (OUTPATIENT)
Dept: CARDIOLOGY CLINIC | Age: 83
End: 2018-03-15

## 2018-03-15 DIAGNOSIS — I48.92 ATRIAL FLUTTER WITH RAPID VENTRICULAR RESPONSE (HCC): Primary | ICD-10-CM

## 2018-03-15 DIAGNOSIS — I26.99 OTHER PULMONARY EMBOLISM WITHOUT ACUTE COR PULMONALE, UNSPECIFIED CHRONICITY (HCC): ICD-10-CM

## 2018-03-15 DIAGNOSIS — Z79.01 LONG TERM CURRENT USE OF ANTICOAGULANT THERAPY: ICD-10-CM

## 2018-03-15 LAB — INR, EXTERNAL: 2.4

## 2018-03-15 NOTE — PROGRESS NOTES
The INR is stable and therapeutic.  Continue same dose of coumadin and recheck in 2 weeks  Continue  take Coumadin 2.5mg daily   Verbal order and read back per Abran Hoang NP

## 2018-03-29 ENCOUNTER — TELEPHONE ANTICOAG (OUTPATIENT)
Dept: CARDIOLOGY CLINIC | Age: 83
End: 2018-03-29

## 2018-03-29 DIAGNOSIS — I48.92 ATRIAL FLUTTER WITH RAPID VENTRICULAR RESPONSE (HCC): Primary | ICD-10-CM

## 2018-03-29 DIAGNOSIS — I26.99 OTHER PULMONARY EMBOLISM WITHOUT ACUTE COR PULMONALE, UNSPECIFIED CHRONICITY (HCC): ICD-10-CM

## 2018-03-29 DIAGNOSIS — Z79.01 LONG TERM CURRENT USE OF ANTICOAGULANT THERAPY: ICD-10-CM

## 2018-03-29 LAB — INR, EXTERNAL: 2.2

## 2018-03-29 NOTE — PROGRESS NOTES
The INR is stable and therapeutic. Continue  take Coumadin 2.5mg daily and recheck in 2 weeks at home.         Verbal order and read back per Luz Maria Obregon MD

## 2018-04-12 ENCOUNTER — TELEPHONE ANTICOAG (OUTPATIENT)
Dept: CARDIOLOGY CLINIC | Age: 83
End: 2018-04-12

## 2018-04-12 DIAGNOSIS — I48.92 ATRIAL FLUTTER WITH RAPID VENTRICULAR RESPONSE (HCC): Primary | ICD-10-CM

## 2018-04-12 DIAGNOSIS — Z79.01 LONG TERM CURRENT USE OF ANTICOAGULANT THERAPY: ICD-10-CM

## 2018-04-12 LAB — INR, EXTERNAL: 2.2

## 2018-04-12 NOTE — PROGRESS NOTES
Verbal order and read back per Mary Laureano, NP    Continue  take Coumadin 2.5mg daily, recheck in 2 weeks

## 2018-04-20 ENCOUNTER — HOSPITAL ENCOUNTER (OUTPATIENT)
Dept: LAB | Age: 83
Discharge: HOME | End: 2018-04-20
Payer: MEDICARE

## 2018-04-20 DIAGNOSIS — E87.6 HYPOKALEMIA: ICD-10-CM

## 2018-04-20 DIAGNOSIS — D64.9 SYMPTOMATIC ANEMIA: ICD-10-CM

## 2018-04-20 LAB
ANION GAP SERPL CALC-SCNC: 4 MMOL/L (ref 3–18)
BUN SERPL-MCNC: 15 MG/DL (ref 7–18)
BUN/CREAT SERPL: 16 (ref 12–20)
CALCIUM SERPL-MCNC: 8.8 MG/DL (ref 8.5–10.1)
CHLORIDE SERPL-SCNC: 108 MMOL/L (ref 100–108)
CO2 SERPL-SCNC: 29 MMOL/L (ref 21–32)
CREAT SERPL-MCNC: 0.96 MG/DL (ref 0.6–1.3)
ERYTHROCYTE [DISTWIDTH] IN BLOOD BY AUTOMATED COUNT: 16.9 % (ref 11.6–14.5)
FERRITIN SERPL-MCNC: 74 NG/ML (ref 8–388)
GLUCOSE SERPL-MCNC: 95 MG/DL (ref 74–99)
HCT VFR BLD AUTO: 36.1 % (ref 36–48)
HGB BLD-MCNC: 12 G/DL (ref 13–16)
MCH RBC QN AUTO: 28.7 PG (ref 24–34)
MCHC RBC AUTO-ENTMCNC: 33.2 G/DL (ref 31–37)
MCV RBC AUTO: 86.4 FL (ref 74–97)
PLATELET # BLD AUTO: 185 K/UL (ref 135–420)
PMV BLD AUTO: 11.3 FL (ref 9.2–11.8)
POTASSIUM SERPL-SCNC: 4 MMOL/L (ref 3.5–5.5)
RBC # BLD AUTO: 4.18 M/UL (ref 4.7–5.5)
SODIUM SERPL-SCNC: 141 MMOL/L (ref 136–145)
WBC # BLD AUTO: 7.3 K/UL (ref 4.6–13.2)

## 2018-04-20 PROCEDURE — 82728 ASSAY OF FERRITIN: CPT | Performed by: FAMILY MEDICINE

## 2018-04-20 PROCEDURE — 36415 COLL VENOUS BLD VENIPUNCTURE: CPT | Performed by: FAMILY MEDICINE

## 2018-04-20 PROCEDURE — 85027 COMPLETE CBC AUTOMATED: CPT | Performed by: FAMILY MEDICINE

## 2018-04-20 PROCEDURE — 80048 BASIC METABOLIC PNL TOTAL CA: CPT | Performed by: FAMILY MEDICINE

## 2018-04-23 ENCOUNTER — OFFICE VISIT (OUTPATIENT)
Dept: FAMILY MEDICINE CLINIC | Age: 83
End: 2018-04-23

## 2018-04-23 VITALS
RESPIRATION RATE: 16 BRPM | DIASTOLIC BLOOD PRESSURE: 76 MMHG | BODY MASS INDEX: 31.92 KG/M2 | TEMPERATURE: 98.7 F | WEIGHT: 223 LBS | HEART RATE: 73 BPM | HEIGHT: 70 IN | SYSTOLIC BLOOD PRESSURE: 142 MMHG | OXYGEN SATURATION: 96 %

## 2018-04-23 VITALS
TEMPERATURE: 98.7 F | SYSTOLIC BLOOD PRESSURE: 142 MMHG | WEIGHT: 223 LBS | BODY MASS INDEX: 31.92 KG/M2 | OXYGEN SATURATION: 96 % | HEIGHT: 70 IN | DIASTOLIC BLOOD PRESSURE: 76 MMHG | RESPIRATION RATE: 16 BRPM | HEART RATE: 73 BPM

## 2018-04-23 DIAGNOSIS — Z71.89 ADVANCED DIRECTIVES, COUNSELING/DISCUSSION: ICD-10-CM

## 2018-04-23 DIAGNOSIS — I42.9 CARDIOMYOPATHY, UNSPECIFIED TYPE (HCC): ICD-10-CM

## 2018-04-23 DIAGNOSIS — E66.9 OBESITY WITH SERIOUS COMORBIDITY, UNSPECIFIED CLASSIFICATION, UNSPECIFIED OBESITY TYPE: ICD-10-CM

## 2018-04-23 DIAGNOSIS — I72.4 POPLITEAL ARTERY ANEURYSM (HCC): ICD-10-CM

## 2018-04-23 DIAGNOSIS — Z00.00 MEDICARE ANNUAL WELLNESS VISIT, SUBSEQUENT: ICD-10-CM

## 2018-04-23 DIAGNOSIS — Z79.01 LONG TERM CURRENT USE OF ANTICOAGULANT THERAPY: ICD-10-CM

## 2018-04-23 DIAGNOSIS — I10 ESSENTIAL HYPERTENSION: Primary | ICD-10-CM

## 2018-04-23 DIAGNOSIS — Z98.890 S/P ABLATION OF ATRIAL FLUTTER: ICD-10-CM

## 2018-04-23 DIAGNOSIS — I48.92 ATRIAL FLUTTER WITH RAPID VENTRICULAR RESPONSE (HCC): ICD-10-CM

## 2018-04-23 DIAGNOSIS — Z86.79 S/P ABLATION OF ATRIAL FLUTTER: ICD-10-CM

## 2018-04-23 DIAGNOSIS — D64.9 SYMPTOMATIC ANEMIA: ICD-10-CM

## 2018-04-23 DIAGNOSIS — I26.99 OTHER PULMONARY EMBOLISM WITHOUT ACUTE COR PULMONALE, UNSPECIFIED CHRONICITY (HCC): ICD-10-CM

## 2018-04-23 DIAGNOSIS — K92.2 LOWER GI BLEED: ICD-10-CM

## 2018-04-23 DIAGNOSIS — Z13.31 SCREENING FOR DEPRESSION: ICD-10-CM

## 2018-04-23 RX ORDER — POTASSIUM CHLORIDE 1500 MG/1
20 TABLET, FILM COATED, EXTENDED RELEASE ORAL DAILY
Qty: 90 TAB | Refills: 1 | Status: SHIPPED | OUTPATIENT
Start: 2018-04-23 | End: 2019-04-23

## 2018-04-23 RX ORDER — METOPROLOL SUCCINATE 25 MG/1
TABLET, EXTENDED RELEASE ORAL
Qty: 90 TAB | Refills: 1 | Status: SHIPPED | OUTPATIENT
Start: 2018-04-23 | End: 2018-10-23 | Stop reason: SDUPTHER

## 2018-04-23 RX ORDER — LANOLIN ALCOHOL/MO/W.PET/CERES
1000 CREAM (GRAM) TOPICAL DAILY
COMMUNITY
End: 2021-01-01

## 2018-04-23 NOTE — ACP (ADVANCE CARE PLANNING)
Advance Care Planning    Advance Care Planning (ACP) Provider Note - Comprehensive     Date of ACP Conversation: 04/23/18  Persons included in Conversation:  patient and family  Length of ACP Conversation in minutes:  16 minutes    Authorized Decision Maker (if patient is incapable of making informed decisions): This person is:  Healthcare Agent/Medical Power of  under Advance Directive          General ACP for ALL Patients with Decision Making Capacity:   Importance of advance care planning, including choosing a healthcare agent to communicate patient's healthcare decisions if patient lost the ability to make decisions, such as after a sudden illness or accident  Understanding of the healthcare agent role was assessed and information provided    Review of Existing Advance Directive:  Does this advance directive still reflect your preferences?   Yes (Provide new form/Refer for assistance in updating)    For Serious or Chronic Illness:  Understanding of medical condition      Interventions Provided:  Recommended communicating the plan and making copies for the healthcare agent, personal physician, and others as appropriate (e.g., health system)  Recommended review of completed ACP document annually or upon change in health status

## 2018-04-23 NOTE — MR AVS SNAPSHOT
1017 [facility] [address] [facility] 
[phone] Patient: Elizabeth Herrera Sr. MRN: [id number] MXS:1/97/1634 Visit Information Date & Time Provider Department Dept. Phone Encounter #  
 4/23/2018  8:30 AM Ananth Torre MD [facility] [phone] [id number] Follow-up Instructions Return in about 1 year (around 4/23/2019) for Medicare Wellness exam . Your Appointments 6/18/2018 12:45 PM  
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  
[phone] [facility]  
  
    
 6/18/2018  1:45 PM  
PROCEDURE with BSVVS NONIMAGING [facility] ([address] [place] [address]) Appt Note: genesis harmon 5 months 1212 [facility]  
[phone] [address]  
  
    
 7/9/2018  9:30 AM  
Follow Up with Dia Hurtado MD  
[address] [place] [address] [facility] [address] [place] [address]) Appt Note: 5 month follow up after studies 1212 [facility]  
[phone] [address]  
  
    
 12/13/2018  9:20 AM  
Follow Up with Shelly Jules MD  
[facility] ([address] [place] [address]) Appt Note: 1 year follow up with an EKG  
 [facility] [address] [address] [address] [phone] 1212 [facility] [address] Upcoming Health Maintenance Date Due  
 MEDICARE YEARLY EXAM 4/18/2018 GLAUCOMA SCREENING Q2Y 2/20/2020 DTaP/Tdap/Td series (2 - Td) 3/13/2027 COLONOSCOPY 10/6/2027 Allergies as of 4/23/2018  Review Complete On: 4/23/2018 By: Meridee Ganser, MD  
 No Known Allergies Current Immunizations  Reviewed on 4/23/2018 Name Date Influenza High Dose Vaccine PF 10/16/2016 Influenza Vaccine 10/14/2014 Influenza Vaccine (Quad) PF 10/11/2017  5:09 PM  
 Influenza Vaccine Whole 10/28/2012 Pneumococcal Polysaccharide (PPSV-23) 10/28/2012 ZZZ-RETIRED (DO NOT USE) Pneumococcal Vaccine (Unspecified Type) 10/28/2012 Reviewed by Marques Mclean on 4/23/2018 at  8:26 AM  
You Were Diagnosed With   
  
 Codes Comments Medicare annual wellness visit, subsequent     ICD-10-CM: Z00.00 ICD-9-CM: V70.0 Advanced directives, counseling/discussion     ICD-10-CM: Z71.89 ICD-9-CM: V65.49 Screening for depression     ICD-10-CM: Z13.89 ICD-9-CM: V79.0 Vitals BP Pulse Temp Resp Height(growth percentile) Weight(growth percentile) 142/76 (BP 1 Location: Right arm, BP Patient Position: Sitting) 73 98.7 °F (37.1 °C) (Oral) 16 5' 10\" (1.778 m) 223 lb (101.2 kg) SpO2 BMI Smoking Status 96% 32 kg/m2 Former Smoker Vitals History BMI and BSA Data Body Mass Index Body Surface Area 32 kg/m 2 2.24 m 2 Preferred Pharmacy Pharmacy Name Phone [address] [place] [address], [address] [place] [phone] Your Updated Medication List  
  
   
This list is accurate as of 4/23/18  9:07 AM.  Always use your most recent med list.  
  
  
  
  
 FISH OIL 1,000 mg Cap Generic drug:  omega-3 fatty acids-vitamin e Take 1 Cap by mouth two (2) times a day. METAMUCIL 3.4 gram/5.4 gram Powd Generic drug:  psyllium husk Take  by mouth two (2) times a day. metoprolol succinate 25 mg XL tablet Commonly known as:  TOPROL-XL  
TAKE ONE TABLET BY MOUTH ONCE DAILY  
  
 multivitamin tablet Commonly known as:  ONE A DAY Take 1 Tab by mouth daily. OSTEO BI-FLEX (5-LOXIN) 1,500-400-100 mg-unit-mg Tab Generic drug:  glucosamine-D3-Boswellia serr Take 1 Tab by mouth two (2) times a day. potassium chloride SR 20 mEq tablet Commonly known as:  K-TAB Take 1 Tab by mouth daily. senna-docusate 8.6-50 mg per tablet Commonly known as:  Debara Furlong Take 1 Tab by mouth daily. VITAMIN B-12 1,000 mcg tablet Generic drug:  cyanocobalamin Take 1,000 mcg by mouth daily. Indications: PREVENTION OF VITAMIN B12 DEFICIENCY  
  
 VITAMIN C 500 mg tablet Generic drug:  ascorbic acid (vitamin C) Take 1,000 mg by mouth daily. warfarin 2.5 mg tablet Commonly known as:  COUMADIN Take 1 Tab by mouth daily. We Performed the Following ADVANCE CARE PLANNING FIRST 30 MINS [49219 CPT(R)] Kiki 68 [[id number] [facility]] Follow-up Instructions Return in about 1 year (around 4/23/2019) for Medicare Wellness exam . Patient Instructions Medicare Wellness Visit, Male The best way to live healthy is to have a healthy lifestyle by eating a well-balanced diet, exercising regularly, limiting alcohol and stopping smoking. Regular physical exams and screening tests are another way to keep healthy. Preventive exams provided by your health care provider can find health problems before they become diseases or illnesses. Preventive services including immunizations, screening tests, monitoring and exams can help you take care of your own health. All people over age 72 should have a pneumovax  and and a prevnar shot to prevent pneumonia. These are once in a lifetime unless you and your provider decide differently. All people over 65 should have a yearly flu shot and a tetanus vaccine every 10 years. Screening for diabetes mellitus with a blood sugar test should be done every year.  
 
Glaucoma is a disease of the eye due to increased ocular pressure that can lead to blindness and it should be done every year by an eye professional. 
 
Cardiovascular screening tests that check for elevated lipids (fatty part of blood) which can lead to heart disease and strokes should be done every 5 years. Colorectal screening that evaluates for blood or polyps in your colon should be done yearly as a stool test or every five years as a flexible sigmoidoscope or every 10 years as a colonoscopy up to age 76. Men up to age 76 may need a screening blood test for prostate cancer at certain intervals, depending on their personal and family history. This decision is between the patient and his provider. If you have been a smoker or had family history of abdominal aortic aneurysms, you and your provider may decide to schedule an ultrasound test of your aorta. Hepatitis C screening is also recommended for anyone born between [birth year] through [address]. A shingles vaccine is also recommended once in a lifetime after age 61. Your Medicare Wellness Exam is recommended annually. Here is a list of your current Health Maintenance items with a due date: 
Health Maintenance Due Topic Date Due  
 Annual Well Visit  04/18/2018 Please provide this summary of care documentation to your next provider. Your primary care clinician is listed as Isauro Lee. If you have any questions after today's visit, please call [phone].

## 2018-04-23 NOTE — PROGRESS NOTES
This is the Subsequent Medicare Annual Wellness Exam, performed 12 months or more after the Initial AWV or the last Subsequent AWV    I have reviewed the patient's medical history in detail and updated the computerized patient record. History     Past Medical History:   Diagnosis Date    Ankle fracture, left approx 2011    medial, tibial    Arthritis     knees    Cardiac echocardiogram 06/16/2011    Normal LV systolic function. Mild conc LVH. Gr 1 DDfx. RVSP 35-40 mmHg. Marked LAE. Marked LISA. Mild-mod MR.  Mild-mod AI. Mild AoRE. Mild aortic arch dil.  Cardiac Holter monitor study 06/22/2011    Baseline sinus rhythm to sinus bradycardia, avg HR 60 bpm (range  bpm). Questionable chronotropic intolerance. No sustained arrhythmias.  Emphysema lung (HCC)     GERD (gastroesophageal reflux disease)     Glaucoma suspect     posterior vitreous detachment b/l, pterygium left eye, aseroid hyalosis / synchisis, b/l pseudophakia    H/O colonoscopy 04/13/2011    1 polyp thermally treated    History of atrial fibrillation 2009    Hypertension     Pulmonary embolism ([address]     Unspecified adverse effect of anesthesia     H/o A-fib immediately post colonoscopy. Past Surgical History:   Procedure Laterality Date    CARDIAC SURG PROCEDURE UNLIST      hx atrial flutter ablation 2013    COLONOSCOPY N/A 10/6/2017    COLONOSCOPY performed by Ck Verdin MD at [address] HX COLONOSCOPY      HX GI      HX KNEE REPLACEMENT Right 02/19/2013    Dr. 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 Connie Spain EGD [address]  SPHNCTR/CARDIA GERD       Current Outpatient Prescriptions   Medication Sig Dispense Refill    cyanocobalamin (VITAMIN B-12) 1,000 mcg tablet Take 1,000 mcg by mouth daily.  Indications: PREVENTION OF VITAMIN B12 DEFICIENCY      metoprolol succinate (TOPROL-XL) 25 mg XL tablet TAKE ONE TABLET BY MOUTH ONCE DAILY 90 Tab 0    warfarin (COUMADIN) 2.5 mg tablet Take 1 Tab by mouth daily. 90 Tab 3    potassium chloride SR (K-TAB) 20 mEq tablet Take 1 Tab by mouth daily. 30 Tab 4    ascorbic acid, vitamin C, (VITAMIN C) 500 mg tablet Take 1,000 mg by mouth daily.  multivitamin (ONE A DAY) tablet Take 1 Tab by mouth daily.  glucosamine-D3-boswellia serr (OSTEO BI-FLEX) 1,500-400-100 mg-unit-mg Tab Take 1 Tab by mouth two (2) times a day.  omega-3 fatty acids-vitamin e (FISH OIL) 1,000 mg cap Take 1 Cap by mouth two (2) times a day.  psyllium husk (METAMUCIL) 3.4 gram/5.4 gram powd Take  by mouth two (2) times a day.  senna-docusate (PERICOLACE) 8.6-50 mg per tablet Take 1 Tab by mouth daily. 10 Tab 0     No Known Allergies  Family History   Problem Relation Age of Onset    Pacemaker Father     Pacemaker Sister     Heart Failure Brother     Diabetes Brother     Cancer Brother      Lung Cancer     Social History   Substance Use Topics    Smoking status: Former Smoker     Packs/day: 1.00     Quit date: 1/1/1965    Smokeless tobacco: Never Used    Alcohol use Yes      Comment: occasionally.      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 Functional Ability and Level of Safety:   Hearing Loss  Hearing is poor. Activities of Daily Living  The home contains: no safety equipment. Besides a walker  Patient does total self care    Fall Risk  Fall Risk Assessment, last 12 mths 4/23/2018   Able to walk? Yes   Fall in past 12 months? No   Fall with injury? -   Number of falls in past 12 months -   Fall Risk Score -       Abuse Screen  Patient is not abused    Cognitive Screening   Evaluation of Cognitive Function:  Has your family/caregiver stated any concerns about your memory: no  Normal    Patient Care Team   Patient Care Team:  Cipriano Guallpa MD as PCP - General (Family Practice)  Kaveh Robles MD (Cardiology)  Carlene Richards, NP (Nurse Practitioner)  Lizeth Tanner MD (Gastroenterology)  PARAMJIT Lomeli (Physician Assistant)  Kye Watters MD (Vascular Surgery)  Mary Kate Gonsalez MD (Ophthalmology)    Assessment/Plan       ICD-10-CM ICD-9-CM    1. Medicare annual wellness visit, subsequent Z00.00 V70.0    2. Advanced directives, counseling/discussion Z71.89 V65.49 ADVANCE CARE PLANNING FIRST 30 MINS   3. Screening for depression Z13.89 V79.0 [facility]     Education and counseling provided:  Are appropriate based on today's review and evaluation    Age and sex specific counseling. Screening for depression and alcoholism. Advanced directives / end of life planning discussion - see ACP note. Care team updated. Vaccines are up to date. Medicare recommended screening and prevention test guidelines are filled out, reviewed, and provided to patient. Screening and prevention is up to date given his age-based recommendations. Patient understands our medical plan. Patient has provided input and agrees with goals. All questions answered.

## 2018-04-23 NOTE — MR AVS SNAPSHOT
101[address] [facility] [address] 200 [facility] 
[phone] Patient: Sharee Zuniga Sr. MRN: [id number] UNT:9/68/7731 Visit Information Date & Time Provider Department Dept. Phone Encounter #  
 4/23/2018  8:45 AM Jane Dickson MD [facility] [phone] [id number] Follow-up Instructions Return in about 6 months (around 10/23/2018) for routine care . Your Appointments 6/18/2018 12:45 PM  
PROCEDURE with BSVVS IMAGING 1 [facility] ([address] [place] [address]) Appt Note: lower bilateral right pop aneurysm repaired for leak and left sided small pop aneurysm check size harmon 5 months 2300 [facility] [address] 200 [facility]  
[phone] 2630 [facility]  
  
    
 6/18/2018  1:45 PM  
PROCEDURE with BSVVS NONIMAGING [facility] ([address] [place] [address]) Appt Note: genesis harmon 5 months 2300 [facility] [address] 093 200 [facility]  
[phone] 2300 [facility] [address] 47 [facility]  
  
    
 7/9/2018  9:30 AM  
Follow Up with Melina Kendrick MD  
[address] [place] [address] and [facility] [address] [place] [address]) Appt Note: 5 month follow up after studies [address] 761 200 [facility]  
[phone] 2300 [facility] [address] [place]  
  
    
 12/13/2018  9:20 AM  
Follow Up with Aiden Crockett MD  
[facility] ([address] [place] [address]) Appt Note: 1 year follow up with an EKG  
 Irene Rockwell [address] [phone] 2300 [facility] [address] Upcoming Health Maintenance Date Due  
 MEDICARE YEARLY EXAM 4/18/2018 GLAUCOMA SCREENING Q2Y 2/20/2020 DTaP/Tdap/Td series (2 - Td) 3/13/2027 COLONOSCOPY 10/6/2027 Allergies as of 4/23/2018  Review Complete On: 4/23/2018 By: Mame Florentino MD  
 No Known Allergies Current Immunizations  Reviewed on 4/23/2018 Name Date Influenza High Dose Vaccine PF 10/16/2016 Influenza Vaccine 10/14/2014 Influenza Vaccine (Quad) PF 10/11/2017  5:09 PM  
 Influenza Vaccine Whole 10/28/2012 Pneumococcal Polysaccharide (PPSV-23) 10/28/2012 ZZZ-RETIRED (DO NOT USE) Pneumococcal Vaccine (Unspecified Type) 10/28/2012 Reviewed by Luis Foreman on 4/23/2018 at  8:26 AM  
You Were Diagnosed With   
  
 Codes Comments Cardiomyopathy, unspecified type ([facility] [address]    -  Primary ICD-10-CM: I42.9 ICD-9-CM: 425.4 Atrial flutter with rapid ventricular response (HCC)     ICD-10-CM: I48.92 
ICD-9-CM: 427.32 Other pulmonary embolism without acute cor pulmonale, unspecified chronicity (HCC)     ICD-10-CM: I26.99 
ICD-9-CM: 415.19 Popliteal artery aneurysm (HCC)     ICD-10-CM: I72.4 ICD-9-CM: 442.3 Essential hypertension     ICD-10-CM: I10 
ICD-9-CM: 401.9 Obesity with serious comorbidity, unspecified classification, unspecified obesity type     ICD-10-CM: E66.9 ICD-9-CM: 278.00 Vitals BP Pulse Temp Resp Height(growth percentile) Weight(growth percentile) 142/76 (BP 1 Location: Right arm, BP Patient Position: Sitting) 73 98.7 °F (37.1 °C) (Oral) 16 5' 10\" (1.778 m) 223 lb (101.2 kg) SpO2 BMI Smoking Status 96% 32 kg/m2 Former Smoker BMI and BSA Data Body Mass Index Body Surface Area 32 kg/m 2 2.24 m 2 Preferred Pharmacy Pharmacy Name Phone Ricco Tripp [address] [place] [address], [address] [place] [address] [phone] Your Updated Medication List  
  
   
This list is accurate as of 4/23/18  9:07 AM.  Always use your most recent med list.  
  
  
  
  
 FISH OIL 1,000 mg Cap Generic drug:  omega-3 fatty acids-vitamin e Take 1 Cap by mouth two (2) times a day. METAMUCIL 3.4 gram/5.4 gram Powd Generic drug:  psyllium husk Take  by mouth two (2) times a day. metoprolol succinate 25 mg XL tablet Commonly known as:  TOPROL-XL  
TAKE ONE TABLET BY MOUTH ONCE DAILY  
  
 multivitamin tablet Commonly known as:  ONE A DAY Take 1 Tab by mouth daily. OSTEO BI-FLEX (5-LOXIN) 1,500-400-100 mg-unit-mg Tab Generic drug:  glucosamine-D3-Boswellia serr Take 1 Tab by mouth two (2) times a day. potassium chloride SR 20 mEq tablet Commonly known as:  K-TAB Take 1 Tab by mouth daily. senna-docusate 8.6-50 mg per tablet Commonly known as:  Herb Fought Take 1 Tab by mouth daily. VITAMIN B-12 1,000 mcg tablet Generic drug:  cyanocobalamin Take 1,000 mcg by mouth daily. Indications: PREVENTION OF VITAMIN B12 DEFICIENCY  
  
 VITAMIN C 500 mg tablet Generic drug:  ascorbic acid (vitamin C) Take 1,000 mg by mouth daily. warfarin 2.5 mg tablet Commonly known as:  COUMADIN Take 1 Tab by mouth daily. Prescriptions Sent to Pharmacy Refills  
 potassium chloride SR (K-TAB) 20 mEq tablet 1 Sig: Take 1 Tab by mouth daily. Class: Normal  
 Pharmacy: [address] [place] [address] Ph #: [phone] Route: Oral  
 metoprolol succinate (TOPROL-XL) 25 mg XL tablet 1 Sig: TAKE ONE TABLET BY MOUTH ONCE DAILY Class: Normal  
 Pharmacy: [address] [place] [address] Ph #: [phone] Follow-up Instructions Return in about 6 months (around 10/23/2018) for routine care . Patient Instructions A Healthy Lifestyle: Care Instructions Your Care Instructions A healthy lifestyle can help you feel good, stay at a healthy weight, and have plenty of energy for both work and play. A healthy lifestyle is something you can share with your whole family. A healthy lifestyle also can lower your risk for serious health problems, such as high blood pressure, heart disease, and diabetes. You can follow a few steps listed below to improve your health and the health of your family. Follow-up care is a key part of your treatment and safety. Be sure to make and go to all appointments, and call your doctor if you are having problems. It's also a good idea to know your test results and keep a list of the medicines you take. How can you care for yourself at home? · Do not eat too much sugar, fat, or fast foods. You can still have dessert and treats now and then. The goal is moderation. · Start small to improve your eating habits. Pay attention to portion sizes, drink less juice and soda pop, and eat more fruits and vegetables. ¨ Eat a healthy amount of food. A 3-ounce serving of meat, for example, is about the size of a deck of cards. Fill the rest of your plate with vegetables and whole grains. ¨ Limit the amount of soda and sports drinks you have every day. Drink more water when you are thirsty. ¨ Eat at least 5 servings of fruits and vegetables every day. It may seem like a lot, but it is not hard to reach this goal. A serving or helping is 1 piece of fruit, 1 cup of vegetables, or 2 cups of leafy, raw vegetables. Have an apple or some carrot sticks as an afternoon snack instead of a candy bar. Try to have fruits and/or vegetables at every meal. 
· Make exercise part of your daily routine. You may want to start with simple activities, such as walking, bicycling, or slow swimming. Try to be active 30 to 60 minutes every day. You do not need to do all 30 to 60 minutes all at once. For example, you can exercise 3 times a day for 10 or 20 minutes. Moderate exercise is safe for most people, but it is always a good idea to talk to your doctor before starting an exercise program. 
· Keep moving. Bradly Cue the lawn, work in the garden, or Rapid [employer].  Take the stairs instead of the elevator at work. · If you smoke, quit. People who smoke have an increased risk for heart attack, stroke, cancer, and other lung illnesses. Quitting is hard, but there are ways to boost your chance of quitting tobacco for good. ¨ Use nicotine gum, patches, or lozenges. ¨ Ask your doctor about stop-smoking programs and medicines. ¨ Keep trying. In addition to reducing your risk of diseases in the future, you will notice some benefits soon after you stop using tobacco. If you have shortness of breath or asthma symptoms, they will likely get better within a few weeks after you quit. · Limit how much alcohol you drink. Moderate amounts of alcohol (up to 2 drinks a day for men, 1 drink a day for women) are okay. But drinking too much can lead to liver problems, high blood pressure, and other health problems. Family health If you have a family, there are many things you can do together to improve your health. · Eat meals together as a family as often as possible. · Eat healthy foods. This includes fruits, vegetables, lean meats and dairy, and whole grains. · Include your family in your fitness plan. Most people think of activities such as jogging or tennis as the way to fitness, but there are many ways you and your family can be more active. Anything that makes you breathe hard and gets your heart pumping is exercise. Here are some tips: 
¨ Walk to do errands or to take your child to school or the bus. ¨ Go for a family bike ride after dinner instead of watching TV. Where can you learn more? Go to http://fouzia-bere.info/. Enter Q187 in the search box to learn more about \"A Healthy Lifestyle: Care Instructions. \" Current as of: May 12, 2017 Content Version: 11.4 © 9455-5698 [employer].  Care instructions adapted under license by [employer] (which disclaims liability or warranty for this information). If you have questions about a medical condition or this instruction, always ask your healthcare professional. [address] any warranty or liability for your use of this information. Please provide this summary of care documentation to your next provider. Your primary care clinician is listed as Anh Ramires. If you have any questions after today's visit, please call [phone].

## 2018-04-23 NOTE — PROGRESS NOTES
1. Have you been to the ER, urgent care clinic since your last visit? Hospitalized since your last visit? No    2. Have you seen or consulted any other health care providers outside of the [facility] since your last visit? Include any pap smears or colon screening. No     3. Per Gloria Weiner and his spouse he is no longer taken senna-docusate ([place]) 8.6-50 mg per       Abuse Screening Questionnaire 4/23/2018   Do you ever feel afraid of your partner? N   Are you in a relationship with someone who physically or mentally threatens you? N   Is it safe for you to go home? Y     Fall Risk Assessment, last 12 mths 4/23/2018   Able to walk? Yes   Fall in past 12 months?  No   Fall with injury? -   Number of falls in past 12 months -   Fall Risk Score -             PHQ over the last two weeks 4/23/2018   Little interest or pleasure in doing things Not at all   Feeling down, depressed or hopeless Not at all   Total Score PHQ 2 0

## 2018-04-23 NOTE — PATIENT INSTRUCTIONS

## 2018-04-23 NOTE — PROGRESS NOTES
1. Have you been to the ER, urgent care clinic since your last visit? Hospitalized since your last visit? No    2. Have you seen or consulted any other health care providers outside of the [facility] since your last visit? Include any pap smears or colon screening. No     3.  Per Shaista Melvi and his spouse he is no longer taken senna-docusate (PERICOLACE) 8.6-50 mg per

## 2018-04-23 NOTE — PROGRESS NOTES
SUBJECTIVE  Chief Complaint   Patient presents with    Anemia    Hypertension    Irregular Heart Beat    Other     Cardiomyopathy, Lower GI-Bleed, Low Kt and History PE    Obesity     Patient presents for follow-up. Doing much better as ofg late. Has been keeping up with vascular and cardiology. Has had popliteal artery stenting a few months ago. Has been checking INR at home as he is on coumadin. He has had a GI bleed and a DVT/PE last year. He has had his filter removed. He had profound anemia and hypokalemia but those seem to have resolved. He feels much better with only minimal leg pains at times. He attributes this to the cold, his right TKR, and a history of left ankle fracture. No increase in fatigued or dyspnea. No longer with GI bleeding or signs of that. No further falls. No dizziness or lightheadedness. No chest pain. He is taking potassium daily no as opposed to twice daily. OBJECTIVE    Blood pressure 142/76, pulse 73, temperature 98.7 °F (37.1 °C), temperature source Oral, resp. rate 16, height 5' 10\" (1.778 m), weight 223 lb (101.2 kg), SpO2 96 %. General:  Alert, cooperative, well appearing, in no apparent distress. ENT:  The tongue and mucous membranes are pink and moist without lesions. CV:  The heart sounds are regular in rate and rhythm. There is a normal S1 and S2.    Lungs: Inspiratory and expiratory efforts are full and unlabored. Lung sounds are clear and equal to auscultation throughout all lung fields without wheezing, rales, or rhonchi. Abd: soft, nontender, nondistended. No HSM. No r/g. Ext: no swelling or tenderness. Skin:  No rashes, no jaundice.     Results for orders placed or performed during the hospital encounter of 04/20/18   CBC W/O DIFF   Result Value Ref Range    WBC 7.3 4.6 - 13.2 K/uL    RBC 4.18 (L) 4.70 - 5.50 M/uL    HGB 12.0 (L) 13.0 - 16.0 g/dL    HCT 36.1 36.0 - 48.0 %    MCV 86.4 74.0 - 97.0 FL    MCH 28.7 24.0 - 34.0 PG MCHC 33.2 31.0 - 37.0 g/dL    RDW 16.9 (H) 11.6 - 14.5 %    PLATELET 929 069 - 532 K/uL    MPV 11.3 9.2 - 11.8 FL   FERRITIN   Result Value Ref Range    Ferritin 74 8 - 168 NG/ML   METABOLIC PANEL, BASIC   Result Value Ref Range    Sodium 141 136 - 145 mmol/L    Potassium 4.0 3.5 - 5.5 mmol/L    Chloride 108 100 - 108 mmol/L    CO2 29 21 - 32 mmol/L    Anion gap 4 3.0 - 18 mmol/L    Glucose 95 74 - 99 mg/dL    BUN 15 7.0 - 18 MG/DL    Creatinine 0.96 0.6 - 1.3 MG/DL    BUN/Creatinine ratio 16 12 - 20      GFR est AA >60 >60 ml/min/1.73m2    GFR est non-AA >60 >60 ml/min/1.73m2    Calcium 8.8 8.5 - 10.1 MG/DL     ASSESSMENT / PLAN    ICD-10-CM ICD-9-CM    1. Essential hypertension I10 401.9    2. Cardiomyopathy, unspecified type ([facility] [address] I42.9 425.4    3. Atrial flutter with rapid ventricular response ([facility]) I48.92 427.32    4. S/P ablation of atrial flutter Z98.890 V45.89     Z86.79     5. Other pulmonary embolism without acute cor pulmonale, unspecified chronicity ([facility]) I26.99 415.19    6. 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 Obesity with serious comorbidity, unspecified classification, unspecified obesity type E66.9 278.00    8. Lower GI bleed K92.2 578.9    9. Symptomatic anemia D64.9 285.9    10. Long term current use of anticoagulant therapy Z79.01 V58.61      Labs reviewed. HTN - controlled considering his age-based recommendations. Cardiomyopathy - History of biatrial enlargement and transient cardiomyopathy in 04/2010 likely due to atrial flutter and rapid response. His last echocardiogram was 2011 with normal function. Notes reviewed. Paroxysmal atrial flutter - ablation 01/2013 without clinical recurrence - He is on beta-blocker. He sees cardiology. Notes reviewed. PE / DVT on anticoagulation - cont anticoagulation. He will watch closely for signs of bleeding.   Cont per coumadin clinic.  Goal INR between 2-3 and stable.      History of popliteal aneurysm status post stenting - December 2017 by Dr. Hayley Manning. Anemia secondary to GI bleeding - Hg is looking normal now. He can wean off iron. We will recheck Hg and ferritin in April. Hypokalemia - we will change him to daily potassium with Kdur 20meq daily and recheck in April. Obesity - diet and exercise as tolerated. All chart history elements were reviewed by me at the time of the visit even though marked at time of note closure. Patient understands our medical plan. Patient has provided input and agrees with goals. Alternatives have been explained and offered. All questions answered. The patient is to call if condition worsens or fails to improve. Follow-up Disposition:  Return in about 6 months (around 10/23/2018) for routine care .

## 2018-04-26 ENCOUNTER — TELEPHONE ANTICOAG (OUTPATIENT)
Dept: CARDIOLOGY CLINIC | Age: 83
End: 2018-04-26

## 2018-04-26 DIAGNOSIS — I48.92 ATRIAL FLUTTER WITH RAPID VENTRICULAR RESPONSE (HCC): Primary | ICD-10-CM

## 2018-04-26 DIAGNOSIS — Z79.01 LONG TERM CURRENT USE OF ANTICOAGULANT THERAPY: ICD-10-CM

## 2018-04-26 DIAGNOSIS — I26.99 OTHER PULMONARY EMBOLISM WITHOUT ACUTE COR PULMONALE, UNSPECIFIED CHRONICITY (HCC): ICD-10-CM

## 2018-04-26 LAB — INR, EXTERNAL: 1.4

## 2018-04-26 NOTE — PROGRESS NOTES
Verbal order and read back per Leonardo Spaulding NP  Patient is not with therapeutic range  Take 7.5 mg 4/26 then Continue take Coumadin 2.5mg daily   Patient stated at original call it was ok to leave instructions on vm  Instructions on vm as patient to call if they have questions or concerns.

## 2018-05-03 ENCOUNTER — TELEPHONE ANTICOAG (OUTPATIENT)
Dept: CARDIOLOGY CLINIC | Age: 83
End: 2018-05-03

## 2018-05-03 DIAGNOSIS — Z79.01 LONG TERM CURRENT USE OF ANTICOAGULANT THERAPY: ICD-10-CM

## 2018-05-03 DIAGNOSIS — I48.92 ATRIAL FLUTTER WITH RAPID VENTRICULAR RESPONSE (HCC): Primary | ICD-10-CM

## 2018-05-03 DIAGNOSIS — I26.99 OTHER PULMONARY EMBOLISM WITHOUT ACUTE COR PULMONALE, UNSPECIFIED CHRONICITY (HCC): ICD-10-CM

## 2018-05-03 LAB — INR, EXTERNAL: 2.2

## 2018-05-03 NOTE — PROGRESS NOTES
Verbal order and read back per Jaciel Alex NP  Patient is within therapeutic range   Continue take Coumadin 2.5mg daily   Recheck in 2 weeks  This has been fully explained to the patient's wife, who indicates understanding.

## 2018-05-07 ENCOUNTER — HOSPITAL ENCOUNTER (OUTPATIENT)
Dept: PHYSICAL THERAPY | Age: 83
Discharge: HOME | End: 2018-05-07
Payer: MEDICARE

## 2018-05-07 PROCEDURE — G8997 SWALLOW GOAL STATUS: HCPCS

## 2018-05-07 PROCEDURE — G8996 SWALLOW CURRENT STATUS: HCPCS

## 2018-05-07 PROCEDURE — 92610 EVALUATE SWALLOWING FUNCTION: CPT

## 2018-05-07 PROCEDURE — 92526 ORAL FUNCTION THERAPY: CPT

## 2018-05-07 NOTE — PROGRESS NOTES
ST DAILY TREATMENT NOTE    Patient Name: Nani Braun Sr.  Date:2018  : 1932  [x]  Patient  Verified  Payor: [place] Plan / Plan: [place] MEDICARE PART A & B / Product Type: Medicare /   In time:10:57  Out time: 11:45  Total Treatment Time (min): 48  Visit #: 1 of 16    SUBJECTIVE  Pain Level (0-10 scale): 0  Any medication changes, allergies to medications, adverse drug reactions, diagnosis change, or new procedure performed?: [x] No    [] Yes (see summary sheet for update)  Subjective functional status/changes:   [x] No changes reported  Date of Onset: 2018  Social History: Retired , retired ,  and lives with spouse  Prior Functional level: independent  Radiology: see [facility]  Current diet: regular/ thin   Positionin*  Mental Status:  [x]alert []lethargic []confused  Orientation: [x]person [x]place [x]time [x]situation  [facility] Directions: [x]1-step [x]2-step []3-step []complex  Motivation: []excellent [x]good  []fair  []poor   Barriers to learning: []none []aphasia [x]? cognition []lethargy/motivation []Kashia   []?vision []language []Fatigue/pain [x]physical factors compensate with:   Dentition: []normal []abnormal []edentulous [x]dentures   Respiratory Status: [x]WFL []SOB  []O2 L/min:  []NC []Mask               []Trach Tube:                     []Excess secretions  Lips:  [x]Symmetrical []asymmetrical  Retraction [x]WFL  []?min []?mod []?max  Protrusion [x]WFL  []?min []?mod []?max  Strength [x]WFL  []?min []?mod []?max  Puff  [x]WFL  []?min []?mod []?max  Tongue:  [x]Symmetrical []asymmetrical  Protrusion []WFL  [x]?min []?mod []?max  Elevation []WFL  [x]?min []?mod []?max  Depression []WFL  []?min []?mod []?max  Lateralization [x]WFL  []?min []?mod []?max  Strength [x]WFL  []?min []?mod []?max  Velum:  [x]Symmetrical []asymmetrical  Gag Reflex:  [x]Present []absent []DNT  Sensation:  [x]Intact []Diminished  Specify: Voice: [x]Normal []Hoarse []harsh  []Breathy []Hypernasal []hyponasal  []Gurgly Other:   Swallow:  [x]Volitional []absent  []Reflexive []absent  Cough   Strength : [x]WNL []Diminished  [x]Volitional []absent  [x]Reflexive []absent  OBJECTIVE    OP SWALLOW EVALUATION    Observation of Swallow:  Thin Nectar Honey Puree  applesauce Solids  Mechanical soft fruit and grain bar/regular peanut butter cracker nabs Other  Mixed consistency canned fruit cocktail   Oral Phase    DNT Regular tested DNT   Anterior loss of bolus  (decreased labial seal [])         Decreased bolus formation  (?lingual ROM/Coord[])         Increased mastication         Increased oral transit time  (?A-P bolus transit[])         Abnormal chewing         Tongue pumping/tremors         Pocketing  (?labial/buccal tension/lingual control)         Other         Oral Pharyngeal Phase         Delayed swallow initiation         Absent swallow         Stasis on lingual surface  (?lingual movement)     X, resolved with verbal cues    Adherence to hard palate   (? lingual elevation)         Pharyngeal Phase         Multiple swallows  (residue in pharynx [])         Coughing post swallow         Throat clear post swallow         Wet vocal quality  (residue on vocal cords [])         Reduced laryngeal elevation x x x  x    Nasal Regurgitation  (? velopharyngeal closure)         Other           [] No symptoms of dysphagia evidenced  [] Symptoms of dysphagia observed  [x] Patient at risk for aspiration  [] Other:     Recommendations:  Diet:  [] NPO    [] Pureed [] Ground [] MechSoft [x] Regular  Liquids: [] Water  [] Regular [] Thickened   [] Nectar  [x] Honeythick  [] Pudding  Presentation: [x] Cup    [] Spoon [] Straw [x] Alternate liquids and solids  Monitor: [] Sitting up at 90 deg [] Reclined to:  [] Head turned to:    [x] Chin tuck  [] Head tilt to:      [x] Seated upright post meals (min): 45  Document: [] Coughing [] Temperatures [] Lung Sounds    Videoflouroscopy: [x] Yes - to be completed after one month or more of therapy has been conducted    [] No   Dysphagia Treatment: [x] Yes [] No Sessions per week: 2  Other: Follow up with GI to discuss Pt's report of being previously recommended esophageal dilation earlier this year     Remediation Techniques:  C = Compensatory techniques to use during meal      F = Facilitation/treatment techniques by SLP    [x] Supraglottic Swallow (c,f)    [x] Oral motor exercises (f)  [x] Super-supraglottic swallow (c,f)   [] Labial closure  [] Compensations for pocketing (c)   [x] Lingual elevation  [] Sweep mouth with tongue    [x] Lingual lateralization  [] Sweep mouth with finger    [x] Lingual anterior-posterior  [] External pressure to check   [x] Lingual base of tongue  [] Rinse mouth/expel after meal   [] Vocal Fold Exercises (f)  [] Alternate liquid swallows every _ bites (c)  [x] Falsetto/laryngeal elevation exercises (f)  [] Discourage liquid wash between bites (c)  [] Thermal application (c,f)  [x] Multiple swallows     [] Sour bolus (f)  [x] Patient needs cues     [] Cold bolus (f)  [] Patient does not need cues   [x] Pharyngeal exercise (f)  [x] Mendelsohn Maneuver (c,f)    [x] Breath hold  [] Encourage/stimulate lip closure (c)   [x] Effortful Swallow (c,f)  [x] Empty mouth before next bite (c)   [] Tongue base retraction  [] Cue patient to slow down (c)    [] Tongue hold  [] Encourage coughing (c)    [x] Laryngeal closure  []Chin tuck (c)      Patient/Caregiver instruction/education: [x] Review HEP    [] Progressed/Changed    HEP/Handouts given: thickener handout, laryngeal exercises, safe swallowing strategies/compensatory techniques    Pain Level (0-10 scale) post treatment: 0    ASSESSMENT  [x]  See Plan of Care    Short Term Goals:  To be accomplished in 6 weeks  1.) Pt will perform pharyngeal exercises in structured therapy setting in order to improve functional swallow in 10:10 trials with Min A over 2 therapy sessions to promote carryover of HEP.  2.) Pt will perform oral motor exercises in structured therapy setting in order to improve functional swallow in 10:10 trials with Min A over 2 therapy sessions to promote carryover of HEP.  3.) Pt will report following recommended honey thick liquids and regular diet at home consistently across 3 therapy sessions in order to improve Pt safety at meal time. 4.) Pt will report utilizing chin tuck and double swallow >90% of the time when eating/drinking at home consistently across 3 therapy sessions in order to improve Pt safety at meal time and utilization of safe swallowing strategies/compensatory techniques. 5.) Pt will tolerate nectar thick liquids with 0 s/sx of aspiration/penetration in 10:10 trials Any across 2 therapy sessions in order to improve Pt safety with liquids and increase overall QOL. 6.) Pt will tolerate regular diet trials and nectar thick liquids utilizing compensatory strategies (including chin tuck and double swallow) in 10:10 trials with Min A over 2 therapy sessions in order to reduce risk for aspiration. 7.) Pt will tolerate a regular diet with no instances of oral residue in 10:10 trials with Min A over 2 therapy sessions in order to reduce risk for aspiration and increase overall QOL. Long Term Goals: To be accomplished in 8 weeks:  1.) Pt will tolerate thin liquids with regular diet with 0 s/sx of aspiration/ penetration in order to maintain nutrition/hydration needs and increase overall QOL. 2.) Pt will consistently utilize safe swallowing strategies/compensatory techniques (including chin tuck and double swallow) consistently with PO in order to reduce risk for aspiration and increase overall QOL. 3.) Pt will tolerate a regular diet with no instances of oral residue Any in order to reduce risk for aspiration, maintain nutrition/hydration needs, and increase overall QOL.        PLAN  []  Upgrade activities as tolerated []  Continue plan of care  []  Discharge due to:__  [x] Other: See Plan of Care written on this date.       Laureen Palomino M.S. Ed. SLP-CF   5/7/2018  9:47 AM

## 2018-05-07 NOTE — PROGRESS NOTES
[facility]  [address] [place] [address]  [phone] fax    Plan of Care/ Statement of Necessity for Speech Therapy Services    Patient name: Adonis Horton Sr. Start of Care: 2018   Referral source: Joanna Brar MD : [id number]    Medical Diagnosis: Dysphagia [R13.10]   Onset Date:2018    Treatment Diagnosis: Oropharyngeal Dysphagia   Prior Hospitalization: see medical history Provider#: [id number]   Medications: Verified on Patient summary List    Comorbidities: Diverticulitis, Reflux, Lower GI bleed, Essential Hypertension, Pulmonary Embolism     Prior Level of Function: Independent, Regular diet and thin liquids       The Plan of Care and following information is based on the information from the initial evaluation. Assessment/ key information:   Pt is a 66-year-old male referred for speech therapy evaluation to address dysphagia. Pt alert and oriented x4 with difficulty following complex commands; reports most recent onset of swallowing issues 2018. He reports having decreased memory and hearing loss, which he attributes to old age. Pt reports \"I haven't had any problems swallowing or eating. \" Pt became aware of swallowing difficulty following completion of Esophogram conducted on 2018. Pt referred for MBS following esophogram and was later referred for out patient therapy with ST. Pt and Pt's wife reported that Pt was recommended to receive Esophageal Dilation earlier this year, but procedure was not completed due to scheduling difficulties with his MD. Pt's wife stated Maxine Coto does clear his throat when he eats\". Pt endorses regular solid/thin liquid diet at home; reports he does not use thickener for liquids as recommended by SLP who performed modified barium swallow (MBS).      MBS results from 2018 revealed: \"moderate pharyngeal dysphagia c/b silent penetration to the cords with thin liquids and moderate penetration with [place] Thick Liquids during the swallow. Pt able to improve tolerance of Nectar Thick Liquids with chin tuck; however, not with thin liquids; tolerated honey thick liquids, puree, regular solids and 13 mm Ba pill with honey thick liquids. Recommend regular diet with honey thick liquids with medication whole + Honey Thick Liquids. \"      Oral motor exam revealed Pt oral motor structures to have minimal impairment for mastication and deglutition. Noted minimal decreased lingual ROM, full upper and lower dentures without use of adhesive (not secure), and buccal strength to be [facility]. This day, Pt accepted self-fed thin, nectar-thick liquids, and honey-thick liquids via cup sips (individual sips and consecutive swallows), and regular solids. Adequate oral bolus prep and anterior posterior transit noted. Noted oral residue >15% after regular solid trials, which cleared with verbal cues to take additional swallows and clear oral cavity. Swallow initiation WNL. Decreased Hyolaryngeal excursion to palpation. Pt exhibited 0 s/sx of aspiration/penetration after completion of all PO trials. Pt educated on utilization safe swallowing strategies/compensatory techniques (including chin tuck and double swallow), good oral care after PO, implementation of HEP, and how to thicken liquids to honey-thick consistency. Pt and Pt's wife verbalized understanding. Pt presents with moderate pharyngeal dysphagia and mild oral dysphagia, as evidenced above, which places Pt at risk for aspiration/aspiration pneumonia and malnutrition. Skilled speech intervention for dysphagia warranted to facilitate effective swallow of least restrictive diet for meeting nutritional needs, dining in social situations, and QOL.  Based on continued decreased hyolaryngeal excursion upon palpation and previous MBS results reflecting history of silent penetration to the cords with thin and nectar-thick liquids, SLP-CF recommending continuation of regular solid/honey-thick liquid diet at this time. Pt recommended to follow up with GI to determine if Pt is still recommended Esophageal Dilation, per Pt's report. Problem List:       []aphasia                           []dysarthria                                  [x]dysphagia     []alexia                              []agraphia                                     []dysphonia     []dysfluency                     []Cognitive-Linguistic Disorder     []other:        Treatment Plan may include any combination of the following: Oral Motor Exercises, Dysphagia Treatment, Treatment of Swallowing and Patient Education      Patient / Family readiness to learn indicated by: asking questions, trying to perform skills and interest    Persons(s) to be included in education: patient (P) and family support person (FSP);list wife    Barriers to Learning/Limitations: yes;  cognitive and physical    Patient Goal (s): get better    Patient Self Reported Health Status: good    Rehabilitation Potential: good    Short Term Goals: To be accomplished in 6 weeks  1.) Pt will perform pharyngeal exercises in structured therapy setting in order to improve functional swallow in 10:10 trials with Min A over 2 therapy sessions to promote carryover of HEP.  2.) Pt will perform oral motor exercises in structured therapy setting in order to improve functional swallow in 10:10 trials with Min A over 2 therapy sessions to promote carryover of HEP.  3.) Pt will report following recommended honey thick liquids and regular diet at home consistently across 3 therapy sessions in order to improve Pt safety at meal time. 4.) Pt will report utilizing chin tuck and double swallow >90% of the time when eating/drinking at home consistently across 3 therapy sessions in order to improve Pt safety at meal time and utilization of safe swallowing strategies/compensatory techniques.   5.) Pt will tolerate nectar thick liquids with 0 s/sx of aspiration/penetration in 10:10 trials Any across 2 therapy sessions in order to improve Pt safety with liquids and increase overall QOL. 6.) Pt will tolerate regular diet trials and nectar thick liquids utilizing compensatory strategies (including chin tuck and double swallow) in 10:10 trials with Min A over 2 therapy sessions in order to reduce risk for aspiration. 7.) Pt will tolerate a regular diet with no instances of oral residue in 10:10 trials with Min A over 2 therapy sessions in order to reduce risk for aspiration and increase overall QOL. Long Term Goals: To be accomplished in 8 weeks:  1.) Pt will tolerate thin liquids with regular diet with 0 s/sx of aspiration/ penetration in order to maintain nutrition/hydration needs and increase overall QOL. 2.) Pt will consistently utilize safe swallowing strategies/compensatory techniques (including chin tuck and double swallow) consistently with PO in order to reduce risk for aspiration and increase overall QOL. 3.) Pt will tolerate a regular diet with no instances of oral residue Any in order to reduce risk for aspiration, maintain nutrition/hydration needs, and increase overall QOL. Frequency / Duration: Patient to be seen 2 times per week for 8 weeks: 5/7/2018 to 7/6/2018    Patient/ Caregiver education and instruction: Diagnosis, prognosis, HEP, Recommended regular diet/honey thick liquids, instructed to follow up with GI, safe swallowing strategies/compensatory techniques, and to have good oral care    G Codes:   Swallow Current Status CK= 40-59%   Swallow Goal Status CJ= 20-39%    The severity rating is based on the following outcomes:    National Outcomes Measures (NOMS) = 4 and professional judgment     Tika Palmer M.S. Ed. SLP-CF   5/7/2018 9:48 AM  ________________________________________________________________________    I certify that the above Therapy Services are being furnished while the patient is under my care. I agree with the treatment plan and certify that this therapy is necessary.     Physician's Signature:____________________  Date:___________Time:_________    Please sign and return to [facility] [address]  [phone] fax     Thank you

## 2018-05-15 ENCOUNTER — HOSPITAL ENCOUNTER (OUTPATIENT)
Dept: PHYSICAL THERAPY | Age: 83
Discharge: HOME | End: 2018-05-15
Payer: MEDICARE

## 2018-05-15 PROCEDURE — 92526 ORAL FUNCTION THERAPY: CPT

## 2018-05-15 NOTE — PROGRESS NOTES
ST DAILY TREATMENT NOTE    Patient Name: Joycelyn Hein.  Date:5/15/2018  : 1932  [x]  Patient  Verified  Payor: Payor: Velasquez Art / Plan: [place] MEDICARE [place] A & B / Product Type: Medicare /   In time:8:50  Out time: 9:35  Total Treatment Time (min): 45  Visit #: 2 of 16    Treatment Diagnosis: Dysphagia [R13.10]    SUBJECTIVE  Pain Level (0-10 scale): 0  Any medication changes, allergies to medications, adverse drug reactions, diagnosis change, or new procedure performed?: [x] No    [] Yes (see summary sheet for update)    Subjective functional status/changes:   [] No changes reported  Pt reported utilizing chin tuck more at home. OBJECTIVE  Treatment provided includes:  Increase/Improve:  []  Voice Quality []  Cognitive Linguistic Skills [x]  Laryngeal/Pharyngeal Exercises   []  Vocal Loudness []  Reading Comprehension [x]  Swallowing Skills    []  Vocal Cord Function []  Auditory Comprehension [x]  Oral Motor Skills   []  Resonance []  Writing Skills [x]  Compensatory strategies    []  Speech Intelligibility []  Expressive Language []  Attention   []  Breath Support/Coord. []  Receptive language []  Memory   []  Articulation [x]  Safety Awareness []    []  Fluency []  Word Retrieval []        Treatment Provided:  Pharyngeal exercises, oral motor exercises, safe swallowing strategies/compensatory techniques, Pt thickening liquids to honey consistency, and reviewed HEP.      Honey consistency trials and educated to utilize chin tuck and double swallow: 10:10 trials Mod-Max A  Pt educated to thicken liquids to honey consistency: 100% accuracy Max A      Patient/Caregiver  Education: [x] Review HEP      HEP/Handouts given: Oral motor exercises    Pain Level (0-10 scale) post treatment: 0    ASSESSMENT   [x]   Improving appropriately and progressing toward goals  []   Improving slowly and progressing toward goals  []   Approximating goals/maximum potential  [x]   Continues to benefit from skilled therapy to address remaining functional deficits  []   Not progressing toward goals and plan of care will be adjusted    Patient will continue to benefit from skilled therapy to address remaining functional deficits: dysphagia    Progress towards goals / Updated goals:  Pt is making progress in therapy. Pt reports thickening liquids to honey consistency ~75% of the time and utilizing chin tuck and double swallow at meal time. Pt educated that all liquids, including soda, should be thickened to honey consistency. Pt verbalized understanding. Pt benefits from verbal cues, models, and gestures to complete therapy tasks. Pt continues to benefit from services to address dysphagia. Pt's wife informed SLP-CF that Pt's MD has decided that Pt will not receive esophageal dilation as he would not like Pt to have anesthesia. PLAN  [x]  Continue plan of care  []  Modify Goals/Treatment Plan      []  Discharge due to:  [] Other:    Short Term Goals: To be accomplished in 6 weeks  1.) Pt will perform pharyngeal exercises in structured therapy setting in order to improve functional swallow in 10:10 trials with Min A over 2 therapy sessions to promote carryover of HEP. 5:15:18: effortful swallow 4:6 trials Mod-Max A, 2:5 trials Natividad Max A  2.) Pt will perform oral motor exercises in structured therapy setting in order to improve functional swallow in 10:10 trials with Min A over 2 therapy sessions to promote carryover of HEP. 5/15/18: 10:10 trials Max A (tongue tip against resistance and tongue elevation exercises)  3.) Pt will report following recommended honey thick liquids and regular diet at home consistently across 3 therapy sessions in order to improve Pt safety at meal time.  5/15/18: reported 75% of the time  4.) Pt will report utilizing chin tuck and double swallow >90% of the time when eating/drinking at home consistently across 3 therapy sessions in order to improve Pt safety at meal time and utilization of safe swallowing strategies/compensatory techniques. 5/15/18: Pt reported 75% of the time   5.) Pt will tolerate nectar thick liquids with 0 s/sx of aspiration/penetration in 10:10 trials Any across 2 therapy sessions in order to improve Pt safety with liquids and increase overall QOL.   5/15/18: 6:6 trials with Mod-Max A  6.) Pt will tolerate regular diet trials and nectar thick liquids utilizing compensatory strategies (including chin tuck and double swallow) in 10:10 trials with Min A over 2 therapy sessions in order to reduce risk for aspiration. 5/15/18: 4:4 trials with Mod A  7.) Pt will tolerate a regular diet with no instances of oral residue in 10:10 trials with Min A over 2 therapy sessions in order to reduce risk for aspiration and increase overall QOL.  5/15/18: 4:4 trials Min A    Johnson Cruz M.S. Ed. SLP-CF   5/15/2018  8:58 AM    Future Appointments  Date Time Provider [place]   5/16/2018 11:30 AM JESSICA Higgins [facility]   5/22/2018 8:45 AM Hermila Benson [facility]   5/25/2018 10:00 AM Hermila Benson [facility]   5/29/2018 11:00 AM Hermila Leahyr [facility]   6/1/2018 10:00 AM Hermila Benson [facility]   6/5/2018 10:00 AM JESSICA Higgins [facility]   6/7/2018 10:00 AM JESSICA Higgins [facility]   6/18/2018 12:45 PM [facility] 1 [facility] SCH[facility]   6/18/2018 1:45 PM [facility] NONIMAGING [facility] SCHED   7/9/2018 9:30 AM Rafia Shipman MD [facility] SCH[facility]   10/23/2018 9:30 AM Isaias Klein MD [facility] SCH[facility]   12/13/2018 9:20 AM Benja Fortune MD [address] [place] [address]

## 2018-05-16 ENCOUNTER — HOSPITAL ENCOUNTER (OUTPATIENT)
Dept: PHYSICAL THERAPY | Age: 83
Discharge: HOME | End: 2018-05-16
Payer: MEDICARE

## 2018-05-16 PROCEDURE — 92526 ORAL FUNCTION THERAPY: CPT

## 2018-05-16 NOTE — PROGRESS NOTES
ST DAILY TREATMENT NOTE    Patient Name: Bere Redd Sr.  Date:2018  : 1932  [x]  Patient  Verified  Payor: Payor: Jonatan George / Plan: [place] MEDICARE PART A & B / Product Type: Medicare /   In time:11:30  Out time: 12:15  Total Treatment Time (min): 45  Visit #: 3 of 16     Treatment Diagnosis: Dysphagia [R13.10]    SUBJECTIVE  Pain Level (0-10 scale): 0  Any medication changes, allergies to medications, adverse drug reactions, diagnosis change, or new procedure performed?: [x] No    [] Yes (see summary sheet for update)    Subjective functional status/changes:   [] No changes reported  Pt reported utilizing chin tuck more at home. He reports that he has been thickening his liquids to honey consistency however his wife stated that the only exception was with sodas-he has not been thickening them. OBJECTIVE  Treatment provided includes:  Increase/Improve:  []  Voice Quality []  Cognitive Linguistic Skills [x]  Laryngeal/Pharyngeal Exercises   []  Vocal Loudness []  Reading Comprehension [x]  Swallowing Skills    []  Vocal Cord Function []  Auditory Comprehension [x]  Oral Motor Skills   []  Resonance []  Writing Skills [x]  Compensatory strategies    []  Speech Intelligibility []  Expressive Language []  Attention   []  Breath Support/Coord. []  Receptive language []  Memory   []  Articulation [x]  Safety Awareness []    []  Fluency []  Word Retrieval []        Treatment Provided:  Pharyngeal exercises, oral motor exercises, safe swallowing strategies/compensatory techniques, Pt thickening liquids to honey consistency, and reviewed HEP. Honey consistency trials and educated to utilize chin tuck versus head retraction and double swallow: 10:10 trials for small sip, double swallow with min A.  Mod A for proper chin tuck  Pt educated to thicken liquids to honey consistency: 100% accuracy Max A      Patient/Caregiver  Education: [x] Review HEP      HEP/Handouts given: purpose of each exercise, Oral motor and pharyngeal exercises, thicken all liquids to honey consistency including soda. Pain Level (0-10 scale) post treatment: 0    ASSESSMENT   [x]   Improving appropriately and progressing toward goals  []   Improving slowly and progressing toward goals  []   Approximating goals/maximum potential  [x]   Continues to benefit from skilled therapy to address remaining functional deficits  []   Not progressing toward goals and plan of care will be adjusted    Patient will continue to benefit from skilled therapy to address remaining functional deficits: dysphagia    Progress towards goals / Updated goals:  Pt is making progress in therapy. Pt reports thickening liquids to honey consistency for all liquids except soda which he has not been thickening. He reports utilizing chin tuck and double swallow at meal time. Pt educated that all liquids, including soda, should be thickened to honey consistency. Patient also educated to actually tuck his chin instead of head retraction. Pt verbalized understanding. Pt benefits from verbal cues, models, and gestures to complete therapy tasks. Pt continues to benefit from services to address dysphagia. PLAN  [x]  Continue plan of care  []  Modify Goals/Treatment Plan      []  Discharge due to:  [] Other:    Short Term Goals:  To be accomplished in 6 weeks  1.) Pt will perform pharyngeal exercises in structured therapy setting in order to improve functional swallow in 10:10 trials with Min A over 2 therapy sessions to promote carryover of HEP.   5/16/18: high pitched 'ee\" hold 5 sec: 7:10 mod A; Terie  many trials: 0 correct; max A;   effortful swallow 4:6 trials Mod-Max A, 3:6 trials Natividad Max A; hard k words: 10:20 mod A  2.) Pt will perform oral motor exercises in structured therapy setting in order to improve functional swallow in 10:10 trials with Min A over 2 therapy sessions to promote carryover of HEP.   5/16/18: 10:10 trials lingual resistance 10:10 mod A Lingual elevation exercises 10:10 with min A  3.) Pt will report following recommended honey thick liquids and regular diet at home consistently across 3 therapy sessions in order to improve Pt safety at meal time. 5/16/18: reported 75% of the time thickening his liquids to honey thick (all except for sodas). He is consuming a regular diet. 4.) Pt will report utilizing chin tuck and double swallow >90% of the time when eating/drinking at home consistently across 3 therapy sessions in order to improve Pt safety at meal time and utilization of safe swallowing techniques. 5/16/18: Pt reported 80% of the time for chin tuck and double swallow when eating and drinking at home however he tends to retract his head instead of a full chin tuck with honey thick liquid trials today. 5.) Pt will tolerate nectar thick liquids with 0 s/sx of aspiration/penetration in 10 of 10 0 trials Any across 2 therapy sessions in order to improve Pt safety with liquids and increase overall QOL.     5/16/18: goal not today for nectar consistency  He was able to tolerate 8 of 8 trials with honey thick liquids with chin tuck and double swallow with Mod A for proper chin tuck and at S for double swallow. 6.) Pt will tolerate regular diet trials and nectar thick liquids utilizing compensatory strategies (including chin tuck and double swallow) in 10:10 trials with Min A over 2 therapy sessions in order to reduce ristk for aspiration. 5/16/18: HTL's targeted today. Patient tolerated 8 of 8 HTL trials with no overt s/sx of asp with the use of chin tuck and double swallow with Mod A for chin tuck accuracy and S with double swallow. 7.) Pt will tolerate a regular diet with no instances of oral residue in 10:10 trials with Min A over 2 therapy sessions in order to reduce risk for aspiration and increase overall QOL.    5/16/18: goal not targeted today  Ye Larkin, SLP, M.S.CCC-SLP   5/16/2018  11:30 AM    Future Appointments  Date Time Provider Mary [place]   5/22/2018 8:45 AM Prince Mcadams [facility]   5/25/2018 10:00 AM Prince Mcadams [facility]   5/29/2018 11:00 AM Prince Mcadams [facility]   6/1/2018 10:00 AM Douglasdeisi Pema [facility]   6/5/2018 10:00 AM JESSICA Villavicencio [facility]   6/7/2018 10:00 AM JESSICA Villavicencio [facility]   6/18/2018 12:45 PM [facility] 1 [facility] SCH[facility]   6/18/2018 1:45 PM [facility] NONIMAGING [facility] SCHED   7/9/2018 9:30 AM Mono Wilkinson MD [facility] SCH[facility]   10/23/2018 9:30 AM Shelly Lowry MD [facility]   12/13/2018 9:20 AM Teja Cash MD [address] [place] [address]

## 2018-05-17 ENCOUNTER — TELEPHONE ANTICOAG (OUTPATIENT)
Dept: CARDIOLOGY CLINIC | Age: 83
End: 2018-05-17

## 2018-05-17 DIAGNOSIS — I48.92 ATRIAL FLUTTER WITH RAPID VENTRICULAR RESPONSE (HCC): Primary | ICD-10-CM

## 2018-05-17 DIAGNOSIS — I26.99 OTHER PULMONARY EMBOLISM WITHOUT ACUTE COR PULMONALE, UNSPECIFIED CHRONICITY (HCC): ICD-10-CM

## 2018-05-17 DIAGNOSIS — Z79.01 LONG TERM CURRENT USE OF ANTICOAGULANT THERAPY: ICD-10-CM

## 2018-05-17 LAB — INR, EXTERNAL: 2.2

## 2018-05-17 NOTE — PROGRESS NOTES
Verbal order and read back per Kim Gardiner NP  Patient is within therapeutic range  Continue Coumadin 2.5 mg daily Recheck 5/31/18  This has been fully explained to the patient wife, who indicates understanding.

## 2018-05-22 ENCOUNTER — HOSPITAL ENCOUNTER (OUTPATIENT)
Dept: PHYSICAL THERAPY | Age: 83
Discharge: HOME | End: 2018-05-22
Payer: MEDICARE

## 2018-05-22 PROCEDURE — 92526 ORAL FUNCTION THERAPY: CPT

## 2018-05-22 NOTE — PROGRESS NOTES
ST DAILY TREATMENT NOTE    Patient Name: Shabnam Huerta.  Date:2018  : 1932  [x]  Patient  Verified  Payor: Payor: Renny Frankel / Plan: [place] MEDICARE PART A & B / Product Type: Medicare /   In time: 8:45  Out time: 9:30  Total Treatment Time (min): 45  Visit #: 4 of 16    Treatment Diagnosis: Dysphagia [R13.10]    SUBJECTIVE  Pain Level (0-10 scale): 0  Any medication changes, allergies to medications, adverse drug reactions, diagnosis change, or new procedure performed?: [x] No    [] Yes (see summary sheet for update)    Subjective functional status/changes:   [] No changes reported  Pt reported \"there is a lot to Israel Jones". OBJECTIVE  Treatment provided includes:  Increase/Improve:  []  Voice Quality []  Cognitive Linguistic Skills [x]  Laryngeal/Pharyngeal Exercises   []  Vocal Loudness []  Reading Comprehension [x]  Swallowing Skills    []  Vocal Cord Function []  Auditory Comprehension [x]  Oral Motor Skills   []  Resonance []  Writing Skills [x]  Compensatory strategies    []  Speech Intelligibility []  Expressive Language []  Attention   []  Breath Support/Coord. []  Receptive language []  Memory   []  Articulation [x]  Safety Awareness []    []  Fluency []  Word Retrieval []        Treatment Provided:  Pharyngeal exercises, oral motor exercises, safe swallowing strategies/compensatory techniques, Pt thickening liquids to honey consistency, and reviewed HEP. Honey and nectar thick consistency trials and educated to utilize chin tuck versus head retraction and double swallow.     Patient/Caregiver  Education: [x] Review HEP      HEP/Handouts given: continue HEP     Pain Level (0-10 scale) post treatment: 0    ASSESSMENT   [x]   Improving appropriately and progressing toward goals  []   Improving slowly and progressing toward goals  []   Approximating goals/maximum potential  [x]   Continues to benefit from skilled therapy to address remaining functional deficits  []   Not progressing toward goals and plan of care will be adjusted    Patient will continue to benefit from skilled therapy to address remaining functional deficits: Dysaphgia    Progress towards goals / Updated goals:  Pt is slowly progress in therapy. Pt reports completing HEP daily. Pt required Max A to utilize chin tuck during PO trials. Pt benefits from verbal cues and models to complete therapy tasks. Pt reports he does not thicken liquids to honey consistency for all liquids and was educated on the importance of following recommended diet. Pt verbalized understanding. Pt educated on how to thicken liquids to honey consistency. Pt continues to present with oropharyngeal weakness assessed upon palpation and when completing lingual resistance exercises. Pt continues to benefit from services to address dysphagia. PLAN  [x]  Continue plan of care  []  Modify Goals/Treatment Plan      []  Discharge due to:  [] Other:    Short Term Goals: To be accomplished in 6 weeks  1.) Pt will perform pharyngeal exercises in structured therapy setting in order to improve functional swallow in 10:10 trials with Min A over 2 therapy sessions to promote carryover of HEP.   5/22/18: high pitched 'ee\" hold 10 sec: 5:5 min A; effortful swallow 6:10 trials Mod A, 3:6 trials, Natividad 2:5 trials Max A; hard k words: 10:20 mod A  2.) Pt will perform oral motor exercises in structured therapy setting in order to improve functional swallow in 10:10 trials with Min A over 2 therapy sessions to promote carryover of HEP.   5/22/18: 10:10 trials lingual resistance 10:10 mod A  3.) Pt will report following recommended honey thick liquids and regular diet at home consistently across 3 therapy sessions in order to improve Pt safety at meal time. 5/22/18: reported 50% of the time thickening his liquids to honey thick. He is consuming a regular diet.   4.) Pt will report utilizing chin tuck and double swallow >90% of the time when eating/drinking at home consistently across 3 therapy sessions in order to improve Pt safety at meal time and utilization of safe swallowing techniques. 5/22/18: Pt reported 25% of the time for chin tuck and double swallow when eating and drinking at home   5.) Pt will tolerate nectar thick liquids with 0 s/sx of aspiration/penetration in 10 of 10 0 trials Any across 2 therapy sessions in order to improve Pt safety with liquids and increase overall QOL.     5/22/18: able to tolerate 10 of 10 trials with nectar thick liquids with chin tuck and double swallow with Max A for proper chin tuck and for double swallow. 6.) Pt will tolerate regular diet trials and nectar thick liquids utilizing compensatory strategies (including chin tuck and double swallow) in 10:10 trials with Min A over 2 therapy sessions in order to reduce ristk for aspiration. 5/16/18: Nectar thick liquids's targeted today.  Max A to use compensatory techniques  7.) Pt will tolerate a regular diet with no instances of oral residue in 10:10 trials with Min A over 2 therapy sessions in order to reduce risk for aspiration and increase overall QOL.   5/22/18: goal not targeted today    Daisha Medel M.S. Ed. SLP-CF   5/22/2018  8:51 AM    Future Appointments  Date Time Provider Mary Wayne   5/25/2018 10:00 AM Rafael Shows Swiader [facility]   5/29/2018 11:00 AM Rafael Shows Swiader [facility]   6/1/2018 10:00 AM Rafael Shows Swiader [facility]   6/5/2018 10:00 AM JESSICA Evangelista [facility]   6/7/2018 10:00 AM JESSICA Evangelista [facility]   6/18/2018 12:45 PM [facility] 1 [facility] SCH[facility]   6/18/2018 1:45 PM [facility] NONIMAGING [facility] SCHED   7/9/2018 9:30 AM Candice Cheema MD [facility] SCHED   10/23/2018 9:30 AM Inessa Anderson MD [facility] SCH[facility]   12/13/2018 9:20 AM Samantha Martinez MD [address] [place] [address]

## 2018-05-25 ENCOUNTER — HOSPITAL ENCOUNTER (OUTPATIENT)
Dept: PHYSICAL THERAPY | Age: 83
Discharge: HOME | End: 2018-05-25
Payer: MEDICARE

## 2018-05-25 PROCEDURE — 92526 ORAL FUNCTION THERAPY: CPT

## 2018-05-25 PROCEDURE — G8996 SWALLOW CURRENT STATUS: HCPCS

## 2018-05-25 PROCEDURE — 92610 EVALUATE SWALLOWING FUNCTION: CPT

## 2018-05-25 PROCEDURE — G8997 SWALLOW GOAL STATUS: HCPCS

## 2018-05-25 NOTE — PROGRESS NOTES
ST DAILY TREATMENT NOTE    Patient Name: Tracy De Leon.  Date:2018  : 1932  [x]  Patient  Verified  Payor: Payor: Leonard Diane / Plan: [place] MEDICARE PART A & B / Product Type: Medicare /   In time: 10:10  Out time: 10:45  Total Treatment Time (min): 35  Visit #: 5 of 16    Treatment Diagnosis: Dysphagia [R13.10]    SUBJECTIVE  Pain Level (0-10 scale): 0  Any medication changes, allergies to medications, adverse drug reactions, diagnosis change, or new procedure performed?: [x] No    [] Yes (see summary sheet for update)    Subjective functional status/changes:   [] No changes reported  Pt reported \"there's a lot to remember\". OBJECTIVE  Treatment provided includes:  Increase/Improve:  []  Voice Quality []  Cognitive Linguistic Skills [x]  Laryngeal/Pharyngeal Exercises   []  Vocal Loudness []  Reading Comprehension [x]  Swallowing Skills    []  Vocal Cord Function []  Auditory Comprehension [x]  Oral Motor Skills   []  Resonance []  Writing Skills [x]  Compensatory strategies    []  Speech Intelligibility []  Expressive Language []  Attention   []  Breath Support/Coord. []  Receptive language []  Memory   []  Articulation [x]  Safety Awareness []    []  Fluency []  Word Retrieval []        Treatment Provided:  Sustained high pitched \"ee\": 7:10 trials with Mod-Max A, Pt required cues to increase pitch to be higher than his speaking pitch  Mendelsohn: 7:10 trials Mod A  Supraglottic Swallow: 4:10 trials Max A  Hard /k/: 8:10 trials Min A  Lingual lip against resistance: 8:10 Any  PO nectar: 8:10 trials with Mod-Max A to utilize chin tuck and effortful swallow, 0 s/sx of aspiration   PO regular: 7:8 trials with Mod-Max A to utilize chin tuck and effortful swallow, 0 s/sx of aspiration      Utilizing safe swallowing strategies/compensatory techniques (chin tuck, double swallow, effortful swallow):  Mod-Max A over 18 trials (nectar and regular)     Patient/Caregiver  Education: [x] Review HEP HEP/Handouts given: Continue HEP     Pain Level (0-10 scale) post treatment: 0    ASSESSMENT   []   Improving appropriately and progressing toward goals  [x]   Improving slowly and progressing toward goals  []   Approximating goals/maximum potential  [x]   Continues to benefit from skilled therapy to address remaining functional deficits  []   Not progressing toward goals and plan of care will be adjusted    Patient will continue to benefit from skilled therapy to address remaining functional deficits: dysphagia    Progress towards goals / Updated goals:  see Progress note written on this date. PLAN  [x]  Continue plan of care  []  Modify Goals/Treatment Plan      []  Discharge due to:  [x] Other: see Progress note written on this date.      Emily Avendano M.S. Ed. SLP-CF   5/25/2018  10:20 AM    Future Appointments  Date Time Provider Mary Wayne   5/29/2018 11:00 AM Emily Avendano [facility]   6/1/2018 10:00 AM Emily Avendano [facility]   6/5/2018 10:00 AM JESSICA Nicole [facility]   6/7/2018 10:00 AM JESSICA Nicole [facility]   6/18/2018 12:45 PM [facility] 1 [facility] SCHED   6/18/2018 1:45 PM [facility] NONIMAGING [facility] SCHED   7/9/2018 9:30 AM Desire Lane MD [facility] SCHED   10/23/2018 9:30 AM Jesus Baxter MD [facility] SCH[facility]   12/13/2018 9:20 AM Juan Starr MD [address] [place] [address]

## 2018-05-25 NOTE — PROGRESS NOTES
[facility]  [address] [facility]  [phone] fax    Medicare Progress Report    Patient name: Alexandria Whitehead. Start of Care: 2018   Referral source: Cali Trujillo MD : 1932   Medical/Treatment Diagnosis: Dysphagia [R13.10] Onset Date: 2018     Prior Hospitalization: see medical history Provider#: [id number]   Medications: Verified on Patient Summary List    Comorbidities: Diverticulitis, Reflux, Lower GI bleed, Essential Hypertension, Pulmonary Embolism     Prior Level of Function: Independent, Regular diet and thin liquids                             Visits from Start of Care: 5    Missed Visits: 0    Reporting Period: 2018 to 2018    Subjective Reports: Pt reports utilizing chin tuck more at home but is not consistently using it. He reports that he has been thickening his liquids more frequently to honey consistency. On 18 Pt's wife stated that the only exception to Pt thickening his liquids was with sodas- Araseli Eves has not been thickening them\". Pt reports completing exercises up to 5 repetitions, Pt educated to complete exercises up to 10 to 15 repetitions for targeting improved strength. Pt stated \"I can feel it\" regarding laryngeal elevation during the [facility] exercise. Goals:  1.) Pt will perform pharyngeal exercises in structured therapy setting in order to improve functional swallow in 10:10 trials with Min A over 2 therapy sessions to promote carryover of HEP. Status at last note/certification: Evaluation, Pt presented with decreased pharyngeal excursion upon palpation. Current Status: Goal not met, progressing, 18: high pitched 'ee\" hold 10 sec: 5:5 min A; effortful swallow 6:10 trials Mod A, 3:6 trials, Natividad 2:5 trials Max A; hard k words: 10:20 mod A. Goal not met as Pt requires varying levels of support across therapeutic exercises.  Pt requires cues to complete tasks accurately and to increase intensity. 2.) Pt will perform oral motor exercises in structured therapy setting in order to improve functional swallow in 10:10 trials with Min A over 2 therapy sessions to promote carryover of HEP. Status at last note/certification: Evaluation, presented with decreased ROM for tongue. Current Status: Goal not met, progressing, 8:10 Any on 5/25/18 and 10:10 Mod A. Goal not met as Pt has not reached desired level of accuracy with Min A. Pt continues to require varying level of support to complete exercises accurately. 3.) Pt will report following recommended honey thick liquids and regular diet at home consistently across 3 therapy sessions in order to improve Pt safety at meal time. Status at last note/certification: Evaluation, Pt was not thickening liquids to honey consistency. Current Status: Goal not met, Pt has not reported thickening liquids to honey thick consistency consistently across therapy sessions. Pt is progressing as he reports increasing how often he is thickening liquids over therapy sessions. Pt follows regular diet. 4.) Pt will report utilizing chin tuck and double swallow >90% of the time when eating/drinking at home consistently across 3 therapy sessions in order to improve Pt safety at meal time and utilization of safe swallowing strategies/compensatory techniques. Status at last note/certification: Evaluation, Pt reported he had not been utilizing chin tuck regularly or double swallow at home. Current Status: Goal not met, slowly progressing, 5/22/18: Pt reported 25% of the time for chin tuck and double swallow when eating and drinking at home. Goal not met as Pt does not consistently utilize chin tuck at home. Pt reported \"it's a lot to remember\". 5.) Pt will tolerate nectar thick liquids with 0 s/sx of aspiration/penetration in 10:10 trials Any across 2 therapy sessions in order to improve Pt safety with liquids and increase overall QOL.     Status at last note/certification: Evaluation, Pt exhibited 0 s/sx of aspiration/penetration after completion of all PO trials but required education on utilization of safe swallowing strategies/compensatory techniques (including chin tuck and double swallow)  Current Status: Goal not met, progressing, 0 s/sx over 2 therapy session but Pt is cued Mod-Max A for chin tuck for PO trials    6.) Pt will tolerate regular diet trials and nectar thick liquids utilizing compensatory strategies (including chin tuck and double swallow) in 10:10 trials with Min A over 2 therapy sessions in order to reduce risk for aspiration. Status at last note/certification: Evaluation, Pt required education on utilization of safe swallowing strategies/compensatory techniques (including chin tuck and double swallow)  Current Status: Goal not met, progressing, Pt requires Mod-Max A for chin tuck for PO trials. Goal not met at Pt requires greater level of cuing to utilize strategies than specified for the goal.     7.) Pt will tolerate a regular diet with no instances of oral residue in 10:10 trials with Min A over 2 therapy sessions in order to reduce risk for aspiration and increase overall QOL. Status at last note/certification: Evaluation, Pt presented with oral residue >15% with regular trials   Current Status: Goal not met, progressing, 5/15/18: 4:4 trials Min A, 5/25/18 8:8 trials with no oral residue Any. Goal not met as Pt has not reached desired number of trials. Key functional changes: Pt reports thickening liquids to honey consistency at home and completes HEP daily. Pt is able to complete HEP exercises with cues during therapy sessions. Pt is able to clear oral cavity during PO trials Any.        Problems/ barriers to goal attainment: inconsistent carryover of chin tuck and double swallow, physical weakness, recalling safe swallowing strategies/compensatory techniques Any    Assessment / Recommendations:   Pt is making progress in therapy. Pt is completing HEP and works hard in therapy. Pt reports thickening liquids to honey consistency more regularly but is not consistent. He reports he does not consistently remember to utilize chin tuck and double swallow at meal time. Patient educated on 5/16 and 5/22 to actually tuck his chin instead of head retraction. Pt verbalized understanding. Pt benefits from verbal cues, models, and gestures to complete therapy tasks. Pt continues to have decreased hyolaryngeal excursion upon palpation during PO trials. Pt requires cues to increase accuracy and level of intensity when completing therapeutic exercises. Mr. Eduar Stringer is recommended to continue to receive services to address dysphagia. Problem List:       []aphasia                           []dysarthria                                  [x]dysphagia     []alexia                              []agraphia                                     []dysphonia     []dysfluency                     []Cognitive-Linguistic Disorder     []other:        Treatment Plan: Oral Motor Therapeutic Excerise, Dysphagia Treatment, Treatment of Swallowing and Patient Education    Patient Goal (s) has been updated and includes: continuation of goals not met     Updated Goals to be accomplished in 11 remaining sessions per initial plan of care:  1.) Pt will perform pharyngeal exercises in structured therapy setting in order to improve functional swallow in 10:10 trials with Min A over 2 therapy sessions to promote carryover of HEP.  2.) Pt will perform oral motor exercises in structured therapy setting in order to improve functional swallow in 10:10 trials with Min A over 2 therapy sessions to promote carryover of HEP.  3.) Pt will report following recommended honey thick liquids and regular diet at home consistently across 3 therapy sessions in order to improve Pt safety at meal time.   Status at last note/certification: Evaluation, Pt was not thickening liquids to honey consistency. 4.) Pt will report utilizing chin tuck and double swallow >90% of the time when eating/drinking at home consistently across 3 therapy sessions in order to improve Pt safety at meal time and utilization of safe swallowing strategies/compensatory techniques. 5.) Pt will tolerate nectar thick liquids with 0 s/sx of aspiration/penetration in 10:10 trials Any across 2 therapy sessions in order to improve Pt safety with liquids and increase overall QOL.     6. ) Pt will tolerate regular diet trials and nectar thick liquids utilizing compensatory strategies (including chin tuck and double swallow) in 10:10 trials with Min A over 2 therapy sessions in order to reduce risk for aspiration. Status at last note/certification: Evaluation, Pt required education on utilization of safe swallowing strategies/compensatory techniques (including chin tuck and double swallow)  7.) Pt will tolerate a regular diet with no instances of oral residue in 10:10 trials with Min A over 2 therapy sessions in order to reduce risk for aspiration and increase overall QOL.     Frequency / Duration: Patient to be seen 2 times per week for 11 remaining therapy sessions through initial certification period (5/7/2018 to 7/6/2018)    G Codes:   Swallow Current Status CK= 40-59%   Swallow Goal Status CJ= 20-39%     The severity rating is based on the following outcomes:                   National Outcomes Measures (NOMS) = 4 and professional judgment     Katelyn Nowak M.S. Ed. SLP-CF   5/25/2018 11:13 AM

## 2018-05-29 ENCOUNTER — HOSPITAL ENCOUNTER (OUTPATIENT)
Dept: PHYSICAL THERAPY | Age: 83
Discharge: HOME | End: 2018-05-29
Payer: MEDICARE

## 2018-05-29 PROCEDURE — 92526 ORAL FUNCTION THERAPY: CPT

## 2018-05-29 NOTE — PROGRESS NOTES
ST DAILY TREATMENT NOTE    Patient Name: Jerome Rene.  Date:2018  : 1932  [x]  Patient  Verified  Payor: Payor: Arelis Tubbs / Plan: [place] / Product Type: Medicare /   In time:11:00  Out time: 11:50  Total Treatment Time (min): 50  Visit #: 6 of 16    Treatment Diagnosis: Dysphagia [R13.10]    SUBJECTIVE  Pain Level (0-10 scale): 0  Any medication changes, allergies to medications, adverse drug reactions, diagnosis change, or new procedure performed?: [x] No    [] Yes (see summary sheet for update)    Subjective functional status/changes:   [] No changes reported  Pt reported \"I forget lots of things\". OBJECTIVE  Treatment provided includes:  Increase/Improve:  []  Voice Quality []  Cognitive Linguistic Skills [x]  Laryngeal/Pharyngeal Exercises   []  Vocal Loudness []  Reading Comprehension [x]  Swallowing Skills    []  Vocal Cord Function []  Auditory Comprehension [x]  Oral Motor Skills   []  Resonance []  Writing Skills [x]  Compensatory strategies    []  Speech Intelligibility []  Expressive Language []  Attention   []  Breath Support/Coord. []  Receptive language []  Memory   []  Articulation [x]  Safety Awareness []    []  Fluency []  Word Retrieval []        Treatment Provided:   Completed oral motor exercises (lingual strengthening exercises), pharyngeal exercises, reviewed safe swallowing strategies/compensatory techniques (including chin tuck and double swallow), reviewed Pt's compliance with recommended honey thick liquids and utilization of chin tuck and double swallow when at home, discussed Pt's memory difficulty and how it is negatively impacting Pt's progress, created visual aids for Pt to use at home during meal time to remind Pt to utilize chin tuck and double swallow, and reviewed Pt's progress.        Patient/Caregiver  Education: [x] Review HEP      HEP/Handouts given: continue HEP    Pain Level (0-10 scale) post treatment: 0    ASSESSMENT   [] Improving appropriately and progressing toward goals  [x]   Improving slowly and progressing toward goals  []   Approximating goals/maximum potential  [x]   Continues to benefit from skilled therapy to address remaining functional deficits  []   Not progressing toward goals and plan of care will be adjusted    Patient will continue to benefit from skilled therapy to address remaining functional deficits: dysphagia    Progress towards goals / Updated goals:  Pt reports following HEP of oropharyngeal exercises daily, but continues to present with significant weakness and has difficulty completing therapy tasks without cues. Pt disclosed that he has decreased memory which may be impacting Pt's carryover of safe swallowing strategies/compensatory techniques and progress in therapy. Pt does not follow honey thick liquid recommendation at all times, but reported that he will work on being more compliant at home. Pt requires verbal cues, models, and gestures to complete therapy tasks. Pt demonstrated progress in therapy as he was able to identify when he complete the [facility] exercise correctly Any but required cues for improved accuracy. Pt continues to present with decreased laryngeal excursion upon palpation. Pt continues to benefit from services to address dysphagia.      PLAN  [x]  Continue plan of care  []  Modify Goals/Treatment Plan      []  Discharge due to:  [] Other:    Short Term Goals:  1.) Pt will perform pharyngeal exercises in structured therapy setting in order to improve functional swallow in 10:10 trials with Min A over 2 therapy sessions to promote carryover of HEP.  5/29/18:    Natividad: 4:10 trials Mod A   Mendelsohn: 6:10 trials Mod-Max A  2.) Pt will perform oral motor exercises in structured therapy setting in order to improve functional swallow in 10:10 trials with Min A over 2 therapy sessions to promote carryover of HEP.  5/29/18:    Hard \"k\": 10:10 Any   Tongue tip against resistance: 8:10 Any, 10:10 Min A   Tongue lateralization against resistance: 5:10 trials Any, 10:10 Max A  3.) Pt will report following recommended honey thick liquids and regular diet at home consistently across 3 therapy sessions in order to improve Pt safety at meal time. Status at last note/certification: Evaluation, Pt was not thickening liquids to honey consistency. 5/29/18: Pt reports thickening liquids 75% of the time. Pt is not thickening sodas correctly and was educated to take the liquid out of the can and stir it in a cup.   4.) Pt will report utilizing chin tuck and double swallow >90% of the time when eating/drinking at home consistently across 3 therapy sessions in order to improve Pt safety at meal time and utilization of safe swallowing strategies/compensatory techniques. 5/29/18: Pt reports 50% Any, with reminders from his spouse he is doing it ~75% of the time. Pt educated on the importance of utilizing chin tuck. Pt reports he is utilizing double swallow more often then chin tuck. 5.) Pt will tolerate nectar thick liquids with 0 s/sx of aspiration/penetration in 10:10 trials Any across 2 therapy sessions in order to improve Pt safety with liquids and increase overall QOL.     5/29/18: 1 s/sx of aspiration over 6 trials, 1 instance of delayed throat clear  6.) Pt will tolerate regular diet trials and nectar thick liquids utilizing compensatory strategies (including chin tuck and double swallow) in 10:10 trials with Min A over 2 therapy sessions in order to reduce risk for aspiration. 5/29/18: 8:16 trials Min A  Pt reports having difficulty remembering things, including but not limited to tucking his chin during meals  7.) Pt will tolerate a regular diet with no instances of oral residue in 10:10 trials with Min A over 2 therapy sessions in order to reduce risk for aspiration and increase overall QOL.  5/29/18: Approaching goal, 0 oral residue over 10:10 trials Xuan Flores M.S.  Ed. SLP-CF   5/29/2018  10:52 AM    Future Appointments  Date Time Provider [place]   5/29/2018 11:00 AM Homero Taylor [facility]   6/5/2018 10:00 AM JESSICA Borden [facility]   6/7/2018 10:00 AM Janny Lloyd SLP [facility]   6/12/2018 10:15 AM JESSICA Borden [facility]   6/14/2018 10:15 AM Janny Lloyd, SLP [facility]   6/18/2018 12:45 PM [facility]ED   6/18/2018 1:45 PM [facility] NONIMAGING [facility] SCHED   6/20/2018 10:30 AM JESSICA Borden [facility]   6/22/2018 9:45 AM JESSICA Borden [facility]   6/26/2018 9:45 AM Janny Lloyd SLP [facility]   6/28/2018 1:45 PM Janny Lloyd SLP [facility]   7/9/2018 9:30 AM Gudelia Castillo MD [facility] SCHED   10/23/2018 9:30 AM Caitlyn Lugo MD [facility] SCHED   12/13/2018 9:20 AM Willy Foreman MD [address] [place] [address]

## 2018-05-31 ENCOUNTER — APPOINTMENT (OUTPATIENT)
Dept: PHYSICAL THERAPY | Age: 83
End: 2018-05-31
Payer: MEDICARE

## 2018-06-01 ENCOUNTER — TELEPHONE ANTICOAG (OUTPATIENT)
Dept: CARDIOLOGY CLINIC | Age: 83
End: 2018-06-01

## 2018-06-01 ENCOUNTER — APPOINTMENT (OUTPATIENT)
Dept: PHYSICAL THERAPY | Age: 83
End: 2018-06-01
Payer: MEDICARE

## 2018-06-01 DIAGNOSIS — I48.92 ATRIAL FLUTTER WITH RAPID VENTRICULAR RESPONSE (HCC): Primary | ICD-10-CM

## 2018-06-01 DIAGNOSIS — Z79.01 LONG TERM CURRENT USE OF ANTICOAGULANT THERAPY: ICD-10-CM

## 2018-06-01 DIAGNOSIS — I26.99 OTHER PULMONARY EMBOLISM WITHOUT ACUTE COR PULMONALE, UNSPECIFIED CHRONICITY (HCC): ICD-10-CM

## 2018-06-01 LAB — INR, EXTERNAL: 2.4

## 2018-06-01 NOTE — PROGRESS NOTES
Verbal order and read back per Namita Guzman NP    The INR is stable and therapeutic. Continue take Coumadin 2.5mg daily  and recheck in 2weeks.

## 2018-06-05 ENCOUNTER — APPOINTMENT (OUTPATIENT)
Dept: PHYSICAL THERAPY | Age: 83
End: 2018-06-05
Payer: MEDICARE

## 2018-06-07 ENCOUNTER — APPOINTMENT (OUTPATIENT)
Dept: PHYSICAL THERAPY | Age: 83
End: 2018-06-07
Payer: MEDICARE

## 2018-06-12 ENCOUNTER — HOSPITAL ENCOUNTER (OUTPATIENT)
Dept: PHYSICAL THERAPY | Age: 83
Discharge: HOME | End: 2018-06-12
Payer: MEDICARE

## 2018-06-12 PROCEDURE — 92526 ORAL FUNCTION THERAPY: CPT

## 2018-06-12 NOTE — PROGRESS NOTES
ST DAILY TREATMENT NOTE    Patient Name: Spencer Crow Sr.  Date:2018  : 1932  [x]  Patient  Verified  Payor: Payor: [employer] / Plan: [place] MED[place] & B / Product Type: Medicare /   In time:10:15  Out time: 11:00  Total Treatment Time (min): 45  Visit #: 7 of 16    Treatment Diagnosis: Dysphagia [R13.10]    SUBJECTIVE  Pain Level (0-10 scale): 0  Any medication changes, allergies to medications, adverse drug reactions, diagnosis change, or new procedure performed?: [x] No    [] Yes (see summary sheet for update)    Subjective functional status/changes:   [] No changes reported  Pt reported \"I forget lots of things\". I have so many things to do I forget to do some of my exercises. Questioning him about whether he uses the thickener all the time he stated, \"When I come in from outside working I don't have the thickener. I just come in and have a bottle of water\"  When questioned he did say that he takes a sip from the bottle and does perform a chin tuck. OBJECTIVE  Treatment provided includes:  Increase/Improve:  []  Voice Quality []  Cognitive Linguistic Skills [x]  Laryngeal/Pharyngeal Exercises   []  Vocal Loudness []  Reading Comprehension [x]  Swallowing Skills    []  Vocal Cord Function []  Auditory Comprehension [x]  Oral Motor Skills   []  Resonance []  Writing Skills [x]  Compensatory strategies    []  Speech Intelligibility []  Expressive Language []  Attention   []  Breath Support/Coord. []  Receptive language []  Memory   []  Articulation [x]  Safety Awareness []    []  Fluency []  Word Retrieval []        Treatment Provided:   Observed patient thickening liquids to honey thick. He thickened correctly. Educated patient on using a flavor enhancer such as crystal light so that he may increase use of consuming thickening liquids.  Also recommended that he keep a chilled bottle of honey thickened liquids in his refrigerator and when he comes in from outside, shake up the bottle, then pour in a cup and drink it that way using chin tuck and double swallows. Reviewed that drinking from a bottle can promote drinking with head extended instead of flexed but he stated that he does tuck his chin when drinking from a bottle. Completed oral motor exercises (lingual strengthening exercises), pharyngeal exercises, reviewed how to complete each exercise. SLP made a daily charting form for the day/month of June with check off spaces and how to yas the charting form with completion of each exercise. Also on the charting forms are instructions regarding how to perform the exercises to fatigue the muscles as fatiguing them will help strengthen them to improve his swallowing functions. Reviewed compensatory swallowing strategies and recommendation to keep the external visual aid reminder where he can see it. Ask wife to help you remember the strategies and to do the swallowing exercises. Patient/Caregiver  Education: [x] Review HEP      HEP/Handouts given: continue HEP marking off the charting forms when the exercises are completed, bring back to therapy each session.      Pain Level (0-10 scale) post treatment: 0    ASSESSMENT   []   Improving appropriately and progressing toward goals  [x]   Improving slowly and progressing toward goals  []   Approximating goals/maximum potential  [x]   Continues to benefit from skilled therapy to address remaining functional deficits  []   Not progressing toward goals and plan of care will be adjusted    Patient will continue to benefit from skilled therapy to address remaining functional deficits: dysphagia    Progress towards goals / Updated goals:    Short Term Goals:  1.) Pt will perform pharyngeal exercises in structured therapy setting in order to improve functional swallow in 10:10 trials with Min A over 2 therapy sessions to promote carryover of HEP.  6/12/18:    Natividad: 10 trials Min-Mod A and re-direction   Mendelsohn: 5:10 trials Mod-Max   2.) Pt will perform oral motor exercises in structured therapy setting in order to improve functional swallow in 10:10 trials with Min A over 2 therapy sessions to promote carryover of HEP.  6/12/18:    Hard \"k\": 20/25 Sirisha    Tongue tip against resistance: 8:10 Sirisha, 10:10 Min A   Tongue lateralization against resistance: 6:10 trials Sirisha, 10:10 mod A for each direction (right and left)  3.) Pt will report following recommended honey thick liquids and regular diet at home consistently across 3 therapy sessions in order to improve Pt safety at meal time. 6/12/18: Pt reports thickening liquids 75% of the time. Pt was independent with thickening flavored water sirisha today. He reports that he is thickening his liquids ~75% of the time. He reports that he does not thicken his liquids when coming in from working outside-drinks water from a bottle. Patient was instructed to thicken liquid to honey consistency in bottle, shake it pour in a cup and then take a small sip use chin tuck and double swallow. Patient verbally stated understanding. Jamila Xiao 4. ) Pt will report utilizing chin tuck and double swallow >90% of the time when eating/drinking at home consistently across 3 therapy sessions in order to improve Pt safety at meal time and utilization of safe swallowing strategies/compensatory techniques. 6/12/18: Pt reports 60% Sirisha, with reminders from his spouse he is doing it ~75% of the time. Pt educated on the importance of utilizing chin tuck. Pt reports he is utilizing double swallow more often then chin tuck. 5.) Pt will tolerate nectar thick liquids with 0 s/sx of aspiration/penetration in 10:10 trials Sirisha across 2 therapy sessions in order to improve Pt safety with liquids and increase overall QOL.     6/12/18: 0 s/sx of aspiration over 5 trials he utilized a chin tuck 4/5 trials, double swallow 5/5 trials.    6.) Pt will tolerate regular diet trials and nectar thick liquids utilizing compensatory strategies (including chin tuck and double swallow) in 10:10 trials with Min A over 2 therapy sessions in order to reduce risk for aspiration. 6/12/18: patient tolerated honey thick trials 6/6 100% but did not chin tuck for 1/6 trials. The remainder trials he used chin tuck and double swallow. Nectar thick was not trialed today. Pt reports having difficulty remembering things using swallowing strategies more since posting external visual aid on the refrigerator. He did forget to use a chin tuck with nec, including but not limited to tucking his chin during meals  7.) Pt will tolerate a regular diet with no instances of oral residue in 10:10 trials with Min A over 2 therapy sessions in order to reduce risk for aspiration and increase overall QOL. 6/12/18:  goal not targeted this session.      PLAN  [x]  Continue plan of care  []  Modify Goals/Treatment Plan      []  Discharge due to:  [] Other:          Keysha Lanza, JESSICA, MISHA Breaux   6/12/2018  10:15 AM    Future Appointments  Date Time Provider [place]   6/14/2018 10:15 AM JESSICA Coronel [facility]   6/18/2018 12:45 PM [facility] 1 [facility]   6/18/2018 1:45 PM [facility] NONIMAGING [facility]   6/20/2018 10:30 AM JESSICA Coronel [facility]   6/22/2018 9:45 AM Keysha Lanza, SLP [facility]   6/26/2018 9:45 AM Keysha Lanza SLP [facility]   6/28/2018 1:45 PM Keysha Lanza, SLP [facility]   7/9/2018 9:30 AM Dai Hurtado MD [facility]   10/23/2018 9:30 AM Ananth Torre MD [facility] SCH[facility]   12/13/2018 9:20 AM Shelly Jules MD [address] [place] [address]

## 2018-06-13 LAB — INR, EXTERNAL: 1.9

## 2018-06-14 ENCOUNTER — HOSPITAL ENCOUNTER (OUTPATIENT)
Dept: PHYSICAL THERAPY | Age: 83
Discharge: HOME | End: 2018-06-14
Payer: MEDICARE

## 2018-06-14 ENCOUNTER — TELEPHONE ANTICOAG (OUTPATIENT)
Dept: CARDIOLOGY CLINIC | Age: 83
End: 2018-06-14

## 2018-06-14 DIAGNOSIS — I26.99 OTHER PULMONARY EMBOLISM WITHOUT ACUTE COR PULMONALE, UNSPECIFIED CHRONICITY (HCC): ICD-10-CM

## 2018-06-14 DIAGNOSIS — Z79.01 LONG TERM CURRENT USE OF ANTICOAGULANT THERAPY: ICD-10-CM

## 2018-06-14 DIAGNOSIS — I48.92 ATRIAL FLUTTER WITH RAPID VENTRICULAR RESPONSE (HCC): Primary | ICD-10-CM

## 2018-06-14 PROCEDURE — 92526 ORAL FUNCTION THERAPY: CPT

## 2018-06-14 NOTE — PROGRESS NOTES
Verbal order and read back per Connie Cuenca NP  Patient is not with therapeutic range   Take 3.75 mg today then Continue take Coumadin 2.5mg daily   Recheck 2 weeks

## 2018-06-14 NOTE — PROGRESS NOTES
ST DAILY TREATMENT NOTE    Patient Name: Spencer Crow Sr.  Date:2018  : 1932  [x]  Patient  Verified  Payor: Payor: [employer] / Plan: [place] MEDICARE PART A & B / Product Type: Medicare /   In time:10:15  Out time: 11:00  Total Treatment Time (min): 45  Visit #: 8 of 16    Treatment Diagnosis: Dysphagia [R13.10]    SUBJECTIVE  Pain Level (0-10 scale): 0  Any medication changes, allergies to medications, adverse drug reactions, diagnosis change, or new procedure performed?: [x] No    [] Yes (see summary sheet for update)    Subjective functional status/changes:   [] No changes reported  Pt reported \"I forget lots of things\". The exercises charts helped me remember-I didn't do all of the exercises that I was supposed to-I don't have time to. He said that he got Crystal Light to flavor the thickener. He thickened his soda. He said that did drink water without the thickener. He stated that he is using the thickener 75% of the time. He said that his wife has to remind him to chin tuck. she is helping him with his exercises. OBJECTIVE  Treatment provided includes:  Increase/Improve:  []  Voice Quality []  Cognitive Linguistic Skills [x]  Laryngeal/Pharyngeal Exercises   []  Vocal Loudness []  Reading Comprehension [x]  Swallowing Skills    []  Vocal Cord Function []  Auditory Comprehension [x]  Oral Motor Skills   []  Resonance []  Writing Skills [x]  Compensatory strategies    []  Speech Intelligibility []  Expressive Language []  Attention   []  Breath Support/Coord. []  Receptive language []  Memory   []  Articulation [x]  Safety Awareness []    []  Fluency []  Word Retrieval []        Treatment Provided:   Extensive teaching regarding importance of taking the time several times a day to complete all the swallowing exercises daily and working the muscles to fatigue and checking off each trials. Discussed doing 12 and then 13 another part of the day until all of the exercises completed for each day. Reviewed importance of continuing honey thick liquids at home for all liquids utilizing chin tuck and extra dry swallow. Discussed use of nectar thick liquids for trials in therapy but needs to continue with honey thick at home away from therapy. Completed oral motor exercises (lingual strengthening exercises), pharyngeal exercises, reviewed how to complete each exercise and to not perform the exercise when taking a sip of liquid-only use a chin tuck and extra dry swallow. Minimal use of charting form given last time due to patient only doing a few trials of his exercises. Asked that his  wife accompany him to the next tx visit. Patient/Caregiver  Education: [x] Review HEP      HEP/Handouts given: continue HEP marking off the charting forms when the exercises are completed, bring back to therapy each session. Extensive teaching regarding completion of HEP getting in 25 trials throughout the day every day. Extensive teaching related to not doing the exercises with liquid it oral cavity, taking a sip of thickened liquids requires chin tuck and double swallow.      Pain Level (0-10 scale) post treatment: 0    ASSESSMENT     []   Improving appropriately and progressing toward goals  [x]   Improving slowly and progressing toward goals  []   Approximating goals/maximum potential  [x]   Continues to benefit from skilled therapy to address remaining functional deficits  []   Not progressing toward goals and plan of care will be adjusted    Patient will continue to benefit from skilled therapy to address remaining functional deficits: dysphagia    Progress towards goals / Updated goals:    Short Term Goals:  1.) Pt will perform pharyngeal exercises in structured therapy setting in order to improve functional swallow in 10:10 trials with Min A over 2 therapy sessions to promote carryover of HEP.  6/14/18:    Natividad: 16 trials Min-Mod A    Mendelsohn: 6:10 trials Mod A    Falsetto pitch ee x20 mod A to take a bigger breath, elevate pitch higher  2.) Pt will perform oral motor exercises in structured therapy setting in order to improve functional swallow in 10:10 trials with Min A over 2 therapy sessions to promote carryover of HEP.  6/14/18:     Hard \"k\": 20/25 Sirisha     Tongue tip against resistance: 8:10 Sirisha, 10:10 Min A   Tongue lateralization against resistance both left and right: each:  7:10 trials min -iLY3.) Pt will report following recommended honey thick liquids and regular diet at home consistently across 3 therapy sessions in order to improve Pt safety at meal time. 6/1418: Pt reports thickening liquids 75% of the time. 4.) Pt will report utilizing chin tuck and double swallow >90% of the time when eating/drinking at home consistently across 3 therapy sessions in order to improve Pt safety at meal time and utilization of safe swallowing strategies/compensatory techniques. 6/14/18: Pt reports 70% Sirisha, with reminders with some reminders  Pt educated on the importance of utilizing chin tuck 100 of the time. Pt reports he is utilizing double swallow most of the time. More often than chin tuck. Pt reports having difficulty remembering things reports using swallowing strategies but less since posting external visual aid on the refrigerator. He reports that he still gets some reminders from his wife to chin tuck. 5.) Pt will tolerate nectar thick liquids with 0 s/sx of aspiration/penetration in 10:10 trials Sirisha across 2 therapy sessions in order to improve Pt safety with liquids and increase overall QOL.     6/14/18:  8 half tsp trials: s/x of aspiration x1 (cough post swallow)>  utilized a chin tuck 7/8 trials with mod cues, double swallow 8/8 trials sirisha   6.) Pt will tolerate regular diet trials and nectar thick liquids utilizing compensatory strategies (including chin tuck and double swallow) in 10:10 trials with Min A over 2 therapy sessions in order to reduce risk for aspiration.     6/14/18: trials of nectar thick: 8 half tsp trials: s/x of aspiration x1 (cough post swallow)>  utilized a chin tuck 7/8 trials with mod cues, double swallow 8/8 trials sirisha   Regular consistency not trialed today. 7.) Pt will tolerate a regular diet with no instances of oral residue in 10:10 trials with Min A over 2 therapy sessions in order to reduce risk for aspiration and increase overall QOL. 6/14/18:  goal not targeted this session-target goal at next visit.      PLAN  [x]  Continue plan of care  []  Modify Goals/Treatment Plan      []  Discharge due to:  [] Other:          JESSICA Spencer, M.S. [id number] [facility]   6/14/2018  10:15 AM    Future Appointments  Date Time Provider Mary Wayne   6/18/2018 12:45 PM [facility] 1 [facility]   6/18/2018 1:45 PM [facility] NONIMAGING [facility] SCHED   6/20/2018 10:30 AM JESSICA Spencer [facility]   6/22/2018 9:45 AM JESSICA Spencer [facility]   6/26/2018 9:45 AM JESSICA Spencer [facility]   6/28/2018 1:45 PM JESSICA Spencer [facility]   7/9/2018 9:30 AM Areli Parks MD [facility]   10/23/2018 9:30 AM Lana Zuniga MD [facility] SCH[facility]   12/13/2018 9:20 AM Darlene Phelan MD [address] [place] [address]

## 2018-06-20 ENCOUNTER — HOSPITAL ENCOUNTER (OUTPATIENT)
Dept: PHYSICAL THERAPY | Age: 83
Discharge: HOME | End: 2018-06-20
Payer: MEDICARE

## 2018-06-20 PROCEDURE — 92526 ORAL FUNCTION THERAPY: CPT

## 2018-06-20 NOTE — PROGRESS NOTES
ST DAILY TREATMENT NOTE    Patient Name: Rachel Rodriguez.  Date:2018  : 1932  [x]  Patient  Verified  Payor: Payor: Babar Myers / Plan: [place] MEDICARE PART A & B / Product Type: Medicare /   In time:10:32  Out time: 11:15  Total Treatment Time (min): 43  Visit #: 9 of 16    Treatment Diagnosis: Dysphagia [R13.10]    SUBJECTIVE  Pain Level (0-10 scale): 0  Any medication changes, allergies to medications, adverse drug reactions, diagnosis change, or new procedure performed?: [x] No    [] Yes (see summary sheet for update)    Subjective functional status/changes:   [] No changes reported  Pt reported that his power was off last night for 3 hours. He went to [facility] to get some air conditioning. He said that he did some of the swallowing exercises. He said that did drink water without the thickener. He stated that he is using the thickener 50% of the time in the past 2 days. He said that his wife has to remind him to chin tuck and he is doing the swallowing exercises when he is outside or waiting for her. He stated that some of the exercises are getting easier as he does them. OBJECTIVE  Treatment provided includes:  Increase/Improve:  []  Voice Quality []  Cognitive Linguistic Skills [x]  Laryngeal/Pharyngeal Exercises   []  Vocal Loudness []  Reading Comprehension [x]  Swallowing Skills    []  Vocal Cord Function []  Auditory Comprehension [x]  Oral Motor Skills   []  Resonance []  Writing Skills [x]  Compensatory strategies    []  Speech Intelligibility []  Expressive Language []  Attention   []  Breath Support/Coord. []  Receptive language []  Memory   []  Articulation [x]  Safety Awareness []    []  Fluency []  Word Retrieval []        Treatment Provided:   Extensive teaching regarding importance of taking the folder with him and doing the swallowing exercises marking each time each trial is done.  If he is doing the exercise he needs to yas it. again teaching about breaking up the 25 trials in half  And working the muscles to fatigue. Discussed doing 12 and then 13 another part of the day until all are done. Reviewed importance of continuing honey thick liquids at home for all liquids utilizing chin tuck and extra dry swallow. .   Completed oral motor exercises (lingual strengthening exercises), pharyngeal exercises, reviewed how to complete each exercise and again  to not perform the exercise when taking a sip of liquid-only use a chin tuck and extra dry swallow. Minimal use of charting form given with patient stating that he did more of the exercises but did not yas them off. He stated that the exercises are getting easier. Patient/Caregiver  Education: [x] Review HEP      HEP/Handouts given: You may think that you are tucking your chin all the time with PO but you forget to do it some unless you have reminders especially when you are talking. . continue HEP marking off the charting forms when the exercises are completed, bring back to therapy each session. Extensive teaching regarding completion of HEP getting in 25 trials throughout the day every day. Extensive teaching related to not doing the exercises with liquid it oral cavity, taking a sip of thickened liquids requires chin tuck and double swallow. Pain Level (0-10 scale) post treatment: 0    ASSESSMENT   Patient reports being inconsistent with using thickener in his drinks. He was re-educated regarding the flavor enhancer does not thicken the liquid-he still needs to use the thickener and thicken all liquids to honey thick. He reports use of chin tuck 100% of the time but does not demonstrate same in therapy- often forgetting to chin tuck unless cued. He does double swallow without cues. He exhibited no pocketing nor oral residue with regular consistency. Throat clear post swallow 2 of 10 trials with nectar thick liquid trials today. Patient reports increased HEP with swallowing exercises but not recording them.  He reports that they are getting easier but he is not performing the amount suggested by SLP stating that he is too busy,    []   Improving appropriately and progressing toward goals  [x]   Improving slowly and progressing toward goals  []   Approximating goals/maximum potential  [x]   Continues to benefit from skilled therapy to address remaining functional deficits  []   Not progressing toward goals and plan of care will be adjusted    Patient will continue to benefit from skilled therapy to address remaining functional deficits: dysphagia    Progress towards goals / Updated goals:    Short Term Goals:  1.) Pt will perform pharyngeal exercises in structured therapy setting in order to improve functional swallow in 10:10 trials with Min A over 2 therapy sessions to promote carryover of HEP.  6/120/18:    Natividad: 16 trials Min-Mod A    Mendelsohn: 6:10 trials Mod A    Falsetto pitch ee x10 mod A to take a bigger breath, elevate pitch higher  2.) Pt will perform oral motor exercises in structured therapy setting in order to improve functional swallow in 10:10 trials with Min A over 2 therapy sessions to promote carryover of HEP.  6/20/18:     Hard \"k\": 22/25 Any     Tongue tip against resistance: 8:10 Any, 10:10 Min A   Tongue lateralization against resistance both left and right: each:  8:10 trials min -Any    3.) Pt will report following recommended honey thick liquids and regular diet at home consistently across 3 therapy sessions in order to improve Pt safety at meal time. 6/2018: Pt reports thickening liquids to honey consistency 50-75% of the time depending on where he is. He stated that he is putting Crystal light in his drink that he drinks when he is outside but not the thickener. Re-educating that the [employer] is just a flavor enhancer and not the thickener, and that 100% of his liquids need to be thickened. Reviewed s/s of aspiration and that he silently aspirates on thin liquid. Sierra Azul    4.) Pt will report utilizing chin tuck and double swallow >90% of the time when eating/drinking at home consistently across 3 therapy sessions in order to improve Pt safety at meal time and utilization of safe swallowing strategies/compensatory techniques. 6/20/18: Pt reports close to 100% for chin tuck and double because his wife reminds him he stated. He stated that they always go together. However in treatment if he was not cued he did not tuck his chin 40% of the time. He also reported that he is outside when he drinks also. He reports having trouble with his memory and needs to be reminded. 5.) Pt will tolerate nectar thick liquids with 0 s/sx of aspiration/penetration in 10:10 trials Sirisha across 2 therapy sessions in order to improve Pt safety with liquids and increase overall QOL.     6/20/18:  10 half tsp to tsp of nectar thick trials: s/x of aspiration x2 (throat clear post swallow)>  utilized a chin tuck 8/10 trials 80% trials with mod cues, double swallow 10/10 trials sirisha clear vocal quality post swallow for all trials. 6.) Pt will tolerate regular diet trials and nectar thick liquids utilizing compensatory strategies (including chin tuck and double swallow) in 10:10 trials with Min A over 2 therapy sessions in order to reduce risk for aspiration. 6/20/18: trials of nectar thick: 10 half tsp to tsp  trials: s/x of aspiration x2 (throat clear cough post swallow x 2 -one with mixed consistency with regular). Patient  utilized a chin tuck 8/10 trials with NTL;s with mod cues, double swallow 9/10 trials sirisha . Patient tolerated regular consistency without overt s/s of aspiration. 7.) Pt will tolerate a regular diet with no instances of oral residue in 10:10 trials with Min A over 2 therapy sessions in order to reduce risk for aspiration and increase overall QOL.  6/20/18:  Goal met.   rotary chewing triscuit and 0 instances of oral residue 10/10 trials sirisha  Throat clear post swallow when he mixed NTL and regular together prior to swallow.       PLAN  [x]  Continue plan of care  []  Modify Goals/Treatment Plan      []  Discharge due to:  [] Other:          JESSICA Castellano, M.S. [address]   6/20/2018  10:32 AM    Future Appointments  Date Time Provider Mary Wayne   6/20/2018 10:30 AM JESSICA Castellano [facility]   6/22/2018 9:45 AM JESSICA Castellano [facility]   6/26/2018 9:45 AM JESSICA Castellano [facility]   6/28/2018 1:45 PM JESSICA Castellano [id number]   7/9/2018 9:30 AM Aleisha Irizarry MD [facility]   10/23/2018 9:30 AM Vane Cavazos MD [address]   12/13/2018 9:20 AM Frederick Hawley MD [address] [place] [address]

## 2018-06-22 ENCOUNTER — APPOINTMENT (OUTPATIENT)
Dept: PHYSICAL THERAPY | Age: 83
End: 2018-06-22
Payer: MEDICARE

## 2018-06-26 ENCOUNTER — HOSPITAL ENCOUNTER (OUTPATIENT)
Dept: PHYSICAL THERAPY | Age: 83
Discharge: HOME | End: 2018-06-26
Payer: MEDICARE

## 2018-06-26 PROCEDURE — G8997 SWALLOW GOAL STATUS: HCPCS

## 2018-06-26 PROCEDURE — G8996 SWALLOW CURRENT STATUS: HCPCS

## 2018-06-26 PROCEDURE — 92526 ORAL FUNCTION THERAPY: CPT

## 2018-06-26 NOTE — PROGRESS NOTES
ST DAILY TREATMENT NOTE    Patient Name: Keya Carter Sr.  Date:2018  : 1932  [x]  Patient  Verified  Payor: Payor: Ramona Díaz / Plan: [place] / Product Type: Medicare /   In time:9:50  Out time: 10:20  Total Treatment Time (min): 30  Visit #: 10 of 16    Treatment Diagnosis: Dysphagia [R13.10]    SUBJECTIVE  Pain Level (0-10 scale): 0  Any medication changes, allergies to medications, adverse drug reactions, diagnosis change, or new procedure performed?: [x] No    [] Yes (see summary sheet for update)    Subjective functional status/changes:   [] No changes reported  Pt and his wife attended tx. Patient gave verbal permission for SLP to share medical information related to his care. Patient brought back swallowing exercise tracking forms. His wife may see patient not using chin tuck and will remind him once and then he remembers to do it she reports. He reports that the exercises are getting easier to do except Amy Gómez which ist still difficult. He worked on some but all the exercises he reports. They reported that they need to leave 15 minutes early to be in [facility], waitng to talk to brother's MD.  OBJECTIVE  Treatment provided includes:  Increase/Improve:  []  Voice Quality []  Cognitive Linguistic Skills [x]  Laryngeal/Pharyngeal Exercises   []  Vocal Loudness []  Reading Comprehension [x]  Swallowing Skills    []  Vocal Cord Function []  Auditory Comprehension [x]  Oral Motor Skills   []  Resonance []  Writing Skills [x]  Compensatory strategies    []  Speech Intelligibility []  Expressive Language []  Attention   []  Breath Support/Coord. []  Receptive language []  Memory   []  Articulation [x]  Safety Awareness []    []  Fluency []  Word Retrieval []        Treatment Provided:   Extensive teaching regarding importance of blocking out time to do the exercises and taking the folder with him marking each time each trial is done.   Recommended that his wife assist him in blocking out time, initiating the tasks, marking off each trial and doing the exercises daily with good accuracy. Discussed again doing 12 and then 13 another part of the day until all are done. Reviewed importance of continuing honey thick liquids at home for all liquids utilizing chin tuck and extra dry swallow. .   Completed oral motor exercises (lingual strengthening exercises), pharyngeal exercises, reviewed how to complete each exercise so that wife would know how to assist patient. Patient/Caregiver  Education: [x] Review HEP      HEP/Handouts given: Continue HEP, with wife assist with organization and encourage follow thru with patient. Pain Level (0-10 scale) post treatment: 0    ASSESSMENT   Patient/wife reports being consistent with using thickener in his liquids. Wife and patient report that patient has significantly increased use of chin tuck and double swallows with PO. Patient reports increased HEP with swallowing exercises but not recording them or not completing recommended number per day. He reports that they are getting easier.     []   Improving appropriately and progressing toward goals  [x]   Improving slowly and progressing toward goals  []   Approximating goals/maximum potential  [x]   Continues to benefit from skilled therapy to address remaining functional deficits  []   Not progressing toward goals and plan of care will be adjusted    Patient will continue to benefit from skilled therapy to address remaining functional deficits: dysphagia    Progress towards goals / Updated goals:    Short Term Goals:  1.) Pt will perform pharyngeal exercises in structured therapy setting in order to improve functional swallow in 10:10 trials with Min A over 2 therapy sessions to promote carryover of HEP.  6/26/18:    Natividad: 15 trials Min-Mod A    Mendelsohn: 5:10 trials Mod A    Falsetto pitch ee x10 mod A to take a bigger breath, elevate pitch higher   2.) Pt will perform oral motor exercises in structured therapy setting in order to improve functional swallow in 10:10 trials with Min A over 2 therapy sessions to promote carryover of HEP.  6/26/18:     Hard \"k\": 25/25 Sirisha     Tongue tip against resistance: 8:10 Sirisha, 10:10 Min A   Tongue lateralization against resistance both left and right: each:  8:10 trials min A     3.) Pt will report following recommended honey thick liquids and regular diet at home consistently across 3 therapy sessions in order to improve Pt safety at meal time. 6/26/18: Pt/wife reports thickening liquids to honey consistency 100% of the time . He is consuming a regular diet. 4.) Pt will report utilizing chin tuck and double swallow >90% of the time when eating/drinking at home consistently across 3 therapy sessions in order to improve Pt safety at meal time and utilization of safe swallowing strategies/compensatory techniques. 6/26/18: Pt/wife report patient is utilizing chin tuck 80% of the time and double swallows ~90% of the time. Wife will notice patient did not use chin tuck, remind him and he will use it the remainder of the time at meals. 5.) Pt will tolerate nectar thick liquids with 0 s/sx of aspiration/penetration in 10:10 trials Sirisha across 2 therapy sessions in order to improve Pt safety with liquids and increase overall QOL.     6/26/18:  10 half tsp to tsp of nectar thick trials: s/x of aspiration x2 with patient utilized a chin tuck 10/10 trials 100%trials with S, double swallow 10/10 trials sirisha clear vocal quality post swallow for all trials. Throat clear post swallow 2 of 10 trials)   6. ) Pt will tolerate regular diet trials and nectar thick liquids utilizing compensatory strategies (including chin tuck and double swallow) in 10:10 trials with Min A over 2 therapy sessions in order to reduce risk for aspiration.     6/20/18: see information under STG # 5.   7.) Pt will tolerate a regular diet with no instances of oral residue in 10:10 trials with Min A over 2 therapy sessions in order to reduce risk for aspiration and increase overall QOL.  6/20/18:  Goal met on 6/20/18   PLAN  []  Continue plan of care  [x]  Modify Goals/Treatment Plan      []  Discharge due to:  [] Other:          JESSICA Coronel, M.S. [id number] [facility]   6/26/2018  9:50 AM    Future Appointments  Date Time Provider [place]   6/28/2018 1:45 PM JESSICA Coronel [facility]   7/9/2018 9:30 AM Dai Hurtado MD [place] [id number]   10/23/2018 9:30 AM Ananth Torre MD [address]   12/13/2018 9:20 AM Shelly Jules MD [address] [place] [address]

## 2018-06-27 NOTE — PROGRESS NOTES
[facility]  [facility] [employer] [facility] [address]  [phone] fax    Continued Plan of Care/ Re-certification for Speech Therapy Services    Patient name: Sharee Zuniga Sr. Start of Care: 18   Referral source: Sravani Aguirre MD : 1932   Medical/Treatment Diagnosis: Dysphagia [R13.10] Onset Date:2018     Prior Hospitalization: see medical history Provider#: [id number]   Medications: Verified on Patient Summary List      Comorbidities: Diverticulitis, Reflux, Lower GI bleed, Essential Hypertension, Pulmonary Embolism     Prior Level of Function: Independent, Regular diet and thin liquids  without s/s of aspiration nor reported difficulty                              Reporting Period: 2018 to 2018    Visits from Start of Care: 10    Missed Visits: 0    The Plan of Care and following information is based on the patient's current status:    Goal:1.) Pt will perform pharyngeal exercises in structured therapy setting in order to improve functional swallow in 10:10 trials with Min A over 2 therapy sessions to promote carryover of HEP. Status at last note/certification: Goal not met, progressing, 18: high pitched 'ee\" hold 10 sec: 5:5 min A; effortful swallow 6:10 trials Mod A, 3:6 trials, Natividad 2:5 trials Max A; hard k words: 10:20 mod A. Goal not met as Pt requires varying levels of support across therapeutic exercises. Pt requires cues to complete tasks accurately and to increase intensity. Current Status: not met but progressing. Natividad: 15 trials Min-Mod A (increase in trials and decreased level of cuing/support need in tx. Mendelsohn: 5:10 trials Mod A progressing. This difficult exercise had not been previously targeted.  He is progressing with this exercise                        Falsetto pitch ee x10 mod A to take a bigger breath, elevate pitch higher progressing but still needs to elevate pitch higher. Effortful swallows: progressing: mod A. Hard K words: 25/25 sirisha   Patient is progressing with his exercises but still needs varying levels of support. He is recalling them with more ease, overall. Goal: 2.) Pt will perform oral motor exercises in structured therapy setting in order to improve functional swallow in 10:10 trials with Min A over 2 therapy sessions to promote carryover of HEP. Status at last note/certification: Goal not met, progressing, 8:10 Sirisha on 5/25/18 and 10:10 Mod A. Goal not met as Pt has not reached desired level of accuracy with Min A. Pt continues to require varying level of support to complete exercises accurately. Current Status: not met but nearing completion of this this goal                      Hard \"k\": 25/25 Sirisha                          Tongue tip against resistance: 8:10 Sirisha, 10:10 Min A                        Tongue lateralization against resistance both left and right: each:  8:10 trials min A - advance goal to S over 2 therapy sessions. Goal 3.) Pt will report following recommended honey thick liquids and regular diet at home consistently across 3 therapy sessions in order to improve Pt safety at meal time. Status at last note/certification: Goal not met, Pt has not reported thickening liquids to honey thick consistency consistently across therapy sessions. Pt is progressing as he reports increasing how often he is thickening liquids over therapy sessions. Pt follows regular diet. Current Status: not met but progressing. Met criterion for 1 of 3 session  6/26/18: Pt/wife reports thickening liquids to honey consistency 100% of the time . He is consuming a regular diet. Goal expected to be met in 2 more sessions (to maintain consistency).     Goal: 4.) Pt will report utilizing chin tuck and double swallow >90% of the time when eating/drinking at home consistently across 3 therapy sessions in order to improve Pt safety at meal time and utilization of safe swallowing strategies/compensatory techniques. Status at last note/certification:Goal not met, slowly progressing, 5/22/18: Pt reported 25% of the time for chin tuck and double swallow when eating and drinking at home. Goal not met as Pt does not consistently utilize chin tuck at home. Pt reported \"it's a lot to r emember\". Current Status: not met but progressing toward completion. 6/26/18: Pt/wife report patient is utilizing chin tuck 80% of the time and double swallows ~90% of the time. Wife will notice patient did not use chin tuck, remind him and he will use it the remainder of the time at meals. Goal:5.) Pt will tolerate nectar thick liquids with 0 s/sx of aspiration/penetration in 10:10 trials Sirisha across 2 therapy sessions in order to improve Pt safety with liquids and increase overall QOL.     Status at last note/certification:Goal not met, progressing, 0 s/sx over 2 therapy session but Pt is cued Mod-Max A for chin tuck for PO trials  Current Status:Goal not met, progressing, 6/26/18:  10 half tsp to tsp of nectar thick trials: s/x of aspiration x2 with patient utilized a chin tuck 10/10 trials 100%trials with S, double swallow 10/10 trials sirisha clear vocal quality post swallow for all trials. Throat clear post swallow 2 of 10 trials)     Goal:6.) Pt will tolerate regular diet trials and nectar thick liquids utilizing compensatory strategies (including chin tuck and double swallow) in 10:10 trials with Min A over 2 therapy sessions in order to reduce risk for aspiration. Status at last note/certification:Goal not met, progressing, Pt requires Mod-Max A for chin tuck for PO trials. Goal not met at Pt requires greater level of cuing to utilize strategies than specified for the goal.   Current Status: Goal not met, progressing with decreased level of support/cuing for chin tuck and double swallow.  6/26/18:  10 half tsp to tsp of nectar thick trials: s/x of aspiration x2 with patient utilized a chin tuck 10/10 trials 100%trials with S, double swallow 10/10 trials sirisha clear vocal quality post swallow for all trials. Throat clear post swallow 2 of 10 trials)   Goal expected to be met in the next few sessions. Goal:7.) Pt will tolerate a regular diet with no instances of oral residue in 10:10 trials with Min A over 2 therapy sessions in order to reduce risk for aspiration and increase overall QOL. Status at last note/certification:Goal not met, progressing, 5/15/18: 4:4 trials Min A, 5/25/18 8:8 trials with no oral residue Sirisha. Goal not met as Pt has not reached desired number of trials.       Current Status: met sirisha     Long Term Goals:    1.) Pt will tolerate thin liquids with regular diet with 0 s/sx of aspiration/ penetration in order to maintain nutrition/hydration needs and increase overall QOL.     Status at last note/certification: goal not addressed-not appropriate at that time. Patient on honey thick liquids  Current status: not met due to not targeted yet (not appropriate yet). Currently on nectar thick trials (see progress with short term goal # 6)  2.) Pt will consistently utilize safe swallowing strategies/compensatory techniques (including chin tuck and double swallow) consistently with PO in order to reduce risk for aspiration and increase overall QOL.   Status at last note/certification: patient not consistently using compensatory strategies and with mod degree of cuing/support. Current status: goal not met but progressing. 6/26/18: Pt/wife report patient is utilizing chin tuck 80% of the time and double swallows ~90% of the time. Wife will notice patient did not use chin tuck, remind him and he will use it the remainder of the time at meals.     3.) Pt will tolerate a regular diet with no instances of oral residue Sirisha in order to reduce risk for aspiration, maintain nutrition/hydration needs, and increase overall QOL.                     Status at last note/certification::Goal not met, progressing, 5/15/18: 4:4 trials Min A, 5/25/18 8:8 trials with no oral residue Sirisha. Goal not met as Pt has not reached desired number of trials. Current Status: met sirisha    Key functional changes: Patient is progressing with his swallowing and oral motor exercises with decreased level of cuing/instructions overall. Memory issues and a feeling of being overwhelmed with therapy and home activities has made progress slow but with a home charting form for the exercises and increased expectation is helping. Patient is now consistently drinking only honey thick liquids at home and outside the tx clinic he and his wife report. He is utilizing chin tuck and double swallow as well as his other safe swallowing strategies with much more consistency in tx and at home he and his wife report. He has visual reminders regarding chin tuck and double swallow at his meals. Problems/ barriers to goal attainment:  Cognitive deficits, (memory-recall)     Problem List:    []aphasic  []dysarthric  [x]dysphagic       []alexic  []agraphic  []dysphonia       []dysfluency  []Cognitive-Linguistic Disorder       []other     Treatment Plan: Oral Motor Therapeutic Excerise, Treatment of Swallowing and Patient Education    Patient Goal (s) has been updated and includes: \"I want to get better with all of this\"     Goals for this certification period to be accomplished in 4-6weeks:    1.) Pt will perform pharyngeal exercises in structured therapy setting in order to improve functional swallow in 10:10 trials with Min A over 2 therapy sessions to promote carryover of HEP.  2). Pt will perform oral motor exercises in structured therapy setting in order to improve functional swallow in 10:10 trials with Min A to sirisha over 2 therapy sessions to promote carryover of HEP.   3.) Pt will report following recommended honey thick liquids and regular diet at home consistently across 3 therapy sessions in order to improve Pt safety at meal time. (first one met 6/26/18)  4. ) Pt will report utilizing chin tuck and double swallow 90% of the time with min cues to S when eating/drinking at home consistently across 3 therapy sessions in order to improve Pt safety at meal time.   (goal revised to 90% with min cues to S due to patient's memory diffiuclty)   5.) Pt will tolerate nectar thick liquids with 0 s/sx of aspiration/penetration in 10:10 trials Any across 2 therapy sessions in order to improve Pt safety with liquids and increase overall QOL.     6. ) Pt will tolerate regular diet trials and nectar thick liquids utilizing compensatory strategies (including chin tuck and double swallow) in 10:10 trials with Min A over 2 therapy sessions in order to reduce risk for aspiration. Long Term Goals: To be accomplished in 8 weeks:    1.) Pt will tolerate thin liquids with regular diet with 0 s/sx of aspiration/ penetration in order to maintain nutrition/hydration needs and increase overall QOL.     2.) Pt will consistently utilize safe swallowing strategies/compensatory techniques (including chin tuck and double swallow) consistently with PO with S to mod I in order to reduce risk for aspiration and increase overall QOL. (goal revised due to patient's memory difficulties)  3.) Pt will tolerate a regular diet with no instances of oral residue Any in order to reduce risk for aspiration, maintain nutrition/hydration needs, and increase overall QOL. (goal met this past certification period)     Frequency / Duration: Patient to be seen 2-3 times per week for 8  weeks:    Assessment/ Recommendations:  Patient is tolerating nectar thick liquids with the use of chin tuck and double swallow 8/10 trials. He requires S with chin tuck in the tx room and is independent in the treatment room with double swallows. He throat clears post swallow on 2 trials.  He is tolerating a regular diet without the use of chin tuck and double swallow without s/s of aspiration nor oral residue. Patient is expected to make significant progress during the next certification period as his wife is now going to start coming to therapy to learn now to assist him with his recall of his swallowing strategies and with his HEP. G Codes:  Swallowing:  Swallow Current Status CK= 40-59%   Swallow Goal Status CJ= 20-39%      The severity rating is based on the following outcomes:    National Outcomes Measures (NOMS) clinical judgement, Repeat MBS if indicated    Certification Period: 6/27/18 to 8/25/18    JESSICA Mccormick 6/26/2018  12:15 PM    _____________________________________________________________________    I certify that the above Therapy Services are being furnished while the patient is under my care. I agree with the treatment plan and certify that this therapy is necessary. []  I have read the above report and request that my patient continue as recommended. []  I have read the above report and request that my patient continue therapy with the following changes/special instructions:________________________________________  []I have read the above report and request that my patient be discharged from therapy.     Physician's Signature:_________________ Date:___________Time:__________      Please sign and return to [facility] [address] [facility] [place]  [phone] fax

## 2018-06-28 ENCOUNTER — TELEPHONE ANTICOAG (OUTPATIENT)
Dept: CARDIOLOGY CLINIC | Age: 83
End: 2018-06-28

## 2018-06-28 ENCOUNTER — HOSPITAL ENCOUNTER (OUTPATIENT)
Dept: PHYSICAL THERAPY | Age: 83
Discharge: HOME | End: 2018-06-28
Payer: MEDICARE

## 2018-06-28 DIAGNOSIS — I26.99 OTHER PULMONARY EMBOLISM WITHOUT ACUTE COR PULMONALE, UNSPECIFIED CHRONICITY (HCC): ICD-10-CM

## 2018-06-28 DIAGNOSIS — I48.92 ATRIAL FLUTTER WITH RAPID VENTRICULAR RESPONSE (HCC): Primary | ICD-10-CM

## 2018-06-28 DIAGNOSIS — Z79.01 LONG TERM CURRENT USE OF ANTICOAGULANT THERAPY: ICD-10-CM

## 2018-06-28 LAB — INR, EXTERNAL: 4.9

## 2018-06-28 PROCEDURE — 92526 ORAL FUNCTION THERAPY: CPT

## 2018-06-28 NOTE — PROGRESS NOTES
ST DAILY TREATMENT NOTE    Patient Name: Maynor Valentin.  Date:2018  : 1932  [x]  Patient  Verified  Payor: Payor: Maru Marroquin / Plan: [place] / Product Type: Medicare /   In time: 1:50  Out time: 2:30  Total Treatment Time (min): 40  Visit #: 1 of 24  Treatment Diagnosis: Dysphagia [R13.10]    SUBJECTIVE  Pain Level (0-10 scale): 0  Any medication changes, allergies to medications, adverse drug reactions, diagnosis change, or new procedure performed?: [x] No    [] Yes (see summary sheet for update)    Subjective functional status/changes:   [] No changes reported   Patient brought back swallowing exercise tracking forms. He reports that he is doing more of the exercises at home. OBJECTIVE  Treatment provided includes:  Increase/Improve:  []  Voice Quality []  Cognitive Linguistic Skills [x]  Laryngeal/Pharyngeal Exercises   []  Vocal Loudness []  Reading Comprehension [x]  Swallowing Skills    []  Vocal Cord Function []  Auditory Comprehension [x]  Oral Motor Skills   []  Resonance []  Writing Skills [x]  Compensatory strategies    []  Speech Intelligibility []  Expressive Language []  Attention   []  Breath Support/Coord. []  Receptive language []  Memory   []  Articulation [x]  Safety Awareness []    []  Fluency []  Word Retrieval []        Treatment Provided:   Oral motor/pharyngeal/laryngeal strengthening exercises  Again reviewed importance of placing sticky notes around reminding him to utilizing chin tuck and extra dry swallow. .    NTL trials with use of small sip, chin tuck, double swallow. Extensive teaching regarding trials with liquids and use of compensatory strategies does not include tongue hold swallow and that the tongue hold is only for saliva with nadeen exercise. teaching regarding the difference between mendelsohn (holding up larynx) and nadeen ex.      Patient/Caregiver  Education: [x] Review HEP      HEP/Handouts given: Continue HEP, added supraglottic swallow , made swallowing charting forms for July, for laryngeal/pharyngeal exercises. Pain Level (0-10 scale) post treatment: 0    ASSESSMENT   Patient  reports continued use of having HTL's for all his liquids. He reports forgetting to chin tuck at times and his wife reminds him. He began tongue holding prior to NTL trial swallows, requiring mod cues related to when to use tongue hold (for Natividad and only with saliva swallows) versus PO with liquids. He has been doing his exercises more with his wife making off on charting form. No overt s/s of asp nor change in vocal quality nor c/o globus with NTL trials today. At times he would forget to chin tuck as SLP addressed the tongue holding issue and trying to extinguish that behavior. []   Improving appropriately and progressing toward goals  [x]   Improving slowly and progressing toward goals  []   Approximating goals/maximum potential  [x]   Continues to benefit from skilled therapy to address remaining functional deficits  []   Not progressing toward goals and plan of care will be adjusted    Patient will continue to benefit from skilled therapy to address remaining functional deficits: dysphagia    Progress towards goals / Updated goals:    Short Term Goals:    1.) Pt will perform pharyngeal exercises in structured therapy setting in order to improve functional swallow in 10:10 trials with Min A over 2 therapy sessions to promote carryover of HEP.  6/28/18: Natividad: 18 trials Min A    Mendelsohn: 6:10 trials Mod A  50% successful with holding larynx up for 4-5 seconds. Falsetto pitch ee x10 mod A to take a bigger breath, elevate pitch higher   Superglottic swallow: x6: mod A.   2). Pt will perform oral motor exercises in structured therapy setting in order to improve functional swallow in 10:10 trials with Min A to sirisha over 2 therapy sessions to promote carryover of HEP.  6/28/18: Hard \"k\": 30/30 Sirisha     Tongue tip against resistance: 10:10 Sirisha,    Tongue lateralization against resistance both left and right: each:  10:10 trials sirisha    3.) Pt will report following recommended honey thick liquids and regular diet at home consistently across 3 therapy sessions in order to improve Pt safety at meal time. (first one met 6/26/18)  6/28/18:  He reports all liquids comsumed have been honey thick/ and with regular diet. (2 of 3 session with report of 100% compliance)   4. ) Pt will report utilizing chin tuck and double swallow 90% of the time with min cues to S when eating/drinking at home consistently across 3 therapy sessions in order to improve Pt safety at meal time.   (goal revised to 90% with min cues to S due to patient's memory diffiuclty)   6/28/18:  Patient reports using chin tuck 80% of the time due to wife reminding him some times more than others. He reports that he is using double swallow 90% or more   5.) Pt will tolerate nectar thick liquids with 0 s/sx of aspiration/penetration in 10:10 trials Sirisha across 2 therapy sessions in order to improve Pt safety with liquids and increase overall QOL.    6/28/18:   0 overt s/s of asp nor change in vocal quality/nor globus sensation for 10/10 trials with mod cues to not hold tongue and chin tuck for 6 of the 10 trials due to confusion of nadeen with tongue hold. 6.) Pt will tolerate regular diet trials and nectar thick liquids utilizing compensatory strategies (including chin tuck and double swallow) in 10:10 trials with Min A over 2 therapy sessions in order to reduce risk for aspiration. 6/28/18:  No overt s/s of asp with 10/10 trials of NTL with or without chin tuck today.      PLAN  [x]  Continue plan of care  []  Modify Goals/Treatment Plan      []  Discharge due to:  [] Other:          JESSICA Franco, M.SOliva Pinzon   6/28/2018  1:50 PM    Future Appointments  Date Time Provider [place]   7/6/2018 8:30 AM JESSICA Franco [facility]   7/9/2018 9:30 AM MD Josef Short 51 7/13/2018 3:15 PM Lossie Ormond, SLP [facility]   10/23/2018 9:30 AM La Canada MD [address]   12/13/2018 9:20 AM Carl Anne MD [address] [place] [address]

## 2018-06-28 NOTE — PROGRESS NOTES
Verbal order and read back per Gutierrez Negron NP  Hold 6/28 then take 1.25 mg 6/29 then Continue take Coumadin 2.5mg daily   Patient is not with therapeutic range  Patient was taking 3.75 daily instead of for one day. Spoke to patient's wife, made her aware of the current dose change for next two days, and then to   Go back to 2.5 mg daily. This has been fully explained to the patient's wife, who indicates understanding.

## 2018-07-06 ENCOUNTER — HOSPITAL ENCOUNTER (OUTPATIENT)
Dept: PHYSICAL THERAPY | Age: 83
Discharge: HOME | End: 2018-07-06
Payer: MEDICARE

## 2018-07-06 PROCEDURE — 92526 ORAL FUNCTION THERAPY: CPT

## 2018-07-06 NOTE — PROGRESS NOTES
ST DAILY TREATMENT NOTE    Patient Name: Theresa Munoz.  Date:2018  : 1932  [x]  Patient  Verified  Payor: Payor: Donovan English / Plan: [place] MEDICARE PART A & B / Product Type: Medicare /   In time: 8:30  Out time: 9:15  Total Treatment Time (min): 45  Visit #: 2 of 24  Treatment Diagnosis: Dysphagia [R13.10]    SUBJECTIVE  Pain Level (0-10 scale): 0  Any medication changes, allergies to medications, adverse drug reactions, diagnosis change, or new procedure performed?: [x] No    [] Yes (see summary sheet for update)    Subjective functional status/changes:   [] No changes reported   Patient brought back swallowing exercise tracking forms show that he had significantly improved regarding number of trials and consistency with them from day to day. OBJECTIVE  Treatment provided includes:  Increase/Improve:  []  Voice Quality []  Cognitive Linguistic Skills [x]  Laryngeal/Pharyngeal Exercises   []  Vocal Loudness []  Reading Comprehension [x]  Swallowing Skills    []  Vocal Cord Function []  Auditory Comprehension [x]  Oral Motor Skills   []  Resonance []  Writing Skills [x]  Compensatory strategies    []  Speech Intelligibility []  Expressive Language []  Attention   []  Breath Support/Coord. []  Receptive language []  Memory   []  Articulation [x]  Safety Awareness []    []  Fluency []  Word Retrieval []         Treatment Provided:   Oral motor/pharyngeal/laryngeal strengthening exercises  He has typed notes for reminders for honey thick liquids only and chin tuck double swallowed where he sits and where his glass is. NTL trials with use of small sip, chin tuck, double swallow, also trialed with supraglottic swallow. .   Teaching regarding vocal fold adduction with vowel prolongation with effortful pushing and importance of taking replenishing breathes.       Patient/Caregiver  Education: [x] Review HEP      HEP/Handouts given: Continue HEP, added HTL with supraglottic swallow to make it easier and for transition to NTL's  , made swallowing charting forms for supraglottic swallow and for vocal fold adduction ex. 25 each daily. Pain Level (0-10 scale) post treatment: 0    ASSESSMENT   Patient  reports continued use of having HTL's for all his liquids. He reports that he is using chin tuck due to written reminders   He is progressing nicely with his exercises. Excellent with new exercises vocal fold adduction with effort. No overt s/s of asp nor change in vocal quality nor c/o globus with NTL trials 90% of the time today (throat clear post swallow x 1). []   Improving appropriately and progressing toward goals  [x]   Improving slowly and progressing toward goals  []   Approximating goals/maximum potential  [x]   Continues to benefit from skilled therapy to address remaining functional deficits  []   Not progressing toward goals and plan of care will be adjusted    Patient will continue to benefit from skilled therapy to address remaining functional deficits: dysphagia    Progress towards goals / Updated goals:    Short Term Goals:    1.) Pt will perform pharyngeal exercises in structured therapy setting in order to improve functional swallow in 10:10 trials with Min A over 2 therapy sessions to promote carryover of HEP.  7/6/18:   Vocal fold adduction vowel ah prolongation with effortful pushing  X12; Natividad: 18 trials Min A, Mendelsohn: 6:10 trials Mod A  50% successful with holding larynx up for 4-5 seconds., Supraglottic swallow: x10 with NTL trials  mod A.   2). Pt will perform oral motor exercises in structured therapy setting in order to improve functional swallow in 10:10 trials with Min A to sirisha over 2 therapy sessions to promote carryover of HEP.  7/6/18: goal met.  Hard \"k\": 30/30 Sirisha     Tongue tip against resistance: 10:10 Sirisha,    Tongue lateralization against resistance both left and right: each:  10:10 trials sirisha    3.) Pt will report following recommended honey thick liquids and regular diet at home consistently across 3 therapy sessions in order to improve Pt safety at meal time. (first one met 6/26/18)  7/6/18:  Goal met. He reports all liquids comsumed have been honey thick/ and with regular diet. (3 of 3 session with report of 100% compliance)  Goal met. 4.) Pt will report utilizing chin tuck and double swallow 90% of the time with min cues to S when eating/drinking at home consistently across 3 therapy sessions in order to improve Pt safety at meal time.   (goal revised to 90% with min cues to S due to patient's memory diffiuclty)   7/6/18:  Patient reports using chin tuck 80% of the time due to wife reminding him and written reminders posted. He reports that he is using double swallow 90% or more   5.) Pt will tolerate nectar thick liquids with 0 s/sx of aspiration/penetration in 10:10 trials Any across 2 therapy sessions in order to improve Pt safety with liquids and increase overall QOL.    7/6/18:   0 overt s/s of asp nor change in vocal quality/nor globus sensation for 9/10 trials with min. 6.) Pt will tolerate regular diet trials and nectar thick liquids utilizing compensatory strategies (including chin tuck and double swallow) in 10:10 trials with Min A over 2 therapy sessions in order to reduce risk for aspiration. 7/5/18:  No overt s/s of asp with 9/10 trials of NTL  without chin tuck today.   Throat clear post swallow x     PLAN  [x]  Continue plan of care  []  Modify Goals/Treatment Plan      []  Discharge due to:  [] Other:      Aletha Cushing, SLP, M.SOliva Mueller   7/6/2018  830 AM    Future Appointments  Date Time Provider Mary Wayne   7/6/2018 8:30 AM Aletha Cushing, SLP [facility]   7/9/2018 9:30 AM MD Josef Stockton 51   7/10/2018 4:00 PM Aletha Cushing, SLP [facility]   7/13/2018 3:15 PM Aletha Cushing, SLP [facility]   7/17/2018 9:45 AM Aletha Cushing, SLP [facility]   7/20/2018 3:15 PM Aletha Cushing, SLP [facility]   7/24/2018 1:00 PM Lonza JESSICA Walker [facility]   7/27/2018 9:00 AM [place] JESSICA Walker [facility]   10/23/2018 9:30 AM Laura Barney MD [place] [id number]   12/13/2018 9:20 AM Guille Black MD [address] [place] [address]

## 2018-07-09 ENCOUNTER — OFFICE VISIT (OUTPATIENT)
Dept: VASCULAR SURGERY | Age: 83
End: 2018-07-09

## 2018-07-09 VITALS
RESPIRATION RATE: 16 BRPM | BODY MASS INDEX: 31.92 KG/M2 | HEART RATE: 74 BPM | HEIGHT: 70 IN | SYSTOLIC BLOOD PRESSURE: 132 MMHG | DIASTOLIC BLOOD PRESSURE: 80 MMHG | WEIGHT: 223 LBS

## 2018-07-09 DIAGNOSIS — I72.4 POPLITEAL ARTERY ANEURYSM (HCC): Primary | ICD-10-CM

## 2018-07-09 DIAGNOSIS — I72.4 FEMORAL ARTERY ANEURYSM, BILATERAL (HCC): ICD-10-CM

## 2018-07-09 NOTE — MR AVS SNAPSHOT
[address] [facility] 
[phone] Patient: Apurva Collier Sr. MRN: [id number] ENQ:5/64/1015 Visit Information Date & Time Provider Department Dept. Phone Encounter #  
 7/9/2018  9:30 AM Yesica Shine [facility] [phone] Follow-up Instructions Return in about 6 months (around 1/9/2019). Follow-up and Disposition History Your Appointments 10/23/2018  9:30 AM  
FOLLOW UP EXAM with Erum Desir MD  
[facility] ([facility]) Appt Note: routine f/u 6mo [address] [place] [address]  
[address] [facility] [address] [place] [address]  
  
    
 12/13/2018  9:20 AM  
Follow Up with Virginia Israel MD  
[facility] ([facility]) Appt Note: 1 year follow up with an EKG  
 [facility] [address] [address] [address] [phone] [place] [phone]  
  
    
 1/9/2019 10:00 AM  
PROCEDURE with [facility] IMAGING 1 [facility] ([facility]) Appt Note: bypass right pop kaylyn pop and cfa aneurysm harmon 6 months same [address] [facility] [address] 706 [facility]  
[phone] [address] [facility] 47 [facility]  
  
    
 1/9/2019 11:00 AM  
PROCEDURE with [facility] NONIMAGING [facility] ([facility]) Appt Note: genesis harmon 6 months same day [address] [facility] 226 706 [facility]  
[phone] [address] [facility] 47 [facility]  
  
    
 1/9/2019  1:30 PM  
Follow Up with Lon Francis MD  
600 [facility]) Appt Note: 6 month follow up after studies same day [address] [facility] 698 200 [facility]  
[phone] [address] [place], [place] [address]Clarence frazier 200 [facility] Upcoming Health Maintenance Date Due Influenza Age 5 to Adult 8/1/2018 MEDICARE YEARLY EXAM 4/24/2019 GLAUCOMA SCREENING Q2Y 2/20/2020 DTaP/Tdap/Td series (2 - Td) 3/13/2027 COLONOSCOPY 10/6/2027 Allergies as of 7/9/2018  Review Complete On: 7/9/2018 By: Livier Donovan MD  
 No Known Allergies Current Immunizations  Reviewed on 4/23/2018 Name Date Influenza High Dose Vaccine PF 10/16/2016 Influenza Vaccine 10/14/2014 Influenza Vaccine (Quad) PF 10/11/2017  5:09 PM  
 Influenza Vaccine Whole 10/28/2012 Pneumococcal Polysaccharide (PPSV-23) 10/28/2012 ZZZ-RETIRED (DO NOT USE) Pneumococcal Vaccine (Unspecified Type) 10/28/2012 Not reviewed this visit You Were Diagnosed With   
  
 Codes Comments Popliteal artery aneurysm (HCC)    -  Primary ICD-10-CM: I72.4 ICD-9-CM: 125. 3 Femoral artery aneurysm, bilateral (HCC)     ICD-10-CM: I72.4 ICD-9-CM: 739. 3 Vitals BP Pulse Resp Height(growth percentile) Weight(growth percentile) BMI  
 132/80 (BP 1 Location: Left arm, BP Patient Position: Sitting) 74 16 5' 10\" (1.778 m) 223 lb (101.2 kg) 32 kg/m2 Smoking Status Former Smoker Vitals History BMI and BSA Data Body Mass Index Body Surface Area 32 kg/m 2 2.24 m 2 Preferred Pharmacy Pharmacy Name Phone [facility] [address] [place] [address], [address] [place] [phone] Your Updated Medication List  
  
   
This list is accurate as of 7/9/18 10:04 AM.  Always use your most recent med list.  
  
  
  
  
 FISH OIL 1,000 mg Cap Generic drug:  omega-3 fatty acids-vitamin e Take 1 Cap by mouth two (2) times a day. METAMUCIL 3.4 gram/5.4 gram Powd Generic drug:  psyllium husk Take  by mouth two (2) times a day. metoprolol succinate 25 mg XL tablet Commonly known as:  TOPROL-XL  
TAKE ONE TABLET BY MOUTH ONCE DAILY  
  
 multivitamin tablet Commonly known as:  ONE A DAY Take 1 Tab by mouth daily. OSTEO BI-FLEX (5-LOXIN) 1,500-400-100 mg-unit-mg Tab Generic drug:  glucosamine-D3-Boswellia serr Take 1 Tab by mouth two (2) times a day. potassium chloride SR 20 mEq tablet Commonly known as:  K-TAB Take 1 Tab by mouth daily. VITAMIN B-12 1,000 mcg tablet Generic drug:  cyanocobalamin Take 1,000 mcg by mouth daily. Indications: PREVENTION OF VITAMIN B12 DEFICIENCY  
  
 VITAMIN C 500 mg tablet Generic drug:  ascorbic acid (vitamin C) Take 1,000 mg by mouth daily. warfarin 2.5 mg tablet Commonly known as:  COUMADIN Take 1 Tab by mouth daily. Follow-up Instructions Return in about 6 months (around 1/9/2019). To-Do List   
 07/09/2018 Vascular/US:  DUPLEX LOW EXT ARTERIES WITH MIGUEL   
  
 07/10/2018 4:00 PM  
  Appointment with JESSICA Vergara at [facility] PT [place] [address] ([phone])  
  
 07/13/2018 3:15 PM  
  Appointment with JESSICA Vergara at [facility] PT [place] [address] ([phone])  
  
 07/17/2018 9:45 AM  
  Appointment with JESSICA Vergara at [facility] PT [place] [address] ([phone])  
  
 07/20/2018 3:15 PM  
  Appointment with JESSICA Vergara at [facility] PT [address] [facility] ([phone])  
  
 07/24/2018 1:00 PM  
  Appointment with JESSICA Vergara at [facility] PT [facility] [address] ([phone])  
  
 07/27/2018 9:00 AM  
  Appointment with JESSICA Vergara at [facility] PT [address] [facility] ([phone]) Please provide this summary of care documentation to your next provider. Your primary care clinician is listed as Sharlett Lowers. If you have any questions after today's visit, please call [phone].

## 2018-07-09 NOTE — PROGRESS NOTES
[address] Chief Complaint   Patient presents with    Leg Pain       History and Physical    Mukund Robertson Sr. is a 80 y.o. 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 Patient is doing quite well without too much difficulty. He does state that he can get a little bit of numbness within his knee and posterior calf with working a lot but overall no major arterial claudication or rest pain. He does start to get some burning and tingling sensation of bilateral feet more indicative of peripheral neuropathy than anything else at this current time. Otherwise no other current complaints. No fevers or chills. No ulcerations. Past Medical History:   Diagnosis Date    Ankle fracture, left approx 2011    medial, tibial    Arthritis     knees    Cardiac echocardiogram 06/16/2011    Normal LV systolic function. Mild conc LVH. Gr 1 DDfx. RVSP 35-40 mmHg. Marked LAE. Marked LISA. Mild-mod MR.  Mild-mod AI. Mild AoRE. Mild aortic arch dil.  Cardiac Holter monitor study 06/22/2011    Baseline sinus rhythm to sinus bradycardia, avg HR 60 bpm (range  bpm). Questionable chronotropic intolerance. No sustained arrhythmias.  Emphysema lung (HCC)     GERD (gastroesophageal reflux disease)     Glaucoma suspect     posterior vitreous detachment b/l, pterygium left eye, aseroid hyalosis / synchisis, b/l pseudophakia    H/O colonoscopy 04/13/2011    1 polyp thermally treated    History of atrial fibrillation 2009    Hypertension     Pulmonary embolism ([address]     Unspecified adverse effect of anesthesia     H/o A-fib immediately post colonoscopy.      Past Surgical History:   Procedure Laterality Date    CARDIAC SURG PROCEDURE UNLIST      hx atrial flutter ablation 2013    COLONOSCOPY N/A 10/6/2017    COLONOSCOPY performed by Adolfo Bustos MD at [address] HX COLONOSCOPY      HX GI      HX KNEE REPLACEMENT Right 02/19/2013    Dr. Christal Pro ORTHOPAEDIC Right 2/13    [facility], [place]    HX OTHER SURGICAL      right popliteal artery aneurysm now repaired with Viabahn stenting    HX SVT ABLATION  1/2/2013    by Dr. 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 Indications: PREVENTION OF VITAMIN B12 DEFICIENCY      potassium chloride SR (K-TAB) 20 mEq tablet Take 1 Tab by mouth daily. 90 Tab 1    metoprolol succinate (TOPROL-XL) 25 mg XL tablet TAKE ONE TABLET BY MOUTH ONCE DAILY 90 Tab 1    warfarin (COUMADIN) 2.5 mg tablet Take 1 Tab by mouth daily. 90 Tab 3    ascorbic acid, vitamin C, (VITAMIN C) 500 mg tablet Take 1,000 mg by mouth daily.  psyllium husk (METAMUCIL) 3.4 gram/5.4 gram powd Take  by mouth two (2) times a day.  multivitamin (ONE A DAY) tablet Take 1 Tab by mouth daily.  glucosamine-D3-boswellia serr (OSTEO BI-FLEX) 1,500-400-100 mg-unit-mg Tab Take 1 Tab by mouth two (2) times a day.  omega-3 fatty acids-vitamin e (FISH OIL) 1,000 mg cap Take 1 Cap by mouth two (2) times a day. No Known Allergies  Social History     Social History    Marital status:      Spouse name: N/A    Number of children: N/A    Years of education: N/A     Occupational History    Not on file.      Social History Main Topics    Smoking status: Former Smoker     Packs/day: 1.00     Quit date: 1/1/1965    Smokeless tobacco: Never Used    Alcohol use Yes      Comment: occasionally.  Drug use: No    Sexual activity: Not Currently     Partners: Female     Other Topics Concern    Not on file     Social History Narrative      Family History   Problem Relation Age of Onset    Pacemaker Father     Pacemaker Sister     Heart Failure Brother     Diabetes Brother     Cancer Brother      Lung Cancer       Physical Exam:    Visit Vitals    /80 (BP 1 Location: Left arm, BP Patient Position: Sitting)    Pulse 74    Resp 16    Ht 5' 10\" (1.778 m)    Wt 223 lb (101.2 kg)    BMI 32 kg/m2      General: Well-appearing male in no acute distress  HEENT: EOMI no scleral icterus is noted  Pulmonary: No increased work of breathing is noted  Extremities: Warm and well-perfused bilaterally  Neuro: Cranial nerves II through XII are grossly intact    Impression and Plan:  Cj Santos is a 80 y.o. male with a right popliteal artery aneurysm repair with stent. I reviewed his ultrasounds in clinic today showing bilateral common femoral artery aneurysms although small in size not needing repair at this current time. His left popliteal artery aneurysm is present but again is small and does not require repair. On the right side he does have a good repair with a small type II endoleak but the sac has shrunk in size so we will continue to watch this. I will bring him back in 6 months for his anniversary more than likely we can go to yearly ultrasounds at that time. Has no arterial disease based on MIGUEL as well. We reviewed the plan with the patient and the patient understands. We also gave the patient appropriate instructions on their disease process and when to call back. Follow-up Disposition:  Return in about 6 months (around 1/9/2019).     Reena Oreilly MD    PLEASE NOTE:  This document has been produced using voice recognition software. Unrecognized errors in transcription may be present.

## 2018-07-09 NOTE — PROGRESS NOTES
1. Have you been to an emergency room or urgent care clinic since your last visit? No  Hospitalized since your last visit? If yes, where, when, and reason for visit? No  2. Have you seen or consulted any other health care providers outside of the [facility] since your last visit including any procedures, health maintenance items. If yes, where, when and reason for visit?

## 2018-07-10 ENCOUNTER — HOSPITAL ENCOUNTER (OUTPATIENT)
Dept: PHYSICAL THERAPY | Age: 83
Discharge: HOME | End: 2018-07-10
Payer: MEDICARE

## 2018-07-10 PROCEDURE — 92526 ORAL FUNCTION THERAPY: CPT

## 2018-07-10 NOTE — PROGRESS NOTES
ST DAILY TREATMENT NOTE    Patient Name: Stefany Gracia Sr.  Date:7/10/2018  : 1932  [x]  Patient  Verified  Payor: Payor: Marsha Kimbrough / Plan: [place] & [place] / Product Type: Medicare /   In time: 11:20  Out time: 12:00  Total Treatment Time (min): 40  Visit #: 3 of 24  Treatment Diagnosis: Dysphagia [R13.10]    SUBJECTIVE  Pain Level (0-10 scale): 0  Any medication changes, allergies to medications, adverse drug reactions, diagnosis change, or new procedure performed?: [x] No    [] Yes (see summary sheet for update)    Subjective functional status/changes:   [] No changes reported   Patient stated that Konstantin Weinsteinradha is getting easier because he has been practicing it. He brought back his exercise tracking forms. He skipped exercises yesterday working on his broken -he has been doing 25 of the exercises each day with his wife checking off the charting form as he performs each. OBJECTIVE  Treatment provided includes:  Increase/Improve:  []  Voice Quality []  Cognitive Linguistic Skills [x]  Laryngeal/Pharyngeal Exercises   []  Vocal Loudness []  Reading Comprehension [x]  Swallowing Skills    []  Vocal Cord Function []  Auditory Comprehension [x]  Oral Motor Skills   []  Resonance []  Writing Skills [x]  Compensatory strategies    []  Speech Intelligibility []  Expressive Language []  Attention   []  Breath Support/Coord. []  Receptive language []  Memory   []  Articulation [x]  Safety Awareness []    []  Fluency []  Word Retrieval []         Treatment Provided:   pharyngeal/laryngeal strengthening exercises  NTL trials with use of small sip, chin tuck, double swallow effortful swallow, also trialed with supraglottic swallow.  .        Patient/Caregiver  Education: [x] Review HEP      HEP/Handouts given: Continue HEP  Pain Level (0-10 scale) post treatment: 0    ASSESSMENT   Patient  reports continued use of having HTL's for all his liquids and that he still needs reminders from his wife to chin tuck during meals. Progressing nicely with his exercises. He exhibited some throat clearing post swallowing with NTL's  stating that he could feel the grittiness of the thicker with NTL trials. Using an effortful swallow helped. []   Improving appropriately and progressing toward goals  [x]   Improving slowly and progressing toward goals  []   Approximating goals/maximum potential  [x]   Continues to benefit from skilled therapy to address remaining functional deficits  []   Not progressing toward goals and plan of care will be adjusted    Patient will continue to benefit from skilled therapy to address remaining functional deficits: dysphagia    Progress towards goals / Updated goals:    Short Term Goals:    1.) Pt will perform pharyngeal exercises in structured therapy setting in order to improve functional swallow in 10:10 trials with Min A over 2 therapy sessions to promote carryover of HEP.  7/10/18:   Vocal fold adduction vowel ah prolongation with effortful pushing  X12; Natividad: 15 trials Min A, Mendelsohn: 710 trials Min A  70% successful with holding larynx up for 6-8 seconds., Supraglottic swallow: x10 with NTL trials  mod A.,  ee pitch: x 12 min A.   2). Pt will perform oral motor exercises in structured therapy setting in order to improve functional swallow in 10:10 trials with Min A to sirisha over 2 therapy sessions to promote carryover of HEP.  7/10/18: goal met on prior sessions. 3.) Pt will report following recommended honey thick liquids and regular diet at home consistently across 3 therapy sessions in order to improve Pt safety at meal time. (first one met 6/26/18)  7/10/18:  Goal met last session. He reports all liquids comsumed have been honey thick/ and with regular diet. (3 of 3 session with report of 100% compliance)    4. ) Pt will report utilizing chin tuck and double swallow 90% of the time with min cues to S when eating/drinking at home consistently across 3 therapy sessions in order to improve Pt safety at meal time.   (goal revised to 90% with min cues to S due to patient's memory diffiuclty)   7/10/18:  Patient reports using chin tuck 80% of the time due to wife reminding him and written reminders posted. When he is by himself he said that he cheats sometimes and doesn't chin tuck. He reports that he is using double swallow 90% or more. Reteaching regarding purpose of chin tuck for safety. 5.) Pt will tolerate nectar thick liquids with 0 s/sx of aspiration/penetration in 10:10 trials Any across 2 therapy sessions in order to improve Pt safety with liquids and increase overall QOL.    7/10/18:   throat clear post swallow 5/10 trials as he stated that he can feel the grittiness of the thickener. No change in vocal quality. 6.) Pt will tolerate regular diet trials and nectar thick liquids utilizing compensatory strategies (including chin tuck and double swallow) in 10:10 trials with Min A over 2 therapy sessions in order to reduce risk for aspiration. 7/10/18:  Not addressed today.       PLAN  [x]  Continue plan of care  []  Modify Goals/Treatment Plan      []  Discharge due to:  [] Other:      JESSICA Drake, M.SOliva Albright   7/10/2018  11:20 AM    Future Appointments  Date Time Provider Mary [place]   7/10/2018 11:15 AM JESSICA Drake [facility]   7/13/2018 3:15 PM JESSICA Drake [facility]   7/17/2018 9:45 AM JESSICA Drake [facility]   7/20/2018 3:15 PM JESSICA Drake [facility]   7/24/2018 1:00 PM JESSICA Drake [facility]   7/27/2018 9:00 AM JESSICA Drake [facility]   10/23/2018 9:30 AM Erum Dseir MD [facility] SHERIDAN   12/13/2018 9:20 AM Virginia Israel MD [address] [place] [address]   1/9/2019 10:00 AM [facility] SCHED   1/9/2019 11:00 AM [facility] NONIMAGING [facility] SCHED   1/9/2019 1:30 PM Lon Francis MD [place] [id number]

## 2018-07-12 ENCOUNTER — TELEPHONE ANTICOAG (OUTPATIENT)
Dept: CARDIOLOGY CLINIC | Age: 83
End: 2018-07-12

## 2018-07-12 DIAGNOSIS — I26.99 OTHER PULMONARY EMBOLISM WITHOUT ACUTE COR PULMONALE, UNSPECIFIED CHRONICITY (HCC): ICD-10-CM

## 2018-07-12 DIAGNOSIS — I48.92 ATRIAL FLUTTER WITH RAPID VENTRICULAR RESPONSE (HCC): Primary | ICD-10-CM

## 2018-07-12 DIAGNOSIS — Z79.01 LONG TERM CURRENT USE OF ANTICOAGULANT THERAPY: ICD-10-CM

## 2018-07-12 LAB — INR, EXTERNAL: 3.3

## 2018-07-12 NOTE — PROGRESS NOTES
Verbal order and read back per Rachel Hardy NP  Patient is not with therapeutic range  Hold today then Continue take Coumadin 2.5mg daily   Recheck 2 weeks

## 2018-07-13 ENCOUNTER — APPOINTMENT (OUTPATIENT)
Dept: PHYSICAL THERAPY | Age: 83
End: 2018-07-13
Payer: MEDICARE

## 2018-07-17 ENCOUNTER — HOSPITAL ENCOUNTER (OUTPATIENT)
Dept: PHYSICAL THERAPY | Age: 83
Discharge: HOME | End: 2018-07-17
Payer: MEDICARE

## 2018-07-17 PROCEDURE — 92526 ORAL FUNCTION THERAPY: CPT

## 2018-07-17 NOTE — PROGRESS NOTES
ST DAILY TREATMENT NOTE    Patient Name: Dandre Medrano Sr.  Date:2018  : 1932  [x]  Patient  Verified  Payor: Payor: Tianna Jackson / Plan: [place] MEDICARE PART A & B / Product Type: Medicare /   In time: 9:48  Out time: 10:30  Total Treatment Time (min): 42  Visit #: 4 of 24  Treatment Diagnosis: Dysphagia [R13.10]    SUBJECTIVE  Pain Level (0-10 scale): 0  Any medication changes, allergies to medications, adverse drug reactions, diagnosis change, or new procedure performed?: [x] No    [] Yes (see summary sheet for update)    Subjective functional status/changes:   [] No changes reported   Patient stated that he has been doing all of his swallowing exercises. Charting forms for exercises were brought back and current with all exercises and marked off for 25 trials. OBJECTIVE  Treatment provided includes:  Increase/Improve:  []  Voice Quality []  Cognitive Linguistic Skills [x]  Laryngeal/Pharyngeal Exercises   []  Vocal Loudness []  Reading Comprehension [x]  Swallowing Skills    []  Vocal Cord Function []  Auditory Comprehension [x]  Oral Motor Skills   []  Resonance []  Writing Skills [x]  Compensatory strategies    []  Speech Intelligibility []  Expressive Language []  Attention   []  Breath Support/Coord. []  Receptive language []  Memory   []  Articulation [x]  Safety Awareness []    []  Fluency []  Word Retrieval []         Treatment Provided:   pharyngeal/laryngeal strengthening exercises   NTL trials with use of small sip, chin tuck, double swallow effortful swallow and periodic throat clear. How to thicken liquids to NTL's, patient with return teaching and demo (correct)        Patient/Caregiver  Education: [x] Review HEP      HEP/Handouts given: How to thicken liquids to NTL's Continue HEP, can have NTL's now with the use of safe swallowing strategies currently using with periodic throat clear.   Never hold your tongue between your teeth for anything except Natividad exercise, never with any food or liquid. Have aggressive oral care 2-3 times per day. Pain Level (0-10 scale) post treatment: 0     ASSESSMENT   Patient  reports using NTL's now after trials last week. He can transition to NTL -he thickened correctly today after demo. He would occasionally try to perform Natividad while having liquid in his mouth, requiring re-teaching. Progressing nicely with his exercises and performing them regularly at home now with wife helping him with daily charting as he is performing them. .Patient demo decreased episodes of throat clearing and globus complaints with NTL's trials today. Periodic throat clearing added to strategies. []   Improving appropriately and progressing toward goals  [x]   Improving slowly and progressing toward goals  []   Approximating goals/maximum potential  [x]   Continues to benefit from skilled therapy to address remaining functional deficits  []   Not progressing toward goals and plan of care will be adjusted    Patient will continue to benefit from skilled therapy to address remaining functional deficits: dysphagia    Progress towards goals / Updated goals:    Short Term Goals:     1.) Pt will perform pharyngeal exercises in structured therapy setting in order to improve functional swallow in 10:10 trials with Min A over 2 therapy sessions to promote carryover of HEP.  7/17/18:   Vocal fold adduction vowel ah prolongation with effortful pushing  X12; Natividad: 15 trials Min A, Mendelsohn: 810 trials Min -Mod A  80% successful with holding larynx up for 6-8 seconds., Supraglottic swallow: x10 with NTL trials  mod A.,  ee pitch: x 12 min A.   2). Pt will perform oral motor exercises in structured therapy setting in order to improve functional swallow in 10:10 trials with Min A to sirisha over 2 therapy sessions to promote carryover of HEP.  7/17/18: goal met on prior sessions.     3.) Pt will report following recommended honey thick liquids and regular diet at home consistently across 3 therapy sessions in order to improve Pt safety at meal time. (first one met 6/26/18)  7/17/18:  Goal met last session. He reports all liquids comsumed have been honey thick/ and with regular diet. (3 of 3 session with report of 100% compliance)    4. ) Pt will report utilizing chin tuck and double swallow 90% of the time with min cues to S when eating/drinking at home consistently across 3 therapy sessions in order to improve Pt safety at meal time.   (goal revised to 90% with min cues to S due to patient's memory diffiuclty)    7/17/18:  Patient reports using chin tuck 75-80% of the time due to wife reminding him and written reminders posted. He reports that he no longer cheating on the chin tuck. He reports that he is using double swallow 90% or more. 5.) Pt will tolerate nectar thick liquids with 0 s/sx of aspiration/penetration in 10:10 trials Any across 2 therapy sessions in order to improve Pt safety with liquids and increase overall QOL.    7/17/18:  15 trials:  throat clear post swallow 4/15 trials as he stated . Periodic volitional throat clear added to compensatory swallowing strategies. 6.) Pt will tolerate regular diet trials and nectar thick liquids utilizing compensatory strategies (including chin tuck and double swallow) in 10:10 trials with Min A over 2 therapy sessions in order to reduce risk for aspiration. 7/17/18:   Regular consistency 10/10 trials with use of chin tuck double swallow with min cues. PLAN  []  Continue plan of care  [x]  Modify Goals/Treatment Plan[de-identified] Nectar thick liquids at home with continued use of safe swallowing strategies.        []  Discharge due to:  [] Other:      Christal Credit, JESSICA, M.S. [address] [facility]   7/17/2018  9:48  AM    Future Appointments  Date Time Provider Mary [place]   7/20/2018 3:15 PM Christal Orellana, JESSICA [facility]   7/24/2018 1:00 PM Christal Orellana, JESSICA [facility]   7/27/2018 9:00 AM Christal Credit, JESSICA [facility] 10/23/2018 9:30 AM Lenore Salcedo MD [facility] SCHED   12/13/2018 9:20 AM Zehra Marroquin MD [address] [place] [address]   1/9/2019 10:00 AM [facility] SCHED   1/9/2019 11:00 AM [facility] NONIMAGING [facility] SCHED   1/9/2019 1:30 PM Mandy Carty MD [place] [id number]

## 2018-07-20 ENCOUNTER — HOSPITAL ENCOUNTER (OUTPATIENT)
Dept: PHYSICAL THERAPY | Age: 83
Discharge: HOME | End: 2018-07-20
Payer: MEDICARE

## 2018-07-20 PROCEDURE — 92526 ORAL FUNCTION THERAPY: CPT

## 2018-07-20 NOTE — PROGRESS NOTES
ST DAILY TREATMENT NOTE    Patient Name: Panchito Boss.  Date:2018  : 1932  [x]  Patient  Verified  Payor: Payor: Manpreet Walter / Plan: [place] / Product Type: Medicare /   In time: 3:06  Out time: 3:46  Total Treatment Time (min): 40  Visit #: 5 of 24  Treatment Diagnosis: Dysphagia [R13.10]    SUBJECTIVE  Pain Level (0-10 scale): 0  Any medication changes, allergies to medications, adverse drug reactions, diagnosis change, or new procedure performed?: [x] No    [] Yes (see summary sheet for update)    Subjective functional status/changes:   [] No changes reported   Patient stated that he has been doing all of his swallowing exercises and he 'been a good boy' He forgot to bring his charting forms but has been doing them all-25 trials of each. Script for repeat MBS faxed to Dr. Katherine Shine. OBJECTIVE  Treatment provided includes:  Increase/Improve:  []  Voice Quality []  Cognitive Linguistic Skills [x]  Laryngeal/Pharyngeal Exercises   []  Vocal Loudness []  Reading Comprehension [x]  Swallowing Skills    []  Vocal Cord Function []  Auditory Comprehension [x]  Oral Motor Skills   []  Resonance []  Writing Skills [x]  Compensatory strategies    []  Speech Intelligibility []  Expressive Language []  Attention   []  Breath Support/Coord. []  Receptive language []  Memory   []  Articulation [x]  Safety Awareness []    []  Fluency []  Word Retrieval []       Treatment Provided:   pharyngeal/laryngeal strengthening exercises   Patient thicken liquids to NTL's; NTL trials with use of small sip, chin tuck, double swallow. effortful swallow  periodic throat clear. Patient/Caregiver  Education: [x] Review HEP      HEP/Handouts given:Continue HEP Never hold your tongue between your teeth for anything except Natividad exercise, never with any food or liquid. Have aggressive oral care 2-3 times per day.       Pain Level (0-10 scale) post treatment: 0      ASSESSMENT   Patient  Exhibited no overt s/sx of aspiration nor globus complaints with NTL trials. He was at S to mod I with the use of his swallowing strategies. Progressing well with all exercises, except for mendelsohn today-min-mod A for technqiue and monitoring to see if larynx justin and did not come down. [x]   Improving appropriately and progressing toward goals  []   Improving slowly and progressing toward goals  []   Approximating goals/maximum potential  [x]   Continues to benefit from skilled therapy to address remaining functional deficits  []   Not progressing toward goals and plan of care will be adjusted    Patient will continue to benefit from skilled therapy to address remaining functional deficits: dysphagia    Progress towards goals / Updated goals:    Short Term Goals:     1.) Pt will perform pharyngeal exercises in structured therapy setting in order to improve functional swallow in 10:10 trials with Min A over 2 therapy sessions to promote carryover of HEP.    7/20/18:   Vocal fold adduction vowel ah prolongation with effortful pushing  X15; Natividad: 15 trials S, Mendelsohn: 810 trials Min -Mod A  70% successful with holding larynx up for 6-8 seconds., Supraglottic swallow: x15 ,  ee pitch: x 15 min A to lower pitch some. 2). Pt will perform oral motor exercises in structured therapy setting in order to improve functional swallow in 10:10 trials with Min A to sirisha over 2 therapy sessions to promote carryover of HEP.  7/20/18: goal met on prior sessions. 3.) Pt will report following recommended honey thick liquids and regular diet at home consistently across 3 therapy sessions in order to improve Pt safety at meal time. (first one met 6/26/18)  7/20/18:  Goal met last session. He reports all liquids comsumed have been honey thick/ and with regular diet. (3 of 3 session with report of 100% compliance)    4. ) Pt will report utilizing chin tuck and double swallow 90% of the time with min cues to S when eating/drinking at home consistently across 3 therapy sessions in order to improve Pt safety at meal time.   (goal revised to 90% with min cues to S due to patient's memory diffiuclty)    7/20/18:  Patient reports using chin tuck 80% of the time due to wife reminding him and written reminders posted. He reports that he is using double swallow 90% or more. 5.) Pt will tolerate nectar thick liquids with 0 s/sx of aspiration/penetration in 10:10 trials Sirisha across 2 therapy sessions in order to improve Pt safety with liquids and increase overall QOL.   7/20/18:  10/10 trials sirisha. No overt s/sx of aspiration nor globus complaints. Clear vocal quality post swallow for all trials. 6.) Pt will tolerate regular diet trials and nectar thick liquids utilizing compensatory strategies (including chin tuck and double swallow) in 10:10 trials with Min A over 2 therapy sessions in order to reduce risk for aspiration. 7/20/18:   Regular consistency 10/10 trials with use of chin tuck double swallow with min cues. PLAN  []  Continue plan of care  [x]  Modify Goals/Treatment Plan[de-identified] Nectar thick liquids at home with continued use of safe swallowing strategies.        []  Discharge due to:  [] Other:      JESSICA Ayala, M.SOliva Escalante   7/20/2018  3:05 PM    Future Appointments  Date Time Provider [place]   7/24/2018 1:00 PM JESSICA Ayala [id number] [facility]   7/27/2018 9:00 AM JESSICA Ayala VPTJGSA [facility]   10/23/2018 9:30 AM Dawson Huertas MD [facility] SCHED   12/13/2018 9:20 AM Dwayne Grover MD [address] [place] [address]   1/9/2019 10:00 AM [facility] SCHED   1/9/2019 11:00 AM [facility] NONIMAGING [facility] SCHED   1/9/2019 1:30 PM Farrah Cui MD [place] [id number]

## 2018-07-24 ENCOUNTER — APPOINTMENT (OUTPATIENT)
Dept: PHYSICAL THERAPY | Age: 83
End: 2018-07-24
Payer: MEDICARE

## 2018-07-26 ENCOUNTER — TELEPHONE ANTICOAG (OUTPATIENT)
Dept: CARDIOLOGY CLINIC | Age: 83
End: 2018-07-26

## 2018-07-26 DIAGNOSIS — I26.99 OTHER PULMONARY EMBOLISM WITHOUT ACUTE COR PULMONALE, UNSPECIFIED CHRONICITY (HCC): ICD-10-CM

## 2018-07-26 DIAGNOSIS — Z79.01 LONG TERM CURRENT USE OF ANTICOAGULANT THERAPY: ICD-10-CM

## 2018-07-26 DIAGNOSIS — I48.92 ATRIAL FLUTTER WITH RAPID VENTRICULAR RESPONSE (HCC): Primary | ICD-10-CM

## 2018-07-26 LAB
INR, EXTERNAL: 2
INR, EXTERNAL: 2

## 2018-07-26 NOTE — PROGRESS NOTES
Verbal order and read back per Anton Rollins NP   Patient is within therapeutic range   Continue take Coumadin 2.5mg daily   Recheck 2 weeks

## 2018-07-27 ENCOUNTER — TELEPHONE ANTICOAG (OUTPATIENT)
Dept: CARDIOLOGY CLINIC | Age: 83
End: 2018-07-27

## 2018-07-27 ENCOUNTER — APPOINTMENT (OUTPATIENT)
Dept: PHYSICAL THERAPY | Age: 83
End: 2018-07-27
Payer: MEDICARE

## 2018-07-27 DIAGNOSIS — I48.92 ATRIAL FLUTTER WITH RAPID VENTRICULAR RESPONSE (HCC): Primary | ICD-10-CM

## 2018-07-27 DIAGNOSIS — I26.99 OTHER PULMONARY EMBOLISM WITHOUT ACUTE COR PULMONALE, UNSPECIFIED CHRONICITY (HCC): ICD-10-CM

## 2018-07-27 DIAGNOSIS — Z79.01 LONG TERM CURRENT USE OF ANTICOAGULANT THERAPY: ICD-10-CM

## 2018-07-31 ENCOUNTER — HOSPITAL ENCOUNTER (OUTPATIENT)
Dept: GENERAL RADIOLOGY | Age: 83
Discharge: HOME | End: 2018-07-31
Attending: INTERNAL MEDICINE
Payer: MEDICARE

## 2018-07-31 DIAGNOSIS — R13.12 OROPHARYNGEAL DYSPHAGIA: ICD-10-CM

## 2018-07-31 PROCEDURE — 92611 MOTION FLUOROSCOPY/SWALLOW: CPT

## 2018-07-31 PROCEDURE — 74011000255 HC RX REV CODE- 255: Performed by: INTERNAL MEDICINE

## 2018-07-31 PROCEDURE — G8996 SWALLOW CURRENT STATUS: HCPCS

## 2018-07-31 PROCEDURE — 74230 X-RAY XM SWLNG FUNCJ C+: CPT

## 2018-07-31 PROCEDURE — G8997 SWALLOW GOAL STATUS: HCPCS

## 2018-07-31 PROCEDURE — G8998 SWALLOW D/C STATUS: HCPCS

## 2018-07-31 RX ADMIN — BARIUM SULFATE 30 ML: 400 SUSPENSION ORAL at 14:00

## 2018-07-31 RX ADMIN — BARIUM SULFATE 700 MG: 700 TABLET ORAL at 14:00

## 2018-07-31 RX ADMIN — BARIUM SULFATE 30 ML: 400 PASTE ORAL at 14:00

## 2018-07-31 RX ADMIN — BARIUM SULFATE 45 ML: 400 SUSPENSION ORAL at 14:00

## 2018-07-31 NOTE — PROGRESS NOTES
Outpatient Modified Barium Swallow Evaluation Patient: Paige Shi Sr. (80 y.o. male) Date: 7/31/2018 Primary Diagnosis: Oropharyngeal dysphagia [R13.12] Precautions: aspiration Videofluoroscopy Results MBS completed with results yielding mod pharyngeal dysphagia c/b silent aspiration of nectar-thick liquids. Chin tuck, oral bolus hold, and effortful swallows resulted in laryngeal penetration with nectar-thick liquids. Pt tolerated reg solid, puree, honey-thick, and 13 mm Ba pill with honey-thick liquids without any aspiration/penetration events. Premature spillage noted throughout all PO trials. Deficits include decreased tongue base retraction, poor laryngeal elevation, and decreased pharyngeal motility/sensation noted. Positive oropharyngeal clearance appreciated. Rec reg solid diet with honey-thick liquids, aspiration precautions, oral care TID, and meds as tolerated. Rec continuation of SLP services to address above stated deficits. Results/reommendations reviewed with pt in detail following study with verbalized understanding. Video Flouroscopic Procedures 
[x] Lateral View   [] A-P View [] Scanned to level of Sternum    [x] Seated at 90 deg. [] Other: 
 
Presentation:   
[x] Spoon   [x] Cup   [] Straw   [] Syringe   [x] Consecutive Swallows  [] Other: 
 
Consistencies:  
[] Ba+ liquid   [x] Ba+ liquid (nectar)   [x] Ba+ liquid (honey)   [x] Ba+ puree [x] Ba+ cookie [x] 13 mm Ba pill with 13 Ba wash Treatment Techniques Attempted 
[] Head Turn: [] Right [] Left    
[] Head Tilt: [] Right [] Left    
[] Chin Down: 
[] Small Sips/bites: 
[] Effortful swallow: 
[] Double swallow: 
[] Other: 
 
Results Dysphagia Present:    
[x] Yes  [] No 
 
Ratings of Dysphagia:    
[] Mild  [x] Moderate  [] Severe Stages of Breakdown:  
[] Oral  [x] Pharyngeal  [] Esophageal 
 
Aspiration:  
[x] Yes    [] No  [] At Risk     Cough: [] Yes      [x] No  
 
Penetration:  
[] Yes    [] No     Cough: [] Yes      [x] No  
[] Flash/trace [x] Mod  
[] To Chords Consistency Aspirated:   Consistency Penetrated:  
[] Thin Liquid     [] Thin Liquid [x] Nectar-thick Liquid    [x] Nectar-thick Liquid With compensatory strategies [] Honey-thick Liquid    [] Honey-thick Liquid  
[] Puree     [] Puree 
[] Solid     [] Solid Motility Problems with: 
[] Lip Closure:   
[] Mastication:  
[] Bolus Formation/control:  
[] A-P Transport: 
[x] Tongue Base Retraction:mod 
[] Swallow Response (delayed): 
[] Velopharyngeal Closure: 
[] Pharyngeal Aspirations: 
[x] Laryngeal Elevation/adduction: mod 
[] Epiglottic Inversion: 
[x] Pharyngeal motility/sensation: mod 
[] Cricopharyngeal Relaxation: 
[] Esophageal Peristalsis: 
[] Other: 
 
Timing of Aspiration/Penetration: 
[] Before Swallow: 
[x] During Swallow: with nectar-thick liquids only 
[] After Swallow: 
 
Transit Time Delay: 
[] >1 Second  Oral 
[] >1 Second Pharyngeal 
[] >20 Second Esophageal  
 
Residuals: 
[] Vallecula:    [] Mild  [] Mod  [] Severe 
[] Pyriform Sinus:   [] Mild  [] Mod  [] Severe 
[] Posterior Pharyngeal Wall:  [] Mild  [] Mod  [] Severe GCODES(GN): GCODESwallowing: [id number] Swallow Current Status CL= 60-79%  Swallow Goal Status CL= 60-79%  Swallow D/C Status CL= 60-79% Thank you for this referral. 
 
Zakiya Avalos M.S. CCC-SLP/L Speech-Language Pathologist

## 2018-08-09 ENCOUNTER — TELEPHONE ANTICOAG (OUTPATIENT)
Dept: CARDIOLOGY CLINIC | Age: 83
End: 2018-08-09

## 2018-08-09 DIAGNOSIS — Z79.01 LONG TERM CURRENT USE OF ANTICOAGULANT THERAPY: ICD-10-CM

## 2018-08-09 DIAGNOSIS — I26.99 OTHER PULMONARY EMBOLISM WITHOUT ACUTE COR PULMONALE, UNSPECIFIED CHRONICITY (HCC): ICD-10-CM

## 2018-08-09 DIAGNOSIS — I48.92 ATRIAL FLUTTER WITH RAPID VENTRICULAR RESPONSE (HCC): Primary | ICD-10-CM

## 2018-08-09 LAB — INR, EXTERNAL: 2.1

## 2018-08-09 NOTE — PROGRESS NOTES
Verbal order and read back per Ynes Cui NP  Patient is within therapeutic range   Continue take Coumadin 2.5mg daily   Recheck 2 weeks   Patient's wife indicated understanding

## 2018-08-23 ENCOUNTER — TELEPHONE ANTICOAG (OUTPATIENT)
Dept: CARDIOLOGY CLINIC | Age: 83
End: 2018-08-23

## 2018-08-23 DIAGNOSIS — I48.92 ATRIAL FLUTTER WITH RAPID VENTRICULAR RESPONSE (HCC): Primary | ICD-10-CM

## 2018-08-23 DIAGNOSIS — Z79.01 LONG TERM CURRENT USE OF ANTICOAGULANT THERAPY: ICD-10-CM

## 2018-08-23 LAB — INR, EXTERNAL: 2.2

## 2018-09-06 ENCOUNTER — TELEPHONE ANTICOAG (OUTPATIENT)
Dept: CARDIOLOGY CLINIC | Age: 83
End: 2018-09-06

## 2018-09-06 DIAGNOSIS — I48.92 ATRIAL FLUTTER WITH RAPID VENTRICULAR RESPONSE (HCC): Primary | ICD-10-CM

## 2018-09-06 DIAGNOSIS — I26.99 OTHER PULMONARY EMBOLISM WITHOUT ACUTE COR PULMONALE, UNSPECIFIED CHRONICITY (HCC): ICD-10-CM

## 2018-09-06 DIAGNOSIS — Z79.01 LONG TERM CURRENT USE OF ANTICOAGULANT THERAPY: ICD-10-CM

## 2018-09-06 LAB — INR, EXTERNAL: 2.2

## 2018-09-06 NOTE — PROGRESS NOTES
Verbal order and read back per Lisa Patrick NP Patient is within therapeutic range Continue take Coumadin 2.5mg daily Recheck 2 weeks This has been fully explained to the patient's wife Sebas, who indicates understanding.

## 2018-09-14 NOTE — PROGRESS NOTES
[facility] [address] [facility] 
[phone] fax Speech Therapy Discharge Summary Patient name: Robert Irvin Start of Care: 18 Referral source: Franci Dyson MD : [id number] Medical/Treatment Diagnosis: Dysphagia [R13.10] Onset Date:2018 Prior Hospitalization: see medical history Provider#: [id number] Medications: Verified on Patient Summary List    
  
Comorbidities: Diverticulitis, Reflux, Lower GI bleed, Essential Hypertension, Pulmonary Embolism   
 Prior Level of Function: Independent, Regular diet and thin liquids  without s/s of aspiration nor reported difficulty                          
   
Visits from Start of Care: 14    Missed Visits: 0 Reporting Period : 18  to 18 Summary of Care: dysphagia treatments Goal:1.) Pt will perform pharyngeal exercises in structured therapy setting in order to improve functional swallow in 10:10 trials with Min A over 2 therapy sessions to promote carryover of HEP. Status at last note/certification:  not met but progressing. Natividad: 15 trials Min-Mod A (increase in trials and decreased level of cuing/support need in tx.  
                      Mendelsohn: 5:10 trials Mod A progressing. This difficult exercise had not been previously targeted. He is progressing with this exercise                       Falsetto pitch ee x10 mod A to take a bigger breath, elevate pitch higher progressing but still needs to elevate pitch higher. Effortful swallows: progressing: mod A. Hard K words: 25/25 sirisha Patient is progressing with his exercises but still needs varying levels of support. He is recalling them with more ease, overall.  
  
Status at discharge: not met due to patient not returning to tx. Progressin.   Most of the exercises were at min cues to S except for Mendelsohn. (min-mod A), Vocal fold adduction vowel ah prolongation with effortful pushing  X15; Natividad: 15 trials S, Mendelsohn: 8/10 trials Min -Mod A  70% successful with holding larynx up for 6-8 seconds., Supraglottic swallow: x15 ,  ee pitch: x 15 min A to lower pitch some. Goal 2). Pt will perform oral motor exercises in structured therapy setting in order to improve functional swallow in 10:10 trials with Min A to sirisha over 2 therapy sessions to promote carryover of HEP. Status at last note/certification: not met but nearing completion of this this goal                      Hard \"k\": 25/25 Sirisha   
                      Tongue tip against resistance: 8:10 Sirisha, 10:10 Min A 
                      Tongue lateralization against resistance both left and right: each:  8:10 trials min A - advance goal to S over 2 therapy sessions. Status at discharge: met   goal met. Hard \"k\": 30/30 Sirisha Tongue tip against resistance: 10:10 Sirisha, Tongue lateralization against resistance both left and right: each:  10:10 trials sirisha Goal 3) :  Pt will report following recommended honey thick liquids and regular diet at home consistently across 3 therapy sessions in order to improve Pt safety at meal time. (first one met 6/26/18) Status at last note/certification:not met but progressing. Met criterion for 1 of 3 session  6/26/18: Pt/wife reports thickening liquids to honey consistency 100% of the time . He is consuming a regular diet. Goal expected to be met in 2 more sessions (to maintain consistency). Status at discharge: met  Patient  reports all liquids consumed have been honey thick/ and with regular diet. (3 of 3 session with report of 100% compliance) Goal 4.) Pt will report utilizing chin tuck and double swallow 90% of the time with min cues to S when eating/drinking at home consistently across 3 therapy sessions in order to improve Pt safety at meal time.   (goal revised to 90% with min cues to S due to patient's memory difficulty) Status at last note/certification: not met but progressing toward completion. 6/26/18: Pt/wife report patient is utilizing chin tuck 80% of the time and double swallows ~90% of the time. Wife will notice patient did not use chin tuck, remind him and he will use it the remainder of the time at meals. Status at discharge: not met 7/20 Patient reports using chin tuck 80% of the time due to wife reminding him and written reminders posted. He reports that he is using double swallow 90% or more. Goal 5) Pt will tolerate nectar thick liquids with 0 s/sx of aspiration/penetration in 10:10 trials Sirisha across 2 therapy sessions in order to improve Pt safety with liquids and increase overall QOL. Status at last note/certification:Goal not met, progressing, 6/26/18:  10 half tsp to tsp of nectar thick trials: s/x of aspiration x2 with patient utilized a chin tuck 10/10 trials 100%trials with S, double swallow 10/10 trials sirisha clear vocal quality post swallow for all trials. Throat clear post swallow 2 of 10 trials) Status at discharge: not met due to results of MBSS dated 7/31/18: silent aspiration on nectar thick liquids. In tx 10/10 trials sirisha. No overt s/sx of aspiration nor globus complaints. Clear vocal quality post swallow for all trials. (however it was not known that he was silently aspirating this consistency at the time). Goal 6)  Pt will tolerate regular diet trials and nectar thick liquids utilizing compensatory strategies (including chin tuck and double swallow) in 10:10 trials with Min A over 2 therapy sessions in order to reduce risk for aspiration. Status at last note/certification:Goal met sirisha.   
Status at discharge: not met due to results of MBS dated 7/31/18: Silent aspiration on nectar thick liquids.  Met for Regular consistency 10/10 trials with use of chin tuck double swallow with min cues. Status with Long Term Goals:  
  
1. ) Pt will tolerate thin liquids with regular diet with 0 s/sx of aspiration/ penetration in order to maintain nutrition/hydration needs and increase overall QOL.    
Status at last note/certification:not met due to not targeted yet (not appropriate yet). Currently on nectar thick trials (see progress with short term goal # 6) Status at discharge:not met . Patient silently aspirates on nectar thick liquids found on MBSS dated 7/31/18. . Patient needs to be on honey thick liquids per results of MBS Goal 2.) Pt will consistently utilize safe swallowing strategies/compensatory techniques (including chin tuck and double swallow) consistently with PO in order to reduce risk for aspiration and increase overall QOL. Status at last note/certification: goal not met but progressing. 6/26/18: Pt/wife report patient is utilizing chin tuck 80% of the time and double swallows ~90% of the time. Wife will notice patient did not use chin tuck, remind him and he will use it the remainder of the time at meals. Status at discharge: not met but progressing.  7/20 Patient reports using chin tuck 80% of the time due to wife reminding him and written reminders posted. He reports that he is using double swallow 90% or more. 3.) Pt will tolerate a regular diet with no instances of oral residue Sirisha in order to reduce risk for aspiration, maintain nutrition/hydration needs, and increase overall QOL.              
  
 Status at last note/certification:  Goal not met as has not reached desired number of trials.  
 
Status at discharge:  met sirisha ASSESSMENT: Patient was progressing with his swallowing exercises use of compensatory swallowing strategies. He progressed in therapy from honey thick liquids to nectar thick liquids without s/sx of aspiration.  However, the results of his MBSS on 7/31/18 revealed that he silently aspirates on nectar thick liquids and needs to stay on honey thick liquids. The SLP who performed the study with radiology recommended that patient return to speech therapy for additional treatment. She felt with patient having more time to work on the exercises/strengthen the swallowing musculature that his swallowing status would improve. He did not do the HEP as much as desired until several weeks earlier as this SLP stressed that he must give them high priority. However patient did not return to speech therapy as that SLP recommended, he did not call and did not respond to messages left to call and schedule more tx visit. Videofluoroscopy Results from 7/31/18 : SLP at [facility] summary: MBS completed with results yielding mod pharyngeal dysphagia c/b silent aspiration of nectar-thick liquids. Chin tuck, oral bolus hold, and effortful swallows resulted in laryngeal penetration with nectar-thick liquids. Pt tolerated reg solid, puree, honey-thick, and 13 mm Ba pill with honey-thick liquids without any aspiration/penetration events. Premature spillage noted throughout all PO trials. Deficits include decreased tongue base retraction, poor laryngeal elevation, and decreased pharyngeal motility/sensation noted. Positive oropharyngeal clearance appreciated. Rec reg solid diet with honey-thick liquids, aspiration precautions, oral care TID, and meds as tolerated. Rec continuation of SLP services to address above stated deficits. Results/reommendations reviewed with pt in detail following study with verbalized understanding. Patient remains at risk for aspiration if he is not consistently drinking honey thick liquids. He tolerated a regular consistency diet. It is recommended that his referring MD/or PCP speak to him about returning to speech therapy to address the above mentioned deficits and aspiration PNA risk. G Codes: not applicable as patient did not return to tx. RECOMMENDATIONS: 
[x]Discontinue therapy: []Patient has reached or is progressing toward set goals [x]Patient is non-compliant or has abdicated 
    []Due to lack of appreciable progress towards set goals JESSICA Tilley 9/14/2018 4:49 PM

## 2018-09-20 ENCOUNTER — TELEPHONE ANTICOAG (OUTPATIENT)
Dept: CARDIOLOGY CLINIC | Age: 83
End: 2018-09-20

## 2018-09-20 DIAGNOSIS — I26.99 OTHER PULMONARY EMBOLISM WITHOUT ACUTE COR PULMONALE, UNSPECIFIED CHRONICITY (HCC): ICD-10-CM

## 2018-09-20 DIAGNOSIS — I48.92 ATRIAL FLUTTER WITH RAPID VENTRICULAR RESPONSE (HCC): Primary | ICD-10-CM

## 2018-09-20 DIAGNOSIS — Z79.01 LONG TERM CURRENT USE OF ANTICOAGULANT THERAPY: ICD-10-CM

## 2018-09-20 LAB — INR, EXTERNAL: 1.8

## 2018-09-20 NOTE — PROGRESS NOTES
Verbal order and read back per Giovanni Mead NP Patient is not with therapeutic range Take 3.75 mg today then Continue take Coumadin 2.5mg daily Recheck 2 weeks

## 2018-10-04 ENCOUNTER — TELEPHONE ANTICOAG (OUTPATIENT)
Dept: CARDIOLOGY CLINIC | Age: 83
End: 2018-10-04

## 2018-10-04 DIAGNOSIS — I26.99 OTHER PULMONARY EMBOLISM WITHOUT ACUTE COR PULMONALE, UNSPECIFIED CHRONICITY (HCC): ICD-10-CM

## 2018-10-04 DIAGNOSIS — I48.92 ATRIAL FLUTTER WITH RAPID VENTRICULAR RESPONSE (HCC): Primary | ICD-10-CM

## 2018-10-04 DIAGNOSIS — Z79.01 LONG TERM CURRENT USE OF ANTICOAGULANT THERAPY: ICD-10-CM

## 2018-10-04 LAB — INR, EXTERNAL: 2.4

## 2018-10-04 NOTE — PROGRESS NOTES
Verbal order and read back per Sheldon Armstrong NP The INR is stable and therapeutic. Continue same dose of coumadin and recheck in 2 weeks. This has been fully explained to the patient's wife, who indicates understanding.

## 2018-10-18 ENCOUNTER — TELEPHONE ANTICOAG (OUTPATIENT)
Dept: CARDIOLOGY CLINIC | Age: 83
End: 2018-10-18

## 2018-10-18 LAB — INR, EXTERNAL: 2.1

## 2018-10-23 ENCOUNTER — OFFICE VISIT (OUTPATIENT)
Dept: FAMILY MEDICINE CLINIC | Age: 83
End: 2018-10-23

## 2018-10-23 VITALS
DIASTOLIC BLOOD PRESSURE: 70 MMHG | RESPIRATION RATE: 16 BRPM | BODY MASS INDEX: 31.21 KG/M2 | WEIGHT: 218 LBS | OXYGEN SATURATION: 95 % | SYSTOLIC BLOOD PRESSURE: 128 MMHG | HEIGHT: 70 IN | TEMPERATURE: 97.7 F | HEART RATE: 64 BPM

## 2018-10-23 DIAGNOSIS — Z98.890 S/P ABLATION OF ATRIAL FLUTTER: ICD-10-CM

## 2018-10-23 DIAGNOSIS — R63.4 WEIGHT LOSS: ICD-10-CM

## 2018-10-23 DIAGNOSIS — I10 ESSENTIAL HYPERTENSION: Primary | ICD-10-CM

## 2018-10-23 DIAGNOSIS — R79.89 ABNORMAL TSH: ICD-10-CM

## 2018-10-23 DIAGNOSIS — I48.92 ATRIAL FLUTTER WITH RAPID VENTRICULAR RESPONSE (HCC): ICD-10-CM

## 2018-10-23 DIAGNOSIS — Z23 ENCOUNTER FOR IMMUNIZATION: ICD-10-CM

## 2018-10-23 DIAGNOSIS — E87.6 HYPOKALEMIA: ICD-10-CM

## 2018-10-23 DIAGNOSIS — I42.9 CARDIOMYOPATHY, UNSPECIFIED TYPE (HCC): ICD-10-CM

## 2018-10-23 DIAGNOSIS — Z86.79 S/P ABLATION OF ATRIAL FLUTTER: ICD-10-CM

## 2018-10-23 DIAGNOSIS — E66.9 OBESITY WITH SERIOUS COMORBIDITY, UNSPECIFIED CLASSIFICATION, UNSPECIFIED OBESITY TYPE: ICD-10-CM

## 2018-10-23 DIAGNOSIS — I72.4 POPLITEAL ARTERY ANEURYSM (HCC): ICD-10-CM

## 2018-10-23 RX ORDER — METOPROLOL SUCCINATE 25 MG/1
TABLET, EXTENDED RELEASE ORAL
Qty: 90 TAB | Refills: 1 | Status: SHIPPED | OUTPATIENT
Start: 2018-10-23 | End: 2019-04-23 | Stop reason: SDUPTHER

## 2018-10-23 RX ORDER — POTASSIUM CHLORIDE 1500 MG/1
20 TABLET, FILM COATED, EXTENDED RELEASE ORAL DAILY
Qty: 90 TAB | Refills: 1 | Status: CANCELLED | OUTPATIENT
Start: 2018-10-23

## 2018-10-23 NOTE — PROGRESS NOTES
SUBJECTIVE Chief Complaint Patient presents with  Hypertension  Cardiomyopathy  Irregular Heart Beat  Blood Clot  
  pulmonary embolism  Obesity Patient presents for follow-up. Overall doing well. Has been keeping up with vascular and cardiology. Has had artery stenting in each leg in the past.  Having some general LE aches and pains. Has been checking INR at home as he is on coumadin. He has had a GI bleed and a DVT/PE last year. He has had his filter removed. He had profound anemia and hypokalemia but those seem to have resolved. He is not taking potassium that he knows. No increase in fatigued or dyspnea. No longer with GI bleeding or signs of that. No falls. No dizziness or lightheadedness. No chest pain. ROS: 
History obtained from the patient · Respiratory: no cough, shortness of breath, or wheezing · Cardiovascular: no chest pain, palpitations, or dyspnea on exertion · Gastrointestinal: no abdominal pain, N/V, or black or bloody stool. Has had diarrhea and excessive gas 2-3 episodes per week. He thinks this is diet related. · Neurological: no numbness, tingling, headache or dizziness. No memory loss. OBJECTIVE Blood pressure 128/70, pulse 64, temperature 97.7 °F (36.5 °C), temperature source Oral, resp. rate 16, height 5' 10\" (1.778 m), weight 218 lb (98.9 kg), SpO2 95 %. General:  Alert, cooperative, well appearing, in no apparent distress. ENT:  The tongue and mucous membranes are pink and moist without lesions. CV:  The heart sounds are regular in rate and rhythm. There is a normal S1 and S2.   
Lungs: Inspiratory and expiratory efforts are full and unlabored. Lung sounds are clear and equal to auscultation throughout all lung fields without wheezing, rales, or rhonchi. Abd: soft, nontender, nondistended. No HSM. No r/g. Ext: no swelling or tenderness. Skin:  No rashes, no jaundice. Results for orders placed or performed in visit on 10/18/18 AMB PT/INR EXTERNAL Result Value Ref Range INR, External 2.1 ASSESSMENT / PLAN 
  ICD-10-CM ICD-9-CM 1. Essential hypertension I10 401.9 CBC W/O DIFF  
   METABOLIC PANEL, COMPREHENSIVE  
   LIPID PANEL 2. Cardiomyopathy, unspecified type ([facility] I42.9 425.4 TSH 3RD GENERATION 3. Atrial flutter with rapid ventricular response (HCC) I48.92 427.32 metoprolol succinate (TOPROL-XL) 25 mg XL tablet TSH 3RD GENERATION 4. S/P ablation of atrial flutter Z98.890 V45.89   
 Z86.79    
5. 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 Obesity with serious comorbidity, unspecified classification, unspecified obesity type E66.9 278.00   
7. Hypokalemia N85.6 330.4 METABOLIC PANEL, COMPREHENSIVE 8. Abnormal TSH R79.89 790.6 TSH 3RD GENERATION 9. Encounter for immunization Z23 V03.89 INFLUENZA VACCINE INACTIVATED (IIV), SUBUNIT, ADJUVANTED, IM  
   ADMIN INFLUENZA VIRUS VAC 10. Weight loss R63.4 783.21 TSH 3RD GENERATION Labs reviewed. HTN - controlled considering his age-based recommendations. Cardiomyopathy - History of biatrial enlargement and transient cardiomyopathy in 04/2010 likely due to atrial flutter and rapid response. His last echocardiogram was 2011 with normal function. Notes reviewed. Paroxysmal atrial flutter - ablation 01/2013 without clinical recurrence - He is on beta-blocker. He sees cardiology. Notes reviewed. PE / DVT on anticoagulation - cont anticoagulation. He will watch closely for signs of bleeding. Cont per coumadin clinic.  Goal INR between 2-3 and stable.   
 
History of popliteal aneurysm status post stenting - cont vascular follow-up. Hypokalemia - he is off potassium. He will check labs in 6 months. Obesity - diet and exercise as tolerated. History of abn TSH - He has had a weight loss. I have ordered a TSH. Flu vaccine administered. All chart history elements were reviewed by me at the time of the visit even though marked at time of note closure. Patient understands our medical plan. Patient has provided input and agrees with goals. Alternatives have been explained and offered. All questions answered. The patient is to call if condition worsens or fails to improve. Follow-up Disposition: 
Return in about 6 months (around 4/23/2019) for annual medicare wellness/routine care and please have 10 hour fasting labs 3-7 days prior.

## 2018-10-23 NOTE — PROGRESS NOTES
Chief Complaint Patient presents with  Hypertension  Cardiomyopathy  Irregular Heart Beat  Blood Clot  
  pulmonary embolism  Obesity Cardiology f/u with Dr. Maurice Uriostegui 12/13/18 Vascular Surgery f/u with Dr. Shai John 1/9/19 Physician order obtained. Patient completed adult immunization consent form. Allergies, contraindications and recommendations reviewed with patient. High dose seasonal influenza vaccine administered IM right deltoid. Patient tolerated well. Patient remained in office for 15 minutes after injection and no adverse reactions were noted. 1. Have you been to the ER, urgent care clinic since your last visit? Hospitalized since your last visit? No 
 
2. Have you seen or consulted any other health care providers outside of the [address] [place] [address] since your last visit? Include any pap smears or colon screening.  No

## 2018-10-23 NOTE — PATIENT INSTRUCTIONS
A Healthy Lifestyle: Care Instructions Your Care Instructions A healthy lifestyle can help you feel good, stay at a healthy weight, and have plenty of energy for both work and play. A healthy lifestyle is something you can share with your whole family. A healthy lifestyle also can lower your risk for serious health problems, such as high blood pressure, heart disease, and diabetes. You can follow a few steps listed below to improve your health and the health of your family. Follow-up care is a key part of your treatment and safety. Be sure to make and go to all appointments, and call your doctor if you are having problems. It's also a good idea to know your test results and keep a list of the medicines you take. How can you care for yourself at home? · Do not eat too much sugar, fat, or fast foods. You can still have dessert and treats now and then. The goal is moderation. · Start small to improve your eating habits. Pay attention to portion sizes, drink less juice and soda pop, and eat more fruits and vegetables. ? Eat a healthy amount of food. A 3-ounce serving of meat, for example, is about the size of a deck of cards. Fill the rest of your plate with vegetables and whole grains. ? Limit the amount of soda and sports drinks you have every day. Drink more water when you are thirsty. ? Eat at least 5 servings of fruits and vegetables every day. It may seem like a lot, but it is not hard to reach this goal. A serving or helping is 1 piece of fruit, 1 cup of vegetables, or 2 cups of leafy, raw vegetables. Have an apple or some carrot sticks as an afternoon snack instead of a candy bar. Try to have fruits and/or vegetables at every meal. 
· Make exercise part of your daily routine. You may want to start with simple activities, such as walking, bicycling, or slow swimming. Try to be active 30 to 60 minutes every day.  You do not need to do all 30 to 60 minutes all at once. For example, you can exercise 3 times a day for 10 or 20 minutes. Moderate exercise is safe for most people, but it is always a good idea to talk to your doctor before starting an exercise program. 
· Keep moving. [place] the lawn, work in the garden, or [employer]. Take the stairs instead of the elevator at work. · If you smoke, quit. People who smoke have an increased risk for heart attack, stroke, cancer, and other lung illnesses. Quitting is hard, but there are ways to boost your chance of quitting tobacco for good. ? Use nicotine gum, patches, or lozenges. ? Ask your doctor about stop-smoking programs and medicines. ? Keep trying. In addition to reducing your risk of diseases in the future, you will notice some benefits soon after you stop using tobacco. If you have shortness of breath or asthma symptoms, they will likely get better within a few weeks after you quit. · Limit how much alcohol you drink. Moderate amounts of alcohol (up to 2 drinks a day for men, 1 drink a day for women) are okay. But drinking too much can lead to liver problems, high blood pressure, and other health problems. Family health If you have a family, there are many things you can do together to improve your health. · Eat meals together as a family as often as possible. · Eat healthy foods. This includes fruits, vegetables, lean meats and dairy, and whole grains. · Include your family in your fitness plan. Most people think of activities such as jogging or tennis as the way to fitness, but there are many ways you and your family can be more active. Anything that makes you breathe hard and gets your heart pumping is exercise. Here are some tips: 
? Walk to do errands or to take your child to school or the bus. 
? Go for a family bike ride after dinner instead of watching TV. Where can you learn more? Go to http://fouzia-bere.info/. Enter O939 in the search box to learn more about \"A Healthy Lifestyle: Care Instructions. \" Current as of: December 7, 2017 Content Version: 11.8 © 6077-8041 [employer]. Care instructions adapted under license by [employer] (which disclaims liability or warranty for this information). If you have questions about a medical condition or this instruction, always ask your healthcare professional. [address] any warranty or liability for your use of this information. Gas and Bloating: Care Instructions Your Care Instructions Gas and bloating can be uncomfortable and embarrassing problems. All people pass gas, but some people produce more gas than others, sometimes enough to cause distress. It is normal to pass gas from 6 to 20 times per day. Excess gas usually is not caused by a serious health problem. Gas and bloating usually are caused by something you eat or drink, including some food supplements and medicines. Gas and bloating are usually harmless and go away without treatment. However, changing your diet can help end the problem. Some over-the-counter medicines can help prevent gas and relieve bloating. Follow-up care is a key part of your treatment and safety. Be sure to make and go to all appointments, and call your doctor if you are having problems. It's also a good idea to know your test results and keep a list of the medicines you take. How can you care for yourself at home? · Keep a food diary if you think a food gives you gas. Write down what you eat or drink. Also record when you get gas. If you notice that a food seems to cause your gas each time, avoid it and see if the gas goes away. Examples of foods that cause gas include: ? Fried and fatty foods. ? Beans. ? Vegetables such as artichokes, asparagus, broccoli, brussels sprouts, cabbage, cauliflower, cucumbers, green peppers, onions, peas, radishes, and raw potatoes. ? Fruits such as apricots, bananas, melons, peaches, pears, prunes, and raw apples. ? Wheat and wheat bran. · Soak dry beans in water overnight, then dump the water and cook the soaked beans in new water. This can help prevent gas and bloating. · If you have problems with lactose, avoid dairy products such as milk and cheese. · Try not to swallow air. Do not drink through a straw, gulp your food, or chew gum. · Take an over-the-counter medicine. Read and follow all instructions on the label. ? Food enzymes, such as Beano, can be added to gas-producing foods to prevent gas. ? Antacids, such as Maalox Anti-Gas and Mylanta Gas, can relieve bloating by making you burp. Be careful when you take over-the-counter antacid medicines. Many of these medicines have aspirin in them. Read the label to make sure that you are not taking more than the recommended dose. Too much aspirin can be harmful. ? Activated charcoal tablets, such as CharcoCaps, may decrease odor from gas you pass. ? If you have problems with lactose, you can take medicines such as Dairy Ease and Lactaid with dairy products to prevent gas and bloating. · Get some exercise regularly. When should you call for help? Call 911 anytime you think you may need emergency care. For example, call if: 
  · You have gas and signs of a heart attack, such as: 
? Chest pain or pressure. ? Sweating. ? Shortness of breath. ? Nausea or vomiting. ? Pain that spreads from the chest to the neck, jaw, or one or both shoulders or arms. ? Dizziness or lightheadedness. ? A fast or uneven pulse. After calling 911, chew 1 adult-strength aspirin. Wait for an ambulance. Do not try to drive yourself.  
 Call your doctor now or seek immediate medical care if: 
  · You have severe belly pain.  
  · You have blood in your stool.  
 Watch closely for changes in your health, and be sure to contact your doctor if: 
  · You have blood or pus in your urine.   · Your urine is cloudy or smells bad.  
  · You are burping and have trouble swallowing.  
  · You feel bloated and have swelling in your belly.  
  · You do not get better as expected. Where can you learn more? Go to http://fouzia-bere.info/. Enter F314 in the search box to learn more about \"Gas and Bloating: Care Instructions. \" Current as of: November 20, 2017 Content Version: 11.8 © 4654-5668 Healthwise, [employer]. Care instructions adapted under license by [employer] (which disclaims liability or warranty for this information). If you have questions about a medical condition or this instruction, always ask your healthcare professional. [address] any warranty or liability for your use of this information. Follow up in 6 months for annual medicare wellness/routine care and please have 10 hour fasting labs 3-7 days prior

## 2018-10-25 ENCOUNTER — TELEPHONE ANTICOAG (OUTPATIENT)
Dept: CARDIOLOGY CLINIC | Age: 83
End: 2018-10-25

## 2018-10-25 LAB — INR, EXTERNAL: 2.3

## 2018-11-08 ENCOUNTER — TELEPHONE ANTICOAG (OUTPATIENT)
Dept: CARDIOLOGY CLINIC | Age: 83
End: 2018-11-08

## 2018-11-08 DIAGNOSIS — Z79.01 LONG TERM CURRENT USE OF ANTICOAGULANT THERAPY: ICD-10-CM

## 2018-11-08 DIAGNOSIS — I26.99 OTHER PULMONARY EMBOLISM WITHOUT ACUTE COR PULMONALE, UNSPECIFIED CHRONICITY (HCC): ICD-10-CM

## 2018-11-08 DIAGNOSIS — I48.92 ATRIAL FLUTTER WITH RAPID VENTRICULAR RESPONSE (HCC): Primary | ICD-10-CM

## 2018-11-08 LAB — INR, EXTERNAL: 2.5

## 2018-11-08 NOTE — PROGRESS NOTES
The INR is stable and therapeutic. Continue same dose of coumadin and recheck in 2 weeks Continue take Coumadin 2.5mg daily Verbal order and read back per Didi Madrigal NP Wife given instructions with read back. She verbalized understanding.

## 2018-11-12 RX ORDER — WARFARIN 2.5 MG/1
TABLET ORAL
Qty: 90 TAB | Refills: 3 | Status: SHIPPED | OUTPATIENT
Start: 2018-11-12 | End: 2018-12-13 | Stop reason: SDUPTHER

## 2018-11-23 LAB
INR, EXTERNAL: 2.4
INR, EXTERNAL: 2.4

## 2018-11-27 ENCOUNTER — TELEPHONE ANTICOAG (OUTPATIENT)
Dept: CARDIOLOGY CLINIC | Age: 83
End: 2018-11-27

## 2018-11-27 DIAGNOSIS — I26.99 OTHER PULMONARY EMBOLISM WITHOUT ACUTE COR PULMONALE, UNSPECIFIED CHRONICITY (HCC): ICD-10-CM

## 2018-11-27 DIAGNOSIS — Z79.01 LONG TERM CURRENT USE OF ANTICOAGULANT THERAPY: ICD-10-CM

## 2018-11-27 DIAGNOSIS — I48.92 ATRIAL FLUTTER WITH RAPID VENTRICULAR RESPONSE (HCC): Primary | ICD-10-CM

## 2018-11-27 NOTE — PROGRESS NOTES
Verbal order and read back per Rodger Magallon NP The INR is stable and therapeutic. Continue same dose of coumadin and recheck in 1 month.

## 2018-12-04 ENCOUNTER — TELEPHONE ANTICOAG (OUTPATIENT)
Dept: CARDIOLOGY CLINIC | Age: 83
End: 2018-12-04

## 2018-12-04 DIAGNOSIS — I48.92 ATRIAL FLUTTER WITH RAPID VENTRICULAR RESPONSE (HCC): Primary | ICD-10-CM

## 2018-12-04 DIAGNOSIS — Z79.01 LONG TERM CURRENT USE OF ANTICOAGULANT THERAPY: ICD-10-CM

## 2018-12-04 DIAGNOSIS — I26.99 OTHER PULMONARY EMBOLISM WITHOUT ACUTE COR PULMONALE, UNSPECIFIED CHRONICITY (HCC): ICD-10-CM

## 2018-12-04 LAB
INR, EXTERNAL: 2.2
INR, EXTERNAL: 2.2

## 2018-12-04 NOTE — PROGRESS NOTES
Verbal order and read back per Francesca Lee NP Patient is within therapeutic range Continue take Coumadin 2.5mg daily Recheck 2 weeks Around 12/19/2018 Left message

## 2018-12-13 ENCOUNTER — PATIENT OUTREACH (OUTPATIENT)
Dept: FAMILY MEDICINE CLINIC | Age: 83
End: 2018-12-13

## 2018-12-13 ENCOUNTER — OFFICE VISIT (OUTPATIENT)
Dept: CARDIOLOGY CLINIC | Age: 83
End: 2018-12-13

## 2018-12-13 VITALS
BODY MASS INDEX: 32.35 KG/M2 | HEIGHT: 70 IN | WEIGHT: 226 LBS | SYSTOLIC BLOOD PRESSURE: 120 MMHG | HEART RATE: 61 BPM | DIASTOLIC BLOOD PRESSURE: 70 MMHG | OXYGEN SATURATION: 98 %

## 2018-12-13 DIAGNOSIS — Z98.890 S/P ABLATION OF ATRIAL FLUTTER: Primary | ICD-10-CM

## 2018-12-13 DIAGNOSIS — I48.92 PAROXYSMAL ATRIAL FLUTTER (HCC): ICD-10-CM

## 2018-12-13 DIAGNOSIS — Z86.79 S/P ABLATION OF ATRIAL FLUTTER: Primary | ICD-10-CM

## 2018-12-13 DIAGNOSIS — Z79.01 LONG TERM CURRENT USE OF ANTICOAGULANT THERAPY: ICD-10-CM

## 2018-12-13 DIAGNOSIS — I48.92 ATRIAL FLUTTER WITH RAPID VENTRICULAR RESPONSE (HCC): ICD-10-CM

## 2018-12-13 DIAGNOSIS — I42.9 CARDIOMYOPATHY, UNSPECIFIED TYPE (HCC): ICD-10-CM

## 2018-12-13 RX ORDER — WARFARIN 2.5 MG/1
TABLET ORAL
Qty: 90 TAB | Refills: 3 | Status: SHIPPED | OUTPATIENT
Start: 2018-12-13 | End: 2019-06-14 | Stop reason: SDUPTHER

## 2018-12-13 NOTE — PROGRESS NOTES
History of Present Illness: An 80year-old male here for followup. He had a BALAJI 11/2017 without any atrial masses. He has a history of pulmonary embolism on Coumadin and remote atrial flutter. Overall, he has been doing well. His major limitation is when he bike rides since his previous knee surgery. He denies any new chest pain, dyspnea, presyncope, syncope, PND, orthopnea or edema. He is accompanied by his wife. Impression:   1. History of popliteal aneurysm with stenting in 12/2017 by Dr. Kapil Recinos.   2. 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 3. History of remote atrial flutter with ablation in 2013 without recurrence. I had taken him off Coumadin for years also prior to the DVT and PE, but now is on indefinitely Coumadin. 4. History of transient cardiomyopathy in 2010. Normalized 11/2017.   5. Hypertension. 6. Degenerative joint disease with major limitation of the right knee due to previous surgery. Recommendations:  He had a history of spontaneous DVT and PE and will be on lifelong Coumadin with a goal INR between 2 and 3. He is overall doing quite well. His blood pressure is controlled. He has a history of recent normal function without evidence of heart failure. There was a concern initially about a left atrial mass and BALAJI was unremarkable. All questions were answered. I will see him back in a year. Past Medical History:   Diagnosis Date    Ankle fracture, left approx 2011    medial, tibial    Arthritis     knees    Cardiac echocardiogram 06/16/2011    Normal LV systolic function. Mild conc LVH. Gr 1 DDfx. RVSP 35-40 mmHg. Marked LAE. Marked LISA. Mild-mod MR.  Mild-mod AI. Mild AoRE. Mild aortic arch dil.  Cardiac Holter monitor study 06/22/2011    Baseline sinus rhythm to sinus bradycardia, avg HR 60 bpm (range  bpm). Questionable chronotropic intolerance. No sustained arrhythmias.     Emphysema lung (HCC)     GERD (gastroesophageal reflux disease)     Glaucoma suspect     posterior vitreous detachment b/l, pterygium left eye, aseroid hyalosis / synchisis, b/l pseudophakia    H/O colonoscopy 04/13/2011    1 polyp thermally treated    History of atrial fibrillation 2009    Hypertension     Pulmonary embolism ([address]     Unspecified adverse effect of anesthesia     H/o A-fib immediately post colonoscopy. Current Outpatient Medications   Medication Sig Dispense Refill    warfarin (COUMADIN) 2.5 mg tablet TAKE ONE TABLET BY MOUTH ONCE DAILY 90 Tab 3    metoprolol succinate (TOPROL-XL) 25 mg XL tablet TAKE ONE TABLET BY MOUTH ONCE DAILY 90 Tab 1    cyanocobalamin (VITAMIN B-12) 1,000 mcg tablet Take 1,000 mcg by mouth daily. Indications: PREVENTION OF VITAMIN B12 DEFICIENCY      potassium chloride SR (K-TAB) 20 mEq tablet Take 1 Tab by mouth daily. 90 Tab 1    ascorbic acid, vitamin C, (VITAMIN C) 500 mg tablet Take 1,000 mg by mouth daily.  psyllium husk (METAMUCIL) 3.4 gram/5.4 gram powd Take  by mouth two (2) times a day.  multivitamin (ONE A DAY) tablet Take 1 Tab by mouth daily.  glucosamine-D3-boswellia serr (OSTEO BI-FLEX) 1,500-400-100 mg-unit-mg Tab Take 1 Tab by mouth two (2) times a day.  omega-3 fatty acids-vitamin e (FISH OIL) 1,000 mg cap Take 1 Cap by mouth two (2) times a day. Social History   reports that he quit smoking about 53 years ago. He smoked 1.00 pack per day. he has never used smokeless tobacco.   reports that he drinks alcohol. Family History  family history includes Cancer in his brother; Diabetes in his brother; Heart Failure in his brother; Pacemaker in his father and sister. Review of Systems  Except as stated above include:  Constitutional: Negative for fever, chills and malaise/fatigue. HEENT: No congestion or recent URI. Gastrointestinal: No nausea, vomiting, abdominal pain, bloody stools.   Pulmonary:  Negative except as stated above.  Cardiac:  Negative except as stated above. Musculoskeletal: Negative except as stated above. Neurological:  No localized symptoms. Skin:  Negative except as stated above. Psych:  Negative except as stated above. Endocrine:  Negative except as stated above. PHYSICAL EXAM  BP Readings from Last 3 Encounters:   12/13/18 120/70   10/23/18 128/70   07/09/18 132/80     Pulse Readings from Last 3 Encounters:   12/13/18 61   10/23/18 64   07/09/18 74     Wt Readings from Last 3 Encounters:   12/13/18 102.5 kg (226 lb)   10/23/18 98.9 kg (218 lb)   07/09/18 101.2 kg (223 lb)     General:   Well developed, well groomed. Head/Neck:   No jugular venous distention     No carotid bruits. No evidence of xanthelasma. Lungs:   No respiratory distress. Clear bilaterally. Heart:    Regular rate and rhythm. Normal S1/S2. Palpation of heart with normal point of maximum impulse. No significant murmurs, rubs or gallops. Abdomen:   Soft and nontender. No palpable abdominal mass or bruits. Extremities:   Intact peripheral pulses. No significant edema. Neurological:   Alert and oriented to person, place, time. No focal neurological deficit visually.   Skin:   No obvious rash    Blood Pressure Metric:  Elvira Loomis has been given the following recommendations today due to his elevated BP reading: controlled

## 2018-12-13 NOTE — PROGRESS NOTES
Kevin Osorio presents today for   Chief Complaint   Patient presents with    Irregular Heart Beat     1 year follow up - no cardiac complaints        Kevin Osorio. preferred language for health care discussion is english/other. Is anyone accompanying the pt today? Wife     Is the patient using any DME equipment during OV? No     Depression Screening:  PHQ over the last two weeks 7/9/2018   Little interest or pleasure in doing things Not at all   Feeling down, depressed, irritable, or hopeless Not at all   Total Score PHQ 2 0       Learning Assessment:  Learning Assessment 7/9/2018   PRIMARY LEARNER Patient   HIGHEST LEVEL OF EDUCATION - PRIMARY LEARNER  -   BARRIERS PRIMARY LEARNER -   [address] -    NEED -   LEARNER PREFERENCE PRIMARY LISTENING     -     -   [id number] [facility] -   ANSWERED BY patient     -   RELATIONSHIP SELF       Abuse Screening:  Abuse Screening Questionnaire 4/23/2018   Do you ever feel afraid of your partner? N   Are you in a relationship with someone who physically or mentally threatens you? N   Is it safe for you to go home? Y       Fall Risk  Fall Risk Assessment, last 12 mths 7/9/2018   Able to walk? Yes   Fall in past 12 months? No   Fall with injury? -   Number of falls in past 12 months -   Fall Risk Score -       Pt currently taking Anticoagulant therapy? Warfarin     Coordination of Care:  1. Have you been to the ER, urgent care clinic since your last visit? Hospitalized since your last visit? No     2. Have you seen or consulted any other health care providers outside of the [address] [place] [address] since your last visit? Include any pap smears or colon screening.  No

## 2018-12-14 ENCOUNTER — TELEPHONE (OUTPATIENT)
Dept: FAMILY MEDICINE CLINIC | Age: 83
End: 2018-12-14

## 2018-12-18 ENCOUNTER — TELEPHONE ANTICOAG (OUTPATIENT)
Dept: CARDIOLOGY CLINIC | Age: 83
End: 2018-12-18

## 2018-12-18 DIAGNOSIS — Z79.01 LONG TERM CURRENT USE OF ANTICOAGULANT THERAPY: ICD-10-CM

## 2018-12-18 DIAGNOSIS — I26.99 OTHER PULMONARY EMBOLISM WITHOUT ACUTE COR PULMONALE, UNSPECIFIED CHRONICITY (HCC): ICD-10-CM

## 2018-12-18 DIAGNOSIS — I48.92 ATRIAL FLUTTER WITH RAPID VENTRICULAR RESPONSE (HCC): Primary | ICD-10-CM

## 2018-12-18 LAB — INR, EXTERNAL: 2.5

## 2018-12-18 NOTE — PROGRESS NOTES
Verbal order and read back per Sheldon Armstrong NP  Patient is not with therapeutic range  Continue take Coumadin 2.5mg daily   Recheck 2 weeks  This has been fully explained to the patient's wife, who indicates understanding.

## 2019-01-02 ENCOUNTER — TELEPHONE ANTICOAG (OUTPATIENT)
Dept: CARDIOLOGY CLINIC | Age: 84
End: 2019-01-02

## 2019-01-02 DIAGNOSIS — Z79.01 LONG TERM CURRENT USE OF ANTICOAGULANT THERAPY: ICD-10-CM

## 2019-01-02 DIAGNOSIS — I26.99 OTHER PULMONARY EMBOLISM WITHOUT ACUTE COR PULMONALE, UNSPECIFIED CHRONICITY (HCC): ICD-10-CM

## 2019-01-02 DIAGNOSIS — I48.92 ATRIAL FLUTTER WITH RAPID VENTRICULAR RESPONSE (HCC): Primary | ICD-10-CM

## 2019-01-02 LAB — INR, EXTERNAL: 2

## 2019-01-03 NOTE — PROGRESS NOTES
Verbal order and read back per Vandy Simmonds, NP Patient is within therapeutic range Continue take Coumadin 2.5mg daily Recheck 2 weeks This has been fully explained to the patient's emergency contact [facility] by Jailene Basilio, who indicates understanding.

## 2019-01-15 ENCOUNTER — TELEPHONE ANTICOAG (OUTPATIENT)
Dept: CARDIOLOGY CLINIC | Age: 84
End: 2019-01-15

## 2019-01-15 DIAGNOSIS — Z79.01 LONG TERM CURRENT USE OF ANTICOAGULANT THERAPY: ICD-10-CM

## 2019-01-15 DIAGNOSIS — I26.99 OTHER PULMONARY EMBOLISM WITHOUT ACUTE COR PULMONALE, UNSPECIFIED CHRONICITY (HCC): ICD-10-CM

## 2019-01-15 DIAGNOSIS — I48.92 ATRIAL FLUTTER WITH RAPID VENTRICULAR RESPONSE (HCC): Primary | ICD-10-CM

## 2019-01-15 LAB — INR, EXTERNAL: 3.4

## 2019-01-15 NOTE — PROGRESS NOTES
The INR is above the therapeutic range. Ask the patient about bleeding complications. Please make the following adjustments to Coumadin dosing: 0x1, 2.5 mg x1 then continue taking Coumadin 2.5mg daily . Repeat the INR in 1 week. Verbal order and read back per Katie Funez NP Patient's wife given instructions with read back. She verbalized understanding.

## 2019-01-21 ENCOUNTER — TELEPHONE ANTICOAG (OUTPATIENT)
Dept: CARDIOLOGY CLINIC | Age: 84
End: 2019-01-21

## 2019-01-21 LAB — INR, EXTERNAL: 1.5

## 2019-01-21 NOTE — PROGRESS NOTES
Verbal order and read back per Brittany Pump, NP 
5 mg x 1 continue taking Coumadin 2.5mg daily Patient wife aware of instruction

## 2019-01-28 ENCOUNTER — TELEPHONE ANTICOAG (OUTPATIENT)
Dept: CARDIOLOGY CLINIC | Age: 84
End: 2019-01-28

## 2019-01-28 DIAGNOSIS — Z79.01 LONG TERM CURRENT USE OF ANTICOAGULANT THERAPY: ICD-10-CM

## 2019-01-28 DIAGNOSIS — I48.92 ATRIAL FLUTTER WITH RAPID VENTRICULAR RESPONSE (HCC): Primary | ICD-10-CM

## 2019-01-28 DIAGNOSIS — I26.99 OTHER PULMONARY EMBOLISM WITHOUT ACUTE COR PULMONALE, UNSPECIFIED CHRONICITY (HCC): ICD-10-CM

## 2019-01-28 LAB — INR, EXTERNAL: 2.9

## 2019-01-28 NOTE — PROGRESS NOTES
Verbal order and read back per Campos Innocent, NP Follow up 2 weeks at request of patient Take 1.25 mg today then Coumadin 2.5mg daily This has been fully explained to the patient's wife,  who indicates understanding.

## 2019-02-11 ENCOUNTER — TELEPHONE ANTICOAG (OUTPATIENT)
Dept: CARDIOLOGY CLINIC | Age: 84
End: 2019-02-11

## 2019-02-11 ENCOUNTER — OFFICE VISIT (OUTPATIENT)
Dept: VASCULAR SURGERY | Age: 84
End: 2019-02-11

## 2019-02-11 ENCOUNTER — CLINICAL SUPPORT (OUTPATIENT)
Dept: VASCULAR SURGERY | Age: 84
End: 2019-02-11

## 2019-02-11 VITALS
HEART RATE: 74 BPM | WEIGHT: 226 LBS | HEIGHT: 70 IN | SYSTOLIC BLOOD PRESSURE: 132 MMHG | DIASTOLIC BLOOD PRESSURE: 60 MMHG | RESPIRATION RATE: 18 BRPM | BODY MASS INDEX: 32.35 KG/M2

## 2019-02-11 DIAGNOSIS — I72.4 FEMORAL ARTERY ANEURYSM, BILATERAL (HCC): ICD-10-CM

## 2019-02-11 DIAGNOSIS — Z79.01 LONG TERM CURRENT USE OF ANTICOAGULANT THERAPY: ICD-10-CM

## 2019-02-11 DIAGNOSIS — I72.4 POPLITEAL ARTERY ANEURYSM (HCC): Primary | ICD-10-CM

## 2019-02-11 DIAGNOSIS — I48.92 ATRIAL FLUTTER WITH RAPID VENTRICULAR RESPONSE (HCC): Primary | ICD-10-CM

## 2019-02-11 DIAGNOSIS — I72.4 POPLITEAL ARTERY ANEURYSM (HCC): ICD-10-CM

## 2019-02-11 LAB
INR, EXTERNAL: 2.1
LEFT ABI: 1.23
LEFT ANTERIOR TIBIAL: 202 MMHG
LEFT ARM BP: 160 MMHG
LEFT CFA AP DIAM: 1.36 CM
LEFT CFA PROX SYS PSV: 75.3 CM/S
LEFT POP A DIST VEL RATIO: 1.58
LEFT POP A MID VEL RATIO: 1.15
LEFT POP A PROX VEL RATIO: 0.42
LEFT POPLITEAL DIST SYS PSV: 66.2 CM/S
LEFT POPLITEAL MID SYS PSV: 42 CM/S
LEFT POPLITEAL PROX SYS PSV: 36.5 CM/S
LEFT POSTERIOR TIBIAL: 204 MMHG
LEFT PROX PFA A PSV: 36.8 CM/S
LEFT SFA DIST VEL RATIO: 0.96
LEFT SFA MID VEL RATIO: 1.04
LEFT SFA PROX VEL RATIO: 1.14
LEFT SUPER FEMORAL DIST SYS PSV: 86.2 CM/S
LEFT SUPER FEMORAL MID SYS PSV: 89.4 CM/S
LEFT SUPER FEMORAL PROX SYS PSV: 86.2 CM/S
Lab: 1 CM
Lab: 1 CM
Lab: 1.38 CM
Lab: 1.4 CM
Lab: 1.52 CM
Lab: 2.2 CM
Lab: 2.2 CM
RIGHT ABI: 1.17
RIGHT ANTERIOR TIBIAL: 193 MMHG
RIGHT ARM BP: 166 MMHG
RIGHT CFA AP DIAM: 1.48 CM
RIGHT CFA PROX SYS PSV: 70.5 CM/S
RIGHT DIST OUTFLOW PSV: 60 CM/S
RIGHT GRAFT 1 NAME: NORMAL
RIGHT INFLOW ARTERY PSV: 29 CM/S
RIGHT MID OUTFLOW PSV: 80 CM/S
RIGHT OUTFLOW VESSEL PSV: 73 CM/S
RIGHT POPLITEAL DIST SYS PSV: 73 CM/S
RIGHT POSTERIOR TIBIAL: 194 MMHG
RIGHT PROX OUTFLOW PSV: 45 CM/S
RIGHT PROX PFA A PSV: 48.3 CM/S
RIGHT SFA DIST VEL RATIO: 0.57
RIGHT SFA MID VEL RATIO: 1
RIGHT SFA PROX VEL RATIO: 1
RIGHT SUPER FEMORAL DIST SYS PSV: 42.3 CM/S
RIGHT SUPER FEMORAL MID SYS PSV: 73.7 CM/S
RIGHT SUPER FEMORAL PROX SYS PSV: 70.6 CM/S

## 2019-02-11 NOTE — PROGRESS NOTES
Verbal order and read back per Martín Lanier NP Coumadin 2.5mg daily Recheck in 2 weeks Left message on voice mail

## 2019-02-11 NOTE — PROGRESS NOTES
[address] Chief Complaint Patient presents with  Leg Pain History and Physical   
Emma Milian Sr. is a 80 y.o. 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 Right popliteal artery has been repaired with stenting. Overall patient seems to be doing fairly well. He had a recent fall which ended up causing him to have a fibular fracture on the left lower extremity. Since then he has had some swelling of that lower extremity which is resolved with compression sock. He also seems to be walking a little bit better but has had some issues with giving out of his lower extremity. No claudication or rest pain. No fevers or chills Past Medical History:  
Diagnosis Date  Ankle fracture, left approx 2011  
 medial, tibial  
 Arthritis   
 knees  Cardiac echocardiogram 06/16/2011 Normal LV systolic function. Mild conc LVH. Gr 1 DDfx. RVSP 35-40 mmHg. Marked LAE. Marked LISA. Mild-mod MR.  Mild-mod AI. Mild AoRE. Mild aortic arch dil.  Cardiac Holter monitor study 06/22/2011 Baseline sinus rhythm to sinus bradycardia, avg HR 60 bpm (range  bpm). Questionable chronotropic intolerance. No sustained arrhythmias.  Emphysema lung ([address]  GERD (gastroesophageal reflux disease)  Glaucoma suspect   
 posterior vitreous detachment b/l, pterygium left eye, aseroid hyalosis / synchisis, b/l pseudophakia  H/O colonoscopy 04/13/2011 1 polyp thermally treated  History of atrial fibrillation 2009  Hypertension  Pulmonary embolism ([address]  Unspecified adverse effect of anesthesia H/o A-fib immediately post colonoscopy. Past Surgical History:  
Procedure Laterality Date  CARDIAC SURG PROCEDURE UNLIST    
 hx atrial flutter ablation 2013  COLONOSCOPY N/A 10/6/2017  COLONOSCOPY performed by Dyan Aguilar MD at [address] HX COLONOSCOPY    
 HX GI    
  HX KNEE REPLACEMENT Right 02/19/2013 Dr. Oskar Live  HX ORTHOPAEDIC Right 2/13 TKR, Oskar Live  HX OTHER SURGICAL    
 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  
 by Dr. Shavonne Bender  HX TONSILLECTOMY  NE EGD DELIVER THERMAL ENERGY SPHNCTR/CARDIA GERD Patient Active Problem List  
Diagnosis Code  Cardiomyopathy ([facility] I42.9  DJD (degenerative joint disease) M19.90  Atrial flutter with rapid ventricular response (HCC) I48.92  
 S/P ablation of atrial flutter Z98.890, Z86.79  
 Essential hypertension I10  Lower GI bleed K92.2  Symptomatic anemia D64.9  Obesity E66.9  
 Pulmonary embolism (HCC) I26.99  
 Long term current use of anticoagulant therapy Z79.01  
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  
 metoprolol succinate (TOPROL-XL) 25 mg XL tablet TAKE ONE TABLET BY MOUTH ONCE DAILY 90 Tab 1  cyanocobalamin (VITAMIN B-12) 1,000 mcg tablet Take 1,000 mcg by mouth daily. Indications: PREVENTION OF VITAMIN B12 DEFICIENCY  potassium chloride SR (K-TAB) 20 mEq tablet Take 1 Tab by mouth daily. 90 Tab 1  
 ascorbic acid, vitamin C, (VITAMIN C) 500 mg tablet Take 1,000 mg by mouth daily.  psyllium husk (METAMUCIL) 3.4 gram/5.4 gram powd Take  by mouth two (2) times a day.  multivitamin (ONE A DAY) tablet Take 1 Tab by mouth daily.  glucosamine-D3-boswellia serr (OSTEO BI-FLEX) 1,500-400-100 mg-unit-mg Tab Take 1 Tab by mouth two (2) times a day.  omega-3 fatty acids-vitamin e (FISH OIL) 1,000 mg cap Take 1 Cap by mouth two (2) times a day. No Known Allergies Social History Socioeconomic History  Marital status:  Spouse name: Not on file  Number of children: Not on file  Years of education: Not on file  Highest education level: Not on file Social Needs  Financial resource strain: Not on file  Food insecurity - worry: Not on file  Food insecurity - inability: Not on file  Transportation needs - medical: Not on file  Transportation needs - non-medical: Not on file Occupational History  Not on file Tobacco Use  Smoking status: Former Smoker Packs/day: 1.00 Last attempt to quit: 1965 Years since quittin.1  Smokeless tobacco: Never Used Substance and Sexual Activity  Alcohol use: Yes Comment: occasionally.  Drug use: No  
 Sexual activity: Not Currently Partners: Female Other Topics Concern  Not on file Social History Narrative  Not on file Family History Problem Relation Age of Onset  Pacemaker Father  Pacemaker Sister  Heart Failure Brother  Diabetes Brother  Cancer Brother Lung Cancer Physical Exam:   
Visit Vitals /60 (BP 1 Location: Left arm, BP Patient Position: Sitting) Pulse 74 Resp 18 Ht 5' 10\" (1.778 m) Wt 226 lb (102.5 kg) BMI 32.43 kg/m² General: Well-appearing male in no acute distress HEENT: EOMI no scleral icterus is noted Pulmonary: No increased work of breathing is noted Extremities: Warm and well-perfused bilaterally patient does have 1-2+ edema bilateral lower extremities. No varicosities are identified bilaterally and no ulcerations are identified bilaterally Neuro: Cranial nerves II through XII are grossly intact Impression and Plan: 
Jessica Sandoval Sr. is a 80 y.o. 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 I reviewed his ultrasounds in clinic today showing that his aneurysms are fairly stable and not requiring repair. His left popliteal is small enough to continue to watch.   The right popliteal artery aneurysm is repaired with stent continues with type II endoleak but is stable in size at 2.2 cm. Given the fact that most of these are clotting aneurysms I would not recommend any embolization at this current time. We will continue with watchful waiting with his other aneurysms. I will bring him back next year with repeat ultrasounds. Obviously if he has any issues we can see him earlier in the interim. We reviewed the plan with the patient and the patient understands. We also gave the patient appropriate instructions on their disease process and when to call back. Greater than 50% of this visit was spent with face to face discussion. Follow-up Disposition: 
Return in about 1 year (around 2/11/2020). Avis Resendiz MD 
 
PLEASE NOTE: 
This document has been produced using voice recognition software. Unrecognized errors in transcription may be present.

## 2019-02-25 ENCOUNTER — TELEPHONE ANTICOAG (OUTPATIENT)
Dept: CARDIOLOGY CLINIC | Age: 84
End: 2019-02-25

## 2019-02-25 DIAGNOSIS — I26.99 OTHER PULMONARY EMBOLISM WITHOUT ACUTE COR PULMONALE, UNSPECIFIED CHRONICITY (HCC): ICD-10-CM

## 2019-02-25 DIAGNOSIS — Z79.01 LONG TERM CURRENT USE OF ANTICOAGULANT THERAPY: ICD-10-CM

## 2019-02-25 DIAGNOSIS — I48.92 ATRIAL FLUTTER WITH RAPID VENTRICULAR RESPONSE (HCC): Primary | ICD-10-CM

## 2019-02-25 LAB — INR, EXTERNAL: 1

## 2019-02-25 NOTE — PROGRESS NOTES
Verbal order and read back per Kirti Current, NP Patient is not with therapeutic range Patient's wife states she was informed to take a half of a tablet vs 2.5 mg tablets for 2 weeks Take 5 mg x 2 days then take Coumadin 2.5mg daily Recheck 1 week This has been fully explained to the patient, who indicates understanding.

## 2019-03-04 ENCOUNTER — TELEPHONE ANTICOAG (OUTPATIENT)
Dept: CARDIOLOGY CLINIC | Age: 84
End: 2019-03-04

## 2019-03-04 DIAGNOSIS — I26.99 OTHER PULMONARY EMBOLISM WITHOUT ACUTE COR PULMONALE, UNSPECIFIED CHRONICITY (HCC): ICD-10-CM

## 2019-03-04 DIAGNOSIS — Z79.01 LONG TERM CURRENT USE OF ANTICOAGULANT THERAPY: ICD-10-CM

## 2019-03-04 DIAGNOSIS — I48.92 ATRIAL FLUTTER WITH RAPID VENTRICULAR RESPONSE (HCC): Primary | ICD-10-CM

## 2019-03-04 LAB — INR, EXTERNAL: 1.6

## 2019-03-04 NOTE — PROGRESS NOTES
Verbal order and read back per Dusty Burrows NP Take 5 mg x 2 days then take Coumadin 2.5mg daily Recheck in one week Patient wife aware of instructions and will recheck in one week

## 2019-03-11 ENCOUNTER — TELEPHONE ANTICOAG (OUTPATIENT)
Dept: CARDIOLOGY CLINIC | Age: 84
End: 2019-03-11

## 2019-03-11 DIAGNOSIS — Z79.01 LONG TERM CURRENT USE OF ANTICOAGULANT THERAPY: ICD-10-CM

## 2019-03-11 DIAGNOSIS — I48.92 ATRIAL FLUTTER WITH RAPID VENTRICULAR RESPONSE (HCC): Primary | ICD-10-CM

## 2019-03-11 LAB — INR, EXTERNAL: 2.7

## 2019-03-25 ENCOUNTER — TELEPHONE ANTICOAG (OUTPATIENT)
Dept: CARDIOLOGY CLINIC | Age: 84
End: 2019-03-25

## 2019-03-25 LAB — INR, EXTERNAL: 2.7

## 2019-03-25 NOTE — PROGRESS NOTES
Verbal order and read back per Campos Innocent, NP The INR is above the therapeutic range. Please make the following adjustments to Coumadin dosin.25 mg today then 2.5 mg daily Repeat the INR in 1 week. Left message; awaiting return call.

## 2019-04-01 ENCOUNTER — TELEPHONE ANTICOAG (OUTPATIENT)
Dept: CARDIOLOGY CLINIC | Age: 84
End: 2019-04-01

## 2019-04-01 DIAGNOSIS — I48.92 ATRIAL FLUTTER WITH RAPID VENTRICULAR RESPONSE (HCC): Primary | ICD-10-CM

## 2019-04-01 DIAGNOSIS — Z79.01 LONG TERM CURRENT USE OF ANTICOAGULANT THERAPY: ICD-10-CM

## 2019-04-01 LAB — INR, EXTERNAL: 2.2

## 2019-04-01 NOTE — PROGRESS NOTES
Verbal order and read back per Lalo Langston NP Coumadin 1.25 mg today then 2.5 mg daily Recheck in 2 weeks Spoke to wife aware of instruction s

## 2019-04-15 ENCOUNTER — TELEPHONE ANTICOAG (OUTPATIENT)
Dept: CARDIOLOGY CLINIC | Age: 84
End: 2019-04-15

## 2019-04-15 ENCOUNTER — HOSPITAL ENCOUNTER (OUTPATIENT)
Dept: LAB | Age: 84
Discharge: HOME | End: 2019-04-15
Payer: MEDICARE

## 2019-04-15 DIAGNOSIS — I42.9 CARDIOMYOPATHY, UNSPECIFIED TYPE (HCC): ICD-10-CM

## 2019-04-15 DIAGNOSIS — R63.4 WEIGHT LOSS: ICD-10-CM

## 2019-04-15 DIAGNOSIS — I10 ESSENTIAL HYPERTENSION: ICD-10-CM

## 2019-04-15 DIAGNOSIS — I26.09 PULMONARY EMBOLISM WITH ACUTE COR PULMONALE, UNSPECIFIED CHRONICITY, UNSPECIFIED PULMONARY EMBOLISM TYPE (HCC): ICD-10-CM

## 2019-04-15 DIAGNOSIS — I48.92 ATRIAL FLUTTER WITH RAPID VENTRICULAR RESPONSE (HCC): Primary | ICD-10-CM

## 2019-04-15 DIAGNOSIS — R79.89 ABNORMAL TSH: ICD-10-CM

## 2019-04-15 DIAGNOSIS — I48.92 ATRIAL FLUTTER WITH RAPID VENTRICULAR RESPONSE (HCC): ICD-10-CM

## 2019-04-15 DIAGNOSIS — E87.6 HYPOKALEMIA: ICD-10-CM

## 2019-04-15 DIAGNOSIS — Z79.01 LONG TERM CURRENT USE OF ANTICOAGULANT THERAPY: ICD-10-CM

## 2019-04-15 LAB
ALBUMIN SERPL-MCNC: 3.5 G/DL (ref 3.4–5)
ALBUMIN/GLOB SERPL: 0.9 {RATIO} (ref 0.8–1.7)
ALP SERPL-CCNC: 90 U/L (ref 45–117)
ALT SERPL-CCNC: 20 U/L (ref 16–61)
ANION GAP SERPL CALC-SCNC: 6 MMOL/L (ref 3–18)
AST SERPL-CCNC: 16 U/L (ref 15–37)
BILIRUB SERPL-MCNC: 0.4 MG/DL (ref 0.2–1)
BUN SERPL-MCNC: 15 MG/DL (ref 7–18)
BUN/CREAT SERPL: 16 (ref 12–20)
CALCIUM SERPL-MCNC: 8.8 MG/DL (ref 8.5–10.1)
CHLORIDE SERPL-SCNC: 106 MMOL/L (ref 100–108)
CHOLEST SERPL-MCNC: 148 MG/DL
CO2 SERPL-SCNC: 26 MMOL/L (ref 21–32)
CREAT SERPL-MCNC: 0.96 MG/DL (ref 0.6–1.3)
ERYTHROCYTE [DISTWIDTH] IN BLOOD BY AUTOMATED COUNT: 13.8 % (ref 11.6–14.5)
GLOBULIN SER CALC-MCNC: 4 G/DL (ref 2–4)
GLUCOSE SERPL-MCNC: 92 MG/DL (ref 74–99)
HCT VFR BLD AUTO: 39.1 % (ref 36–48)
HDLC SERPL-MCNC: 37 MG/DL (ref 40–60)
HDLC SERPL: 4 {RATIO} (ref 0–5)
HGB BLD-MCNC: 13.1 G/DL (ref 13–16)
INR, EXTERNAL: 2.2
LDLC SERPL CALC-MCNC: 76.4 MG/DL (ref 0–100)
LIPID PROFILE,FLP: ABNORMAL
MCH RBC QN AUTO: 30.7 PG (ref 24–34)
MCHC RBC AUTO-ENTMCNC: 33.5 G/DL (ref 31–37)
MCV RBC AUTO: 91.6 FL (ref 74–97)
PLATELET # BLD AUTO: 195 K/UL (ref 135–420)
PMV BLD AUTO: 11.6 FL (ref 9.2–11.8)
POTASSIUM SERPL-SCNC: 4.1 MMOL/L (ref 3.5–5.5)
PROT SERPL-MCNC: 7.5 G/DL (ref 6.4–8.2)
RBC # BLD AUTO: 4.27 M/UL (ref 4.7–5.5)
SODIUM SERPL-SCNC: 138 MMOL/L (ref 136–145)
TRIGL SERPL-MCNC: 173 MG/DL (ref ?–150)
TSH SERPL DL<=0.05 MIU/L-ACNC: 2.96 UIU/ML (ref 0.36–3.74)
VLDLC SERPL CALC-MCNC: 34.6 MG/DL
WBC # BLD AUTO: 7.2 K/UL (ref 4.6–13.2)

## 2019-04-15 PROCEDURE — 85027 COMPLETE CBC AUTOMATED: CPT

## 2019-04-15 PROCEDURE — 80053 COMPREHEN METABOLIC PANEL: CPT

## 2019-04-15 PROCEDURE — 84443 ASSAY THYROID STIM HORMONE: CPT

## 2019-04-15 PROCEDURE — 80061 LIPID PANEL: CPT

## 2019-04-15 PROCEDURE — 36415 COLL VENOUS BLD VENIPUNCTURE: CPT

## 2019-04-15 NOTE — PROGRESS NOTES
Verbal order and read back per Oma Pacheco DO Patient is within therapeutic range Coumadin 1.25 mg today then 2.5 mg daily Recheck 2 weeks Attempted to leave message machine cut off

## 2019-04-16 NOTE — PROGRESS NOTES
Pt called to let us know what INR was yesterday. Was already documented from Demario Boo. Pt and patient's wife aware of instructions and will recheck in 2 weeks.

## 2019-04-23 ENCOUNTER — OFFICE VISIT (OUTPATIENT)
Dept: FAMILY MEDICINE CLINIC | Age: 84
End: 2019-04-23

## 2019-04-23 VITALS
OXYGEN SATURATION: 97 % | BODY MASS INDEX: 31.64 KG/M2 | HEIGHT: 70 IN | WEIGHT: 221 LBS | DIASTOLIC BLOOD PRESSURE: 82 MMHG | SYSTOLIC BLOOD PRESSURE: 114 MMHG | TEMPERATURE: 98.4 F | HEART RATE: 66 BPM | RESPIRATION RATE: 16 BRPM

## 2019-04-23 VITALS
TEMPERATURE: 98.4 F | WEIGHT: 221 LBS | DIASTOLIC BLOOD PRESSURE: 82 MMHG | HEIGHT: 70 IN | HEART RATE: 66 BPM | RESPIRATION RATE: 16 BRPM | BODY MASS INDEX: 31.64 KG/M2 | OXYGEN SATURATION: 97 % | SYSTOLIC BLOOD PRESSURE: 114 MMHG

## 2019-04-23 DIAGNOSIS — I72.4 POPLITEAL ARTERY ANEURYSM (HCC): ICD-10-CM

## 2019-04-23 DIAGNOSIS — Z98.890 S/P ABLATION OF ATRIAL FLUTTER: ICD-10-CM

## 2019-04-23 DIAGNOSIS — I26.99 OTHER PULMONARY EMBOLISM WITHOUT ACUTE COR PULMONALE, UNSPECIFIED CHRONICITY (HCC): ICD-10-CM

## 2019-04-23 DIAGNOSIS — Z13.31 SCREENING FOR DEPRESSION: ICD-10-CM

## 2019-04-23 DIAGNOSIS — I48.92 ATRIAL FLUTTER WITH RAPID VENTRICULAR RESPONSE (HCC): ICD-10-CM

## 2019-04-23 DIAGNOSIS — Z00.00 MEDICARE ANNUAL WELLNESS VISIT, SUBSEQUENT: ICD-10-CM

## 2019-04-23 DIAGNOSIS — R79.89 ABNORMAL TSH: ICD-10-CM

## 2019-04-23 DIAGNOSIS — E66.9 OBESITY WITH SERIOUS COMORBIDITY, UNSPECIFIED CLASSIFICATION, UNSPECIFIED OBESITY TYPE: ICD-10-CM

## 2019-04-23 DIAGNOSIS — Z71.89 ADVANCED DIRECTIVES, COUNSELING/DISCUSSION: ICD-10-CM

## 2019-04-23 DIAGNOSIS — E87.6 HYPOKALEMIA: ICD-10-CM

## 2019-04-23 DIAGNOSIS — I42.9 CARDIOMYOPATHY, UNSPECIFIED TYPE (HCC): ICD-10-CM

## 2019-04-23 DIAGNOSIS — I10 ESSENTIAL HYPERTENSION: Primary | ICD-10-CM

## 2019-04-23 DIAGNOSIS — Z86.79 S/P ABLATION OF ATRIAL FLUTTER: ICD-10-CM

## 2019-04-23 RX ORDER — METOPROLOL SUCCINATE 25 MG/1
TABLET, EXTENDED RELEASE ORAL
Qty: 90 TAB | Refills: 1 | Status: SHIPPED | OUTPATIENT
Start: 2019-04-23 | End: 2019-06-14 | Stop reason: SDUPTHER

## 2019-04-23 NOTE — PATIENT INSTRUCTIONS
Medicare Wellness Visit, Male The best way to live healthy is to have a lifestyle where you eat a well-balanced diet, exercise regularly, limit alcohol use, and quit all forms of tobacco/nicotine, if applicable. Regular preventive services are another way to keep healthy. Preventive services (vaccines, screening tests, monitoring & exams) can help personalize your care plan, which helps you manage your own care. Screening tests can find health problems at the earliest stages, when they are easiest to treat. [facility] follows the current, evidence-based guidelines published by the [facility] [employer] ([facility]) when recommending preventive services for our patients. Because we follow these guidelines, sometimes recommendations change over time as research supports it. (For example, a prostate screening blood test is no longer routinely recommended for men with no symptoms.) Of course, you and your doctor may decide to screen more often for some diseases, based on your risk and co-morbidities (chronic disease you are already diagnosed with). Preventive services for you include: - Medicare offers their members a free annual wellness visit, which is time for you and your primary care provider to discuss and plan for your preventive service needs. Take advantage of this benefit every year! 
-All adults over age 72 should receive the recommended pneumonia vaccines. Current USPSTF guidelines recommend a series of two vaccines for the best pneumonia protection.  
-All adults should have a flu vaccine yearly and an ECG.  All adults age 61 and older should receive a shingles vaccine once in their lifetime.   
-All adults age 38-68 who are overweight should have a diabetes screening test once every three years.  
-Other screening tests & preventive services for persons with diabetes include: an eye exam to screen for diabetic retinopathy, a kidney function test, a foot exam, and stricter control over your cholesterol.  
-Cardiovascular screening for adults with routine risk involves an electrocardiogram (ECG) at intervals determined by the provider.  
-Colorectal cancer screening should be done for adults age 54-65 with no increased risk factors for colorectal cancer. There are a number of acceptable methods of screening for this type of cancer. Each test has its own benefits and drawbacks. Discuss with your provider what is most appropriate for you during your annual wellness visit. The different tests include: colonoscopy (considered the best screening method), a fecal occult blood test, a fecal DNA test, and sigmoidoscopy. 
-All adults born between [facility] should be screened once for Hepatitis C. 
-An Abdominal Aortic Aneurysm (AAA) Screening is recommended for men age 73-68 who has ever smoked in their lifetime. Here is a list of your current Health Maintenance items (your personalized list of preventive services) with a due date: 
Health Maintenance Due Topic Date Due  Shingles Vaccine (1 of 2) 08/13/1982  Pneumococcal Vaccine (2 of 2 - PCV13) 10/28/2013 Danay Wyatt Annual Well Visit  04/24/2019 Preventing Outdoor Falls: Care Instructions Your Care Instructions Worries about falls don't need to keep you indoors. Outdoor activities like walking have big benefits for your health. You will need to watch your step and learn a few safety measures. If you are worried about having a fall outdoors, ask your doctor about exercises, classes, or physical therapy that may help. You can learn ways to gain strength, flexibility, and balance. Ask if it might help to use a cane or walker. You can make your time outdoors safer with a few simple measures. Follow-up care is a key part of your treatment and safety.  Be sure to make and go to all appointments, and call your doctor if you are having problems. It's also a good idea to know your test results and keep a list of the medicines you take. How can you prevent falls outdoors? · Wear shoes with firm soles and low heels. If you have to walk on an icy surface, use grippers that can be worn over your shoes in bad weather. · Be extra careful if weather is bad. Walk on the grass when the sidewalks are slick. If you live in a place that gets snow and ice in the winter, sprinkle salt on slippery stairs and sidewalks. · Be careful getting on or off buses and trains or getting in and out of cars. If handrails are available, use them. · Be careful when you cross the street. Look for crosswalks or places where curb cuts or ramps are present. · Try not to hurry, especially if you are carrying something. · Be cautious in parking lots or garages. There may be curbs or changes in pavement, or the height of the pavement may vary. · Make sure to wear the correct eyeglasses, if you need them. Reading glasses or bifocals can make it harder to see hazards that might be in your way. · If you are walking outdoors for exercise, try to: 
? Walk in well-lighted, well-maintained areas. These include high school or college tracks, shopping malls, and public spaces. ? Walk with a partner. ? Watch out for cracked sidewalks, curbs, changes in the height of the pavement, exposed tree roots, and debris such as fallen leaves or branches. Where can you learn more? Go to http://fouzia-bere.info/. Enter D271 in the search box to learn more about \"Preventing Outdoor Falls: Care Instructions. \" Current as of: March 15, 2018 Content Version: 11.9 © 9614-8024 [employer]. Care instructions adapted under license by [facility] (which disclaims liability or warranty for this information).  If you have questions about a medical condition or this instruction, always ask your healthcare professional. [employer] disclaims any warranty or liability for your use of this information. Preventing Falls: Care Instructions Your Care Instructions Getting around your home safely can be a challenge if you have injuries or health problems that make it easy for you to fall. Loose rugs and furniture in walkways are among the dangers for many older people who have problems walking or who have poor eyesight. People who have conditions such as arthritis, osteoporosis, or dementia also have to be careful not to fall. You can make your home safer with a few simple measures. Follow-up care is a key part of your treatment and safety. Be sure to make and go to all appointments, and call your doctor if you are having problems. It's also a good idea to know your test results and keep a list of the medicines you take. How can you care for yourself at home? Taking care of yourself · You may get dizzy if you do not drink enough water. To prevent dehydration, drink plenty of fluids, enough so that your urine is light yellow or clear like water. Choose water and other caffeine-free clear liquids. If you have kidney, heart, or liver disease and have to limit fluids, talk with your doctor before you increase the amount of fluids you drink. · Exercise regularly to improve your strength, muscle tone, and balance. Walk if you can. Swimming may be a good choice if you cannot walk easily. · Have your vision and hearing checked each year or any time you notice a change. If you have trouble seeing and hearing, you might not be able to avoid objects and could lose your balance. · Know the side effects of the medicines you take. Ask your doctor or pharmacist whether the medicines you take can affect your balance. Sleeping pills or sedatives can affect your balance. · Limit the amount of alcohol you drink. Alcohol can impair your balance and other senses.  
· Ask your doctor whether calluses or corns on your feet need to be removed. If you wear loose-fitting shoes because of calluses or corns, you can lose your balance and fall. · Talk to your doctor if you have numbness in your feet. Preventing falls at home · Remove raised doorway thresholds, throw rugs, and clutter. Repair loose carpet or raised areas in the floor. · Move furniture and electrical cords to keep them out of walking paths. · Use nonskid floor wax, and wipe up spills right away, especially on ceramic tile floors. · If you use a walker or cane, put rubber tips on it. If you use crutches, clean the bottoms of them regularly with an abrasive pad, such as steel wool. · Keep your house well lit, especially [employer], and outside walkways. Use night-lights in areas such as hallways and bathrooms. Add extra light switches or use remote switches (such as switches that go on or off when you clap your hands) to make it easier to turn lights on if you have to get up during the night. · Install sturdy handrails on stairways. · Move items in your cabinets so that the things you use a lot are on the lower shelves (about waist level). · Keep a cordless phone and a flashlight with new batteries by your bed. If possible, put a phone in each of the main rooms of your house, or carry a cell phone in case you fall and cannot reach a phone. Or, you can wear a device around your neck or wrist. You push a button that sends a signal for help. · Wear low-heeled shoes that fit well and give your feet good support. Use footwear with nonskid soles. Check the heels and soles of your shoes for wear. Repair or replace worn heels or soles. · Do not wear socks without shoes on wood floors. · Walk on the grass when the sidewalks are slippery. If you live in an area that gets snow and ice in the winter, sprinkle salt on slippery steps and sidewalks. Preventing falls in the bath · Install grab bars and nonskid mats inside and outside your shower or tub and near the toilet and sinks. · Use shower chairs and bath benches. · Use a hand-held shower head that will allow you to sit while showering. · Get into a tub or shower by putting the weaker leg in first. Get out of a tub or shower with your strong side first. 
· Repair loose toilet seats and consider installing a raised toilet seat to make getting on and off the toilet easier. · Keep your bathroom door unlocked while you are in the shower. Where can you learn more? Go to http://fouzia-bere.info/. Enter [id number] [id number] 69 [id number] in the search box to learn more about \"Preventing Falls: Care Instructions. \" Current as of: March 15, 2018 Content Version: 11.9 © 7322-3370 [employer]. Care instructions adapted under license by [employer] (which disclaims liability or warranty for this information). If you have questions about a medical condition or this instruction, always ask your healthcare professional. [place] [address] any warranty or liability for your use of this information.

## 2019-04-23 NOTE — PROGRESS NOTES
SUBJECTIVE Chief Complaint Patient presents with  Hypertension  Other Atrial flutter, cardiomyopathy, hypokalemia, abnoramal TSH,   
 Results Review of results Patient presents for follow-up. No new complaints. Has been keeping up with vascular and cardiology. Has been checking INR at home as he is on coumadin. He has had a history of a GI bleed and a DVT/PE. He had profound anemia and hypokalemia but those have resolved. No increase in fatigued or dyspnea. No longer with GI bleeding or signs of that. No falls. No dizziness or lightheadedness. No chest pain. ROS: 
History obtained from the patient · Respiratory: no cough, shortness of breath, or wheezing · Cardiovascular: no chest pain, palpitations, or dyspnea on exertion · Gastrointestinal: no abdominal pain, N/V, or black or bloody stool. Neurological: no numbness, tingling, headache or dizziness. No memory loss. OBJECTIVE Blood pressure 114/82, pulse 66, temperature 98.4 °F (36.9 °C), temperature source Oral, resp. rate 16, height 5' 10\" (1.778 m), weight 221 lb (100.2 kg), SpO2 97 %. General:  Alert, cooperative, well appearing, in no apparent distress. ENT:  The tongue and mucous membranes are pink and moist without lesions. CV:  The heart sounds are regular in rate and rhythm. There is a normal S1 and S2.   
Lungs: Inspiratory and expiratory efforts are full and unlabored. Lung sounds are clear and equal to auscultation throughout all lung fields without wheezing, rales, or rhonchi. Abd: soft, nontender, nondistended. No HSM. No r/g. Ext: no swelling or tenderness. Skin:  No rashes, no jaundice. Results for orders placed or performed during the hospital encounter of 04/15/19 CBC W/O DIFF Result Value Ref Range WBC 7.2 4.6 - 13.2 K/uL  
 RBC 4.27 (L) 4.70 - 5.50 M/uL  
 HGB 13.1 13.0 - 16.0 g/dL HCT 39.1 36.0 - 48.0 %  MCV 91.6 74.0 - 97.0 FL  
 MCH 30.7 24.0 - 34.0 PG  
 MCHC 33.5 31.0 - 37.0 g/dL  
 RDW 13.8 11.6 - 14.5 % PLATELET 775 149 - 803 K/uL MPV 11.6 9.2 - 42.9 FL  
METABOLIC PANEL, COMPREHENSIVE Result Value Ref Range Sodium 138 136 - 145 mmol/L Potassium 4.1 3.5 - 5.5 mmol/L Chloride 106 100 - 108 mmol/L  
 CO2 26 21 - 32 mmol/L Anion gap 6 3.0 - 18 mmol/L Glucose 92 74 - 99 mg/dL BUN 15 7.0 - 18 MG/DL Creatinine 0.96 0.6 - 1.3 MG/DL  
 BUN/Creatinine ratio 16 12 - 20 GFR est AA >60 >60 ml/min/1.73m2 GFR est non-AA >60 >60 ml/min/1.73m2 Calcium 8.8 8.5 - 10.1 MG/DL Bilirubin, total 0.4 0.2 - 1.0 MG/DL  
 ALT (SGPT) 20 16 - 61 U/L  
 AST (SGOT) 16 15 - 37 U/L Alk. phosphatase 90 45 - 117 U/L Protein, total 7.5 6.4 - 8.2 g/dL Albumin 3.5 3.4 - 5.0 g/dL Globulin 4.0 2.0 - 4.0 g/dL A-G Ratio 0.9 0.8 - 1.7 LIPID PANEL Result Value Ref Range LIPID PROFILE Cholesterol, total 148 <200 MG/DL Triglyceride 173 (H) <150 MG/DL  
 HDL Cholesterol 37 (L) 40 - 60 MG/DL  
 LDL, calculated 76.4 0 - 100 MG/DL VLDL, calculated 34.6 MG/DL  
 CHOL/HDL Ratio 4.0 0 - 5.0 TSH 3RD GENERATION Result Value Ref Range TSH 2.96 0.36 - 3.74 uIU/mL  
 
ASSESSMENT / PLAN 
  ICD-10-CM ICD-9-CM 1. Essential hypertension I10 401.9 2. Atrial flutter with rapid ventricular response (HCC) I48.92 427.32 metoprolol succinate (TOPROL-XL) 25 mg XL tablet 3. Cardiomyopathy, unspecified type ([address] I42.9 425.4 4. S/P ablation of atrial flutter Z98.890 V45.89   
 Z86.79    
5. 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 Other pulmonary embolism without acute cor pulmonale, unspecified chronicity (HCC) I26.99 415.19   
7. Hypokalemia E87.6 276.8 8. Obesity with serious comorbidity, unspecified classification, unspecified obesity type E66.9 278.00   
9. Abnormal TSH R79.89 790.6 Labs reviewed. HTN - controlled. Cont current care. Paroxysmal atrial flutter - ablation 01/2013 without clinical recurrence - He is on beta-blocker. He sees cardiology. Notes reviewed. Cardiomyopathy - History of biatrial enlargement and transient cardiomyopathy in 04/2010 likely due to atrial flutter and rapid response. His last echocardiogram was 2011 with normal function. Notes reviewed. History of popliteal aneurysm status post stenting - cont vascular follow-up. PE / DVT on anticoagulation - cont anticoagulation. He will watch closely for signs of bleeding. Cont per coumadin clinic.  Goal INR between 2-3 and stable.  
  
Hypokalemia - he is off potassium and levels have stayed normal.   
 
Obesity - diet and exercise as tolerated. History of abn TSH - labs indicate a normal TSH. All chart history elements were reviewed by me at the time of the visit even though marked at time of note closure. Patient understands our medical plan. Patient has provided input and agrees with goals. Alternatives have been explained and offered. All questions answered. The patient is to call if condition worsens or fails to improve. Follow-up and Dispositions · Return in about 6 months (around 10/23/2019) for routine care.

## 2019-04-23 NOTE — PROGRESS NOTES
1. Have you been to the ER, urgent care clinic since your last visit? Hospitalized since your last visit? No 
 
2. Have you seen or consulted any other health care providers outside of the [address] [place] [address] since your last visit? Include any pap smears or colon screening. Yes December 2018 or January 2019 seen by Dr. Archana Lyon [facility]) for possible left fibula bone fracture

## 2019-04-23 NOTE — PROGRESS NOTES
1. Have you been to the ER, urgent care clinic since your last visit? Hospitalized since your last visit? No 
 
2. Have you seen or consulted any other health care providers outside of the [address] [place] [address] [place] since your last visit? Include any pap smears or colon screening. Yes December of 2018 or January of 2019 seen by Dr. Liliane Davis [facility]) for possible left fibula bone fracture Abuse Screening Questionnaire 4/23/2019 Do you ever feel afraid of your partner? Karli Kan Are you in a relationship with someone who physically or mentally threatens you? Karli Kan Is it safe for you to go home? Carla Lopez Fall Risk Assessment, last 12 mths 4/23/2019 Able to walk? Yes Fall in past 12 months? Yes Fall with injury? No  
Number of falls in past 12 months 1 Fall Risk Score 1  
 
 
 
 
 
3 most recent PHQ Screens 4/23/2019 Little interest or pleasure in doing things Not at all Feeling down, depressed, irritable, or hopeless Not at all Total Score PHQ 2 0

## 2019-04-23 NOTE — PATIENT INSTRUCTIONS
A Healthy Lifestyle: Care Instructions Your Care Instructions A healthy lifestyle can help you feel good, stay at a healthy weight, and have plenty of energy for both work and play. A healthy lifestyle is something you can share with your whole family. A healthy lifestyle also can lower your risk for serious health problems, such as high blood pressure, heart disease, and diabetes. You can follow a few steps listed below to improve your health and the health of your family. Follow-up care is a key part of your treatment and safety. Be sure to make and go to all appointments, and call your doctor if you are having problems. It's also a good idea to know your test results and keep a list of the medicines you take. How can you care for yourself at home? · Do not eat too much sugar, fat, or fast foods. You can still have dessert and treats now and then. The goal is moderation. · Start small to improve your eating habits. Pay attention to portion sizes, drink less juice and soda pop, and eat more fruits and vegetables. ? Eat a healthy amount of food. A 3-ounce serving of meat, for example, is about the size of a deck of cards. Fill the rest of your plate with vegetables and whole grains. ? Limit the amount of soda and sports drinks you have every day. Drink more water when you are thirsty. ? Eat at least 5 servings of fruits and vegetables every day. It may seem like a lot, but it is not hard to reach this goal. A serving or helping is 1 piece of fruit, 1 cup of vegetables, or 2 cups of leafy, raw vegetables. Have an apple or some carrot sticks as an afternoon snack instead of a candy bar. Try to have fruits and/or vegetables at every meal. 
· Make exercise part of your daily routine. You may want to start with simple activities, such as walking, bicycling, or slow swimming. Try to be active 30 to 60 minutes every day.  You do not need to do all 30 to 60 minutes all at once. For example, you can exercise 3 times a day for 10 or 20 minutes. Moderate exercise is safe for most people, but it is always a good idea to talk to your doctor before starting an exercise program. 
· Keep moving. Fletcher Vinson the lawn, work in the garden, or [place]. Take the stairs instead of the elevator at work. · If you smoke, quit. People who smoke have an increased risk for heart attack, stroke, cancer, and other lung illnesses. Quitting is hard, but there are ways to boost your chance of quitting tobacco for good. ? Use nicotine gum, patches, or lozenges. ? Ask your doctor about stop-smoking programs and medicines. ? Keep trying. In addition to reducing your risk of diseases in the future, you will notice some benefits soon after you stop using tobacco. If you have shortness of breath or asthma symptoms, they will likely get better within a few weeks after you quit. · Limit how much alcohol you drink. Moderate amounts of alcohol (up to 2 drinks a day for men, 1 drink a day for women) are okay. But drinking too much can lead to liver problems, high blood pressure, and other health problems. Family health If you have a family, there are many things you can do together to improve your health. · Eat meals together as a family as often as possible. · Eat healthy foods. This includes fruits, vegetables, lean meats and dairy, and whole grains. · Include your family in your fitness plan. Most people think of activities such as jogging or tennis as the way to fitness, but there are many ways you and your family can be more active. Anything that makes you breathe hard and gets your heart pumping is exercise. Here are some tips: 
? Walk to do errands or to take your child to school or the bus. 
? Go for a family bike ride after dinner instead of watching TV. Where can you learn more? Go to http://fouzia-bere.info/. Enter B687 in the search box to learn more about \"A Healthy Lifestyle: Care Instructions. \" Current as of: September 11, 2018 Content Version: 11.9 © 5117-7774 [employer]. Care instructions adapted under license by [employer] (which disclaims liability or warranty for this information). If you have questions about a medical condition or this instruction, always ask your healthcare professional. [place] [address] any warranty or liability for your use of this information.

## 2019-04-23 NOTE — PROGRESS NOTES
Chief Complaint Patient presents with [place] Annual Wellness Visit This is the Subsequent Medicare Annual Wellness Exam, performed 12 months or more after the Initial AWV or the last Subsequent AWV I have reviewed the patient's medical history in detail and updated the computerized patient record. History Past Medical History:  
Diagnosis Date  Ankle fracture, left approx 2011  
 medial, tibial  
 Arthritis   
 knees  Cardiac echocardiogram 06/16/2011 Normal LV systolic function. Mild conc LVH. Gr 1 DDfx. RVSP 35-40 mmHg. Marked LAE. Marked LISA. Mild-mod MR.  Mild-mod AI. Mild AoRE. Mild aortic arch dil.  Cardiac Holter monitor study 06/22/2011 Baseline sinus rhythm to sinus bradycardia, avg HR 60 bpm (range  bpm). Questionable chronotropic intolerance. No sustained arrhythmias.  Emphysema lung ([address]  GERD (gastroesophageal reflux disease)  Glaucoma suspect   
 posterior vitreous detachment b/l, pterygium left eye, aseroid hyalosis / synchisis, b/l pseudophakia  H/O colonoscopy 04/13/2011 1 polyp thermally treated  History of atrial fibrillation 2009  Hypertension  Pulmonary embolism ([address]  Unspecified adverse effect of anesthesia H/o A-fib immediately post colonoscopy. Past Surgical History:  
Procedure Laterality Date  CARDIAC SURG PROCEDURE UNLIST    
 hx atrial flutter ablation 2013  COLONOSCOPY N/A 10/6/2017 COLONOSCOPY performed by Hernán Walker MD at [address] HX COLONOSCOPY    
 HX GI    
 HX KNEE REPLACEMENT Right 02/19/2013 Dr. Ferrell Snellen  HX ORTHOPAEDIC Right 2/13 TKR, Ferrell Snellen  HX OTHER SURGICAL    
 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  
 by Dr. Rod Bansal  HX TONSILLECTOMY  CA EGD DELIVER THERMAL ENERGY SPHNCTR/CARDIA GERD Current Outpatient Medications Medication Sig Dispense Refill  warfarin (COUMADIN) 2.5 mg tablet TAKE ONE TABLET BY MOUTH ONCE DAILY 90 Tab 3  
 metoprolol succinate (TOPROL-XL) 25 mg XL tablet TAKE ONE TABLET BY MOUTH ONCE DAILY 90 Tab 1  cyanocobalamin (VITAMIN B-12) 1,000 mcg tablet Take 1,000 mcg by mouth daily. Indications: PREVENTION OF VITAMIN B12 DEFICIENCY  potassium chloride SR (K-TAB) 20 mEq tablet Take 1 Tab by mouth daily. 90 Tab 1  
 ascorbic acid, vitamin C, (VITAMIN C) 500 mg tablet Take 1,000 mg by mouth daily.  psyllium husk (METAMUCIL) 3.4 gram/5.4 gram powd Take  by mouth two (2) times a day.  multivitamin (ONE A DAY) tablet Take 1 Tab by mouth daily.  glucosamine-D3-boswellia serr (OSTEO BI-FLEX) 1,500-400-100 mg-unit-mg Tab Take 1 Tab by mouth two (2) times a day.  omega-3 fatty acids-vitamin e (FISH OIL) 1,000 mg cap Take 1 Cap by mouth two (2) times a day. No Known Allergies Family History Problem Relation Age of Onset  Pacemaker Father  Pacemaker Sister  Heart Failure Brother  Diabetes Brother  Cancer Brother Lung Cancer Social History Tobacco Use  Smoking status: Former Smoker Packs/day: 1.00 Last attempt to quit: 1965 Years since quittin.3  Smokeless tobacco: Never Used Substance Use Topics  Alcohol use: Yes Comment: occasionally. Patient Active Problem List  
Diagnosis Code  Cardiomyopathy ([facility] I42.9  DJD (degenerative joint disease) M19.90  Atrial flutter with rapid ventricular response (HCC) I48.92  
 S/P ablation of atrial flutter Z98.890, Z86.79  
 Essential hypertension I10  Lower GI bleed K92.2  Symptomatic anemia D64.9  Obesity E66.9  
 Pulmonary embolism (HCC) I26.99  
 Long term current use of anticoagulant therapy Z79.01  
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  
 
3 most recent PHQ Screens 2019 Little interest or pleasure in doing things Not at all Feeling down, depressed, irritable, or hopeless Not at all Total Score PHQ 2 0 Alcohol Risk Factor Screening: You do not drink alcohol or very rarely. Functional Ability and Level of Safety:  
Hearing Loss Hearing is poor. Activities of Daily Living The home contains: no safety equipment. Besides a walker being handy Patient does total self care Fall Risk Fall Risk Assessment, last 12 mths 4/23/2019 Able to walk? Yes Fall in past 12 months? Yes Fall with injury? No  
Number of falls in past 12 months 1 Fall Risk Score 1 Had a fall over his lawnmower back in Dec 2018. He simply tripped by accident. Abuse Screen Patient is not abused Cognitive Screening Evaluation of Cognitive Function: 
Has your family/caregiver stated any concerns about your memory: no 
Normal 
 
Patient Care Team  
Patient Care Team: Dao Rodriguez MD as PCP - [facility]) Priyank Cuadra MD (Cardiology) Eulogio Grewal NP (Nurse Practitioner) Laura Barker MD (Gastroenterology) PARAMJIT Salinas (Physician Assistant) Fina Quinones MD (Vascular Surgery) Anton De La Cruz MD (Ophthalmology) Assessment/Plan ICD-10-CM ICD-9-CM 1. Medicare annual wellness visit, subsequent Z00.00 V70.0 2. Advanced directives, counseling/discussion Z71.89 V65.49 ADVANCE CARE PLANNING FIRST 30 MINS 3. Screening for depression Z13.31 V79.0 Kiki Ho Education and counseling provided: 
Are appropriate based on today's review and evaluation Age and sex specific counseling. Screening for depression and alcoholism. Advanced directives / end of life planning discussion - see ACP note. Care team updated. Vaccines are up to date. Medicare recommended screening and prevention test guidelines are filled out, reviewed, and provided to patient. Screening and prevention is up to date given his age-based recommendations. Patient understands our medical plan. Patient has provided input and agrees with goals. All questions answered.

## 2019-04-23 NOTE — ACP (ADVANCE CARE PLANNING)
Advance Care Planning Advance Care Planning (ACP) Provider Note - Comprehensive Date of ACP Conversation: 04/23/19 Persons included in Conversation:  patient and family Length of ACP Conversation in minutes:  16 minutes Authorized Decision Maker (if patient is incapable of making informed decisions): This person is: 
Healthcare Agent/Medical Power of  under Advance Directive Primary - Wife, Hans Rodríguez Secondary - son, Zulma Velez Jr. 
     
General ACP for ALL Patients with Decision Making Capacity: 
 Importance of advance care planning, including choosing a healthcare agent to communicate patient's healthcare decisions if patient lost the ability to make decisions, such as after a sudden illness or accident Understanding of the healthcare agent role was assessed and information provided Review of Existing Advance Directive: 
Does this advance directive still reflect your preferences? Yes (Provide new form/Refer for assistance in updating) For Serious or Chronic Illness: 
Understanding of medical condition Interventions Provided: 
Recommended communicating the plan and making copies for the healthcare agent, personal physician, and others as appropriate (e.g., health system) Recommended review of completed ACP document annually or upon change in health status

## 2019-04-29 ENCOUNTER — TELEPHONE ANTICOAG (OUTPATIENT)
Dept: CARDIOLOGY CLINIC | Age: 84
End: 2019-04-29

## 2019-04-29 DIAGNOSIS — I48.92 ATRIAL FLUTTER WITH RAPID VENTRICULAR RESPONSE (HCC): Primary | ICD-10-CM

## 2019-04-29 DIAGNOSIS — I26.09 OTHER ACUTE PULMONARY EMBOLISM WITH ACUTE COR PULMONALE (HCC): ICD-10-CM

## 2019-04-29 DIAGNOSIS — Z79.01 LONG TERM CURRENT USE OF ANTICOAGULANT THERAPY: ICD-10-CM

## 2019-04-29 LAB — INR, EXTERNAL: 2.5

## 2019-04-29 NOTE — PROGRESS NOTES
Verbal order and read back per Conrad Osler, NP Continue Coumadin 2.5 mg daily, recheck in 2 week Instructed to eat small amount of greens, wife aware of instructions

## 2019-05-13 ENCOUNTER — TELEPHONE ANTICOAG (OUTPATIENT)
Dept: CARDIOLOGY CLINIC | Age: 84
End: 2019-05-13

## 2019-05-13 DIAGNOSIS — I48.92 ATRIAL FLUTTER WITH RAPID VENTRICULAR RESPONSE (HCC): Primary | ICD-10-CM

## 2019-05-13 DIAGNOSIS — Z79.01 LONG TERM CURRENT USE OF ANTICOAGULANT THERAPY: ICD-10-CM

## 2019-05-13 LAB — INR, EXTERNAL: 2.5

## 2019-05-27 LAB — INR, EXTERNAL: 1.5

## 2019-05-28 ENCOUNTER — TELEPHONE ANTICOAG (OUTPATIENT)
Dept: CARDIOLOGY CLINIC | Age: 84
End: 2019-05-28

## 2019-05-28 DIAGNOSIS — I26.09 PULMONARY EMBOLISM WITH ACUTE COR PULMONALE, UNSPECIFIED CHRONICITY, UNSPECIFIED PULMONARY EMBOLISM TYPE (HCC): ICD-10-CM

## 2019-05-28 DIAGNOSIS — I48.92 ATRIAL FLUTTER WITH RAPID VENTRICULAR RESPONSE (HCC): Primary | ICD-10-CM

## 2019-05-28 DIAGNOSIS — Z79.01 LONG TERM CURRENT USE OF ANTICOAGULANT THERAPY: ICD-10-CM

## 2019-05-29 NOTE — PROGRESS NOTES
Verbal order and read back per Anton Rollins NP The INR is below the therapeutic range. Please make the following adjustments to Coumadin dosing: Take 5mg of coumadin on (5/29/19 today only) then Continue Coumadin 2.5 mg daily. Repeat the INR in 1 week This has been fully explained to the patient and patient's wife, who indicates understanding.

## 2019-06-03 ENCOUNTER — TELEPHONE ANTICOAG (OUTPATIENT)
Dept: CARDIOLOGY CLINIC | Age: 84
End: 2019-06-03

## 2019-06-03 DIAGNOSIS — Z79.01 LONG TERM CURRENT USE OF ANTICOAGULANT THERAPY: ICD-10-CM

## 2019-06-03 DIAGNOSIS — I26.09 PULMONARY EMBOLISM WITH ACUTE COR PULMONALE, UNSPECIFIED CHRONICITY, UNSPECIFIED PULMONARY EMBOLISM TYPE (HCC): ICD-10-CM

## 2019-06-03 DIAGNOSIS — I48.92 ATRIAL FLUTTER WITH RAPID VENTRICULAR RESPONSE (HCC): Primary | ICD-10-CM

## 2019-06-03 LAB — INR, EXTERNAL: 2.3

## 2019-06-03 NOTE — PROGRESS NOTES
The INR is stable and therapeutic. Continue same dose of coumadin and recheck in 2 weeks  Continue Coumadin 2.5 mg daily,  Verbal order and read back per Lalo Langston NP    Patient's wife given instructions with read back. She verbalized understanding.

## 2019-06-14 DIAGNOSIS — I48.92 ATRIAL FLUTTER WITH RAPID VENTRICULAR RESPONSE (HCC): ICD-10-CM

## 2019-06-14 RX ORDER — WARFARIN 2.5 MG/1
TABLET ORAL
Qty: 90 TAB | Refills: 3 | Status: SHIPPED | OUTPATIENT
Start: 2019-06-14 | End: 2019-06-21 | Stop reason: SDUPTHER

## 2019-06-14 RX ORDER — METOPROLOL SUCCINATE 25 MG/1
TABLET, EXTENDED RELEASE ORAL
Qty: 90 TAB | Refills: 1 | Status: SHIPPED | OUTPATIENT
Start: 2019-06-14 | End: 2020-01-01

## 2019-06-17 ENCOUNTER — TELEPHONE ANTICOAG (OUTPATIENT)
Dept: CARDIOLOGY CLINIC | Age: 84
End: 2019-06-17

## 2019-06-17 DIAGNOSIS — I48.92 ATRIAL FLUTTER WITH RAPID VENTRICULAR RESPONSE (HCC): Primary | ICD-10-CM

## 2019-06-17 DIAGNOSIS — Z79.01 LONG TERM CURRENT USE OF ANTICOAGULANT THERAPY: ICD-10-CM

## 2019-06-17 DIAGNOSIS — I26.09 PULMONARY EMBOLISM WITH ACUTE COR PULMONALE, UNSPECIFIED CHRONICITY, UNSPECIFIED PULMONARY EMBOLISM TYPE (HCC): ICD-10-CM

## 2019-06-17 LAB — INR, EXTERNAL: 2.2

## 2019-06-17 NOTE — PROGRESS NOTES
Verbal order and read back per Marc Clarity, NP Patient is within therapeutic range Continue Coumadin 2.5 mg daily Recheck 2 weeks This has been fully explained to the patient's wife, who indicates understanding.

## 2019-06-21 RX ORDER — WARFARIN 2.5 MG/1
TABLET ORAL
Qty: 90 TAB | Refills: 3 | Status: SHIPPED | OUTPATIENT
Start: 2019-06-21 | End: 2020-01-01 | Stop reason: SDUPTHER

## 2019-07-01 ENCOUNTER — TELEPHONE ANTICOAG (OUTPATIENT)
Dept: CARDIOLOGY CLINIC | Age: 84
End: 2019-07-01

## 2019-07-01 DIAGNOSIS — Z79.01 LONG TERM CURRENT USE OF ANTICOAGULANT THERAPY: ICD-10-CM

## 2019-07-01 DIAGNOSIS — I48.92 ATRIAL FLUTTER WITH RAPID VENTRICULAR RESPONSE (HCC): Primary | ICD-10-CM

## 2019-07-01 DIAGNOSIS — I26.09 PULMONARY EMBOLISM WITH ACUTE COR PULMONALE, UNSPECIFIED CHRONICITY, UNSPECIFIED PULMONARY EMBOLISM TYPE (HCC): ICD-10-CM

## 2019-07-01 LAB — INR, EXTERNAL: 2.2

## 2019-07-01 NOTE — PROGRESS NOTES
Verbal order and read back per Cliff Mccarthy NP Patient is within therapeutic range Continue Coumadin 2.5 mg daily Recheck 2 weeks Left message with instructions on patient's vm

## 2019-07-15 ENCOUNTER — TELEPHONE ANTICOAG (OUTPATIENT)
Dept: CARDIOLOGY CLINIC | Age: 84
End: 2019-07-15

## 2019-07-15 DIAGNOSIS — Z79.01 LONG TERM CURRENT USE OF ANTICOAGULANT THERAPY: ICD-10-CM

## 2019-07-15 DIAGNOSIS — I48.92 ATRIAL FLUTTER WITH RAPID VENTRICULAR RESPONSE (HCC): Primary | ICD-10-CM

## 2019-07-15 LAB — INR, EXTERNAL: 2

## 2019-07-29 ENCOUNTER — TELEPHONE ANTICOAG (OUTPATIENT)
Dept: CARDIOLOGY CLINIC | Age: 84
End: 2019-07-29

## 2019-07-29 DIAGNOSIS — I26.99 OTHER PULMONARY EMBOLISM WITHOUT ACUTE COR PULMONALE, UNSPECIFIED CHRONICITY (HCC): ICD-10-CM

## 2019-07-29 DIAGNOSIS — I48.92 ATRIAL FLUTTER WITH RAPID VENTRICULAR RESPONSE (HCC): Primary | ICD-10-CM

## 2019-07-29 DIAGNOSIS — Z79.01 LONG TERM CURRENT USE OF ANTICOAGULANT THERAPY: ICD-10-CM

## 2019-07-29 LAB — INR, EXTERNAL: 2.7

## 2019-07-29 NOTE — PROGRESS NOTES
Verbal order and read back per Vishal Layton, DO  Patient is not with therapeutic range  Take 1.25 mg today then Continue Coumadin 2.5 mg daily  Recheck 2 weeks    Left message for return call

## 2019-08-26 ENCOUNTER — TELEPHONE ANTICOAG (OUTPATIENT)
Dept: CARDIOLOGY CLINIC | Age: 84
End: 2019-08-26

## 2019-08-26 DIAGNOSIS — I26.99 OTHER PULMONARY EMBOLISM WITHOUT ACUTE COR PULMONALE, UNSPECIFIED CHRONICITY (HCC): ICD-10-CM

## 2019-08-26 DIAGNOSIS — Z79.01 LONG TERM CURRENT USE OF ANTICOAGULANT THERAPY: ICD-10-CM

## 2019-08-26 DIAGNOSIS — I48.92 ATRIAL FLUTTER WITH RAPID VENTRICULAR RESPONSE (HCC): Primary | ICD-10-CM

## 2019-08-26 LAB
INR, EXTERNAL: 2.1
INR, EXTERNAL: 2.1

## 2019-08-26 NOTE — PROGRESS NOTES
The INR is stable and therapeutic. Continue same dose of coumadin and recheck in 2 weeks. Continue Coumadin 2.5 mg daily  Verbal order and read back per Edgar Walls NP    Patient's wife given instructions with read back. She verbalized understanding.

## 2019-09-09 ENCOUNTER — TELEPHONE ANTICOAG (OUTPATIENT)
Dept: CARDIOLOGY CLINIC | Age: 84
End: 2019-09-09

## 2019-09-09 DIAGNOSIS — I26.99 OTHER PULMONARY EMBOLISM WITHOUT ACUTE COR PULMONALE, UNSPECIFIED CHRONICITY (HCC): ICD-10-CM

## 2019-09-09 DIAGNOSIS — Z79.01 LONG TERM CURRENT USE OF ANTICOAGULANT THERAPY: ICD-10-CM

## 2019-09-09 DIAGNOSIS — I48.92 ATRIAL FLUTTER WITH RAPID VENTRICULAR RESPONSE (HCC): Primary | ICD-10-CM

## 2019-09-09 LAB — INR, EXTERNAL: 2

## 2019-09-12 NOTE — PROGRESS NOTES
Verbal order and read back per Abdulaziz Hernandes NP   Patient is within therapeutic range  Continue Coumadin 2.5 mg daily- Only call patient with dose adjustments   Recheck 2 weeks  This has been fully explained to the patient's wife, who indicates understanding.

## 2019-09-24 ENCOUNTER — TELEPHONE ANTICOAG (OUTPATIENT)
Dept: CARDIOLOGY CLINIC | Age: 84
End: 2019-09-24

## 2019-09-24 DIAGNOSIS — I26.99 OTHER PULMONARY EMBOLISM WITHOUT ACUTE COR PULMONALE, UNSPECIFIED CHRONICITY (HCC): ICD-10-CM

## 2019-09-24 DIAGNOSIS — I48.92 ATRIAL FLUTTER WITH RAPID VENTRICULAR RESPONSE (HCC): Primary | ICD-10-CM

## 2019-09-24 DIAGNOSIS — Z79.01 LONG TERM CURRENT USE OF ANTICOAGULANT THERAPY: ICD-10-CM

## 2019-09-24 LAB — INR, EXTERNAL: 2.3

## 2019-10-07 ENCOUNTER — TELEPHONE (OUTPATIENT)
Dept: VASCULAR SURGERY | Age: 84
End: 2019-10-07

## 2019-10-07 ENCOUNTER — TELEPHONE ANTICOAG (OUTPATIENT)
Dept: CARDIOLOGY CLINIC | Age: 84
End: 2019-10-07

## 2019-10-07 DIAGNOSIS — I26.99 OTHER PULMONARY EMBOLISM WITHOUT ACUTE COR PULMONALE, UNSPECIFIED CHRONICITY (HCC): ICD-10-CM

## 2019-10-07 DIAGNOSIS — Z79.01 LONG TERM CURRENT USE OF ANTICOAGULANT THERAPY: ICD-10-CM

## 2019-10-07 DIAGNOSIS — I48.92 ATRIAL FLUTTER WITH RAPID VENTRICULAR RESPONSE (HCC): Primary | ICD-10-CM

## 2019-10-07 LAB — INR, EXTERNAL: 2.1

## 2019-10-07 NOTE — TELEPHONE ENCOUNTER
Patients wife called and stated that patient fell yesterday. Patient is having pain and trouble walking. Due to his Hx of stent placed, wife wanted to know if he can see Dr. Jovanny Ibrahim. Informed patient we will forward information and will back.

## 2019-10-08 NOTE — PROGRESS NOTES
Verbal order and read back per Blas Vicente MD  
 
Patient is within therapeutic range Continue Coumadin 2.5 mg daily- Only call patient with dose adjustments

## 2019-10-21 ENCOUNTER — OFFICE VISIT (OUTPATIENT)
Dept: VASCULAR SURGERY | Age: 84
End: 2019-10-21

## 2019-10-21 VITALS
BODY MASS INDEX: 31.64 KG/M2 | SYSTOLIC BLOOD PRESSURE: 130 MMHG | RESPIRATION RATE: 17 BRPM | WEIGHT: 221 LBS | HEIGHT: 70 IN | DIASTOLIC BLOOD PRESSURE: 86 MMHG

## 2019-10-21 DIAGNOSIS — I72.4 POPLITEAL ARTERY ANEURYSM (HCC): Primary | ICD-10-CM

## 2019-10-21 DIAGNOSIS — I72.4 FEMORAL ARTERY ANEURYSM, BILATERAL (HCC): ICD-10-CM

## 2019-10-21 NOTE — PROGRESS NOTES
1. Have you been to an emergency room or urgent care clinic since your last visit? [facility] Hospitalized since your last visit? If yes, where, when, and reason for visit? NO 
2. Have you seen or consulted any other health care providers outside of the [facility] since your last visit including any procedures, health maintenance items. If yes, where, when and reason for visit? NO Patient Declined [place]

## 2019-10-21 NOTE — PROGRESS NOTES
[address] Chief Complaint Patient presents with  Leg Pain History and Physical   
Osvaldo Mehta Sr. is a 80 y.o. 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 These are stable in size. There is no endoleak from his stented piece. And his artery appears to be healed around the stent. He has good blood flow going to bilateral lower extremities based on his ultrasound reviewed in clinic today. Patient does not complain of any claudication or rest pain. He did have a fall where he fell on his bottom and has some hematoma on both sides. But otherwise seems to be doing fairly well. Past Medical History:  
Diagnosis Date  Ankle fracture, left approx 2011  
 medial, tibial  
 Arthritis   
 knees  Cardiac echocardiogram 06/16/2011 Normal LV systolic function. Mild conc LVH. Gr 1 DDfx. RVSP 35-40 mmHg. Marked LAE. Marked LISA. Mild-mod MR.  Mild-mod AI. Mild AoRE. Mild aortic arch dil.  Cardiac Holter monitor study 06/22/2011 Baseline sinus rhythm to sinus bradycardia, avg HR 60 bpm (range  bpm). Questionable chronotropic intolerance. No sustained arrhythmias.  Emphysema lung ([address]  GERD (gastroesophageal reflux disease)  Glaucoma suspect   
 posterior vitreous detachment b/l, pterygium left eye, aseroid hyalosis / synchisis, b/l pseudophakia  H/O colonoscopy 04/13/2011 1 polyp thermally treated  History of atrial fibrillation 2009  Hypertension  Pulmonary embolism ([address]  Unspecified adverse effect of anesthesia H/o A-fib immediately post colonoscopy. Past Surgical History:  
Procedure Laterality Date  CARDIAC SURG PROCEDURE UNLIST    
 hx atrial flutter ablation 2013  COLONOSCOPY N/A 10/6/2017  COLONOSCOPY performed by Sophie Huff MD at [address] HX COLONOSCOPY    
 HX GI    
  HX KNEE REPLACEMENT Right 02/19/2013 Dr. Jennifer Miller  HX ORTHOPAEDIC Right 2/13 TKR, Jennifer Miller  HX OTHER SURGICAL    
 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  
 by Dr. Leslie Schwartz  HX TONSILLECTOMY  ID EGD DELIVER THERMAL ENERGY SPHNCTR/CARDIA GERD Patient Active Problem List  
Diagnosis Code  Cardiomyopathy ([facility] I42.9  DJD (degenerative joint disease) M19.90  Atrial flutter with rapid ventricular response (HCC) I48.92  
 S/P ablation of atrial flutter Z98.890, Z86.79  
 Essential hypertension I10  Lower GI bleed K92.2  Symptomatic anemia D64.9  Obesity E66.9  
 Pulmonary embolism (HCC) I26.99  
 Long term current use of anticoagulant therapy Z79.01  
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  
 metoprolol succinate (TOPROL-XL) 25 mg XL tablet TAKE ONE TABLET BY MOUTH ONCE DAILY 90 Tab 1  cyanocobalamin (VITAMIN B-12) 1,000 mcg tablet Take 1,000 mcg by mouth daily. Indications: PREVENTION OF VITAMIN B12 DEFICIENCY    
 ascorbic acid, vitamin C, (VITAMIN C) 500 mg tablet Take 1,000 mg by mouth daily.  multivitamin (ONE A DAY) tablet Take 1 Tab by mouth daily.  glucosamine-D3-boswellia serr (OSTEO BI-FLEX) 1,500-400-100 mg-unit-mg Tab Take 1 Tab by mouth two (2) times a day.  omega-3 fatty acids-vitamin e (FISH OIL) 1,000 mg cap Take 1 Cap by mouth two (2) times a day. No Known Allergies Social History Socioeconomic History  Marital status:  Spouse name: Not on file  Number of children: Not on file  Years of education: Not on file  Highest education level: Not on file Occupational History  Occupation: Retired Social Needs  Financial resource strain: Not on file  Food insecurity:  
  Worry: Not on file Inability: Not on file  Transportation needs:  
  Medical: Not on file Non-medical: Not on file Tobacco Use  Smoking status: Former Smoker Packs/day: 1.00 Last attempt to quit: 1965 Years since quittin.8  Smokeless tobacco: Never Used Substance and Sexual Activity  Alcohol use: Yes Comment: occasionally.  Drug use: No  
 Sexual activity: Not Currently Partners: Female Lifestyle  Physical activity:  
  Days per week: Not on file Minutes per session: Not on file  Stress: Not on file Relationships  Social connections:  
  Talks on phone: Not on file Gets together: Not on file Attends Hoahaoism service: Not on file Active member of club or organization: Not on file Attends meetings of clubs or organizations: Not on file Relationship status: Not on file  Intimate partner violence:  
  Fear of current or ex partner: Not on file Emotionally abused: Not on file Physically abused: Not on file Forced sexual activity: Not on file Other Topics Concern  Not on file Social History Narrative  Not on file Family History Problem Relation Age of Onset  Pacemaker Father  Pacemaker Sister  Heart Failure Brother  Diabetes Brother  Cancer Brother Lung Cancer Physical Exam:   
Visit Vitals /86 (BP 1 Location: Left arm, BP Patient Position: Sitting) Resp 17 Ht 5' 10\" (1.778 m) Wt 221 lb (100.2 kg) BMI 31.71 kg/m² General: Well-appearing male in no acute distress HEENT: EOMI no scleral icterus is noted Pulmonary: No increased work of breathing is noted Extremities: Warm and well-perfused bilaterally Neuro: Cranial nerves II through XII grossly intact with normal mentation and speech Impression and Plan: 
Laury Long Sr. is a 80 y.o. 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 No major changes in his left side or bilateral common femoral artery aneurysms. We can continue to follow these on a yearly basis. We reviewed the plan with the patient and the patient understands. We also gave the patient appropriate instructions on their disease process and when to call back. Greater than 50% of this visit was spent with face to face discussion. Follow-up and Dispositions · Return in about 1 year (around 10/21/2020). Jono Johansen MD 
 
PLEASE NOTE: 
This document has been produced using voice recognition software. Unrecognized errors in transcription may be present.

## 2019-10-22 ENCOUNTER — OFFICE VISIT (OUTPATIENT)
Dept: FAMILY MEDICINE CLINIC | Age: 84
End: 2019-10-22

## 2019-10-22 VITALS
HEIGHT: 70 IN | RESPIRATION RATE: 16 BRPM | SYSTOLIC BLOOD PRESSURE: 118 MMHG | HEART RATE: 73 BPM | TEMPERATURE: 98.3 F | BODY MASS INDEX: 31.21 KG/M2 | WEIGHT: 218 LBS | DIASTOLIC BLOOD PRESSURE: 72 MMHG | OXYGEN SATURATION: 97 %

## 2019-10-22 DIAGNOSIS — Z98.890 S/P ABLATION OF ATRIAL FLUTTER: ICD-10-CM

## 2019-10-22 DIAGNOSIS — E66.9 OBESITY WITH SERIOUS COMORBIDITY, UNSPECIFIED CLASSIFICATION, UNSPECIFIED OBESITY TYPE: ICD-10-CM

## 2019-10-22 DIAGNOSIS — E87.6 HYPOKALEMIA: ICD-10-CM

## 2019-10-22 DIAGNOSIS — I42.9 CARDIOMYOPATHY, UNSPECIFIED TYPE (HCC): ICD-10-CM

## 2019-10-22 DIAGNOSIS — Z23 ENCOUNTER FOR IMMUNIZATION: ICD-10-CM

## 2019-10-22 DIAGNOSIS — I26.99 OTHER PULMONARY EMBOLISM WITHOUT ACUTE COR PULMONALE, UNSPECIFIED CHRONICITY (HCC): ICD-10-CM

## 2019-10-22 DIAGNOSIS — I48.92 ATRIAL FLUTTER WITH RAPID VENTRICULAR RESPONSE (HCC): ICD-10-CM

## 2019-10-22 DIAGNOSIS — I10 ESSENTIAL HYPERTENSION: Primary | ICD-10-CM

## 2019-10-22 DIAGNOSIS — Z86.79 S/P ABLATION OF ATRIAL FLUTTER: ICD-10-CM

## 2019-10-22 DIAGNOSIS — S70.02XA HEMATOMA OF LEFT HIP, INITIAL ENCOUNTER: ICD-10-CM

## 2019-10-22 DIAGNOSIS — I72.4 POPLITEAL ARTERY ANEURYSM (HCC): ICD-10-CM

## 2019-10-22 NOTE — PATIENT INSTRUCTIONS
Vaccine Information Statement Influenza (Flu) Vaccine (Inactivated or Recombinant): What You Need to Know Many Vaccine Information Statements are available in Vietnamese and other languages. See www.immunize.org/vis Hojas de información sobre vacunas están disponibles en español y en muchos otros idiomas. Visite www.immunize.org/vis 1. Why get vaccinated? Influenza vaccine can prevent influenza (flu). Flu is a contagious disease that spreads around the [facility] every year, usually between October and May. Anyone can get the flu, but it is more dangerous for some people. Infants and young children, people 72years of age and older, pregnant women, and people with certain health conditions or a weakened immune system are at greatest risk of flu complications. Pneumonia, bronchitis, sinus infections and ear infections are examples of flu-related complications. If you have a medical condition, such as heart disease, cancer or diabetes, flu can make it worse. Flu can cause fever and chills, sore throat, muscle aches, fatigue, cough, headache, and runny or stuffy nose. Some people may have vomiting and diarrhea, though this is more common in children than adults. Each year thousands of people in the [facility] die from flu, and many more are hospitalized. Flu vaccine prevents millions of illnesses and flu-related visits to the doctor each year. 2. Influenza vaccines CDC recommends everyone 10months of age and older get vaccinated every flu season. Children 6 months through 6years of age may need 2 doses during a single flu season. Everyone else needs only 1 dose each flu season. It takes about 2 weeks for protection to develop after vaccination. There are many flu viruses, and they are always changing. Each year a new flu vaccine is made to protect against three or four viruses that are likely to cause disease in the upcoming flu season.  Even when the vaccine doesnt exactly match these viruses, it may still provide some protection. Influenza vaccine does not cause flu. Influenza vaccine may be given at the same time as other vaccines. 3. Talk with your health care provider Tell your vaccine provider if the person getting the vaccine: 
 Has had an allergic reaction after a previous dose of influenza vaccine, or has any severe, life-threatening allergies.  Has ever had Guillain-Barré Syndrome (also called GBS). In some cases, your health care provider may decide to postpone influenza vaccination to a future visit. People with minor illnesses, such as a cold, may be vaccinated. People who are moderately or severely ill should usually wait until they recover before getting influenza vaccine. Your health care provider can give you more information. 4. Risks of a reaction  Soreness, redness, and swelling where shot is given, fever, muscle aches, and headache can happen after influenza vaccine.  There may be a very small increased risk of Guillain-Barré Syndrome (GBS) after inactivated influenza vaccine (the flu shot). [facility] children who get the flu shot along with pneumococcal vaccine (PCV13), and/or DTaP vaccine at the same time might be slightly more likely to have a seizure caused by fever. Tell your health care provider if a child who is getting flu vaccine has ever had a seizure. People sometimes faint after medical procedures, including vaccination. Tell your provider if you feel dizzy or have vision changes or ringing in the ears. As with any medicine, there is a very remote chance of a vaccine causing a severe allergic reaction, other serious injury, or death. 5. What if there is a serious problem? An allergic reaction could occur after the vaccinated person leaves the clinic.  If you see signs of a severe allergic reaction (hives, swelling of the face and throat, difficulty breathing, a fast heartbeat, dizziness, or weakness), call 9-1-1 and get the person to the nearest hospital. 
 
For other signs that concern you, call your health care provider. Adverse reactions should be reported to the Vaccine Adverse Event Reporting System (VAERS). Your health care provider will usually file this report, or you can do it yourself. Visit the VAERS website at www.vaers. hhs.gov or call 8-361.729.4693. VAERS is only for reporting reactions, and VAERS staff do not give medical advice. 6. The National Vaccine Injury Compensation Program 
 
The [facility] Vaccine Injury Compensation Program (VICP) is a federal program that was created to compensate people who may have been injured by certain vaccines. Visit the VICP website at www.hrsa.gov/vaccinecompensation or call 4-378.464.6028 to learn about the program and about filing a claim. There is a time limit to file a claim for compensation. 7. How can I learn more?  Ask your health care provider.  Call your local or state health department.  Contact the Centers for Disease Control and Prevention (CDC): 
- Call 2-576.447.9550 (8-010-FSF-INFO) or 
- Visit CDCs influenza website at www.cdc.gov/flu Vaccine Information Statement (Interim) Inactivated Influenza Vaccine 8/15/2019 
[address] [id number] Department of [facility] [employer] Centers for Disease Control and Prevention Office Use Only Follow up in 6 months for annual medicare wellness-routine care and fasting labs to be done 3-5 days prior

## 2019-10-22 NOTE — PROGRESS NOTES
1. Have you been to the ER, urgent care clinic since your last visit? Hospitalized since your last visit? Yes 10- Patient [address] [facility] for fall/left hip pain 2. Have you seen or consulted any other health care providers outside of the [address] [place] [address] since your last visit? Include any pap smears or colon screening. No  
 
Physician order obtained. Patient completed adult immunization consent form. Allergies, contraindications and recommendations reviewed with patient. Seasonal influenza vaccine administered IM right deltoid. Patient tolerated well. Patient remained in office for 10 minutes after injection and no adverse reactions were noted. Lot # [id number] Exp Date: 06- [address] # [id number]

## 2019-11-11 LAB — INR, EXTERNAL: 2.5

## 2019-11-12 ENCOUNTER — TELEPHONE ANTICOAG (OUTPATIENT)
Dept: CARDIOLOGY CLINIC | Age: 84
End: 2019-11-12

## 2019-11-12 NOTE — PROGRESS NOTES
Verbal order and read back per Gabriela Montes De Oca, DO Continue Coumadin 2.5 mg daily- Only call patient with dose adjustments

## 2019-11-25 ENCOUNTER — TELEPHONE ANTICOAG (OUTPATIENT)
Dept: CARDIOLOGY CLINIC | Age: 84
End: 2019-11-25

## 2019-11-25 DIAGNOSIS — Z79.01 LONG TERM CURRENT USE OF ANTICOAGULANT THERAPY: ICD-10-CM

## 2019-11-25 DIAGNOSIS — I48.92 ATRIAL FLUTTER WITH RAPID VENTRICULAR RESPONSE (HCC): Primary | ICD-10-CM

## 2019-11-25 NOTE — PROGRESS NOTES
Verbal order and read back per Prashanth Mullen, DO Continue Coumadin 2.5 mg daily- Only call patient with dose adjustments

## 2019-12-09 ENCOUNTER — TELEPHONE ANTICOAG (OUTPATIENT)
Dept: CARDIOLOGY CLINIC | Age: 84
End: 2019-12-09

## 2019-12-09 LAB — INR, EXTERNAL: 1.8

## 2019-12-09 NOTE — PROGRESS NOTES
Verbal order and read back per Dylan Delgadillo NP Continue Coumadin 2.5 mg daily- Only call patient with dose adjustments Recheck in 2 weeks

## 2019-12-19 ENCOUNTER — OFFICE VISIT (OUTPATIENT)
Dept: CARDIOLOGY CLINIC | Age: 84
End: 2019-12-19

## 2019-12-19 VITALS
DIASTOLIC BLOOD PRESSURE: 60 MMHG | HEIGHT: 70 IN | BODY MASS INDEX: 31.07 KG/M2 | OXYGEN SATURATION: 98 % | HEART RATE: 65 BPM | WEIGHT: 217 LBS | SYSTOLIC BLOOD PRESSURE: 118 MMHG

## 2019-12-19 DIAGNOSIS — Z98.890 S/P ABLATION OF ATRIAL FLUTTER: Primary | ICD-10-CM

## 2019-12-19 DIAGNOSIS — Z79.01 LONG TERM CURRENT USE OF ANTICOAGULANT THERAPY: ICD-10-CM

## 2019-12-19 DIAGNOSIS — I26.99 OTHER PULMONARY EMBOLISM WITHOUT ACUTE COR PULMONALE, UNSPECIFIED CHRONICITY (HCC): ICD-10-CM

## 2019-12-19 DIAGNOSIS — Z86.79 S/P ABLATION OF ATRIAL FLUTTER: Primary | ICD-10-CM

## 2019-12-19 DIAGNOSIS — I48.92 ATRIAL FLUTTER WITH RAPID VENTRICULAR RESPONSE (HCC): ICD-10-CM

## 2019-12-19 NOTE — PROGRESS NOTES
Kallie Banegas presents today for Chief Complaint Patient presents with  Irregular Heart Beat  
  1 year follow up - no cardiac complaints Kallie Banegas preferred language for health care discussion is english/other. Is someone accompanying this pt? Wife Is the patient using any DME equipment during OV? no 
 
Depression Screening: 
3 most recent PHQ Screens 12/19/2019 Little interest or pleasure in doing things Not at all Feeling down, depressed, irritable, or hopeless Not at all Total Score PHQ 2 0 Learning Assessment: 
Learning Assessment 4/23/2019 PRIMARY LEARNER Patient HIGHEST LEVEL OF EDUCATION - PRIMARY LEARNER  GRADUATED HIGH SCHOOL OR GED  
[address] PRIMARY LEARNER HEARING  
CO-LEARNER CAREGIVER Yes [facility] (spouse) CO-LEARNER HIGHEST LEVEL OF EDUCATION GRADUATED HIGH SCHOOL OR GED  
BARRIERS CO-LEARNER HEARING  
PRIMARY LANGUAGE ENGLISH  
PRIMARY LANGUAGE CO-LEARNER ENGLISH  NEED No  
LEARNER PREFERENCE PRIMARY DEMONSTRATION  
  LISTENING  
  OTHER (COMMENT) LEARNER PREFERENCE CO-LEARNER READING  
LEARNING SPECIAL TOPICS No  
ANSWERED BY Madelyn Mullen (spouse) RELATIONSHIP SELF Abuse Screening: 
Abuse Screening Questionnaire 4/23/2019 Do you ever feel afraid of your partner? Sharon Skains Are you in a relationship with someone who physically or mentally threatens you? Sharon Skains Is it safe for you to go home? Kiara Dukes Fall Risk Fall Risk Assessment, last 12 mths 12/19/2019 Able to walk? Yes Fall in past 12 months? No  
Fall with injury? -  
Number of falls in past 12 months - Fall Risk Score - Pt currently taking Anticoagulant therapy? Warfarin Coordination of Care: 1. Have you been to the ER, urgent care clinic since your last visit? Hospitalized since your last visit? no 
 
2.  Have you seen or consulted any other health care providers outside of the [address] [place] [address] [place] since your last visit? Include any pap smears or colon screening.  no

## 2019-12-19 NOTE — PROGRESS NOTES
HPI: A 80year old male here for follow up. He is accompanied by his wife. Overall, he is doing very well. He had a BALAJI November 2017 without any atrial masses. His transthoracic echocardiogram initially was concerning for right atrial abnormality. He gets around without any issues but does have frequent falls and gait instability due to his left ankle previous injury and currently is wearing a brace. He denies new chest pain, dyspnea, orthopnea, PND, edema. Impression/Plan: 1. 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 Heidi Alexander. 
2. 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 3. Remote atrial flutter with ablation 2013 without recurrence. 4. History of transient cardiomyopathy 2010 normalized November 2017. 5. Hypertension, controlled. 6. Degenerative joint disease with previous right knee surgery as well as now with left ankle abnormality. 7. 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 He remains on coumadin with goal INR between 2 and 3. His last one was 1.8. He plans to check within the next week or so. No evidence of decompensated heart failure. The last EF was normal. I plan to see him back in one year and consider repeat echocardiogram at that point. His blood pressure is controlled. Past Medical History:  
Diagnosis Date  Ankle fracture, left approx 2011  
 medial, tibial  
 Arthritis   
 knees  Cardiac echocardiogram 06/16/2011 Normal LV systolic function. Mild conc LVH. Gr 1 DDfx. RVSP 35-40 mmHg. Marked LAE. Marked LISA. Mild-mod MR.  Mild-mod AI. Mild AoRE. Mild aortic arch dil.  Cardiac Holter monitor study 06/22/2011 Baseline sinus rhythm to sinus bradycardia, avg HR 60 bpm (range  bpm). Questionable chronotropic intolerance. No sustained arrhythmias. 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 Current Outpatient Medications Medication Sig Dispense Refill  warfarin (COUMADIN) 2.5 mg tablet TAKE ONE TABLET BY MOUTH ONCE DAILY 90 Tab 3  
 metoprolol succinate (TOPROL-XL) 25 mg XL tablet TAKE ONE TABLET BY MOUTH ONCE DAILY 90 Tab 1  cyanocobalamin (VITAMIN B-12) 1,000 mcg tablet Take 1,000 mcg by mouth daily. Indications: PREVENTION OF VITAMIN B12 DEFICIENCY    
 ascorbic acid, vitamin C, (VITAMIN C) 500 mg tablet Take 1,000 mg by mouth daily.  multivitamin (ONE A DAY) tablet Take 1 Tab by mouth daily.  glucosamine-D3-boswellia serr (OSTEO BI-FLEX) 1,500-400-100 mg-unit-mg Tab Take 1 Tab by mouth daily.  omega-3 fatty acids-vitamin e (FISH OIL) 1,000 mg cap Take 1 Cap by mouth two (2) times a day. Social History 
 reports that he quit smoking about 55 years ago. He smoked 1.00 pack per day. He has never used smokeless tobacco. 
 reports current alcohol use. Family History 
family history includes Cancer in his brother; Diabetes in his brother; Heart Failure in his brother; Pacemaker in his father and sister. Review of Systems Except as stated above include: 
Constitutional: Negative for fever, chills and malaise/fatigue. HEENT: No congestion or recent URI. Gastrointestinal: No nausea, vomiting, abdominal pain, bloody stools. Pulmonary:  Negative except as stated above. Cardiac:  Negative except as stated above. Musculoskeletal: Negative except as stated above. Neurological:  No localized symptoms. Skin:  Negative except as stated above. Psych:  Negative except as stated above. Endocrine:  Negative except as stated above. PHYSICAL EXAM 
BP Readings from Last 3 Encounters:  
12/19/19 118/60  
10/22/19 118/72  
10/21/19 130/86 Pulse Readings from Last 3 Encounters:  
12/19/19 65  
10/22/19 73  
04/23/19 66 Wt Readings from Last 3 Encounters:  
12/19/19 98.4 kg (217 lb) 10/22/19 98.9 kg (218 lb) 10/21/19 100.2 kg (221 lb) General:   Well developed, well groomed. Head/Neck:   No jugular venous distention No carotid bruits. No evidence of xanthelasma. Lungs:   No respiratory distress. Clear bilaterally. Heart:    Regular rate and rhythm. Normal S1/S2. Palpation of heart with normal point of maximum impulse. No significant murmurs, rubs or gallops. Abdomen:   Soft and nontender. No palpable abdominal mass or bruits. Extremities:   Intact peripheral pulses. No significant edema. Left ankle in brace. Neurological:   Alert and oriented to person, place, time. No focal neurological deficit visually. Skin:   No obvious rash Blood Pressure Metric: 
Monitor recommended and adjustments stated if needed.

## 2019-12-23 ENCOUNTER — TELEPHONE ANTICOAG (OUTPATIENT)
Dept: CARDIOLOGY CLINIC | Age: 84
End: 2019-12-23

## 2019-12-23 DIAGNOSIS — I48.92 ATRIAL FLUTTER WITH RAPID VENTRICULAR RESPONSE (HCC): Primary | ICD-10-CM

## 2019-12-23 DIAGNOSIS — I26.99 PULMONARY EMBOLISM WITHOUT ACUTE COR PULMONALE, UNSPECIFIED CHRONICITY, UNSPECIFIED PULMONARY EMBOLISM TYPE (HCC): ICD-10-CM

## 2019-12-23 DIAGNOSIS — Z79.01 LONG TERM CURRENT USE OF ANTICOAGULANT THERAPY: ICD-10-CM

## 2019-12-23 LAB — INR, EXTERNAL: 1.3

## 2019-12-23 NOTE — PROGRESS NOTES
Verbal order and read back per Anila Salas NP Coumadin 5mg for 2 days, then continue Coumadin 2.5 mg daily- Only call patient with dose adjustments Repeat INR in 2 weeks. Spoke with patient's wife and instructions on Coumadin dosing given. States understands instructions and no questions.

## 2020-01-01 ENCOUNTER — TELEPHONE ANTICOAG (OUTPATIENT)
Dept: CARDIOLOGY CLINIC | Age: 85
End: 2020-01-01

## 2020-01-01 ENCOUNTER — OFFICE VISIT (OUTPATIENT)
Dept: FAMILY MEDICINE CLINIC | Age: 85
End: 2020-01-01

## 2020-01-01 ENCOUNTER — OFFICE VISIT (OUTPATIENT)
Dept: VASCULAR SURGERY | Age: 85
End: 2020-01-01
Payer: MEDICARE

## 2020-01-01 ENCOUNTER — TELEPHONE (OUTPATIENT)
Dept: FAMILY MEDICINE CLINIC | Age: 85
End: 2020-01-01

## 2020-01-01 ENCOUNTER — HOSPITAL ENCOUNTER (OUTPATIENT)
Dept: LAB | Age: 85
Discharge: HOME | End: 2020-06-25
Payer: MEDICARE

## 2020-01-01 ENCOUNTER — OFFICE VISIT (OUTPATIENT)
Dept: CARDIOLOGY CLINIC | Age: 85
End: 2020-01-01
Payer: MEDICARE

## 2020-01-01 VITALS
BODY MASS INDEX: 30.78 KG/M2 | SYSTOLIC BLOOD PRESSURE: 136 MMHG | SYSTOLIC BLOOD PRESSURE: 136 MMHG | OXYGEN SATURATION: 94 % | OXYGEN SATURATION: 94 % | TEMPERATURE: 98.4 F | WEIGHT: 215 LBS | TEMPERATURE: 98.4 F | RESPIRATION RATE: 16 BRPM | RESPIRATION RATE: 16 BRPM | BODY MASS INDEX: 30.78 KG/M2 | HEART RATE: 56 BPM | HEIGHT: 70 IN | WEIGHT: 215 LBS | HEIGHT: 70 IN | HEART RATE: 56 BPM | DIASTOLIC BLOOD PRESSURE: 70 MMHG | DIASTOLIC BLOOD PRESSURE: 70 MMHG

## 2020-01-01 VITALS
DIASTOLIC BLOOD PRESSURE: 80 MMHG | OXYGEN SATURATION: 97 % | HEART RATE: 55 BPM | SYSTOLIC BLOOD PRESSURE: 144 MMHG | RESPIRATION RATE: 20 BRPM

## 2020-01-01 VITALS
DIASTOLIC BLOOD PRESSURE: 86 MMHG | OXYGEN SATURATION: 98 % | BODY MASS INDEX: 30.49 KG/M2 | HEART RATE: 52 BPM | WEIGHT: 213 LBS | HEIGHT: 70 IN | SYSTOLIC BLOOD PRESSURE: 132 MMHG

## 2020-01-01 DIAGNOSIS — I48.92 ATRIAL FLUTTER WITH RAPID VENTRICULAR RESPONSE (HCC): Primary | ICD-10-CM

## 2020-01-01 DIAGNOSIS — I26.09 OTHER ACUTE PULMONARY EMBOLISM WITH ACUTE COR PULMONALE (HCC): ICD-10-CM

## 2020-01-01 DIAGNOSIS — I26.99 PULMONARY EMBOLISM WITHOUT ACUTE COR PULMONALE, UNSPECIFIED CHRONICITY, UNSPECIFIED PULMONARY EMBOLISM TYPE (HCC): ICD-10-CM

## 2020-01-01 DIAGNOSIS — I10 ESSENTIAL HYPERTENSION: ICD-10-CM

## 2020-01-01 DIAGNOSIS — Z79.01 LONG TERM CURRENT USE OF ANTICOAGULANT THERAPY: ICD-10-CM

## 2020-01-01 DIAGNOSIS — Z00.00 MEDICARE ANNUAL WELLNESS VISIT, SUBSEQUENT: ICD-10-CM

## 2020-01-01 DIAGNOSIS — Z86.79 S/P ABLATION OF ATRIAL FLUTTER: ICD-10-CM

## 2020-01-01 DIAGNOSIS — I48.92 ATRIAL FLUTTER WITH RAPID VENTRICULAR RESPONSE (HCC): ICD-10-CM

## 2020-01-01 DIAGNOSIS — I72.4 FEMORAL ARTERY ANEURYSM, BILATERAL (HCC): ICD-10-CM

## 2020-01-01 DIAGNOSIS — I10 ESSENTIAL HYPERTENSION: Primary | ICD-10-CM

## 2020-01-01 DIAGNOSIS — I72.4 POPLITEAL ARTERY ANEURYSM (HCC): ICD-10-CM

## 2020-01-01 DIAGNOSIS — I42.9 CARDIOMYOPATHY, UNSPECIFIED TYPE (HCC): ICD-10-CM

## 2020-01-01 DIAGNOSIS — Z13.31 SCREENING FOR DEPRESSION: ICD-10-CM

## 2020-01-01 DIAGNOSIS — I26.99 OTHER PULMONARY EMBOLISM WITHOUT ACUTE COR PULMONALE, UNSPECIFIED CHRONICITY (HCC): ICD-10-CM

## 2020-01-01 DIAGNOSIS — Z98.890 S/P ABLATION OF ATRIAL FLUTTER: Primary | ICD-10-CM

## 2020-01-01 DIAGNOSIS — Z71.89 ADVANCED DIRECTIVES, COUNSELING/DISCUSSION: ICD-10-CM

## 2020-01-01 DIAGNOSIS — Z86.79 S/P ABLATION OF ATRIAL FLUTTER: Primary | ICD-10-CM

## 2020-01-01 DIAGNOSIS — Z98.890 S/P ABLATION OF ATRIAL FLUTTER: ICD-10-CM

## 2020-01-01 DIAGNOSIS — I72.4 POPLITEAL ARTERY ANEURYSM (HCC): Primary | ICD-10-CM

## 2020-01-01 DIAGNOSIS — E66.9 OBESITY WITH SERIOUS COMORBIDITY, UNSPECIFIED CLASSIFICATION, UNSPECIFIED OBESITY TYPE: ICD-10-CM

## 2020-01-01 DIAGNOSIS — E87.6 HYPOKALEMIA: ICD-10-CM

## 2020-01-01 LAB
ALBUMIN SERPL-MCNC: 3.5 G/DL (ref 3.4–5)
ALBUMIN/GLOB SERPL: 0.9 {RATIO} (ref 0.8–1.7)
ALP SERPL-CCNC: 86 U/L (ref 45–117)
ALT SERPL-CCNC: 21 U/L (ref 16–61)
ANION GAP SERPL CALC-SCNC: 5 MMOL/L (ref 3–18)
AST SERPL-CCNC: 17 U/L (ref 10–38)
BASOPHILS # BLD: 0 K/UL (ref 0–0.1)
BASOPHILS NFR BLD: 0 % (ref 0–2)
BILIRUB SERPL-MCNC: 0.7 MG/DL (ref 0.2–1)
BUN SERPL-MCNC: 14 MG/DL (ref 7–18)
BUN/CREAT SERPL: 16 (ref 12–20)
CALCIUM SERPL-MCNC: 8.7 MG/DL (ref 8.5–10.1)
CHLORIDE SERPL-SCNC: 110 MMOL/L (ref 100–111)
CHOLEST SERPL-MCNC: 146 MG/DL
CO2 SERPL-SCNC: 27 MMOL/L (ref 21–32)
CREAT SERPL-MCNC: 0.85 MG/DL (ref 0.6–1.3)
DIFFERENTIAL METHOD BLD: ABNORMAL
EOSINOPHIL # BLD: 0.1 K/UL (ref 0–0.4)
EOSINOPHIL NFR BLD: 2 % (ref 0–5)
ERYTHROCYTE [DISTWIDTH] IN BLOOD BY AUTOMATED COUNT: 13.9 % (ref 11.6–14.5)
GLOBULIN SER CALC-MCNC: 3.8 G/DL (ref 2–4)
GLUCOSE SERPL-MCNC: 93 MG/DL (ref 74–99)
HCT VFR BLD AUTO: 40 % (ref 36–48)
HDLC SERPL-MCNC: 45 MG/DL (ref 40–60)
HDLC SERPL: 3.2 {RATIO} (ref 0–5)
HGB BLD-MCNC: 13.4 G/DL (ref 13–16)
INR, EXTERNAL: 1.4
INR, EXTERNAL: 1.6
INR, EXTERNAL: 1.7
INR, EXTERNAL: 1.8
INR, EXTERNAL: 2
INR, EXTERNAL: 2.1
INR, EXTERNAL: 2.2
INR, EXTERNAL: 2.3
INR, EXTERNAL: 2.4
INR, EXTERNAL: 2.5
INR, EXTERNAL: 2.6
INR, EXTERNAL: 2.7
INR, EXTERNAL: 2.7
INR, EXTERNAL: 2.8
INR, EXTERNAL: 2.8
INR, EXTERNAL: 2.9
LDLC SERPL CALC-MCNC: 75.6 MG/DL (ref 0–100)
LEFT ABI: 1.28
LEFT ANTERIOR TIBIAL: 200 MMHG
LEFT ARM BP: 166 MMHG
LEFT CFA AP DIAM: 1.3 CM
LEFT CFA DIST SYS PSV: 59 CM/S
LEFT POP A DIST VEL RATIO: 0.94
LEFT POP A MID VEL RATIO: 1.61
LEFT POP A PROX VEL RATIO: 0.51
LEFT POPLITEAL DIST SYS PSV: 47 CM/S
LEFT POPLITEAL MID SYS PSV: 50 CM/S
LEFT POPLITEAL PROX SYS PSV: 31 CM/S
LEFT POSTERIOR TIBIAL: 213 MMHG
LEFT PROX PFA A PSV: 37 CM/S
LEFT SFA DIST VEL RATIO: 1.17
LEFT SFA MID VEL RATIO: 0.81
LEFT SFA PROX VEL RATIO: 1.08
LEFT SUPER FEMORAL DIST SYS PSV: 61 CM/S
LEFT SUPER FEMORAL MID SYS PSV: 52 CM/S
LEFT SUPER FEMORAL PROX SYS PSV: 64 CM/S
LIPID PROFILE,FLP: NORMAL
LYMPHOCYTES # BLD: 2.9 K/UL (ref 0.9–3.6)
LYMPHOCYTES NFR BLD: 39 % (ref 21–52)
Lab: 1.2 CM
Lab: 1.3 CM
Lab: 1.5 CM/S
Lab: 1.6 CM
Lab: 1.8 CM
Lab: 1.9 CM
MCH RBC QN AUTO: 30.9 PG (ref 24–34)
MCHC RBC AUTO-ENTMCNC: 33.5 G/DL (ref 31–37)
MCV RBC AUTO: 92.2 FL (ref 74–97)
MONOCYTES # BLD: 0.6 K/UL (ref 0.05–1.2)
MONOCYTES NFR BLD: 8 % (ref 3–10)
NEUTS SEG # BLD: 3.9 K/UL (ref 1.8–8)
NEUTS SEG NFR BLD: 51 % (ref 40–73)
PLATELET # BLD AUTO: 185 K/UL (ref 135–420)
PMV BLD AUTO: 11.7 FL (ref 9.2–11.8)
POTASSIUM SERPL-SCNC: 4 MMOL/L (ref 3.5–5.5)
PROT SERPL-MCNC: 7.3 G/DL (ref 6.4–8.2)
RBC # BLD AUTO: 4.34 M/UL (ref 4.7–5.5)
RIGHT ABI: 1.3
RIGHT ANTERIOR TIBIAL: 207 MMHG
RIGHT ARM BP: 160 MMHG
RIGHT CFA AP DIAM: 1.5 CM
RIGHT CFA DIST SYS PSV: 48 CM/S
RIGHT DIST OUTFLOW PSV: 48.9 CM/S
RIGHT DIST PTA PSV: 69 CM/S
RIGHT GRAFT 1 NAME: NORMAL
RIGHT INFLOW ARTERY PSV: 43 CM/S
RIGHT MID OUTFLOW PSV: 46 CM/S
RIGHT OUTFLOW VESSEL PSV: 51 CM/S
RIGHT POPLITEAL DIST SYS PSV: 51 CM/S
RIGHT POSTERIOR TIBIAL: 215 MMHG
RIGHT PROX OUTFLOW PSV: 40 CM/S
RIGHT PROX PFA A PSV: 38 CM/S
RIGHT SFA DIST VEL RATIO: 0.65
RIGHT SFA MID VEL RATIO: 0.9
RIGHT SFA PROX VEL RATIO: 1.2
RIGHT SUPER FEMORAL DIST SYS PSV: 36 CM/S
RIGHT SUPER FEMORAL MID SYS PSV: 55 CM/S
RIGHT SUPER FEMORAL PROX SYS PSV: 58 CM/S
SODIUM SERPL-SCNC: 142 MMOL/L (ref 136–145)
TRIGL SERPL-MCNC: 127 MG/DL (ref ?–150)
VLDLC SERPL CALC-MCNC: 25.4 MG/DL
WBC # BLD AUTO: 7.5 K/UL (ref 4.6–13.2)

## 2020-01-01 PROCEDURE — 80053 COMPREHEN METABOLIC PANEL: CPT

## 2020-01-01 PROCEDURE — G8427 DOCREV CUR MEDS BY ELIG CLIN: HCPCS | Performed by: INTERNAL MEDICINE

## 2020-01-01 PROCEDURE — G8427 DOCREV CUR MEDS BY ELIG CLIN: HCPCS | Performed by: SURGERY

## 2020-01-01 PROCEDURE — G8510 SCR DEP NEG, NO PLAN REQD: HCPCS | Performed by: SURGERY

## 2020-01-01 PROCEDURE — 1100F PTFALLS ASSESS-DOCD GE2>/YR: CPT | Performed by: SURGERY

## 2020-01-01 PROCEDURE — G8417 CALC BMI ABV UP PARAM F/U: HCPCS | Performed by: INTERNAL MEDICINE

## 2020-01-01 PROCEDURE — G8510 SCR DEP NEG, NO PLAN REQD: HCPCS | Performed by: INTERNAL MEDICINE

## 2020-01-01 PROCEDURE — 80061 LIPID PANEL: CPT

## 2020-01-01 PROCEDURE — 3288F FALL RISK ASSESSMENT DOCD: CPT | Performed by: SURGERY

## 2020-01-01 PROCEDURE — 99212 OFFICE O/P EST SF 10 MIN: CPT | Performed by: SURGERY

## 2020-01-01 PROCEDURE — G8536 NO DOC ELDER MAL SCRN: HCPCS | Performed by: SURGERY

## 2020-01-01 PROCEDURE — 93000 ELECTROCARDIOGRAM COMPLETE: CPT | Performed by: INTERNAL MEDICINE

## 2020-01-01 PROCEDURE — G8536 NO DOC ELDER MAL SCRN: HCPCS | Performed by: INTERNAL MEDICINE

## 2020-01-01 PROCEDURE — G8417 CALC BMI ABV UP PARAM F/U: HCPCS | Performed by: SURGERY

## 2020-01-01 PROCEDURE — 85025 COMPLETE CBC W/AUTO DIFF WBC: CPT

## 2020-01-01 PROCEDURE — 36415 COLL VENOUS BLD VENIPUNCTURE: CPT

## 2020-01-01 PROCEDURE — 99214 OFFICE O/P EST MOD 30 MIN: CPT | Performed by: INTERNAL MEDICINE

## 2020-01-01 RX ORDER — METOPROLOL SUCCINATE 25 MG/1
TABLET, EXTENDED RELEASE ORAL
Qty: 90 TAB | Refills: 1 | Status: SHIPPED | OUTPATIENT
Start: 2020-01-01 | End: 2020-01-01 | Stop reason: SDUPTHER

## 2020-01-01 RX ORDER — WARFARIN 2.5 MG/1
TABLET ORAL
Qty: 90 TAB | Refills: 3 | Status: SHIPPED | OUTPATIENT
Start: 2020-01-01 | End: 2020-01-01 | Stop reason: SDUPTHER

## 2020-01-01 RX ORDER — METOPROLOL SUCCINATE 25 MG/1
TABLET, EXTENDED RELEASE ORAL
Qty: 90 TAB | Refills: 3 | Status: SHIPPED | OUTPATIENT
Start: 2020-01-01 | End: 2021-01-01

## 2020-01-01 RX ORDER — WARFARIN 2.5 MG/1
TABLET ORAL
Qty: 180 TAB | Refills: 3 | Status: SHIPPED | OUTPATIENT
Start: 2020-01-01 | End: 2021-01-01 | Stop reason: ALTCHOICE

## 2020-01-01 RX ORDER — METOPROLOL SUCCINATE 25 MG/1
TABLET, EXTENDED RELEASE ORAL
Qty: 90 TAB | Refills: 1 | Status: CANCELLED | OUTPATIENT
Start: 2020-01-01

## 2020-01-06 LAB — INR, EXTERNAL: 2.1

## 2020-01-09 ENCOUNTER — TELEPHONE ANTICOAG (OUTPATIENT)
Dept: CARDIOLOGY CLINIC | Age: 85
End: 2020-01-09

## 2020-01-09 DIAGNOSIS — Z79.01 LONG TERM CURRENT USE OF ANTICOAGULANT THERAPY: ICD-10-CM

## 2020-01-09 DIAGNOSIS — I48.92 ATRIAL FLUTTER WITH RAPID VENTRICULAR RESPONSE (HCC): Primary | ICD-10-CM

## 2020-01-09 DIAGNOSIS — I26.99 PULMONARY EMBOLISM WITHOUT ACUTE COR PULMONALE, UNSPECIFIED CHRONICITY, UNSPECIFIED PULMONARY EMBOLISM TYPE (HCC): ICD-10-CM

## 2020-01-09 NOTE — PROGRESS NOTES
Verbal order and read back per Lashawn Decker NP  INR within therapeutic range: 2.1. Coumadin 2.5 mg daily- Recheck in 2 weeks.  (Only call patient with dose adjustments)

## 2020-01-20 LAB — INR, EXTERNAL: 1.7

## 2020-01-21 ENCOUNTER — TELEPHONE ANTICOAG (OUTPATIENT)
Dept: CARDIOLOGY CLINIC | Age: 85
End: 2020-01-21

## 2020-01-21 DIAGNOSIS — I26.99 PULMONARY EMBOLISM WITHOUT ACUTE COR PULMONALE, UNSPECIFIED CHRONICITY, UNSPECIFIED PULMONARY EMBOLISM TYPE (HCC): ICD-10-CM

## 2020-01-21 DIAGNOSIS — I48.92 ATRIAL FLUTTER WITH RAPID VENTRICULAR RESPONSE (HCC): Primary | ICD-10-CM

## 2020-01-21 DIAGNOSIS — Z79.01 LONG TERM CURRENT USE OF ANTICOAGULANT THERAPY: ICD-10-CM

## 2020-01-21 NOTE — PROGRESS NOTES
Verbal order and read back per Lisa Layne NP 
INR below therapeutic range: 1.7(1/20/2020). Coumadin 3.75 today, then continue Coumadin 2.5 mg daily- Recheck in 2 weeks. (Only call patient with dose adjustments) Patient's spouse made aware of dosing and recheck instructions and no questions.

## 2020-02-04 NOTE — PROGRESS NOTES
Verbal order and read back per Rosario Rodriguez NP 
INR within therapeutic range: 2.0(2/3/2020). Coumadin 2.5 mg daily- Recheck in 2 weeks. (Only call patient with dose adjustments)

## 2020-02-17 NOTE — PROGRESS NOTES
Verbal order and read back per Steve Butt, DO 
INR within therapeutic range Coumadin 2.5 mg daily- Recheck in 2 weeks. (Only call patient with dose adjustments)

## 2020-03-02 NOTE — PROGRESS NOTES
Verbal order and read back per Junito Villar, NP 
5 mg x 1 then Coumadin 2.5 mg daily- Recheck in 2 weeks. (Only call patient with dose adjustments) Spoke to patient wife states it is probably that he missed a dose. She is aware of instructions

## 2020-03-16 NOTE — PROGRESS NOTES
Verbal order and read back per Sylwia Pascal NP The INR is stable and therapeutic. Continue same dose of coumadin and recheck in 2 weeks Coumadin 2.5 mg daily- Recheck in 2 weeks. (Only call patient with dose adjustments)

## 2020-03-30 NOTE — PROGRESS NOTES
The INR is above the therapeutic range. Ask the patient about bleeding complications. Please make the following adjustments to Coumadin dosing:  Coumadin 2.5 mg daily- Recheck in 2 weeks. (Only call patient with dose adjustments) Repeat the INR in 2 weeks.

## 2020-05-13 NOTE — PROGRESS NOTES
Verbal order and read back per Estefania Muñoz NP Coumadin 2.5 mg daily- Recheck in 2 weeks. (Only call patient with dose adjustments)

## 2020-05-21 NOTE — TELEPHONE ENCOUNTER
Harry Mitchell at [facility]. Patient prescription has been shipped on 5/5/2020. No refill will be needed until he is seen in July. Patient and his wife advised.

## 2020-05-21 NOTE — TELEPHONE ENCOUNTER
This patient contacted office for the following prescriptions to be filled: 
 
Medication requested :  
Requested Prescriptions Pending Prescriptions Disp Refills  metoprolol succinate (TOPROL-XL) 25 mg XL tablet 90 Tab 1 Sig: TAKE 1 TABLET EVERY DAY  
 
[facility]P: [facility] or Print: [facility] or Local pharmacy [phone] Scheduled appointment if not seen by current providers in office: LOV 10/22/2019 -7/2/2020

## 2020-05-26 NOTE — PROGRESS NOTES
The INR is stable and therapeutic. Continue same dose of coumadin ( Take 5mg today then Coumadin 2.5 mg daily- Recheck in 2 weeks. (Only call patient with dose adjustments)) recheck in 2 weeks.

## 2020-06-10 NOTE — PROGRESS NOTES
Verbal order and read back per Iliana Chilel, DO The INR is stable and therapeutic. Continue same dose of coumadin and recheck in 2 weeks Coumadin 2.5 mg daily- Recheck in 2 weeks. (Only call patient with dose adjustments)

## 2020-06-23 NOTE — PROGRESS NOTES
Verbal order and read back per Diania Line, DO The INR is stable and therapeutic. Continue same dose of coumadin and recheck in 2 weeks Coumadin 2.5 mg daily- Recheck in 2 weeks. (Only call patient with dose adjustments)

## 2020-07-01 NOTE — TELEPHONE ENCOUNTER
Have you been diagnosed with, tested for, or told that you are suspected of having COVID-19 (coronovirus)? No 
Have you had a fever or taken medication to treat a fever in the past 72 hours? No 
Have you had a cough, SOB, or flu-like symptoms within the past 3 days? No 
Have you had direct contact with someone who tested positive for COVID-19 within the past 14 days? No 
Do you have a household member with flu-like symptoms, including fever, cough, or SOB? No 
Do you currently have flu-like symptoms including fever, cough, or SOB?  No

## 2020-07-02 NOTE — PATIENT INSTRUCTIONS
Medicare Wellness Visit, Male The best way to live healthy is to have a lifestyle where you eat a well-balanced diet, exercise regularly, limit alcohol use, and quit all forms of tobacco/nicotine, if applicable. Regular preventive services are another way to keep healthy. Preventive services (vaccines, screening tests, monitoring & exams) can help personalize your care plan, which helps you manage your own care. Screening tests can find health problems at the earliest stages, when they are easiest to treat. [place] follows the current, evidence-based guidelines published by the [facility] Yul[facility] ([facility]) when recommending preventive services for our patients. Because we follow these guidelines, sometimes recommendations change over time as research supports it. (For example, a prostate screening blood test is no longer routinely recommended for men with no symptoms). Of course, you and your doctor may decide to screen more often for some diseases, based on your risk and co-morbidities (chronic disease you are already diagnosed with). Preventive services for you include: - Medicare offers their members a free annual wellness visit, which is time for you and your primary care provider to discuss and plan for your preventive service needs. Take advantage of this benefit every year! 
-All adults over age 72 should receive the recommended pneumonia vaccines. Current USPSTF guidelines recommend a series of two vaccines for the best pneumonia protection.  
-All adults should have a flu vaccine yearly and tetanus vaccine every 10 years. 
-All adults age 48 and older should receive the shingles vaccines (series of two vaccines).       
-All adults age 38-68 who are overweight should have a diabetes screening test once every three years.  
-Other screening tests & preventive services for persons with diabetes include: an eye exam to screen for diabetic retinopathy, a kidney function test, a foot exam, and stricter control over your cholesterol.  
-Cardiovascular screening for adults with routine risk involves an electrocardiogram (ECG) at intervals determined by the provider.  
-Colorectal cancer screening should be done for adults age 54-65 with no increased risk factors for colorectal cancer. There are a number of acceptable methods of screening for this type of cancer. Each test has its own benefits and drawbacks. Discuss with your provider what is most appropriate for you during your annual wellness visit. The different tests include: colonoscopy (considered the best screening method), a fecal occult blood test, a fecal DNA test, and sigmoidoscopy. 
-All adults born between [facility] should be screened once for Hepatitis C. 
-An Abdominal Aortic Aneurysm (AAA) Screening is recommended for men age 73-68 who has ever smoked in their lifetime. Here is a list of your current Health Maintenance items (your personalized list of preventive services) with a due date: 
Health Maintenance Due Topic Date Due  Shingles Vaccine (1 of 2) 08/13/1982 [place] Annual Well Visit  04/23/2020

## 2020-07-02 NOTE — PROGRESS NOTES
SUBJECTIVE Chief Complaint Patient presents with  Hypertension  Cardiomyopathy Patient presents for follow-up. No acute complaints. Has been keeping up with vascular and cardiology. Has been checking INR at home as he is on coumadin. He has had a history of a GI bleed and a DVT/PE. He had profound anemia and hypokalemia but those have resolved based on follow-up labs. No increase in fatigued or dyspnea. No longer with GI bleeding or signs of that. No dizziness or lightheadedness. No chest pain. Latest labs confirm he is doing well. ROS: 
History obtained from the patient · Respiratory: no cough, shortness of breath, or wheezing · Cardiovascular: no chest pain, palpitations, or dyspnea on exertion · Gastrointestinal: no abdominal pain, N/V, or black or bloody stool. · Neurological: no numbness, tingling, headache or dizziness. No memory loss. OBJECTIVE Blood pressure 136/70, pulse (!) 56, temperature 98.4 °F (36.9 °C), temperature source Oral, resp. rate 16, height 5' 10\" (1.778 m), weight 215 lb (97.5 kg), SpO2 94 %. General:  Alert, cooperative, well appearing, in no apparent distress. ENT:  The tongue and mucous membranes are pink and moist without lesions. CV:  The heart sounds are regular in rate and rhythm. There is a normal S1 and S2.   
Lungs: Inspiratory and expiratory efforts are full and unlabored. Lung sounds are clear and equal to auscultation throughout all lung fields without wheezing, rales, or rhonchi. Abd: soft, nontender, nondistended. No HSM. No r/g. Ext: no swelling or tenderness. Gait is normal.   
Psych: normal affect. Mood good. Oriented x 3. Judgement and insight intact. Results for orders placed or performed during the hospital encounter of 06/25/20 CBC WITH AUTOMATED DIFF Result Value Ref Range WBC 7.5 4.6 - 13.2 K/uL  
 RBC 4.34 (L) 4.70 - 5.50 M/uL  
 HGB 13.4 13.0 - 16.0 g/dL HCT 40.0 36.0 - 48.0 % MCV 92.2 74.0 - 97.0 FL  
 MCH 30.9 24.0 - 34.0 PG  
 MCHC 33.5 31.0 - 37.0 g/dL  
 RDW 13.9 11.6 - 14.5 % PLATELET 697 629 - 143 K/uL MPV 11.7 9.2 - 11.8 FL  
 NEUTROPHILS 51 40 - 73 % LYMPHOCYTES 39 21 - 52 % MONOCYTES 8 3 - 10 % EOSINOPHILS 2 0 - 5 % BASOPHILS 0 0 - 2 %  
 ABS. NEUTROPHILS 3.9 1.8 - 8.0 K/UL  
 ABS. LYMPHOCYTES 2.9 0.9 - 3.6 K/UL  
 ABS. MONOCYTES 0.6 0.05 - 1.2 K/UL  
 ABS. EOSINOPHILS 0.1 0.0 - 0.4 K/UL  
 ABS. BASOPHILS 0.0 0.0 - 0.1 K/UL  
 DF AUTOMATED METABOLIC PANEL, COMPREHENSIVE Result Value Ref Range Sodium 142 136 - 145 mmol/L Potassium 4.0 3.5 - 5.5 mmol/L Chloride 110 100 - 111 mmol/L  
 CO2 27 21 - 32 mmol/L Anion gap 5 3.0 - 18 mmol/L Glucose 93 74 - 99 mg/dL BUN 14 7.0 - 18 MG/DL Creatinine 0.85 0.6 - 1.3 MG/DL  
 BUN/Creatinine ratio 16 12 - 20 GFR est AA >60 >60 ml/min/1.73m2 GFR est non-AA >60 >60 ml/min/1.73m2 Calcium 8.7 8.5 - 10.1 MG/DL Bilirubin, total 0.7 0.2 - 1.0 MG/DL  
 ALT (SGPT) 21 16 - 61 U/L  
 AST (SGOT) 17 10 - 38 U/L Alk. phosphatase 86 45 - 117 U/L Protein, total 7.3 6.4 - 8.2 g/dL Albumin 3.5 3.4 - 5.0 g/dL Globulin 3.8 2.0 - 4.0 g/dL A-G Ratio 0.9 0.8 - 1.7 LIPID PANEL Result Value Ref Range LIPID PROFILE Cholesterol, total 146 <200 MG/DL Triglyceride 127 <150 MG/DL  
 HDL Cholesterol 45 40 - 60 MG/DL  
 LDL, calculated 75.6 0 - 100 MG/DL VLDL, calculated 25.4 MG/DL  
 CHOL/HDL Ratio 3.2 0 - 5.0 ASSESSMENT / PLAN 
  ICD-10-CM ICD-9-CM 1. Essential hypertension I10 401.9 2. Atrial flutter with rapid ventricular response (HCC) I48.92 427.32 metoprolol succinate (TOPROL-XL) 25 mg XL tablet 3. [facility]omyopathy, unspecified type [facility] I42.9 425.4 4. S/P ablation of atrial flutter Z98.890 V45.89   
 Z86.79    
5. Popliteal artery aneurysm ([facility]) I72.4 442.3 6. Other pulmonary embolism without acute cor pulmonale, unspecified chronicity (HCC) I26.99 415.19   
7. Hypokalemia E87.6 276.8 8. Obesity with serious comorbidity, unspecified classification, unspecified obesity type E66.9 278.00 HTN - controlled. Cont current care. Paroxysmal atrial flutter s/p ablation 01/2013 - without clinical recurrence. He is on beta-blocker. He sees cardiology. Notes reviewed. Cardiomyopathy - History of biatrial enlargement and transient cardiomyopathy in 04/2010 likely due to atrial flutter and rapid response. His last echocardiogram was 2011 with normal function. Notes reviewed. History of popliteal aneurysm status post stenting - cont vascular follow-up. Reviewed notes. PE / DVT on anticoagulation - cont anticoagulation. He will watch closely for signs of bleeding. Cont per coumadin clinic.  Goal INR between 2-3 and stable.  
  
Hypokalemia - off potassium and normal.  
 
Obesity - diet and exercise as tolerated. All chart history elements were reviewed by me at the time of the visit even though marked at time of note closure. Patient understands our medical plan. Patient has provided input and agrees with goals. Alternatives have been explained and offered. All questions answered. The patient is to call if condition worsens or fails to improve. Follow-up and Dispositions · Return in about 6 months (around 1/2/2021) for routine care.

## 2020-07-02 NOTE — PATIENT INSTRUCTIONS
A Healthy Lifestyle: Care Instructions Your Care Instructions A healthy lifestyle can help you feel good, stay at a healthy weight, and have plenty of energy for both work and play. A healthy lifestyle is something you can share with your whole family. A healthy lifestyle also can lower your risk for serious health problems, such as high blood pressure, heart disease, and diabetes. You can follow a few steps listed below to improve your health and the health of your family. Follow-up care is a key part of your treatment and safety. Be sure to make and go to all appointments, and call your doctor if you are having problems. It's also a good idea to know your test results and keep a list of the medicines you take. How can you care for yourself at home? · Do not eat too much sugar, fat, or fast foods. You can still have dessert and treats now and then. The goal is moderation. · Start small to improve your eating habits. Pay attention to portion sizes, drink less juice and soda pop, and eat more fruits and vegetables. ? Eat a healthy amount of food. A 3-ounce serving of meat, for example, is about the size of a deck of cards. Fill the rest of your plate with vegetables and whole grains. ? Limit the amount of soda and sports drinks you have every day. Drink more water when you are thirsty. ? Eat at least 5 servings of fruits and vegetables every day. It may seem like a lot, but it is not hard to reach this goal. A serving or helping is 1 piece of fruit, 1 cup of vegetables, or 2 cups of leafy, raw vegetables. Have an apple or some carrot sticks as an afternoon snack instead of a candy bar. Try to have fruits and/or vegetables at every meal. 
· Make exercise part of your daily routine. You may want to start with simple activities, such as walking, bicycling, or slow swimming. Try to be active 30 to 60 minutes every day.  You do not need to do all 30 to 60 minutes all at once. For example, you can exercise 3 times a day for 10 or 20 minutes. Moderate exercise is safe for most people, but it is always a good idea to talk to your doctor before starting an exercise program. 
· Keep moving. Bree Locker the lawn, work in the garden, or Crispy Gamer. Take the stairs instead of the elevator at work. · If you smoke, quit. People who smoke have an increased risk for heart attack, stroke, cancer, and other lung illnesses. Quitting is hard, but there are ways to boost your chance of quitting tobacco for good. ? Use nicotine gum, patches, or lozenges. ? Ask your doctor about stop-smoking programs and medicines. ? Keep trying. In addition to reducing your risk of diseases in the future, you will notice some benefits soon after you stop using tobacco. If you have shortness of breath or asthma symptoms, they will likely get better within a few weeks after you quit. · Limit how much alcohol you drink. Moderate amounts of alcohol (up to 2 drinks a day for men, 1 drink a day for women) are okay. But drinking too much can lead to liver problems, high blood pressure, and other health problems. Family health If you have a family, there are many things you can do together to improve your health. · Eat meals together as a family as often as possible. · Eat healthy foods. This includes fruits, vegetables, lean meats and dairy, and whole grains. · Include your family in your fitness plan. Most people think of activities such as jogging or tennis as the way to fitness, but there are many ways you and your family can be more active. Anything that makes you breathe hard and gets your heart pumping is exercise. Here are some tips: 
? Walk to do errands or to take your child to school or the bus. 
? Go for a family bike ride after dinner instead of watching TV. Where can you learn more? Go to http://fouzia-bere.info/ Enter D042 in the search box to learn more about \"A Healthy Lifestyle: Care Instructions. \" Current as of: January 31, 2020               Content Version: 12.5 © 0301-8572 [employer]. Care instructions adapted under license by [employer] (which disclaims liability or warranty for this information). If you have questions about a medical condition or this instruction, always ask your healthcare professional. [address] any warranty or liability for your use of this information.

## 2020-07-02 NOTE — ACP (ADVANCE CARE PLANNING)
Advance Care Planning Advance Care Planning (ACP) Physician/NP/PA (Provider) Amelia Cortes Date of ACP Conversation: 7/2/2020 Conversation Conducted with:   Patient with Decision Making Capacity Health Care Decision Maker: 
 
Current Designated Health Care Decision Maker:  
(If there is a valid [place] named in the [address] [place] [address] Makers\" box in the ACP activity, but it is not visible above, be sure to open that field and then select the health care decision maker relationship (ie \"primary\") in the blank space to the right of the name.) Note: Assess and validate information in current ACP documents, as indicated. If no Authorized Decision Maker has previously been identified, then patient chooses [place]: \"Who would you like to name as your primary health care decision-maker? \" Name: Babr Hong    Relationship: Wife Phone number: [phone] \"Can this person be reached easily? \" Patience Sue \"Who would you like to name as your back-up decision maker? \" Name: Danie Mojica   Relationship: Son Phone number: [phone] \"Can this person be reached easily? \" Patience Sue Note: If the relationship of these Decision-Makers to the patient does NOT follow your state's Next of Kin hierarchy, recommend that patient complete ACP document that meets state-specific requirements to allow them to act on the patient's behalf when appropriate. Care Preferences: Hospitalization: \"If your health worsens and it becomes clear that your chance of recovery is unlikely, what would your preference be regarding hospitalization? \" \"would want unless no hope\" Ventilation: \"If you were in your present state of health and suddenly became very ill and were unable to breathe on your own, what would your preference be about the use of a ventilator (breathing machine) if it was available to you? \" Would want unless recovery unlikely \"If your health worsens and it becomes clear that your chance of recovery is unlikely, what would your preference be about the use of a ventilator (breathing machine) if it was available to you? \" \"Would not want if no hope. \" Resuscitation: \"CPR works best to restart the heart when there is a sudden event, like a heart attack, in someone who is otherwise healthy. Unfortunately, CPR does not typically restart the heart for people who have serious health conditions or who are very sick. \" \"In the event your heart stopped as a result of an underlying serious health condition, would you want attempts to be made to restart your heart (answer \"yes\" for attempt to resuscitate) or would you prefer a natural death (answer \"no\" for do not attempt to resuscitate)? \" Yes, unless no hope. NOTE: If the patient has a valid advance directive AND provides care preference(s) that are inconsistent with that prior directive, advise the patient to consider either: creating a new advance directive that complies with state-specific requirements; or, if that is not possible, orally revoking that prior directive in accordance with state-specific requirements, which must be documented in the EHR. Conversation Outcomes / Follow-Up Plan:  
review ACP annually Length of Voluntary ACP Conversation in minutes:  16 minutes Saint Coco, MD

## 2020-07-02 NOTE — PROGRESS NOTES
1. Have you been to the ER, urgent care clinic since your last visit? Hospitalized since your last visit? No 
 
2. Have you seen or consulted any other health care providers outside of the [address] [place] [address] since your last visit? Include any pap smears or colon screening.  No

## 2020-07-02 NOTE — PROGRESS NOTES
Chief Complaint Patient presents with [facility] Annual Wellness Visit This is the Subsequent Medicare Annual Wellness Exam, performed 12 months or more after the Initial AWV or the last Subsequent AWV I have reviewed the patient's medical history in detail and updated the computerized patient record. History Patient Active Problem List  
Diagnosis Code  Cardiomyopathy ([facility] I42.9  DJD (degenerative joint disease) M19.90  Atrial flutter with rapid ventricular response (HCC) I48.92  
 S/P ablation of atrial flutter Z98.890, Z86.79  
 Essential hypertension I10  Lower GI bleed K92.2  Symptomatic anemia D64.9  Obesity E66.9  
 Pulmonary embolism (HCC) I26.99  
 Long term current use of anticoagulant therapy Z79.01  
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  
Diagnosis Date  Ankle fracture, left approx 2011  
 medial, tibial  
 Arthritis   
 knees  Cardiac echocardiogram 06/16/2011 Normal LV systolic function. Mild conc LVH. Gr 1 DDfx. RVSP 35-40 mmHg. Marked LAE. Marked LISA. Mild-mod MR.  Mild-mod AI. Mild AoRE. Mild aortic arch dil.  Cardiac Holter monitor study 06/22/2011 Baseline sinus rhythm to sinus bradycardia, avg HR 60 bpm (range  bpm). Questionable chronotropic intolerance. No sustained arrhythmias.  Emphysema lung ([facility]  GERD (gastroesophageal reflux disease)  Glaucoma suspect   
 posterior vitreous detachment b/l, pterygium left eye, aseroid hyalosis / synchisis, b/l pseudophakia  H/O colonoscopy 04/13/2011 1 polyp thermally treated  History of atrial fibrillation 2009  Hypertension  Pulmonary embolism ([facility]  Unspecified adverse effect of anesthesia H/o A-fib immediately post colonoscopy. Past Surgical History:  
Procedure Laterality Date  CARDIAC SURG PROCEDURE UNLIST    
 hx atrial flutter ablation 2013  COLONOSCOPY N/A 10/6/2017 COLONOSCOPY performed by Bianca Downey MD at [address] HX COLONOSCOPY    
 HX GI    
 HX KNEE REPLACEMENT Right 2013 Dr. Mohan Shearer  HX ORTHOPAEDIC Right  TKR, Rembertdisha Shearer  HX OTHER SURGICAL    
 right popliteal artery aneurysm now repaired with Viabahn stenting  HX SVT ABLATION  2013  
 by Dr. Fredick Heimlich  HX TONSILLECTOMY  IA EGD DELIVER THERMAL ENERGY SPHNCTR/CARDIA GERD Current Outpatient Medications Medication Sig Dispense Refill  warfarin (COUMADIN) 2.5 mg tablet TAKE ONE TABLET BY MOUTH ONCE DAILY 90 Tab 3  cyanocobalamin (VITAMIN B-12) 1,000 mcg tablet Take 1,000 mcg by mouth daily. Indications: PREVENTION OF VITAMIN B12 DEFICIENCY    
 ascorbic acid, vitamin C, (VITAMIN C) 500 mg tablet Take 1,000 mg by mouth daily.  multivitamin (ONE A DAY) tablet Take 1 Tab by mouth daily.  glucosamine-D3-boswellia serr (OSTEO BI-FLEX) 1,500-400-100 mg-unit-mg Tab Take 1 Tab by mouth daily.  omega-3 fatty acids-vitamin e (FISH OIL) 1,000 mg cap Take 1 Cap by mouth two (2) times a day.  metoprolol succinate (TOPROL-XL) 25 mg XL tablet TAKE 1 TABLET EVERY DAY 90 Tab 1 No Known Allergies Family History Problem Relation Age of Onset  Pacemaker Father  Pacemaker Sister  Heart Failure Brother  Diabetes Brother  Cancer Brother Lung Cancer Social History Tobacco Use  Smoking status: Former Smoker Packs/day: 1.00 Last attempt to quit: 1965 Years since quittin.5  Smokeless tobacco: Never Used Substance Use Topics  Alcohol use: Yes Comment: occasionally. Depression Risk Factor Screening:  
 
3 most recent PHQ Screens 2020 Little interest or pleasure in doing things Not at all Feeling down, depressed, irritable, or hopeless Not at all Total Score PHQ 2 0 Alcohol Risk Factor Screening (MALE > 65):  
 Do you average more 1 drink per night or more than 7 drinks a week: No 
 
In the past three months have you have had more than 4 drinks containing alcohol on one occasion: No 
 
Functional Ability and Level of Safety:  
Hearing: The patient needs further evaluation. Wife says he needs hearing aides. Has seen ENT. Too expensive. Activities of Daily Living: The home contains: seated shower, walker Patient does total self care Ambulation: with no difficulty Fall Risk: 
Fall Risk Assessment, last 12 mths 7/2/2020 Able to walk? Yes Fall in past 12 months? Yes Fall with injury? Yes  
Number of falls in past 12 months 4 Fall Risk Score 5 Abuse Screen: 
Patient is not abused Cognitive Screening Has your family/caregiver stated any concerns about your memory: no 
 
Patient Care Team  
Patient Care Team: Oxana Ny MD as PCP - [facility], [place]) Oxana Ny MD [facility] PCP [facility] Bairon Burden MD (Cardiology) Gilberto Tracey NP (Nurse Practitioner) Melissa Kraus MD (Gastroenterology) PARAMJIT Dumont (Physician Assistant) Tanner Robert MD (Vascular Surgery) Christianne Reina MD (Ophthalmology) Assessment/Plan Education and counseling provided: 
Are appropriate based on today's review and evaluation ICD-10-CM ICD-9-CM 1. Medicare annual wellness visit, subsequent Z00.00 V70.0 2. Advanced directives, counseling/discussion Z71.89 V65.49 ADVANCE CARE PLANNING FIRST 30 MINS 3. Screening for depression Z13.31 V79.0 Kiki Ho Age and sex specific counseling. Screening for depression and alcoholism. Advanced directives / end of life planning discussion - see ACP note. Care team updated. Vaccines are up to date. Medicare recommended screening and prevention test guidelines are filled out, reviewed, and provided to patient. Screening and prevention is up to date given his age-based recommendations. Patient understands our medical plan. Patient has provided input and agrees with goals. All questions answered.

## 2020-07-02 NOTE — PROGRESS NOTES
Chief Complaint Patient presents with [facility] Annual Wellness Visit 3 most recent PHQ Screens 7/2/2020 Little interest or pleasure in doing things Not at all Feeling down, depressed, irritable, or hopeless Not at all Total Score PHQ 2 0 Fall Risk Assessment, last 12 mths 7/2/2020 Able to walk? Yes Fall in past 12 months? Yes Fall with injury? Yes  
Number of falls in past 12 months 4 Fall Risk Score 5 Abuse Screening Questionnaire 7/2/2020 Do you ever feel afraid of your partner? Concepción Rueda Are you in a relationship with someone who physically or mentally threatens you? Concepción Rueda Is it safe for you to go home? Y  
 
1. Have you been to the ER, urgent care clinic since your last visit? Hospitalized since your last visit? No 
 
2. Have you seen or consulted any other health care providers outside of the [address] [place] [address] since your last visit? Include any pap smears or colon screening.  No

## 2020-07-06 NOTE — PROGRESS NOTES
Verbal order and read back per Roxanne Vasquez MD 
INR slighty above therapeutic range Coumadin 2.5 mg daily- Recheck in 2 weeks. Eat greens today. (Only call patient with dose adjustments) Patient's spouse made aware of dosing and recheck instructions, no questions.

## 2020-07-21 NOTE — PROGRESS NOTES
Verbal order and read back per Brayden Watt MD   Coumadin 2.5 mg daily- Recheck in 2 weeks. Eat greens today. (Only call patient with dose adjustments)

## 2020-08-05 NOTE — PROGRESS NOTES
Verbal order and read back per Francie Lemus NP 
1.25 mg x 1 then  Coumadin 2.5 mg daily- Recheck in 2 weeks. (Only call patient with dose adjustments) Patient wife aware instructions

## 2020-08-17 NOTE — PROGRESS NOTES
Verbal order and read back per Evelyn Vasquez, DO 
INR within therapeutic range Coumadin 2.5 mg daily- Recheck in 2 weeks. (Only call patient with dose adjustments)

## 2020-09-01 NOTE — PROGRESS NOTES
Verbal order and read back per Perla Holcomb NP 
INR below therapeutic range Coumadin 3.75mg x1, then continue Coumadin 2.5 mg daily- Recheck in 2 weeks. (Only call patient with dose adjustments)

## 2020-09-15 NOTE — PROGRESS NOTES
Verbal order and read back per Kristopher Esparza NP 
INR within therapeutic range 
continue Coumadin 2.5 mg daily- Recheck in 2 weeks. (Only call patient with dose adjustments)

## 2020-09-29 NOTE — PROGRESS NOTES
Verbal order and read back per Dennis Michael NP 
INR within therapeutic range 
continue Coumadin 2.5 mg daily- Recheck in 2 weeks. (Only call patient with dose adjustments)

## 2020-10-07 NOTE — PROGRESS NOTES
1. Have you been to the ER, urgent care clinic since your last visit? Hospitalized since your last visit? No  
 
2. Have you seen or consulted any other health care providers outside of the [address] [place] [address] since your last visit? Include any pap smears or colon screening. Yes Reason for visit: pcp 
 
 
 
 
3 most recent PHQ Screens 10/7/2020 Little interest or pleasure in doing things Not at all Feeling down, depressed, irritable, or hopeless Not at all Total Score PHQ 2 0

## 2020-10-07 NOTE — PROGRESS NOTES
[address] Chief Complaint Patient presents with  Leg Pain History and Physical   
Mr. 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 He has no complaints as far as claudication or rest pain symptoms but does complain of frequent falls due to a brace he wears for a left ankle deformity. He states that the brace sticks out a little bit on the bottom and he believes he may catch things with his brace and fall in his house. His wife also states it takes him about an hour to get this brace on. He denies any stroke or TIA-like symptoms, postprandial pain, or claudication or rest pain. Past Medical History:  
Diagnosis Date  Ankle fracture, left approx 2011  
 medial, tibial  
 Arthritis   
 knees  Cardiac echocardiogram 06/16/2011 Normal LV systolic function. Mild conc LVH. Gr 1 DDfx. RVSP 35-40 mmHg. Marked LAE. Marked LISA. Mild-mod MR.  Mild-mod AI. Mild AoRE. Mild aortic arch dil.  Cardiac Holter monitor study 06/22/2011 Baseline sinus rhythm to sinus bradycardia, avg HR 60 bpm (range  bpm). Questionable chronotropic intolerance. No sustained arrhythmias.  Emphysema lung ([address]  GERD (gastroesophageal reflux disease)  Glaucoma suspect   
 posterior vitreous detachment b/l, pterygium left eye, aseroid hyalosis / synchisis, b/l pseudophakia  H/O colonoscopy 04/13/2011 1 polyp thermally treated  History of atrial fibrillation 2009  Hypertension  Pulmonary embolism ([address]  Unspecified adverse effect of anesthesia H/o A-fib immediately post colonoscopy. Patient Active Problem List  
Diagnosis Code  Cardiomyopathy ([address] I42.9  DJD (degenerative joint disease) M19.90  Atrial flutter with rapid ventricular response (HCC) I48.92  
  S/P ablation of atrial flutter Z98.890, Z86.79  
 Essential hypertension I10  Lower GI bleed K92.2  Symptomatic anemia D64.9  Obesity E66.9  
 Pulmonary embolism (HCC) I26.99  
 Long term current use of anticoagulant therapy Z79.01  
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  
Procedure Laterality Date  CARDIAC SURG PROCEDURE UNLIST    
 hx atrial flutter ablation 2013  COLONOSCOPY N/A 10/6/2017 COLONOSCOPY performed by Laura Vital MD at [facility] HX COLONOSCOPY    
 HX GI    
 HX KNEE REPLACEMENT Right 02/19/2013 Dr. Luis Fierro  HX ORTHOPAEDIC Right 2/13 TKR, Luis Firero  HX OTHER SURGICAL    
 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  
 by Dr. Carlos Hayes  HX TONSILLECTOMY  GA EGD DELIVER THERMAL ENERGY SPHNCTR/CARDIA GERD Current Outpatient Medications Medication Sig Dispense Refill  metoprolol succinate (TOPROL-XL) 25 mg XL tablet TAKE 1 TABLET EVERY DAY 90 Tab 1  
 warfarin (COUMADIN) 2.5 mg tablet TAKE ONE TABLET BY MOUTH ONCE DAILY 90 Tab 3  cyanocobalamin (VITAMIN B-12) 1,000 mcg tablet Take 1,000 mcg by mouth daily. Indications: PREVENTION OF VITAMIN B12 DEFICIENCY    
 ascorbic acid, vitamin C, (VITAMIN C) 500 mg tablet Take 1,000 mg by mouth daily.  multivitamin (ONE A DAY) tablet Take 1 Tab by mouth daily.  glucosamine-D3-boswellia serr (OSTEO BI-FLEX) 1,500-400-100 mg-unit-mg Tab Take 1 Tab by mouth daily.  omega-3 fatty acids-vitamin e (FISH OIL) 1,000 mg cap Take 1 Cap by mouth two (2) times a day. No Known Allergies Social History Socioeconomic History  Marital status:  Spouse name: Not on file  Number of children: Not on file  Years of education: Not on file  Highest education level: Not on file Occupational History  Occupation: Retired Social Needs  Financial resource strain: Not on file  Food insecurity Worry: Not on file Inability: Not on file  Transportation needs Medical: Not on file Non-medical: Not on file Tobacco Use  Smoking status: Former Smoker Packs/day: 1.00 Last attempt to quit: 1965 Years since quittin.8  Smokeless tobacco: Never Used Substance and Sexual Activity  Alcohol use: Yes Comment: occasionally.  Drug use: No  
 Sexual activity: Not Currently Partners: Female Lifestyle  Physical activity Days per week: Not on file Minutes per session: Not on file  Stress: Not on file Relationships  Social connections Talks on phone: Not on file Gets together: Not on file Attends Yazdanism service: Not on file Active member of club or organization: Not on file Attends meetings of clubs or organizations: Not on file Relationship status: Not on file  Intimate partner violence Fear of current or ex partner: Not on file Emotionally abused: Not on file Physically abused: Not on file Forced sexual activity: Not on file Other Topics Concern  Not on file Social History Narrative  Not on file Family History Problem Relation Age of Onset  Pacemaker Father  Pacemaker Sister  Heart Failure Brother  Diabetes Brother  Cancer Brother Lung Cancer Review of Systems Review of Systems Constitutional: Negative for chills, diaphoresis, fever, malaise/fatigue and weight loss. HENT: Negative for hearing loss and sore throat. Eyes: Negative for blurred vision, photophobia and redness. Respiratory: Negative for cough, hemoptysis, shortness of breath and wheezing. Cardiovascular: Negative for chest pain, palpitations and orthopnea. Gastrointestinal: Negative for abdominal pain, blood in stool, constipation, diarrhea, heartburn, nausea and vomiting. Genitourinary: Negative for dysuria, frequency, hematuria and urgency. Musculoskeletal: Negative for back pain and myalgias. Skin: Negative for itching and rash. Neurological: Negative for dizziness, speech change, focal weakness, weakness and headaches. Endo/Heme/Allergies: Does not bruise/bleed easily. Psychiatric/Behavioral: Negative for depression and suicidal ideas. Physical Exam:   
Visit Vitals BP (!) 144/80 (BP 1 Location: Left arm, BP Patient Position: Sitting) Pulse (!) 55 Resp 20 SpO2 97% Physical Examination: General appearance - alert, well appearing, and in no distress Mental status - alert, oriented to person, place, and time Eyes - sclera anicteric, left eye normal, right eye normal 
Ears - right ear normal, left ear normal 
Nose - normal and patent, no erythema, discharge or polyps Mouth - mucous membranes moist, pharynx normal without lesions Abdomen - soft, nontender, nondistended, no masses or organomegaly Extremities -bilateral lower extremities well perfused. Palpable dorsalis pedis and posterior pulse bilaterally. No pulsatile masses palpated right popliteal fossa. Prominent popliteal pulse left popliteal fossa. Impression and Plan: ICD-10-CM ICD-9-CM 1. Popliteal artery aneurysm ([facility])  I72.4 442.3 DUPLEX LOW EXT ARTERIES WITH MIGUEL 2. Femoral artery aneurysm, bilateral ([facility])  I72.4 442.3 DUPLEX LOW EXT ARTERIES WITH MIGUEL I reviewed Mr. Brenda Almanza arterial duplex results with him. This shows that his right popliteal artery stent is widely patent without evidence of stenosis and no evidence of flow into his aneurysm. His femoral and popliteal aneurysms are of stable size. He is triphasic normal flow throughout his lower extremities bilaterally. I am pleased with these results. We will continue to follow him yearly with an duplexes to monitor his aneurysm as well as distal flow to his lower extremities.   We discussed Mr. Bailey Cluster may want to reach out to [facility] to see if there is anything they can do with his brace to prevent his falls. I will see him back in 1 years time. The treatment plan was reviewed with the patient in detail. The patient voiced understanding of this plan and all questions and concerns were addressed. The patient agrees with this plan. We discussed the signs and symptoms that would require earlier attention or intervention. The patient was given educational material related to his/her visit and the patient has voiced understanding of the material.  
 
I appreciate the opportunity to participate in the care of your patient. I will be sure to keep you informed of any subsequent changes in the treatment plan. If you have any questions or concerns, please feel free to contact me. Martha Blackwell MD 
 
PLEASE NOTE: 
This document has been produced using voice recognition software. Unrecognized errors in transcription may be present.

## 2020-10-13 NOTE — PROGRESS NOTES
Verbal order and read back per Ron Gardiner NP 
INR below therapeutic range Coumadin 5mg today, then continue Coumadin 2.5 mg daily- Recheck in 2 weeks. (Only call patient with dose adjustments) Patient's spouse made aware of dosing and recheck instructions, no questions.

## 2020-10-27 NOTE — PROGRESS NOTES
Verbal order and read back per Michel García, DO 
INR within therapeutic range 
continue Coumadin 2.5 mg daily- Recheck in 2 weeks. (Only call patient with dose adjustments)

## 2020-11-10 NOTE — PROGRESS NOTES
Verbal order and read back per Mega Adams, DO 
INR above therapeutic range Coumadin 1.25mg today, then continue Coumadin 2.5 mg daily- Recheck in 2 weeks. (Only call patient with dose adjustments) Patient's spouse made aware of dosing and recheck instructions, no questions.

## 2020-11-24 NOTE — PROGRESS NOTES
Verbal order and read back per Ena Bhardwaj, DO 
 continue Coumadin 2.5 mg daily- Recheck in 2 weeks. (Only call patient with dose adjustments)

## 2020-12-03 NOTE — PROGRESS NOTES
Jyothi Simmons presents today for   Chief Complaint   Patient presents with    Follow-up     1 year follow up       Jyothi Simmons preferred language for health care discussion is english/other. Is someone accompanying this pt? yes    Is the patient using any DME equipment during [address]? no    Depression Screening:  3 most recent PHQ Screens 12/3/2020   Little interest or pleasure in doing things Not at all   Feeling down, depressed, irritable, or hopeless Not at all   Total Score PHQ 2 0       Learning Assessment:  Learning Assessment 12/3/2020   PRIMARY LEARNER Patient   HIGHEST LEVEL OF EDUCATION - PRIMARY LEARNER  -   BARRIERS PRIMARY LEARNER -   [address] -   ANSWERED BY patient     -   RELATIONSHIP SELF       Abuse Screening:  Abuse Screening Questionnaire 12/3/2020   Do you ever feel afraid of your partner? N   Are you in a relationship with someone who physically or mentally threatens you? N   Is it safe for you to go home? Y       Fall Risk  Fall Risk Assessment, last 12 mths 12/3/2020   Able to walk? Yes   Fall in past 12 months? No   Fall with injury? -   Number of falls in past 12 months -   Fall Risk Score -       Pt currently taking Anticoagulant therapy? Warfarin    Coordination of Care:  1. Have you been to the ER, urgent care clinic since your last visit? Hospitalized since your last visit? no    2. Have you seen or consulted any other health care providers outside of the [facility] since your last visit? Include any pap smears or colon screening.  no

## 2020-12-03 NOTE — PROGRESS NOTES
History of Present Illness:  80year old male here for follow up. He is accompanied by family today. Overall he is doing well. Denies any new chest pain, dyspnea, PND, orthopnea, edema. He has been having some unsteadiness and loss of balance at times due to his left foot, which is currently in a brace. No head trauma, no bleeding issues. Impression:  1. 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 Lucia Kay, now with new vascular surgery. 2. 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 3. Remote atrial flutter with ablation 2013 without recurrence. 4. Transient cardiomyopathy in 2010, normalized ejection fraction November, 2017, last echocardiogram.  5. Hypertension, reasonably controlled. 6. DJD with previous right knee surgery, now as well as left ankle abnormality, in a brace. 7. 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 Plan:  He remains on Coumadin for both remote atrial flutter, as well as DVT and PE. He is somewhat immobile. Next year when I see him back I will plan to repeat an echocardiogram as he has lost a few pounds. There is no evidence of decompensated heart failure today. If he continues to have falls, we will need to readdress his Coumadin use. All questions answered. Past Medical History:   Diagnosis Date    Ankle fracture, left approx 2011    medial, tibial    Arthritis     knees    Cardiac echocardiogram 06/16/2011    Normal LV systolic function. Mild conc LVH. Gr 1 DDfx. RVSP 35-40 mmHg. Marked LAE. Marked LISA. Mild-mod MR.  Mild-mod AI. Mild AoRE. Mild aortic arch dil.  Cardiac Holter monitor study 06/22/2011    Baseline sinus rhythm to sinus bradycardia, avg HR 60 bpm (range  bpm). Questionable chronotropic intolerance. No sustained arrhythmias.     Emphysema lung (HCC)     GERD (gastroesophageal reflux disease)     Glaucoma suspect     posterior vitreous detachment b/l, pterygium left eye, aseroid hyalosis / synchisis, b/l pseudophakia    H/O colonoscopy 04/13/2011    1 polyp thermally treated    History of atrial fibrillation 2009    Hypertension     Pulmonary embol[facility] ([facility]     Unspecified adverse effect of anesthesia     H/o A-fib immediately post colonoscopy. Current Outpatient Medications   Medication Sig Dispense Refill    metoprolol succinate (TOPROL-XL) 25 mg XL tablet TAKE 1 TABLET EVERY DAY 90 Tab 3    warfarin (COUMADIN) 2.5 mg tablet TAKE ONE TABLET BY MOUTH ONCE DAILY 180 Tab 3    cyanocobalamin (VITAMIN B-12) 1,000 mcg tablet Take 1,000 mcg by mouth daily. Indications: PREVENTION OF VITAMIN B12 DEFICIENCY      ascorbic acid, vitamin C, (VITAMIN C) 500 mg tablet Take 1,000 mg by mouth daily.  multivitamin (ONE A DAY) tablet Take 1 Tab by mouth daily.  glucosamine-D3-boswellia serr (OSTEO BI-FLEX) 1,500-400-100 mg-unit-mg Tab Take 1 Tab by mouth daily.  omega-3 fatty acids-vitamin e (FISH OIL) 1,000 mg cap Take 1 Cap by mouth two (2) times a day. Social History   reports that he quit smoking about 55 years ago. He smoked 1.00 pack per day. He has never used smokeless tobacco.   reports current alcohol use. Family History  family history includes Cancer in his brother; Diabetes in his brother; Heart Failure in his brother; Pacemaker in his father and sister. Review of Systems  Except as stated above include:  Constitutional: Negative for fever, chills and malaise/fatigue. HEENT: No congestion or recent URI. Gastrointestinal: No nausea, vomiting, abdominal pain, bloody stools. Pulmonary:  Negative except as stated above. Cardiac:  Negative except as stated above. Musculoskeletal: Negative except as stated above. Neurological:  No localized symptoms. Skin:  Negative except as stated above. Psych:  Negative except as stated above. Endocrine:  Negative except as stated above.     PHYSICAL EXAM  BP Readings from Last 3 Encounters:   12/03/20 132/86   10/07/20 (!) 144/80   07/02/20 136/70     Pulse Readings from Last 3 Encounters:   12/03/20 (!) 52   10/07/20 (!) 55   07/02/20 (!) 56     Wt Readings from Last 3 Encounters:   12/03/20 96.6 kg (213 lb)   07/02/20 97.5 kg (215 lb)   07/02/20 97.5 kg (215 lb)     General:   Well developed, well groomed. Head/Neck:   No obvious jugular venous distention     No obvious carotid pulsations. No evidence of xanthelasma. Lungs:   No respiratory distress. Clear bilaterally. Heart:  [place] Loveless, regular. Normal S1/S2. Palpation grossly normal.    No significant murmurs, rubs or gallops. Abdomen:   Non-acute abdomen. No obvious pulsations. Extremities:   Intact peripheral pulses. No significant edema. Neurological:   Alert and oriented to person, place, time. No focal neurological deficit visually. Skin:   No obvious rash    Blood Pressure Metric:  Monitor recommended and adjustments stated if needed.

## 2020-12-08 NOTE — PROGRESS NOTES
Verbal order and read back per Zack Caller, DO   continue Coumadin 2.5 mg daily- Recheck in 2 weeks. (Only call patient with dose adjustments)

## 2021-01-01 ENCOUNTER — OFFICE VISIT (OUTPATIENT)
Dept: FAMILY MEDICINE CLINIC | Age: 86
End: 2021-01-01
Payer: MEDICARE

## 2021-01-01 ENCOUNTER — TELEPHONE (OUTPATIENT)
Dept: CARDIOLOGY CLINIC | Age: 86
End: 2021-01-01

## 2021-01-01 ENCOUNTER — TELEPHONE (OUTPATIENT)
Dept: FAMILY MEDICINE CLINIC | Age: 86
End: 2021-01-01

## 2021-01-01 ENCOUNTER — TELEPHONE ANTICOAG (OUTPATIENT)
Dept: CARDIOLOGY CLINIC | Age: 86
End: 2021-01-01

## 2021-01-01 VITALS
DIASTOLIC BLOOD PRESSURE: 68 MMHG | OXYGEN SATURATION: 97 % | RESPIRATION RATE: 16 BRPM | HEIGHT: 70 IN | HEART RATE: 60 BPM | BODY MASS INDEX: 30.92 KG/M2 | TEMPERATURE: 97.5 F | WEIGHT: 216 LBS | SYSTOLIC BLOOD PRESSURE: 128 MMHG

## 2021-01-01 DIAGNOSIS — I10 ESSENTIAL HYPERTENSION: Primary | ICD-10-CM

## 2021-01-01 DIAGNOSIS — Z79.01 LONG TERM CURRENT USE OF ANTICOAGULANT THERAPY: ICD-10-CM

## 2021-01-01 DIAGNOSIS — I26.99 PULMONARY EMBOLISM, UNSPECIFIED CHRONICITY, UNSPECIFIED PULMONARY EMBOLISM TYPE, UNSPECIFIED WHETHER ACUTE COR PULMONALE PRESENT (HCC): ICD-10-CM

## 2021-01-01 DIAGNOSIS — Z98.890 S/P ABLATION OF ATRIAL FLUTTER: ICD-10-CM

## 2021-01-01 DIAGNOSIS — I42.9 CARDIOMYOPATHY, UNSPECIFIED TYPE (HCC): ICD-10-CM

## 2021-01-01 DIAGNOSIS — I48.92 ATRIAL FLUTTER WITH RAPID VENTRICULAR RESPONSE (HCC): Primary | ICD-10-CM

## 2021-01-01 DIAGNOSIS — I72.4 POPLITEAL ARTERY ANEURYSM (HCC): ICD-10-CM

## 2021-01-01 DIAGNOSIS — Z86.79 S/P ABLATION OF ATRIAL FLUTTER: ICD-10-CM

## 2021-01-01 DIAGNOSIS — I48.92 ATRIAL FLUTTER WITH RAPID VENTRICULAR RESPONSE (HCC): ICD-10-CM

## 2021-01-01 DIAGNOSIS — E66.9 OBESITY WITH SERIOUS COMORBIDITY, UNSPECIFIED CLASSIFICATION, UNSPECIFIED OBESITY TYPE: ICD-10-CM

## 2021-01-01 DIAGNOSIS — E87.6 HYPOKALEMIA: ICD-10-CM

## 2021-01-01 DIAGNOSIS — I26.99 OTHER PULMONARY EMBOLISM WITHOUT ACUTE COR PULMONALE, UNSPECIFIED CHRONICITY (HCC): ICD-10-CM

## 2021-01-01 LAB — INR, EXTERNAL: 2.9

## 2021-01-01 PROCEDURE — G8427 DOCREV CUR MEDS BY ELIG CLIN: HCPCS | Performed by: FAMILY MEDICINE

## 2021-01-01 PROCEDURE — 99214 OFFICE O/P EST MOD 30 MIN: CPT | Performed by: FAMILY MEDICINE

## 2021-01-01 PROCEDURE — G0463 HOSPITAL OUTPT CLINIC VISIT: HCPCS | Performed by: FAMILY MEDICINE

## 2021-01-01 PROCEDURE — G8417 CALC BMI ABV UP PARAM F/U: HCPCS | Performed by: FAMILY MEDICINE

## 2021-01-01 PROCEDURE — G8510 SCR DEP NEG, NO PLAN REQD: HCPCS | Performed by: FAMILY MEDICINE

## 2021-01-01 PROCEDURE — G8536 NO DOC ELDER MAL SCRN: HCPCS | Performed by: FAMILY MEDICINE

## 2021-01-01 PROCEDURE — 1101F PT FALLS ASSESS-DOCD LE1/YR: CPT | Performed by: FAMILY MEDICINE

## 2021-01-04 NOTE — PROGRESS NOTES
1. Have you been to the ER, urgent care clinic since your last visit? Hospitalized since your last visit? No 
 
2. Have you seen or consulted any other health care providers outside of the [address] [place] [address] since your last visit? Include any pap smears or colon screening.  No

## 2021-01-04 NOTE — PROGRESS NOTES
SUBJECTIVE Chief Complaint Patient presents with  Hypertension  Other  
  a-flutter, h/o PE, hypokalemia Patient presents for follow-up. No acute complaints but has had falls. He seems to fall inside his home. He did have an AFO made which helps. He says he has two walkers at home and they help but he cannot maneuver it very well in some parts of his home. Has been keeping up with vascular and cardiology. He has had a history of a GI bleed and a DVT/PE. He has been on coumadin due to his mix of risk factors. Cardiology's plan as follows: 
Notes indicate to stay on Coumadin for both remote atrial flutter, as well as DVT and PE. They want to repeat an echocardiogram in 1 year. If he continues to have falls, they wanted to readdress his Coumadin use. He had profound anemia and hypokalemia but those have resolved based on follow-up labs. No increase in fatigued or dyspnea. No longer with GI bleeding or signs of that. No dizziness or lightheadedness. No chest pain. Latest labs confirm he is doing well. ROS: 
History obtained from the patient · Respiratory: no cough, shortness of breath, or wheezing · Cardiovascular: no chest pain, palpitations, or dyspnea on exertion · Gastrointestinal: no abdominal pain, N/V, or black or bloody stool. · Neurological: no numbness, tingling, headache or dizziness. No memory loss. OBJECTIVE Blood pressure 128/68, pulse 60, temperature 97.5 °F (36.4 °C), temperature source Oral, resp. rate 16, height 5' 10\" (1.778 m), weight 216 lb (98 kg), SpO2 97 %. General:  Alert, cooperative, well appearing, in no apparent distress. ENT:  The tongue and mucous membranes are pink and moist without lesions. CV:  The heart sounds are regular in rate and rhythm.   There is a normal S1 and S2.   
 Lungs:  Inspiratory and expiratory efforts are full and unlabored. Lung sounds are clear and equal to auscultation throughout all lung fields without wheezing, rales, or rhonchi. Abd: soft, nontender, nondistended. No HSM. No r/g. Ext: no tenderness. Gait is slow with favoring of right leg, left leg in AFO. Psych: normal affect. Mood good. Oriented x 3. Judgement and insight intact. ASSESSMENT / PLAN 
  ICD-10-CM ICD-9-CM 1. Essential hypertension  I10 401.9 CBC WITH AUTOMATED DIFF  
   METABOLIC PANEL, COMPREHENSIVE  
   LIPID PANEL 2. Atrial flutter with rapid ventricular response ([facility])  I48.92 427.32   
3. Cardiomyopathy, unspecified type ([facility]  I42.9 425.4 4. S/P ablation of atrial flutter  Z98.890 V45.89   
 Z86.79    
5. Popliteal artery aneurysm ([facility])  I72.4 442.3 6. Other pulmonary embolism without acute cor pulmonale, unspecified chronicity ([facility])  I26.99 415.19   
7. Hypokalemia  E87.6 276.8 8. Obesity with serious comorbidity, unspecified classification, unspecified obesity type  E66.9 278.00 HTN - controlled. Cont current care. Paroxysmal atrial flutter s/p ablation 01/2013 - without clinical recurrence. He is on beta-blocker. He sees cardiology. Notes reviewed. Cardiomyopathy - History of biatrial enlargement and transient cardiomyopathy in 04/2010 likely due to atrial flutter and rapid response. His last echocardiogram was 2011 with normal function. Notes reviewed. History of popliteal aneurysm status post stenting - cont vascular follow-up. Reviewed notes. PE / DVT on anticoagulation - cont anticoagulation. He will watch closely for signs of bleeding. Cont per coumadin clinic.  Goal INR between 2-3 and stable.  
  
Hypokalemia - off potassium and maintained normal.  
 
Obesity - healthy living for weight loss. We discussed his falls and advised him on increased walker use. I discussed the risk of being on coumadin. He is advised on trying a cane in his house in difficult to maneuver areas. All chart history elements were reviewed by me at the time of the visit even though marked at time of note closure. Patient understands our medical plan. Patient has provided input and agrees with goals. Alternatives have been explained and offered. All questions answered. The patient is to call if condition worsens or fails to improve. Follow-up and Dispositions · Return in about 6 months (around 7/4/2021).

## 2021-01-04 NOTE — PATIENT INSTRUCTIONS
Preventing Falls: Care Instructions Your Care Instructions Getting around your home safely can be a challenge if you have injuries or health problems that make it easy for you to fall. Loose rugs and furniture in walkways are among the dangers for many older people who have problems walking or who have poor eyesight. People who have conditions such as arthritis, osteoporosis, or dementia also have to be careful not to fall. You can make your home safer with a few simple measures. Follow-up care is a key part of your treatment and safety. Be sure to make and go to all appointments, and call your doctor if you are having problems. It's also a good idea to know your test results and keep a list of the medicines you take. How can you care for yourself at home? Taking care of yourself · You may get dizzy if you do not drink enough water. To prevent dehydration, drink plenty of fluids, enough so that your urine is light yellow or clear like water. Choose water and other caffeine-free clear liquids. If you have kidney, heart, or liver disease and have to limit fluids, talk with your doctor before you increase the amount of fluids you drink. · Exercise regularly to improve your strength, muscle tone, and balance. Walk if you can. Swimming may be a good choice if you cannot walk easily. · Have your vision and hearing checked each year or any time you notice a change. If you have trouble seeing and hearing, you might not be able to avoid objects and could lose your balance. · Know the side effects of the medicines you take. Ask your doctor or pharmacist whether the medicines you take can affect your balance. Sleeping pills or sedatives can affect your balance. · Limit the amount of alcohol you drink. Alcohol can impair your balance and other senses. · Ask your doctor whether calluses or corns on your feet need to be removed. If you wear loose-fitting shoes because of calluses or corns, you can lose your balance and fall. · Talk to your doctor if you have numbness in your feet. Preventing falls at home · Remove raised doorway thresholds, throw rugs, and clutter. Repair loose carpet or raised areas in the floor. · Move furniture and electrical cords to keep them out of walking paths. · Use nonskid floor wax, and wipe up spills right away, especially on ceramic tile floors. · If you use a walker or cane, put rubber tips on it. If you use crutches, clean the bottoms of them regularly with an abrasive pad, such as steel wool. · Keep your house well lit, especially [place], and outside walkways. Use night-lights in areas such as hallways and bathrooms. Add extra light switches or use remote switches (such as switches that go on or off when you clap your hands) to make it easier to turn lights on if you have to get up during the night. · Install sturdy handrails on stairways. · Move items in your cabinets so that the things you use a lot are on the lower shelves (about waist level). · Keep a cordless phone and a flashlight with new batteries by your bed. If possible, put a phone in each of the main rooms of your house, or carry a cell phone in case you fall and cannot reach a phone. Or, you can wear a device around your neck or wrist. You push a button that sends a signal for help. · Wear low-heeled shoes that fit well and give your feet good support. Use footwear with nonskid soles. Check the heels and soles of your shoes for wear. Repair or replace worn heels or soles. · Do not wear socks without shoes on wood floors. · Walk on the grass when the sidewalks are slippery. If you live in an area that gets snow and ice in the winter, sprinkle salt on slippery steps and sidewalks. Preventing falls in the bath · Install grab bars and nonskid mats inside and outside your shower or tub and near the toilet and sinks. · Use shower chairs and bath benches. · Use a hand-held shower head that will allow you to sit while showering. · Get into a tub or shower by putting the weaker leg in first. Get out of a tub or shower with your strong side first. 
· Repair loose toilet seats and consider installing a raised toilet seat to make getting on and off the toilet easier. · Keep your bathroom door unlocked while you are in the shower. Where can you learn more? Go to http://www.hansen.com/ Enter [id number] [id number] in the search box to learn more about \"Preventing Falls: Care Instructions. \" Current as of: April 15, 2020               Content Version: 12.6 © 1900-4097 [employer]. Care instructions adapted under license by [employer] (which disclaims liability or warranty for this information). If you have questions about a medical condition or this instruction, always ask your healthcare professional. [address] any warranty or liability for your use of this information. Preventing Outdoor Falls: Care Instructions Your Care Instructions Worries about falls don't need to keep you indoors. Outdoor activities like walking have big benefits for your health. You will need to watch your step and learn a few safety measures. If you are worried about having a fall outdoors, ask your doctor about exercises, classes, or physical therapy that may help. You can learn ways to gain strength, flexibility, and balance. Ask if it might help to use a cane or walker. You can make your time outdoors safer with a few simple measures. Follow-up care is a key part of your treatment and safety. Be sure to make and go to all appointments, and call your doctor if you are having problems. It's also a good idea to know your test results and keep a list of the medicines you take. How can you prevent falls outdoors? · Wear shoes with firm soles and low heels. If you have to walk on an icy surface, use grippers that can be worn over your shoes in bad weather. · Be extra careful if weather is bad. Walk on the grass when the sidewalks are slick. If you live in a place that gets snow and ice in the winter, sprinkle salt on slippery stairs and sidewalks. · Be careful getting on or off buses and trains or getting in and out of cars. If handrails are available, use them. · Be careful when you cross the street. Look for crosswalks or places where curb cuts or ramps are present. · Try not to hurry, especially if you are carrying something. · Be cautious in parking lots or garages. There may be curbs or changes in pavement, or the height of the pavement may vary. · Make sure to wear the correct eyeglasses, if you need them. Reading glasses or bifocals can make it harder to see hazards that might be in your way. · If you are walking outdoors for exercise, try to: 
? Walk in well-lighted, well-maintained areas. These include high school or college tracks, shopping malls, and public spaces. ? Walk with a partner. ? Watch out for cracked sidewalks, curbs, changes in the height of the pavement, exposed tree roots, and debris such as fallen leaves or branches. Where can you learn more? Go to http://www.gray.com/ Enter F570 in the search box to learn more about \"Preventing Outdoor Falls: Care Instructions. \" Current as of: April 15, 2020               Content Version: 12.6 © 5565-0376 [employer]. Care instructions adapted under license by [employer] (which disclaims liability or warranty for this information). If you have questions about a medical condition or this instruction, always ask your healthcare professional. [address] any warranty or liability for your use of this information. Learning About How to Make a Home Safe Making your home safe: Overview You can help protect the person in your care by making the home safe. Here are some general tips for how to lower the chance of getting injured in the home. · Pad sharp corners on furniture and counter tops. · Keep objects that are used often within easy reach. · Install handrails around the toilet and in the shower. Use a tub mat to prevent slipping. · Use a shower chair or bath bench when the person bathes. · Provide good lighting inside and outside the home. Put night-lights in bedrooms, hallways, and bathrooms. Have light at the top and bottom of stairways. · Have a first aid kit. It is also important to be aware of safe temperatures in the home. When helping someone bathe, use the back of your hand to test the water to make sure it's not too hot. Lower the temperature setting in the hot water heater to 120°F or lower to avoid burns. And make sure other liquids (such as coffee, tea, or soup) are not too hot. How can you protect from fire and carbon monoxide? Home safety alarms are very important. A smoke alarm can detect small amounts of smoke. This can allow time to escape from a fire. And a carbon monoxide alarm can let people know about this deadly gas before it starts to make them sick. · Put smoke alarms: 
? On each level of the home, in the hallway outside sleeping areas, and inside each bedroom. ? In the center of a ceiling, or on a wall 6 to 12 inches from the ceiling. This is where smoke goes first. Avoid places near doors, windows, or air ducts. · Put a carbon monoxide alarm in the hallway outside of the bedrooms in each sleeping area of the house. The alarm should be placed high on the wall. Make sure that the alarm can't be covered up by furniture or drapes. · Test alarms regularly by pressing the test button. · Replace non-lithium batteries in alarms twice a year. · Have a plan for getting out of the home if there is a fire. Practice by having a fire drill. · Keep a fire extinguisher in the kitchen. What can you do to prevent falls? You can help prevent falls by keeping rooms uncluttered, with clear walkways around furniture. Keep electrical cords off the floor, and remove throw rugs to prevent tripping. If there are steps in the home, make sure they all have handrails, and always use the handrails. Don't leave items on the steps, and be sure to fix any that are loose, broken, or uneven. How can you increase safety for people with dementia? If you are caring for someone who has dementia, you may need to make some extra changes to create a safe home. People with dementia have a loss of mental skills, such as memory, problem solving, and learning. So things that might not have been a danger to them before can cause safety problems now. Here are some things to consider: · Don't move furniture around. The person may become confused. · Use locks on doors and cupboards. Lock up knives, scissors, medicines, cleaning supplies, and other dangerous items. · Use hidden switches or controls for the stove, thermostat, water heater, and other appliances. · If your loved one is still cooking, think about whether that is safe. It may be okay with some help, depending on your loved one's condition. But for people who have memory or thinking problems, it's best to avoid any activities that might not be safe. · If the person tends to wander or to try to leave the home, install motion-sensor lights on all doors and windows. · Have emergency numbers in a central area near a phone. Include 911 and numbers for the doctor and family members. · Get medical alert jewelry for the person so you can be contacted if he or she wanders away. If possible, provide a safe place for wandering, such as an enclosed yard or garden. Where can you learn more? Go to http://www.gray.com/ Enter K997 in the search box to learn more about \"Learning About How to Make a Home Safe. \" Current as of: December 9, 2019               Content Version: 12.6 © 4773-8692 [employer]. Care instructions adapted under license by [employer] (which disclaims liability or warranty for this information). If you have questions about a medical condition or this instruction, always ask your healthcare professional. [address] any warranty or liability for your use of this information.

## 2021-01-05 NOTE — PROGRESS NOTES
The INR is above the therapeutic range. Ask the patient about bleeding complications. Please make the following adjustments to Coumadin dosing: . Hold x1 continue Coumadin 2.5 mg daily- Recheck in 2 weeks. (Only call patient with dose adjustments)

## 2021-01-05 NOTE — PROGRESS NOTES
Called patient to inform of INR instructions. Verified name and date of birth Patient verbalized understanding.

## 2021-01-26 NOTE — TELEPHONE ENCOUNTER
Pt's wife called to let Dr Hemalatha Stockton know that pt had a Mini Stroke and a Brain bleed on 1/6/2021 and was in [facility] and discharged after 14 days to [facility] in [place]. She states that they have taken pt off the RX Warfarin and she is having a hard time getting through to the cardiologist to let them know. But she just wanted to let Dr Ann Gilbert know what was going on. Doesn't require a call back at this time. Thank you

## 2021-01-26 NOTE — TELEPHONE ENCOUNTER
Patient wife called to say [facility] and now is at [facility] I will forward to Dr Caty Gracia

## 2021-01-27 NOTE — TELEPHONE ENCOUNTER
Pt is calling to follow up on the message below. Pt states she very concern and would like a call back. Please call pt at your earliest convenience.

## 2021-01-27 NOTE — TELEPHONE ENCOUNTER
Original message states that patient/wife does not need a call back. Message from today has been forwarded to Dr. [place] who prescribes patient's Warfarin.

## 2021-01-30 PROBLEM — I61.9 BLEEDING IN BRAIN (HCC): Status: ACTIVE | Noted: 2021-01-01

## 2021-01-30 PROBLEM — A41.9 SEPSIS (HCC): Status: ACTIVE | Noted: 2021-01-01

## 2021-01-30 PROBLEM — U07.1 PNEUMONIA DUE TO COVID-19 VIRUS: Status: ACTIVE | Noted: 2021-01-01

## 2021-01-30 PROBLEM — R09.02 HYPOXIA: Status: ACTIVE | Noted: 2021-01-01

## 2021-01-30 PROBLEM — N17.0 ACUTE KIDNEY INJURY (AKI) WITH ACUTE TUBULAR NECROSIS (ATN) (HCC): Status: ACTIVE | Noted: 2021-01-01

## 2021-01-30 PROBLEM — J12.82 PNEUMONIA DUE TO COVID-19 VIRUS: Status: ACTIVE | Noted: 2021-01-01

## 2021-01-30 PROBLEM — G93.41 METABOLIC ENCEPHALOPATHY: Status: ACTIVE | Noted: 2021-01-01

## 2021-01-30 PROBLEM — E87.0 HYPERNATREMIA: Status: ACTIVE | Noted: 2021-01-01

## 2021-01-30 PROBLEM — E86.0 DEHYDRATION: Status: ACTIVE | Noted: 2021-01-01

## 2021-10-07 DIAGNOSIS — I72.4 POPLITEAL ARTERY ANEURYSM (HCC): ICD-10-CM

## 2021-10-07 DIAGNOSIS — I72.4 FEMORAL ARTERY ANEURYSM, BILATERAL (HCC): ICD-10-CM

## 2024-02-27 NOTE — ACP (ADVANCE CARE PLANNING)
Call returned from [facility]. They state that due to insurance/system downtime issues, they have yet to send Venclexta to patient. Per Joselin at [facility], a pharmacist will be calling patient today and patient should have drug in hand by tomorrow.    Patient in today with wife to update ACP. Form witnessed and copies made and forms scanned in to medical record.

## (undated) DEVICE — PACK PROCEDURE SURG MAJ W/ BASIN LF

## (undated) DEVICE — SYRINGE MED 20ML STD CLR PLAS LUERLOCK TIP N CTRL DISP

## (undated) DEVICE — ENDOSCOPY PUMP TUBING/ CAP SET: Brand: ERBE

## (undated) DEVICE — FLEX ADVANTAGE 3000CC: Brand: FLEX ADVANTAGE

## (undated) DEVICE — GUIDEWIRE VASC L180CM DIA0.035IN TIP L5CM PTFE COAT S STL

## (undated) DEVICE — (D)PREP SKN CHLRAPRP APPL 26ML -- CONVERT TO ITEM 371833

## (undated) DEVICE — FLUFF AND POLYMER UNDERPAD,EXTRA HEAVY: Brand: WINGS

## (undated) DEVICE — 48" PROBE COVER W/GEL, ULTRASOUND, STERILE: Brand: SITE-RITE

## (undated) DEVICE — MEDI-VAC NON-CONDUCTIVE SUCTION TUBING: Brand: CARDINAL HEALTH

## (undated) DEVICE — NEEDLE ANGIO 1 WALL ULT SMOOTH 19GAX7CM MERIT AVANCE

## (undated) DEVICE — (D)PREP TY PVP SCRB 4OZ PAINT -- DISC BY MFR USE ITEM 102120

## (undated) DEVICE — DRAPE XR C ARM 41X74IN LF --

## (undated) DEVICE — SOLUTION IRRIG 1000ML H2O STRL BLT

## (undated) DEVICE — KIT CLN UP BON SECOURS MARYV

## (undated) DEVICE — (D)GLOVE EXAM LG NITRL NS -- DISC BY MFR NO SUB

## (undated) DEVICE — GLOVE SURG SZ 7 L11.33IN FNGR THK9.8MIL STRW LTX POLYMER

## (undated) DEVICE — SYR 50ML SLIP TIP NSAF LF STRL --

## (undated) DEVICE — BNDG ADHESIVE FABRIC 2X3.5IN -- CONVERT TO ITEM 357955

## (undated) DEVICE — SYRINGE MED 25GA 3ML L5/8IN SUBQ PLAS W/ DETACH NDL SFTY

## (undated) DEVICE — MEDI-VAC SUCTION HIGH CAPACITY: Brand: CARDINAL HEALTH

## (undated) DEVICE — AIRLIFE™ NASAL OXYGEN CANNULA CURVED, NONFLARED TIP WITH 14 FOOT (4.3 M) CRUSH-RESISTANT TUBING, OVER-THE-EAR STYLE: Brand: AIRLIFE™

## (undated) DEVICE — GOWN ISOL IMPERV UNIV, DISP, OPEN BACK, BLUE --

## (undated) DEVICE — GAUZE SPONGES,16 PLY: Brand: CURITY

## (undated) DEVICE — INTRODUCER SHTH 6FR CANN L11CM W/ MINI GWIRE UNIQUE SLIX

## (undated) DEVICE — SOLUTION IV 1000ML 0.9% SOD CHL

## (undated) DEVICE — GLOVE SURG SZ 7.5 L11.73IN FNGR THK9.8MIL STRW LTX POLYMER

## (undated) DEVICE — CANNULA ORIG TL CLR W FOAM CUSHIONS AND 14FT SUPL TB 3 CHN

## (undated) DEVICE — (D)SYR 10ML 1/5ML GRAD NSAF -- PKGING CHANGE USE ITEM 338027

## (undated) DEVICE — SYR LR LCK 1ML GRAD NSAF 30ML --

## (undated) DEVICE — CATHETER SUCT TR FL TIP 14FR W/ O CTRL